# Patient Record
Sex: FEMALE | Race: BLACK OR AFRICAN AMERICAN | HISPANIC OR LATINO | Employment: OTHER | URBAN - METROPOLITAN AREA
[De-identification: names, ages, dates, MRNs, and addresses within clinical notes are randomized per-mention and may not be internally consistent; named-entity substitution may affect disease eponyms.]

---

## 2017-08-28 ENCOUNTER — CONVERSION ENCOUNTER (OUTPATIENT)
Dept: MAMMOGRAPHY | Facility: CLINIC | Age: 72
End: 2017-08-28

## 2017-10-10 ENCOUNTER — CONVERSION ENCOUNTER (OUTPATIENT)
Dept: MAMMOGRAPHY | Facility: CLINIC | Age: 72
End: 2017-10-10

## 2018-05-24 PROBLEM — C50.312 MALIGNANT NEOPLASM OF LOWER-INNER QUADRANT OF LEFT FEMALE BREAST (HCC): Status: ACTIVE | Noted: 2018-05-24

## 2018-05-24 RX ORDER — ATORVASTATIN CALCIUM 20 MG/1
20 TABLET, FILM COATED ORAL
COMMUNITY
End: 2018-07-10 | Stop reason: SDUPTHER

## 2018-05-24 RX ORDER — ACETAMINOPHEN AND CODEINE PHOSPHATE 300; 30 MG/1; MG/1
1 TABLET ORAL EVERY 6 HOURS PRN
Refills: 0 | COMMUNITY
Start: 2018-04-03 | End: 2018-05-29 | Stop reason: ALTCHOICE

## 2018-05-24 RX ORDER — GABAPENTIN 300 MG/1
100 CAPSULE ORAL 3 TIMES DAILY
COMMUNITY
End: 2018-07-10 | Stop reason: SDUPTHER

## 2018-05-24 RX ORDER — ANASTROZOLE 1 MG/1
1 TABLET ORAL DAILY
COMMUNITY
End: 2018-10-26 | Stop reason: SDUPTHER

## 2018-05-24 RX ORDER — NIACIN 100 MG
100 TABLET ORAL
COMMUNITY
End: 2018-07-10 | Stop reason: ALTCHOICE

## 2018-05-24 RX ORDER — DULOXETIN HYDROCHLORIDE 20 MG/1
20 CAPSULE, DELAYED RELEASE ORAL 2 TIMES DAILY
COMMUNITY
End: 2018-07-10 | Stop reason: ALTCHOICE

## 2018-05-24 RX ORDER — B-COMPLEX WITH VITAMIN C
1 TABLET ORAL 2 TIMES DAILY WITH MEALS
COMMUNITY
End: 2018-07-10 | Stop reason: SDUPTHER

## 2018-05-24 RX ORDER — NAPROXEN 500 MG/1
1 TABLET ORAL EVERY 12 HOURS PRN
COMMUNITY
Start: 2013-06-21 | End: 2018-11-27

## 2018-05-24 RX ORDER — DICLOFENAC SODIUM 25 MG/1
50 TABLET, DELAYED RELEASE ORAL 2 TIMES DAILY
COMMUNITY
End: 2018-07-10 | Stop reason: ALTCHOICE

## 2018-05-24 RX ORDER — ONDANSETRON 4 MG/1
4 TABLET, FILM COATED ORAL ONCE
COMMUNITY
End: 2018-11-27

## 2018-05-29 ENCOUNTER — APPOINTMENT (OUTPATIENT)
Dept: RADIATION ONCOLOGY | Facility: CLINIC | Age: 73
End: 2018-05-29
Attending: RADIOLOGY
Payer: MEDICARE

## 2018-05-29 ENCOUNTER — RADIATION ONCOLOGY CONSULT (OUTPATIENT)
Dept: RADIATION ONCOLOGY | Facility: CLINIC | Age: 73
End: 2018-05-29

## 2018-05-29 VITALS
RESPIRATION RATE: 18 BRPM | BODY MASS INDEX: 39.34 KG/M2 | WEIGHT: 194.8 LBS | SYSTOLIC BLOOD PRESSURE: 118 MMHG | TEMPERATURE: 98 F | HEART RATE: 87 BPM | DIASTOLIC BLOOD PRESSURE: 80 MMHG

## 2018-05-29 DIAGNOSIS — Z17.0 MALIGNANT NEOPLASM OF LOWER-INNER QUADRANT OF LEFT BREAST IN FEMALE, ESTROGEN RECEPTOR POSITIVE (HCC): Primary | ICD-10-CM

## 2018-05-29 DIAGNOSIS — C50.312 MALIGNANT NEOPLASM OF LOWER-INNER QUADRANT OF LEFT BREAST IN FEMALE, ESTROGEN RECEPTOR POSITIVE (HCC): Primary | ICD-10-CM

## 2018-05-29 PROCEDURE — 99215 OFFICE O/P EST HI 40 MIN: CPT | Performed by: RADIOLOGY

## 2018-05-29 NOTE — PROGRESS NOTES
Consultation - Radiation Oncology     SJQ:[id number] : 1945  Encounter: [id number]  Patient Information: [address]  Chief Complaint   Patient presents with    Consult     Cancer Staging  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 [facility])  Staging form: Breast, AJCC 8th Edition  - Clinical: No stage assigned - Unsigned  - Pathologic: Stage IA (pT2, pN0, cM0, G1, ER: Positive, CO: Positive, HER2: Negative) - Signed by Sonja Butts MD on 2018           History of Present Illness   Nellie Fernandez is a 74y o  year old female who presents to radiation oncology for left breast cancer radiation treatment  Referred by [facility] Dr Eben Ramsey  Friend from [facility] present with patient during consult           Uzbek speaking only from [facility]  Bilateral mammogram 10/10/17 at [facility], BI-RAD 5, seen by Dr Eben Ramsey, [address] , 18 revealed low grade estrogen receptor positive progesterone receptor negative HER 2 negative invasive lobular carcinoma  Left sided partial mastectomy and sentinel lymph node biopsy on 18 with 2 lymph nodes negative for malignancy and 2 1cm low grade invasive lobular carcinoma with anterior margin focally involved       17 Screening mammogram at [facility]  Focal asymmetry LIQ BI-RAD 0     10/10/17 Left diagnostic mammogram with ultrasound  Left LIQ spiculated asymmetry  US:  At 7:00 6-7 cm FN irregular hypoechoic nodule taller than wide with acoustic shadowing 1 x 1 5 x 1 3 cm  BiRads 5  Results discussed with patient via hospital , f/u with breast care nurse navigator     18 ultrasound pelvis unremarkable uterus, poorly imaged ovaries, no free fluid     18 Consult with Dr Eben Ramsey  Biopsy recommended for a self palpated lump in left breast, 7:00 cm from nipple 1 0x 07x1  0 cm     18 Left breast ultrasound guided core biopsies  Infiltrating lobular carcinoma  Grade 1    ER 98% positive, VA negative, HER2 negative     2/15/18 XR chest 2 views no acute or focal abnormality within chest     18 Dr Italo Coppola, surgical oncology, [facility] f/u  Plan is for eft partial mastectomy with sentinel node biopsy and possible axillary lymph node dissection     3/16/18 MRI brain unremarkable no mets     4/3/18 Left partial mastectomy with 2 sentinel node biopsy  Invasive lobular carcinoma  Grade 1  Size at least 2 1 cm  Multiple foci of LCIS  LCIS present at superior margin  Invasive malignancy is also noted in close proximity to the superior margin of resection as well (although actual involvement of such is not identified)     18 Dr Italo Coppola, [facility] f/u  Plan re-excision of margin unknown date and unplanned at this time  Follow up 18 Seen by Dr Merry Carcamo, [facility] follow up 2018  Anastrozole started  Referral to [address] [place] [address] , Edie  Psychiatry appt scheduled in 2018  Screening  Tobacco  Current tobacco user: no  If yes, brief counseling provided:yes     Hypertension  Hypertension screening performed: yes  Normotensive:  yes  If no, referred to PCP:no     Depression Screening  Screened for depression using PHQ-2: yes     Screened for depression using PHQ-9: yes  Screening positive or negative:  positive  If score >4, was any of the following actions taken? Additional evaluation for depression, suicide risk assesment, referral to PCP or psychiatry, medication started:yes     Advanced Care Planning for Patients >65 years  Advanced Care Planning Discussed:  no  Patient named surrogate decision maker or care plan in chart:no     OB/GYN History:  The patient underwent menarche at 13 years  Menopause Status postmenopausal  Menopause at 37 years  Hormone replacement therapy:no  Years used    6  Para 5  Age at first delivery being 19 years     Nursing: yes  Birth control pills: no   Years used n/a  Gyncological comment: tubes tied approx 43years old     Historical Information      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 ([facility] )    2018 Biopsy     Left breast ultrasound guided core biopsies  Infiltrating lobular carcinoma  Grade 1  ER 98% positive, VA negative, HER2 negative           2018 Initial Diagnosis     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 ([facility] )         4/3/2018 Surgery     Left partial mastectomy with 2 sentinel node biopsy    Size at least 2 1 cm  Multiple foci of LCIS  LCIS present at superior margin  Invasive malignancy is also noted in close proximity to the superior margin of resection    Pathological staging: stage IIA pT2, pN0 sn, pMX, G1         4/3/2018 Genomic Testing     Oncotype DX    Recurrence score 13, 10 year risk of distance recurrence  SANCHEZ alone 8%  ER score 10 6 positive  VA score 5 1 negative  HER2 9 6 negative               Past Medical History:   Diagnosis Date    Anxiety     Breast cancer ([place] [address] )     Depression     Depression     Hypercholesterolemia     Hypertension     Migraine      Past Surgical History:   Procedure Laterality Date    BREAST BIOPSY      BREAST LUMPECTOMY       SECTION      CHOLECYSTECTOMY      SENTINEL LYMPH NODE BIOPSY         History reviewed  No pertinent family history      Social History   History   Alcohol Use No     History   Drug Use No     History   Smoking Status    Passive Smoke Exposure - Never Smoker   Smokeless Tobacco    Never Used         Meds/Allergies     Current Outpatient Prescriptions:     anastrozole (ARIMIDEX) 1 mg tablet, Take 1 mg by mouth daily, Disp: , Rfl:     atorvastatin (LIPITOR) 20 mg tablet, Take 20 mg by mouth daily at bedtime, Disp: , Rfl:     calcium carbonate-vitamin D (OSCAL-D) 500 mg-200 units per tablet, Take 1 tablet by mouth 2 (two) times a day with meals, Disp: , Rfl:     niacin 100 mg tablet, Take 100 mg by mouth daily at bedtime, Disp: , Rfl:     diclofenac (VOLTAREN) 25 MG EC tablet, Take 50 mg by mouth 2 (two) times a day, Disp: , Rfl:     DULoxetine (CYMBALTA) 20 mg capsule, Take 20 mg by mouth 2 (two) times a day, Disp: , Rfl:     gabapentin (NEURONTIN) 300 mg capsule, Take 100 mg by mouth 3 (three) times a day, Disp: , Rfl:     naproxen (NAPROSYN) 500 mg tablet, Take 1 tablet by mouth every 12 (twelve) hours as needed, Disp: , Rfl:     ondansetron (ZOFRAN) 4 mg tablet, Take 4 mg by mouth once, Disp: , Rfl:   Allergies   Allergen Reactions    Chocolate     Pork-Derived Products          Review of Systems  Constitutional: Positive for fatigue  Eyes: Positive for visual disturbance  Cataract surgery scheduled June 18, 2018   Respiratory: Negative  Cardiovascular: Positive for leg swelling  Gastrointestinal: Negative  Endocrine: Negative  Genitourinary: Positive for frequency  Dysuria   Neurological: Positive for headaches  Hematological: Negative  Psychiatric/Behavioral:        Depression      OBJECTIVE:   /80 (BP Location: Right arm, Patient Position: Sitting, Cuff Size: Large)   Pulse 87   Temp 98 °F (36 7 °C) (Temporal)   Resp 18   Wt 88 4 kg (194 lb 12 8 oz)   BMI 39 34 kg/m²   Pain Assessment:  2  Performance Status: ECOG/Zubrod/WHO: 1 - Symptomatic but completely ambulatory    Physical Exam   Constitutional: She appears well-developed  HENT:   Head: Normocephalic  Hair is normal    Mouth/Throat: Oropharynx is clear and moist    Eyes: EOM are normal  No scleral icterus  Neck: Neck supple  No spinous process tenderness present  No edema present  Cardiovascular: Normal rate and regular rhythm  Pulmonary/Chest: Effort normal and breath sounds normal  No respiratory distress  She has no wheezes  She exhibits no tenderness  She has no significant breast edema  She does have limitation for full extension of her left upper extremity    No suspicious lesions palpable in either breast   Her incisions are healing well without any sign of erythema or drainage noted  Abdominal: Soft  Bowel sounds are normal  She exhibits no distension, no ascites and no mass  There is no hepatosplenomegaly  There is no tenderness  There is no rebound  Genitourinary: No breast swelling or tenderness  Musculoskeletal: Normal range of motion  She exhibits no edema or tenderness  Lymphadenopathy:     She has no cervical adenopathy  She has no axillary adenopathy  Neurological: She is alert  She has normal strength  No cranial nerve deficit  Coordination and gait normal    Skin: Skin is intact  Psychiatric: She has a normal mood and affect  Her speech is normal and behavior is normal           RESULTS  Lab Results    Chemistry    No results found for: NA, K, CL, CO2, BUN, CREATININE, GLU No results found for: CALCIUM, ALKPHOS, AST, ALT, BILITOT         No results found for: WBC, HGB, HCT, MCV, PLT      Imaging Studies  No results found  Pathology:  Her outside pathology revealed 2 sentinel lymph nodes negative for metastasis  The left breast contained a 2 1 cm invasive lobular carcinoma grade 3  The anterior margin was focally involved with invasive carcinoma  The superior and inferior margin had invasive carcinoma 1 5 mm  Lateral margin 2 mm a medial margin 2  7 mm  Tumor was ERPR positive her 2-        ASSESSMENT  1   Malignant neoplasm of 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, estrogen receptor positive ([address] )  Radiation Simulation Treatment     Cancer Staging  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 ([address] )  Staging form: Breast, AJCC 8th Edition  - Clinical: No stage assigned - Unsigned  - Pathologic: Stage IA (pT2, pN0, cM0, G1, ER: Positive, ID: Positive, HER2: Negative) - Signed by Leatha Escamilla MD on 5/29/2018        PLAN/DISCUSSION  Orders Placed This Encounter   Procedures   [address] Treatment          David Willis is a 74y o  year old female with T2 N0 (stage IIA) invasive lobular carcinoma grade 1  Her tumor is ERPR positive her 2-  Her anterior margin is positive for invasive carcinoma and close in the superior and inferior aspect at 1 8 mm  Per her surgeon's note there is no plan for re-excision  I will confirm this with the surgeon  Should there be no reexcision the plan will be to proceed with adjuvant radiation therapy to the left whole breast   The plan will be to deliver a dose of 5000 cGy to the breast followed by a boost for an additional 1600 cGy in light of her margin status  The procedure involved was reviewed in detail with the patient  Of notice  was utilized for the entire visit  She understands the goal of treatment is to reduce her risk of recurrence  The possible acute and long-term side effects were reviewed with her  She has been scheduled for CT simulation at which time she will undergo both free breathing as well as breath hold scans for optimal cardiac dosimetry  She has been evaluated by Medical Oncology and recommended hormone therapy  She did undergo Oncotype DX which returned low risk  She is agreeable to the above outlined plan  Omid Schaefer MD  6/22/1115,9:25 PM      Portions of the record may have been created with voice recognition software   Occasional wrong word or "sound a like" substitutions may have occurred due to the inherent limitations of voice recognition software   Read the chart carefully and recognize, using context, where substitutions have occurred

## 2018-05-29 NOTE — PROGRESS NOTES
Deanna Oneal  1945  Ms Sp Bunch is a 74 y o  female    Chief Complaint   Patient presents with    Consult       Cancer Staging  No matching staging information was found for the patient  74year old female presents to radiation oncology for left breast cancer radiation treatment  [facility] Eb [facility]  Friend from [facility] present with patient during consult  Referral to [facility] speaking , Edie  Psychiatry appt scheduled in June 2018  Yi speaking only from [facility]  Bilateral mammogram 10/10/17 at [facility], BI-RAD 5, seen by Dr Eb Rodriguez, [facility] , 1/24/18 revealed low grade estrogen receptor positive progesterone receptor negative HER 2 negative invasive lobular carcinoma  Left sided partial mastectomy and sentinel lymph node biopsy on 4/2/18 with 2 lymph nodes negative for malignancy and 2 1cm low grade invasive lobular carcinoma with anterior margin focally involved  8/28/17 Screening mammogram at [facility]  Focal asymmetry LIQ BI-RAD 0    10/10/17 Left diagnostic mammogram with ultrasound  Left LIQ spiculated asymmetry  US:  At 7:00 6-7 cm FN irregular hypoechoic nodule taller than wide with acoustic shadowing 1 x 1 5 x 1 3 cm  BiRads 5  Results discussed with patient via hospital , f/u with breast care nurse navigator    1/19/18 ultrasound pelvis unremarkable uterus, poorly imaged ovaries, no free fluid    1/24/18 Consult with Dr Eb Rodriguez  Biopsy recommended for a self palpated lump in left breast, 7:00 cm from nipple 1 0x 07x1  0 cm    1/24/18 Left breast ultrasound guided core biopsies  Infiltrating lobular carcinoma  Grade 1  ER 98% positive, WI negative, HER2 negative    2/15/18 XR chest 2 views no acute or focal abnormality within chest    2/22/18 Dr Eb Rodriguez, surgical oncology, [facility]  f/u   Plan is for eft partial mastectomy with sentinel node biopsy and possible axillary lymph node dissection    3/16/18 MRI brain unremarkable no mets    4/3/18 Left partial mastectomy with 2 sentinel node biopsy  Invasive lobular carcinoma  Grade 1  Size at least 2 1 cm  Multiple foci of LCIS  LCIS present at superior margin  Invasive malignancy is also noted in close proximity to the superior margin of resection as well (although actual involvement of such is not identified)    4/16/18 Dr Caleb Darden, [facility] [address]  f/u  Plan re-excision of margin unknown date and unplanned at this time  Follow up July 2018 5/16/18 Seen by Dr Eric Metz, [facility] follow up July 2018  Anastrozole started         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 ([address] )    1/24/2018 Biopsy     Left breast ultrasound guided core biopsies  Infiltrating lobular carcinoma  Grade 1  ER 98% positive, MO negative, HER2 negative           1/24/2018 Initial Diagnosis     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 ([address] )         4/3/2018 Surgery     Left partial mastectomy with 2 sentinel node biopsy    Size at least 2 1 cm  Multiple foci of LCIS  LCIS present at superior margin  Invasive malignancy is also noted in close proximity to the superior margin of resection    Pathological staging: stage IIA pT2, pN0 sn, pMX, G1         4/3/2018 Genomic Testing     Oncotype DX    Recurrence score 13, 10 year risk of distance recurrence  SANCHEZ alone 8%  ER score 10 6 positive  MO score 5 1 negative  HER2 9 6 negative             Clinical Trial: no    Screening  Tobacco  Current tobacco user: no  If yes, brief counseling provided:yes    Hypertension  Hypertension screening performed: yes  Normotensive:  yes  If no, referred to PCP:no    Depression Screening  Screened for depression using PHQ-2: yes    Screened for depression using PHQ-9: yes  Screening positive or negative:  positive  If score >4, was any of the following actions taken?    Additional evaluation for depression, suicide risk assesment, referral to PCP or psychiatry, medication started:yes    Advanced Care Planning for Patients >65 years  Advanced Care Planning Discussed:  no  Patient named surrogate decision maker or care plan in chart:no    OB/GYN History:  The patient underwent menarche at 13 years  Menopause Status postmenopausal  Menopause at 37 years  Hormone replacement therapy:no  Years used    6  Para 5  Age at first delivery being 19 years  Nursing: yes  Birth control pills: no   Years used n/a  Gyncological comment: tubes tied approx 43years old      There are no preventive care reminders to display for this patient  Patient Active Problem List   Diagnosis    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 [facility])     Past Medical History:   Diagnosis Date    Anxiety     [facility] )     Depression     Depression     Hypercholesterolemia     Migraine      Past Surgical History:   Procedure Laterality Date    BREAST BIOPSY      BREAST LUMPECTOMY       SECTION      CHOLECYSTECTOMY      SENTINEL LYMPH NODE BIOPSY       History reviewed  No pertinent family history  Social History     Social History    Marital status: Unknown     Spouse name: N/A    Number of children: N/A    Years of education: N/A     Occupational History    Not on file       Social History Main Topics    Smoking status: Passive Smoke Exposure - Never Smoker    Smokeless tobacco: Never Used    Alcohol use No    Drug use: No    Sexual activity: Not on file     Other Topics Concern    Not on file     Social History Narrative    No narrative on file       Current Outpatient Prescriptions:     anastrozole (ARIMIDEX) 1 mg tablet, Take 1 mg by mouth daily, Disp: , Rfl:     atorvastatin (LIPITOR) 20 mg tablet, Take 20 mg by mouth daily at bedtime, Disp: , Rfl:     calcium carbonate-vitamin D (OSCAL-D) 500 mg-200 units per tablet, Take 1 tablet by mouth 2 (two) times a day with meals, Disp: , Rfl:     niacin 100 mg tablet, Take 100 mg by mouth daily at bedtime, Disp: , Rfl:     diclofenac (VOLTAREN) 25 MG EC tablet, Take 50 mg by mouth 2 (two) times a day, Disp: , Rfl:     DULoxetine (CYMBALTA) 20 mg capsule, Take 20 mg by mouth 2 (two) times a day, Disp: , Rfl:     gabapentin (NEURONTIN) 300 mg capsule, Take 100 mg by mouth 3 (three) times a day, Disp: , Rfl:     naproxen (NAPROSYN) 500 mg tablet, Take 1 tablet by mouth every 12 (twelve) hours as needed, Disp: , Rfl:     ondansetron (ZOFRAN) 4 mg tablet, Take 4 mg by mouth once, Disp: , Rfl:     Allergies   Allergen Reactions    Chocolate     Pork-Derived Products        Review of Systems:  Review of Systems   Constitutional: Positive for fatigue  Eyes: Positive for visual disturbance  Cataract surgery scheduled June 18, 2018   Respiratory: Negative  Cardiovascular: Positive for leg swelling  Gastrointestinal: Negative  Endocrine: Negative  Genitourinary: Positive for frequency  Dysuria   Neurological: Positive for headaches  Hematological: Negative  Psychiatric/Behavioral:        Depression        Vitals:    05/29/18 1346   BP: 118/80   BP Location: Right arm   Patient Position: Sitting   Cuff Size: Large   Pulse: 87   Resp: 18   Temp: 98 °F (36 7 °C)   TempSrc: Temporal   Weight: 88 4 kg (194 lb 12 8 oz)       Pain Score:   8    Imaging:No results found  Teaching: Bel[place]ian speaking patient provided RT packet with breast cancer teaching  Provided [place]ian teaching

## 2018-06-05 ENCOUNTER — APPOINTMENT (OUTPATIENT)
Dept: RADIATION ONCOLOGY | Facility: CLINIC | Age: 73
End: 2018-06-05
Payer: MEDICARE

## 2018-06-07 ENCOUNTER — APPOINTMENT (OUTPATIENT)
Dept: RADIATION ONCOLOGY | Facility: CLINIC | Age: 73
End: 2018-06-07
Attending: RADIOLOGY
Payer: MEDICARE

## 2018-06-07 PROCEDURE — 77290 THER RAD SIMULAJ FIELD CPLX: CPT | Performed by: RADIOLOGY

## 2018-06-14 ENCOUNTER — TELEPHONE (OUTPATIENT)
Dept: GASTROENTEROLOGY | Facility: MEDICAL CENTER | Age: 73
End: 2018-06-14

## 2018-06-20 DIAGNOSIS — Z17.0 MALIGNANT NEOPLASM OF LOWER-INNER QUADRANT OF LEFT BREAST IN FEMALE, ESTROGEN RECEPTOR POSITIVE (HCC): Primary | ICD-10-CM

## 2018-06-20 DIAGNOSIS — C50.312 MALIGNANT NEOPLASM OF LOWER-INNER QUADRANT OF LEFT BREAST IN FEMALE, ESTROGEN RECEPTOR POSITIVE (HCC): Primary | ICD-10-CM

## 2018-06-21 PROCEDURE — 77417 THER RADIOLOGY PORT IMAGE(S): CPT | Performed by: RADIOLOGY

## 2018-06-21 PROCEDURE — 77412 RADIATION TX DELIVERY LVL 3: CPT | Performed by: RADIOLOGY

## 2018-06-22 PROCEDURE — 77331 SPECIAL RADIATION DOSIMETRY: CPT | Performed by: RADIOLOGY

## 2018-06-22 PROCEDURE — 77412 RADIATION TX DELIVERY LVL 3: CPT | Performed by: RADIOLOGY

## 2018-06-25 ENCOUNTER — APPOINTMENT (EMERGENCY)
Dept: CT IMAGING | Facility: HOSPITAL | Age: 73
End: 2018-06-25
Payer: MEDICARE

## 2018-06-25 ENCOUNTER — HOSPITAL ENCOUNTER (EMERGENCY)
Facility: HOSPITAL | Age: 73
Discharge: HOME | End: 2018-06-25
Attending: EMERGENCY MEDICINE
Payer: MEDICARE

## 2018-06-25 VITALS
HEART RATE: 84 BPM | SYSTOLIC BLOOD PRESSURE: 146 MMHG | OXYGEN SATURATION: 100 % | WEIGHT: 191.58 LBS | DIASTOLIC BLOOD PRESSURE: 96 MMHG | BODY MASS INDEX: 38.69 KG/M2 | TEMPERATURE: 96.4 F | RESPIRATION RATE: 20 BRPM

## 2018-06-25 DIAGNOSIS — R11.10 VOMITING: Primary | ICD-10-CM

## 2018-06-25 DIAGNOSIS — R42 DIZZINESS: ICD-10-CM

## 2018-06-25 LAB
ALBUMIN SERPL BCP-MCNC: 4.2 G/DL (ref 3–5.2)
ALP SERPL-CCNC: 92 U/L (ref 43–122)
ALT SERPL W P-5'-P-CCNC: 33 U/L (ref 9–52)
AMORPH PHOS CRY URNS QL MICRO: ABNORMAL /HPF
ANION GAP SERPL CALCULATED.3IONS-SCNC: 8 MMOL/L (ref 5–14)
AST SERPL W P-5'-P-CCNC: 38 U/L (ref 14–36)
ATRIAL RATE: 84 BPM
BACTERIA UR QL AUTO: ABNORMAL /HPF
BASOPHILS # BLD AUTO: 0.17 THOUSAND/UL (ref 0–0.1)
BASOPHILS NFR MAR MANUAL: 2 % (ref 0–1)
BILIRUB SERPL-MCNC: 0.5 MG/DL
BILIRUB UR QL STRIP: NEGATIVE
BUN SERPL-MCNC: 13 MG/DL (ref 5–25)
CALCIUM SERPL-MCNC: 9.5 MG/DL (ref 8.4–10.2)
CHLORIDE SERPL-SCNC: 104 MMOL/L (ref 97–108)
CLARITY UR: ABNORMAL
CO2 SERPL-SCNC: 29 MMOL/L (ref 22–30)
COLOR UR: ABNORMAL
CREAT SERPL-MCNC: 0.56 MG/DL (ref 0.6–1.2)
EOSINOPHIL # BLD AUTO: 0.09 THOUSAND/UL (ref 0–0.4)
EOSINOPHIL NFR BLD MANUAL: 1 % (ref 0–6)
ERYTHROCYTE [DISTWIDTH] IN BLOOD BY AUTOMATED COUNT: 13.8 %
GFR SERPL CREATININE-BSD FRML MDRD: 106 ML/MIN/1.73SQ M
GLUCOSE SERPL-MCNC: 93 MG/DL (ref 70–99)
GLUCOSE UR STRIP-MCNC: NEGATIVE MG/DL
HCT VFR BLD AUTO: 36.9 % (ref 36–46)
HGB BLD-MCNC: 12.5 G/DL (ref 12–16)
HGB UR QL STRIP.AUTO: NEGATIVE
KETONES UR STRIP-MCNC: NEGATIVE MG/DL
LEUKOCYTE ESTERASE UR QL STRIP: 25
LIPASE SERPL-CCNC: 83 U/L (ref 23–300)
LYMPHOCYTES # BLD AUTO: 3.57 THOUSAND/UL (ref 0.5–4)
LYMPHOCYTES # BLD AUTO: 42 % (ref 20–50)
MCH RBC QN AUTO: 30 PG (ref 26–34)
MCHC RBC AUTO-ENTMCNC: 33.8 G/DL (ref 31–36)
MCV RBC AUTO: 89 FL (ref 80–100)
MONOCYTES # BLD AUTO: 1.11 THOUSAND/UL (ref 0.2–0.9)
MONOCYTES NFR BLD AUTO: 13 % (ref 1–10)
NEUTS BAND NFR BLD MANUAL: 2 % (ref 0–8)
NEUTS SEG # BLD: 3.57 THOUSAND/UL (ref 1.8–7.8)
NEUTS SEG NFR BLD AUTO: 40 %
NITRITE UR QL STRIP: NEGATIVE
NON-SQ EPI CELLS URNS QL MICRO: ABNORMAL /HPF
P AXIS: 53 DEGREES
PH UR STRIP.AUTO: 8 [PH] (ref 4.5–8)
PLATELET # BLD AUTO: 265 THOUSANDS/UL (ref 150–450)
PLATELET BLD QL SMEAR: ADEQUATE
PMV BLD AUTO: 8.4 FL (ref 8.9–12.7)
POTASSIUM SERPL-SCNC: 4.2 MMOL/L (ref 3.6–5)
PR INTERVAL: 154 MS
PROT SERPL-MCNC: 7.8 G/DL (ref 5.9–8.4)
PROT UR STRIP-MCNC: NEGATIVE MG/DL
QRS AXIS: -5 DEGREES
QRSD INTERVAL: 96 MS
QT INTERVAL: 400 MS
QTC INTERVAL: 472 MS
RBC # BLD AUTO: 4.16 MILLION/UL (ref 4–5.2)
RBC #/AREA URNS AUTO: ABNORMAL /HPF
RBC MORPH BLD: NORMAL
SODIUM SERPL-SCNC: 141 MMOL/L (ref 137–147)
SP GR UR STRIP.AUTO: 1.01 (ref 1–1.04)
T WAVE AXIS: 40 DEGREES
TOTAL CELLS COUNTED SPEC: 100
TROPONIN I SERPL-MCNC: <0.01 NG/ML (ref 0–0.03)
UROBILINOGEN UA: NEGATIVE MG/DL
VENTRICULAR RATE: 84 BPM
WBC # BLD AUTO: 8.5 THOUSAND/UL (ref 4.5–11)
WBC #/AREA URNS AUTO: ABNORMAL /HPF

## 2018-06-25 PROCEDURE — 93005 ELECTROCARDIOGRAM TRACING: CPT

## 2018-06-25 PROCEDURE — 84484 ASSAY OF TROPONIN QUANT: CPT | Performed by: EMERGENCY MEDICINE

## 2018-06-25 PROCEDURE — 96361 HYDRATE IV INFUSION ADD-ON: CPT

## 2018-06-25 PROCEDURE — 96374 THER/PROPH/DIAG INJ IV PUSH: CPT

## 2018-06-25 PROCEDURE — 80053 COMPREHEN METABOLIC PANEL: CPT | Performed by: EMERGENCY MEDICINE

## 2018-06-25 PROCEDURE — 93010 ELECTROCARDIOGRAM REPORT: CPT | Performed by: INTERNAL MEDICINE

## 2018-06-25 PROCEDURE — 36415 COLL VENOUS BLD VENIPUNCTURE: CPT | Performed by: EMERGENCY MEDICINE

## 2018-06-25 PROCEDURE — 81001 URINALYSIS AUTO W/SCOPE: CPT | Performed by: EMERGENCY MEDICINE

## 2018-06-25 PROCEDURE — 85027 COMPLETE CBC AUTOMATED: CPT | Performed by: EMERGENCY MEDICINE

## 2018-06-25 PROCEDURE — 99284 EMERGENCY DEPT VISIT MOD MDM: CPT

## 2018-06-25 PROCEDURE — 85007 BL SMEAR W/DIFF WBC COUNT: CPT | Performed by: EMERGENCY MEDICINE

## 2018-06-25 PROCEDURE — 83690 ASSAY OF LIPASE: CPT | Performed by: EMERGENCY MEDICINE

## 2018-06-25 PROCEDURE — 74177 CT ABD & PELVIS W/CONTRAST: CPT

## 2018-06-25 RX ORDER — ONDANSETRON 2 MG/ML
4 INJECTION INTRAMUSCULAR; INTRAVENOUS ONCE
Status: COMPLETED | OUTPATIENT
Start: 2018-06-25 | End: 2018-06-25

## 2018-06-25 RX ORDER — ONDANSETRON 2 MG/ML
INJECTION INTRAMUSCULAR; INTRAVENOUS
Status: COMPLETED
Start: 2018-06-25 | End: 2018-06-25

## 2018-06-25 RX ORDER — ONDANSETRON 4 MG/1
4 TABLET, ORALLY DISINTEGRATING ORAL ONCE
Status: CANCELLED | OUTPATIENT
Start: 2018-06-25 | End: 2018-06-25

## 2018-06-25 RX ADMIN — ONDANSETRON 4 MG: 2 INJECTION INTRAMUSCULAR; INTRAVENOUS at 16:54

## 2018-06-25 RX ADMIN — SODIUM CHLORIDE 1000 ML: 9 INJECTION, SOLUTION INTRAVENOUS at 16:49

## 2018-06-25 RX ADMIN — IOHEXOL 50 ML: 240 INJECTION, SOLUTION INTRATHECAL; INTRAVASCULAR; INTRAVENOUS; ORAL at 18:58

## 2018-06-25 RX ADMIN — IOHEXOL 80 ML: 350 INJECTION, SOLUTION INTRAVENOUS at 21:05

## 2018-06-25 RX ADMIN — ONDANSETRON 4 MG: 2 INJECTION, SOLUTION INTRAMUSCULAR; INTRAVENOUS at 16:54

## 2018-06-25 NOTE — ED NOTES
Complaining of feeling weak, dizziness and SOB "she feels like she is suffocating"  Denies CP Eliane Mortimer, RN  06/25/18 1905

## 2018-06-25 NOTE — ED PROVIDER NOTES
History  Chief Complaint   Patient presents with    Dizziness     pt c/o dizziness since last night/ pt also c/o vomiting & neck pain  Denies any injury     Patient presents with several days right-sided abdominal discomfort  Yesterday she began to developed vomiting and now having dizziness as well  She is also complaining of generalized fatigue  She had tried to get in touch with her primary care physician Dr Chuck Kirby but was unsuccessful  She denies chest pain headache diarrhea blood in the vomit or any other symptoms  History provided by:  Caregiver and patient   used: Yes    Dizziness   Quality:  Lightheadedness  Severity:  Moderate  Onset quality:  Gradual  Duration:  1 day  Timing:  Constant  Progression:  Waxing and waning  Chronicity:  New  Relieved by:  Nothing  Worsened by:  Nothing  Ineffective treatments:  None tried  Associated symptoms: nausea, vomiting and weakness    Vomiting:     Quality:  Stomach contents      Prior to Admission Medications   Prescriptions Last Dose Informant Patient Reported? Taking?    DULoxetine (CYMBALTA) 20 mg capsule   Yes No   Sig: Take 20 mg by mouth 2 (two) times a day   anastrozole (ARIMIDEX) 1 mg tablet   Yes No   Sig: Take 1 mg by mouth daily   atorvastatin (LIPITOR) 20 mg tablet   Yes No   Sig: Take 20 mg by mouth daily at bedtime   calcium carbonate-vitamin D (OSCAL-D) 500 mg-200 units per tablet   Yes No   Sig: Take 1 tablet by mouth 2 (two) times a day with meals   diclofenac (VOLTAREN) 25 MG EC tablet   Yes No   Sig: Take 50 mg by mouth 2 (two) times a day   gabapentin (NEURONTIN) 300 mg capsule   Yes No   Sig: Take 100 mg by mouth 3 (three) times a day   naproxen (NAPROSYN) 500 mg tablet   Yes No   Sig: Take 1 tablet by mouth every 12 (twelve) hours as needed   niacin 100 mg tablet   Yes No   Sig: Take 100 mg by mouth daily at bedtime   ondansetron (ZOFRAN) 4 mg tablet   Yes No   Sig: Take 4 mg by mouth once Facility-Administered Medications: None       Past Medical History:   Diagnosis Date    Anxiety     Breast cancer ([address] )     Depression     Depression     Hypercholesterolemia     Hypertension     Migraine        Past Surgical History:   Procedure Laterality Date    BREAST BIOPSY      BREAST LUMPECTOMY       SECTION      SENTINEL LYMPH NODE BIOPSY         History reviewed  No pertinent family history  I have reviewed and agree with the history as documented  Social History   Substance Use Topics    Smoking status: Passive Smoke Exposure - Never Smoker    Smokeless tobacco: Never Used    Alcohol use No        Review of Systems   Constitutional: Positive for appetite change, chills and fatigue  Gastrointestinal: Positive for nausea and vomiting  Neurological: Positive for dizziness and weakness  Physical Exam  Physical Exam   Constitutional: She is oriented to person, place, and time  She appears well-developed and well-nourished  HENT:   Head: Normocephalic  Eyes: Conjunctivae are normal  Pupils are equal, round, and reactive to light  Neck: Neck supple  Cardiovascular: Normal rate, regular rhythm and normal heart sounds  Pulmonary/Chest: Effort normal and breath sounds normal    Abdominal: Soft  Bowel sounds are normal  She exhibits no mass  There is tenderness  There is no rebound  Musculoskeletal: Normal range of motion  Neurological: She is alert and oriented to person, place, and time  Skin: Skin is warm and dry  Capillary refill takes less than 2 seconds  Psychiatric: She has a normal mood and affect  Vitals reviewed        Vital Signs  ED Triage Vitals [18 1534]   Temperature Pulse Respirations Blood Pressure SpO2   (!) 96 4 °F (35 8 °C) 83 22 144/94 96 %      Temp Source Heart Rate Source Patient Position - Orthostatic VS BP Location FiO2 (%)   Temporal Monitor Sitting Left arm --      Pain Score       Worst Possible Pain           Vitals: 06/25/18 1536 06/25/18 1845 06/25/18 1900 06/25/18 2210   BP: 144/94   146/96   Pulse: 82 83 85 84   Patient Position - Orthostatic VS: Sitting   Lying       Visual Acuity      ED Medications  Medications   ondansetron (ZOFRAN) injection 4 mg (4 mg Intravenous Given 6/25/18 1654)   sodium chloride 0 9 % bolus 1,000 mL (0 mL Intravenous Stopped 6/25/18 2201)   iohexol (OMNIPAQUE) 240 MG/ML solution **AcuDose Override Pull** (50 mL Oral Given 6/25/18 1858)   iohexol (OMNIPAQUE) 350 MG/ML injection (SINGLE-DOSE) 80 mL (80 mL Intravenous Given 6/25/18 2105)       Diagnostic Studies  Results Reviewed     Procedure Component Value Units Date/Time    Urine Microscopic [[id number]]  (Abnormal) Collected:  06/25/18 1723    Lab Status:  Final result Specimen:  Urine from Urine, Other Updated:  06/25/18 1746     RBC, UA 0-1 (A) /hpf      WBC, UA 1-2 (A) /hpf      Epithelial Cells Occasional /hpf      Bacteria, UA Occasional /hpf      AMORPH PHOSPATES Moderate /hpf     UA w Reflex to Microscopic [[id number]]  (Abnormal) Collected:  06/25/18 1723    Lab Status:  Final result Specimen:  Urine from Urine, Other Updated:  06/25/18 1745     Color, UA Straw     Clarity, UA Slightly Cloudy (A)     Specific Gravity, UA 1 015     pH, UA 8 0     Leukocytes, UA 25 0 (A)     Nitrite, UA Negative     Protein, UA Negative mg/dl      Glucose, UA Negative mg/dl      Ketones, UA Negative mg/dl      Bilirubin, UA Negative     Blood, UA Negative     UROBILINOGEN UA Negative mg/dL     Troponin I [[id number]]  (Normal) Collected:  06/25/18 1647    Lab Status:  Final result Specimen:  Blood from Arm, Right Updated:  06/25/18 1732     Troponin I <0 01 ng/mL     Lipase [[id number]]  (Normal) Collected:  06/25/18 1647    Lab Status:  Final result Specimen:  Blood from Arm, Right Updated:  06/25/18 1727     Lipase 83 u/L     Comprehensive metabolic panel [[id number]]  (Abnormal) Collected:  06/25/18 1647    Lab Status:  Final result Specimen:  Blood from Arm, Right Updated:  06/25/18 1727     Sodium 141 mmol/L      Potassium 4 2 mmol/L      Chloride 104 mmol/L      CO2 29 mmol/L      Anion Gap 8 mmol/L      BUN 13 mg/dL      Creatinine 0 56 (L) mg/dL      Glucose 93 mg/dL      Calcium 9 5 mg/dL      AST 38 (H) U/L      ALT 33 U/L      Alkaline Phosphatase 92 U/L      Total Protein 7 8 g/dL      Albumin 4 2 g/dL      Total Bilirubin 0 50 mg/dL      eGFR 106 ml/min/1 73sq m     Narrative:         National Kidney Disease Education Program recommendations are as follows:  GFR calculation is accurate only with a steady state creatinine  Chronic Kidney disease less than 60 ml/min/1 73 sq  meters  Kidney failure less than 15 ml/min/1 73 sq  meters  CBC and differential [[id number]]  (Abnormal) Collected:  06/25/18 1647    Lab Status:  Final result Specimen:  Blood from Arm, Right Updated:  06/25/18 1703     WBC 8 50 Thousand/uL      RBC 4 16 Million/uL      Hemoglobin 12 5 g/dL      Hematocrit 36 9 %      MCV 89 fL      MCH 30 0 pg      MCHC 33 8 g/dL      RDW 13 8 %      MPV 8 4 (L) fL      Platelets 724 Thousands/uL                  CT abdomen pelvis with contrast   Final Result by Tyrell Rivera MD (06/25 2121)      Hepatic steatosis  Workstation performed: [id number]                    Procedures  ECG 12 Lead Documentation  Date/Time: 6/25/2018 4:10 PM  Performed by: Leonides Klinefelter  Authorized by: Leonides Klinefelter     ECG reviewed by me, the ED Provider: yes    Patient location:  ED  Interpretation:     Interpretation: normal    Rate:     ECG rate assessment: normal    Rhythm:     Rhythm: sinus rhythm    Ectopy:     Ectopy: none    QRS:     QRS axis:  Left  ST segments:     ST segments:  Normal  T waves:     T waves: flattening               Phone Contacts  ED Phone Contact    ED Course  ED Course as of Jun 26 0003 Mon Jun 25, 2018   1708 Patient re-evaluated  She states that her nausea and vomiting have completely resolved after receiving Zofran    Her dizziness is improving as the IV fluids infuse  [facility]  Number of Diagnoses or Management Options  Dizziness:   Vomiting:   Diagnosis management comments: Patient was reassessed after labs and CT were resulted  Her symptoms remained completely resolved  She was feeling better was happy laughing and smiling with her family  She was advised the importance of close follow-up with her family physician as soon as possible  Also discussed the need to maintain a bland diet for the next 24 hr and then proper methods for advancing her diet  Discussed reasons to return to the emergency department as well  CritCare Time    Disposition  Final diagnoses:   Vomiting   Dizziness     Time reflects when diagnosis was documented in both MDM as applicable and the Disposition within this note     Time User Action Codes Description Comment    6/25/2018  9:55 PM Lennox Ashby Add [R11 10] Vomiting     6/25/2018  9:55 PM Lennox Ashby Add [R42] Dizziness       ED Disposition     ED Disposition Condition Comment    Discharge  Cameron Fall discharge to home/self care      Condition at discharge: Good        Follow-up Information     Follow up With Specialties Details Why Contact Info    Cori Parker MD [facility] Medicine Call  [address]  [address] [facility]  [address]  [address] [place] [address]            Discharge Medication List as of 6/25/2018  9:57 PM      CONTINUE these medications which have NOT CHANGED    Details   anastrozole (ARIMIDEX) 1 mg tablet Take 1 mg by mouth daily, Historical Med      atorvastatin (LIPITOR) 20 mg tablet Take 20 mg by mouth daily at bedtime, Historical Med      calcium carbonate-vitamin D (OSCAL-D) 500 mg-200 units per tablet Take 1 tablet by mouth 2 (two) times a day with meals, Historical Med      diclofenac (VOLTAREN) 25 MG EC tablet Take 50 mg by mouth 2 (two) times a day, Historical Med      DULoxetine (CYMBALTA) 20 mg capsule Take 20 mg by mouth 2 (two) times a day, Historical Med gabapentin (NEURONTIN) 300 mg capsule Take 100 mg by mouth 3 (three) times a day, Historical Med      naproxen (NAPROSYN) 500 mg tablet Take 1 tablet by mouth every 12 (twelve) hours as needed, Starting Fri 6/21/2013, Historical Med      niacin 100 mg tablet Take 100 mg by mouth daily at bedtime, Historical Med      ondansetron (ZOFRAN) 4 mg tablet Take 4 mg by mouth once, Historical Med           No discharge procedures on file      ED Provider  Electronically Signed by           Torin Nur, DO  06/26/18 0005

## 2018-06-26 NOTE — ED NOTES
Patient beginning 2nd half of Omnipaque at this time  Patient family present at bedside       Bradley Whatley RN  06/25/18 2001

## 2018-06-27 ENCOUNTER — RADIATION ONCOLOGY ON TREATMENT (OUTPATIENT)
Dept: RADIATION ONCOLOGY | Facility: CLINIC | Age: 73
End: 2018-06-27

## 2018-07-02 ENCOUNTER — APPOINTMENT (OUTPATIENT)
Dept: RADIATION ONCOLOGY | Facility: CLINIC | Age: 73
End: 2018-07-02
Attending: RADIOLOGY
Payer: MEDICARE

## 2018-07-03 PROCEDURE — 77412 RADIATION TX DELIVERY LVL 3: CPT | Performed by: RADIOLOGY

## 2018-07-05 ENCOUNTER — TELEPHONE (OUTPATIENT)
Dept: HEMATOLOGY ONCOLOGY | Facility: CLINIC | Age: 73
End: 2018-07-05

## 2018-07-05 PROCEDURE — 77412 RADIATION TX DELIVERY LVL 3: CPT | Performed by: RADIOLOGY

## 2018-07-05 NOTE — TELEPHONE ENCOUNTER
Called pt  To f/u on labs, spoke to her  Salvador, he stated he will take her tomorrow to have her labs done, pt  Has an appointment with dr Bowman Fuelfrank on 7/9/18

## 2018-07-06 ENCOUNTER — APPOINTMENT (OUTPATIENT)
Dept: LAB | Facility: HOSPITAL | Age: 73
End: 2018-07-06
Payer: MEDICARE

## 2018-07-06 ENCOUNTER — TELEPHONE (OUTPATIENT)
Dept: HEMATOLOGY ONCOLOGY | Facility: CLINIC | Age: 73
End: 2018-07-06

## 2018-07-06 ENCOUNTER — TRANSCRIBE ORDERS (OUTPATIENT)
Dept: ADMINISTRATIVE | Facility: HOSPITAL | Age: 73
End: 2018-07-06

## 2018-07-06 DIAGNOSIS — C50.919 MALIGNANT NEOPLASM OF FEMALE BREAST, UNSPECIFIED ESTROGEN RECEPTOR STATUS, UNSPECIFIED LATERALITY, UNSPECIFIED SITE OF BREAST (HCC): Primary | ICD-10-CM

## 2018-07-06 DIAGNOSIS — R10.9 ABDOMINAL PAIN, UNSPECIFIED ABDOMINAL LOCATION: ICD-10-CM

## 2018-07-06 DIAGNOSIS — C50.312 MALIGNANT NEOPLASM OF LOWER-INNER QUADRANT OF LEFT FEMALE BREAST, UNSPECIFIED ESTROGEN RECEPTOR STATUS (HCC): ICD-10-CM

## 2018-07-06 DIAGNOSIS — Z17.0 MALIGNANT NEOPLASM OF LOWER-INNER QUADRANT OF LEFT BREAST IN FEMALE, ESTROGEN RECEPTOR POSITIVE (HCC): ICD-10-CM

## 2018-07-06 DIAGNOSIS — C50.312 MALIGNANT NEOPLASM OF LOWER-INNER QUADRANT OF LEFT BREAST IN FEMALE, ESTROGEN RECEPTOR POSITIVE (HCC): ICD-10-CM

## 2018-07-06 LAB
EOSINOPHIL # BLD AUTO: 0.2 THOUSAND/UL (ref 0–0.4)
EOSINOPHIL NFR BLD MANUAL: 3 % (ref 0–6)
ERYTHROCYTE [DISTWIDTH] IN BLOOD BY AUTOMATED COUNT: 14.1 %
HCT VFR BLD AUTO: 35.8 % (ref 36–46)
HGB BLD-MCNC: 11.9 G/DL (ref 12–16)
LYMPHOCYTES # BLD AUTO: 2.73 THOUSAND/UL (ref 0.5–4)
LYMPHOCYTES # BLD AUTO: 42 % (ref 20–50)
MCH RBC QN AUTO: 29.5 PG (ref 26–34)
MCHC RBC AUTO-ENTMCNC: 33.3 G/DL (ref 31–36)
MCV RBC AUTO: 89 FL (ref 80–100)
MONOCYTES # BLD AUTO: 0.72 THOUSAND/UL (ref 0.2–0.9)
MONOCYTES NFR BLD AUTO: 11 % (ref 1–10)
NEUTS BAND NFR BLD MANUAL: 2 % (ref 0–8)
NEUTS SEG # BLD: 2.86 THOUSAND/UL (ref 1.8–7.8)
NEUTS SEG NFR BLD AUTO: 42 %
PLATELET # BLD AUTO: 255 THOUSANDS/UL (ref 150–450)
PLATELET BLD QL SMEAR: ADEQUATE
PMV BLD AUTO: 8.4 FL (ref 8.9–12.7)
RBC # BLD AUTO: 4.04 MILLION/UL (ref 4–5.2)
RBC MORPH BLD: NORMAL
TOTAL CELLS COUNTED SPEC: 100
WBC # BLD AUTO: 6.5 THOUSAND/UL (ref 4.5–11)

## 2018-07-06 PROCEDURE — 77336 RADIATION PHYSICS CONSULT: CPT | Performed by: RADIOLOGY

## 2018-07-06 PROCEDURE — 85027 COMPLETE CBC AUTOMATED: CPT

## 2018-07-06 PROCEDURE — 36415 COLL VENOUS BLD VENIPUNCTURE: CPT

## 2018-07-06 PROCEDURE — 85007 BL SMEAR W/DIFF WBC COUNT: CPT

## 2018-07-06 PROCEDURE — 77412 RADIATION TX DELIVERY LVL 3: CPT | Performed by: RADIOLOGY

## 2018-07-06 NOTE — TELEPHONE ENCOUNTER
Tried to reach pt  At house number and spoke to her , he stated he will give her the message, also spoke to neighbor listed as her emergency contact Soniya Corley explained to her that we will be cancelling Enriqueta's sydni  For Monday 7/9/18 due to the fact that we have been trying to reach her to have her labs done and she needs a CT which is scheduled for Thursday 7/12/18 @ [facility]eria is aware to bring Momo Dickey to have her labs done on Monday and stop by our office to  the drinks for the CT and have everything explained to her  We will reschedule her sydni after all her test are completed

## 2018-07-09 ENCOUNTER — HOSPITAL ENCOUNTER (OUTPATIENT)
Dept: INFUSION CENTER | Facility: HOSPITAL | Age: 73
Discharge: HOME | End: 2018-07-09
Payer: MEDICARE

## 2018-07-09 ENCOUNTER — OFFICE VISIT (OUTPATIENT)
Dept: HEMATOLOGY ONCOLOGY | Facility: CLINIC | Age: 73
End: 2018-07-09
Payer: MEDICARE

## 2018-07-09 ENCOUNTER — APPOINTMENT (OUTPATIENT)
Dept: LAB | Facility: HOSPITAL | Age: 73
End: 2018-07-09
Payer: MEDICARE

## 2018-07-09 VITALS
WEIGHT: 191.4 LBS | DIASTOLIC BLOOD PRESSURE: 76 MMHG | RESPIRATION RATE: 16 BRPM | OXYGEN SATURATION: 98 % | BODY MASS INDEX: 38.66 KG/M2 | SYSTOLIC BLOOD PRESSURE: 108 MMHG | HEART RATE: 99 BPM | TEMPERATURE: 98.7 F

## 2018-07-09 DIAGNOSIS — Z17.0 MALIGNANT NEOPLASM OF LOWER-INNER QUADRANT OF LEFT BREAST IN FEMALE, ESTROGEN RECEPTOR POSITIVE (HCC): Primary | ICD-10-CM

## 2018-07-09 DIAGNOSIS — D64.9 ANEMIA, UNSPECIFIED TYPE: ICD-10-CM

## 2018-07-09 DIAGNOSIS — C50.919 MALIGNANT NEOPLASM OF FEMALE BREAST, UNSPECIFIED ESTROGEN RECEPTOR STATUS, UNSPECIFIED LATERALITY, UNSPECIFIED SITE OF BREAST (HCC): ICD-10-CM

## 2018-07-09 DIAGNOSIS — C50.312 MALIGNANT NEOPLASM OF LOWER-INNER QUADRANT OF LEFT FEMALE BREAST, UNSPECIFIED ESTROGEN RECEPTOR STATUS (HCC): ICD-10-CM

## 2018-07-09 DIAGNOSIS — R10.9 ABDOMINAL PAIN, UNSPECIFIED ABDOMINAL LOCATION: ICD-10-CM

## 2018-07-09 DIAGNOSIS — C50.312 MALIGNANT NEOPLASM OF LOWER-INNER QUADRANT OF LEFT BREAST IN FEMALE, ESTROGEN RECEPTOR POSITIVE (HCC): Primary | ICD-10-CM

## 2018-07-09 LAB
ALBUMIN SERPL BCP-MCNC: 3.9 G/DL (ref 3–5.2)
ALP SERPL-CCNC: 84 U/L (ref 43–122)
ALT SERPL W P-5'-P-CCNC: 30 U/L (ref 9–52)
ANION GAP SERPL CALCULATED.3IONS-SCNC: 8 MMOL/L (ref 5–14)
AST SERPL W P-5'-P-CCNC: 27 U/L (ref 14–36)
BILIRUB SERPL-MCNC: 0.4 MG/DL
BUN SERPL-MCNC: 13 MG/DL (ref 5–25)
CALCIUM SERPL-MCNC: 9.6 MG/DL (ref 8.4–10.2)
CHLORIDE SERPL-SCNC: 102 MMOL/L (ref 97–108)
CO2 SERPL-SCNC: 29 MMOL/L (ref 22–30)
CREAT SERPL-MCNC: 0.66 MG/DL (ref 0.6–1.2)
CRP SERPL QL: <3 MG/L
EOSINOPHIL # BLD AUTO: 0.07 THOUSAND/UL (ref 0–0.4)
EOSINOPHIL NFR BLD MANUAL: 1 % (ref 0–6)
ERYTHROCYTE [DISTWIDTH] IN BLOOD BY AUTOMATED COUNT: 13.8 %
ERYTHROCYTE [SEDIMENTATION RATE] IN BLOOD: 37 MM/HOUR (ref 1–20)
FERRITIN SERPL-MCNC: 44 NG/ML (ref 8–388)
GFR SERPL CREATININE-BSD FRML MDRD: 101 ML/MIN/1.73SQ M
GLUCOSE SERPL-MCNC: 82 MG/DL (ref 70–99)
HCT VFR BLD AUTO: 37.7 % (ref 36–46)
HGB BLD-MCNC: 12.6 G/DL (ref 12–16)
IGA SERPL-MCNC: 329 MG/DL (ref 70–400)
IGG SERPL-MCNC: 1170 MG/DL (ref 700–1600)
IGM SERPL-MCNC: 86 MG/DL (ref 40–230)
IRON SATN MFR SERPL: 24 %
IRON SERPL-MCNC: 72 UG/DL (ref 50–170)
LDH SERPL-CCNC: 600 U/L (ref 313–618)
LYMPHOCYTES # BLD AUTO: 2.6 THOUSAND/UL (ref 0.5–4)
LYMPHOCYTES # BLD AUTO: 40 % (ref 20–50)
MCH RBC QN AUTO: 29.6 PG (ref 26–34)
MCHC RBC AUTO-ENTMCNC: 33.3 G/DL (ref 31–36)
MCV RBC AUTO: 89 FL (ref 80–100)
MONOCYTES # BLD AUTO: 0.52 THOUSAND/UL (ref 0.2–0.9)
MONOCYTES NFR BLD AUTO: 8 % (ref 1–10)
NEUTS SEG # BLD: 3.32 THOUSAND/UL (ref 1.8–7.8)
NEUTS SEG NFR BLD AUTO: 51 %
PLATELET # BLD AUTO: 245 THOUSANDS/UL (ref 150–450)
PLATELET BLD QL SMEAR: ADEQUATE
PMV BLD AUTO: 8.5 FL (ref 8.9–12.7)
POTASSIUM SERPL-SCNC: 4.4 MMOL/L (ref 3.6–5)
PROT SERPL-MCNC: 7.3 G/DL (ref 5.9–8.4)
RBC # BLD AUTO: 4.24 MILLION/UL (ref 4–5.2)
RBC MORPH BLD: PRESENT
SODIUM SERPL-SCNC: 139 MMOL/L (ref 137–147)
TIBC SERPL-MCNC: 302 UG/DL (ref 250–450)
TOTAL CELLS COUNTED SPEC: 100
VIT B12 SERPL-MCNC: 337 PG/ML (ref 100–900)
WBC # BLD AUTO: 6.5 THOUSAND/UL (ref 4.5–11)

## 2018-07-09 PROCEDURE — 83550 IRON BINDING TEST: CPT

## 2018-07-09 PROCEDURE — 77387 GUIDANCE FOR RADJ TX DLVR: CPT | Performed by: RADIOLOGY

## 2018-07-09 PROCEDURE — 83540 ASSAY OF IRON: CPT

## 2018-07-09 PROCEDURE — 82607 VITAMIN B-12: CPT

## 2018-07-09 PROCEDURE — 84165 PROTEIN E-PHORESIS SERUM: CPT

## 2018-07-09 PROCEDURE — 82784 ASSAY IGA/IGD/IGG/IGM EACH: CPT

## 2018-07-09 PROCEDURE — 85027 COMPLETE CBC AUTOMATED: CPT

## 2018-07-09 PROCEDURE — 85652 RBC SED RATE AUTOMATED: CPT

## 2018-07-09 PROCEDURE — 77417 THER RADIOLOGY PORT IMAGE(S): CPT | Performed by: RADIOLOGY

## 2018-07-09 PROCEDURE — 84165 PROTEIN E-PHORESIS SERUM: CPT | Performed by: PATHOLOGY

## 2018-07-09 PROCEDURE — 83615 LACTATE (LD) (LDH) ENZYME: CPT

## 2018-07-09 PROCEDURE — 77412 RADIATION TX DELIVERY LVL 3: CPT | Performed by: RADIOLOGY

## 2018-07-09 PROCEDURE — 80053 COMPREHEN METABOLIC PANEL: CPT

## 2018-07-09 PROCEDURE — 36415 COLL VENOUS BLD VENIPUNCTURE: CPT

## 2018-07-09 PROCEDURE — 85007 BL SMEAR W/DIFF WBC COUNT: CPT

## 2018-07-09 PROCEDURE — 83918 ORGANIC ACIDS TOTAL QUANT: CPT

## 2018-07-09 PROCEDURE — 82728 ASSAY OF FERRITIN: CPT

## 2018-07-09 PROCEDURE — 96401 CHEMO ANTI-NEOPL SQ/IM: CPT

## 2018-07-09 PROCEDURE — 99214 OFFICE O/P EST MOD 30 MIN: CPT | Performed by: INTERNAL MEDICINE

## 2018-07-09 PROCEDURE — 82232 ASSAY OF BETA-2 PROTEIN: CPT

## 2018-07-09 PROCEDURE — 86140 C-REACTIVE PROTEIN: CPT

## 2018-07-09 RX ADMIN — DENOSUMAB 60 MG: 60 INJECTION SUBCUTANEOUS at 16:22

## 2018-07-09 NOTE — PLAN OF CARE
Problem: SAFETY ADULT  Goal: Maintain or return to baseline ADL function  INTERVENTIONS:  -  Assess patient's ability to carry out ADLs; assess patient's baseline for ADL function and identify physical deficits which impact ability to perform ADLs (bathing, care of mouth/teeth, toileting, grooming, dressing, etc )  - Assess/evaluate cause of self-care deficits   - Assess range of motion  - Assess patient's mobility; develop plan if impaired  - Assess patient's need for assistive devices and provide as appropriate  - Encourage maximum independence but intervene and supervise when necessary  ¯ Involve family in performance of ADLs  ¯ Assess for home care needs following discharge   ¯ Request OT consult to assist with ADL evaluation and planning for discharge  ¯ Provide patient education as appropriate   Outcome: Progressing

## 2018-07-09 NOTE — PLAN OF CARE
Problem: SAFETY ADULT  Goal: Maintain or return mobility status to optimal level  INTERVENTIONS:  - Assess patient's baseline mobility status (ambulation, transfers, stairs, etc )    - Identify cognitive and physical deficits and behaviors that affect mobility  - Identify mobility aids required to assist with transfers and/or ambulation (gait belt, sit-to-stand, lift, walker, cane, etc )  - [place] fall precautions as indicated by assessment  - Record patient progress and toleration of activity level on Mobility SBAR; progress patient to next Phase/Stage  - Instruct patient to call for assistance with activity based on assessment  - Request Rehabilitation consult to assist with strengthening/weightbearing, etc   Outcome: Progressing      Problem: Knowledge Deficit  Goal: Patient/family/caregiver demonstrates understanding of disease process, treatment plan, medications, and discharge instructions  Complete learning assessment and assess knowledge base    Interventions:  - Provide teaching at level of understanding  - Provide teaching via preferred learning methods  Outcome: Progressing

## 2018-07-09 NOTE — PLAN OF CARE
Problem: SAFETY ADULT  Goal: Patient will remain free of falls  INTERVENTIONS:  - Assess patient frequently for physical needs  -  Identify cognitive and physical deficits and behaviors that affect risk of falls    -  [place] fall precautions as indicated by assessment   - Educate patient/family on patient safety including physical limitations  - Instruct patient to call for assistance with activity based on assessment  - Modify environment to reduce risk of injury  - Consider OT/PT consult to assist with strengthening/mobility   Outcome: Progressing

## 2018-07-09 NOTE — PROGRESS NOTES
t Hematology/Oncology Outpatient Follow-up  Deanna Oneal 74 y o  female 1945 [id number]    Date:  7/9/2018      Assessment and Plan:  1  Stage IIA left breast cancer, ER positive:  Patient seems to have difficulties tolerating the anastrozole  I did ask her to start anastrozole for about 2 weeks to see if her symptoms will improve  She also is to follow-up with her primary care physician regarding her chronic headaches  2   Mild normocytic anemia:  The patient did not pursue the work up ordered prior to this office visit hopefully she will do it prior to her next visit  HPI:  The patient came in today for a follow-up visit  She was supposed to get her blood done prior to this office visit which she did not do  The patient was in the emergency room on the 25th of June for further evaluation of her abdominal pain  She had a CT scan of the abdomen and pelvis which was negative for any is obvious etiology be site hepatic steatosis  The patient has been taking anastrozole daily since 16th May 2018  Oncology History    Patient had bilateral mammogram October 2017 which showed a breast density with a left-sided asymmetry  An ultrasound was done which revealed 1 5 cm solid mass  Ultrasound-guided core biopsy was done January 24, 2018 the pathology revealed infiltrating lobular carcinoma grade 1  ER strongly positive 90% PA-0% and her 2 Jaylen negative as well  She had her definitive surgery April 3, 2018 as well as 2 sentinel lymph nodes from the left axilla which were both negative for metastatic disease  Her tumor was 2 8 cm and compatible with invasive lobular type adenocarcinoma  The lymphovascular invasion was not present grade 1 with multiple foci of lobular carcinoma in situ  The anterior margin was focally involved with invasive malignancy  Her final pathologic stage was to a PT2PN0 MX G1  Oncotype recurrence score came back 13 putting her in the low risk category      Prolia injections every 6 months to start July of 2018  Patient has a history of osteopenia and will require long-term use of an aromatase inhibitor  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 ([address] )    1/24/2018 Biopsy     Left breast ultrasound guided core biopsies  Infiltrating lobular carcinoma  Grade 1  ER 98% positive, SC negative, HER2 negative           1/24/2018 Initial Diagnosis     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 (HCC)  Stage IA (pT2, pN0, cM0, G1, ER: Positive, SC: Positive, HER2: Negative)           4/3/2018 Surgery     Left partial mastectomy with 2 sentinel node biopsy    Size at least 2 1 cm  Multiple foci of LCIS  LCIS present at superior margin  Invasive malignancy is also noted in close proximity to the superior margin of resection    Pathological staging: stage IIA pT2, pN0 sn, pMX, G1         4/3/2018 Genomic Testing     Oncotype DX    Recurrence score 13, 10 year risk of distance recurrence  SANCHEZ alone 8%  ER score 10 6 positive  SC score 5 1 negative  HER2 9 6 negative          5/16/2018 -  Chemotherapy     1  Anastrozole 1 mg p o  daily            Interval history:    ROS: Review of Systems   Constitutional: Positive for appetite change and fatigue  Negative for activity change, chills, diaphoresis, fever and unexpected weight change  HENT: Positive for trouble swallowing  Negative for congestion, dental problem, ear discharge, ear pain, facial swelling, hearing loss, mouth sores, nosebleeds, postnasal drip, sore throat and tinnitus  Eyes: Negative for discharge, redness, itching and visual disturbance  Respiratory: Negative for cough, chest tightness, shortness of breath and wheezing  Cardiovascular: Negative for chest pain, palpitations and leg swelling  Gastrointestinal: Positive for abdominal pain, constipation, nausea and vomiting  Negative for abdominal distention, anal bleeding, blood in stool and diarrhea  Genitourinary: Negative for difficulty urinating, dysuria, flank pain, frequency, hematuria and urgency  Musculoskeletal: Negative for arthralgias, back pain, gait problem, joint swelling, myalgias and neck pain  Skin: Negative for color change, pallor, rash and wound  Neurological: Positive for dizziness and headaches (Severe headaches in the back of her head which seems to be chronic for the last 2 months)  Negative for syncope, speech difficulty, weakness, light-headedness and numbness  Hematological: Negative for adenopathy  Does not bruise/bleed easily  Psychiatric/Behavioral: Positive for sleep disturbance  Negative for agitation, behavioral problems and confusion         Past Medical History:   Diagnosis Date    Anxiety     Breast cancer ([facility] [address]     Depression     Depression     Hypercholesterolemia     Hypertension     Migraine        Past Surgical History:   Procedure Laterality Date    BREAST BIOPSY      BREAST LUMPECTOMY      CARPAL TUNNEL RELEASE Right     right CTR     SECTION      CHOLECYSTECTOMY      MASTECTOMY Left     sentinae node mapping    SENTINEL LYMPH NODE BIOPSY         Social History     Social History    Marital status: Single     Spouse name: N/A    Number of children: N/A    Years of education: N/A     Social History Main Topics    Smoking status: Passive Smoke Exposure - Never Smoker    Smokeless tobacco: Never Used    Alcohol use No    Drug use: No    Sexual activity: Not Asked     Other Topics Concern    None     Social History Narrative    None       Family History   Problem Relation Age of Onset    Heart disease Mother         cardiovascular disease       Allergies   Allergen Reactions    Chocolate     Chocolate Flavor     Ibuprofen Swelling    Pork-Derived Products          Current Outpatient Prescriptions:     acetaminophen (TYLENOL) 650 mg CR tablet, take 1 tablet   by oral route  every 8 hours as needed for pain, Disp: , Rfl:    anastrozole (ARIMIDEX) 1 mg tablet, Take 1 mg by mouth daily, Disp: , Rfl:     atorvastatin (LIPITOR) 20 mg tablet, Take 20 mg by mouth daily at bedtime, Disp: , Rfl:     atorvastatin (LIPITOR) 40 mg tablet, every 24 hours, Disp: , Rfl:     calcium carbonate-vitamin D (OSCAL-D) 500 mg-200 units per tablet, Take 1 tablet by mouth 2 (two) times a day with meals, Disp: , Rfl:     diclofenac (VOLTAREN) 25 MG EC tablet, Take 50 mg by mouth 2 (two) times a day, Disp: , Rfl:     DULoxetine (CYMBALTA) 20 mg capsule, Take 20 mg by mouth 2 (two) times a day, Disp: , Rfl:     DULoxetine (CYMBALTA) 30 mg delayed release capsule, take 1 capsule by oral route 2 times every day, Disp: , Rfl:     ergocalciferol (VITAMIN D2) 50,000 units, take 1 capsule by oral route  every week, Disp: , Rfl:     fluticasone (FLONASE) 50 mcg/act nasal spray, 2 sprays into each nostril, Disp: , Rfl:     gabapentin (NEURONTIN) 100 mg capsule, Reported on 2/14/2017 , Disp: , Rfl:     gabapentin (NEURONTIN) 300 mg capsule, Take 100 mg by mouth 3 (three) times a day, Disp: , Rfl:     gabapentin (NEURONTIN) 300 mg capsule, Every 12 hours, Disp: , Rfl:     HYDROcodone-acetaminophen (XODOL) 5-300 MG per tablet, Take 1 tablet by mouth every 24 hours, Disp: , Rfl:     Linaclotide (LINZESS) 145 MCG CAPS, every 24 hours, Disp: , Rfl:     mirtazapine (REMERON) 7 5 MG tablet, Take 7 5 mg by mouth, Disp: , Rfl:     naproxen (NAPROSYN) 500 mg tablet, Take 1 tablet by mouth every 12 (twelve) hours as needed, Disp: , Rfl:     niacin (NIASPAN) 500 mg CR tablet, , Disp: , Rfl:     niacin 100 mg tablet, Take 100 mg by mouth daily at bedtime, Disp: , Rfl:     ondansetron (ZOFRAN) 4 mg tablet, Take 4 mg by mouth once, Disp: , Rfl:     topiramate (TOPAMAX) 25 mg tablet, every 24 hours, Disp: , Rfl:     traZODone (DESYREL) 100 mg tablet, Take 100 mg by mouth, Disp: , Rfl:     trolamine salicylate (ASPERCREME) 10 % cream, Apply 1 Units topically 2 (two) times a day, Disp: , Rfl:   No current facility-administered medications for this visit  Facility-Administered Medications Ordered in Other Visits:     denosumab (PROLIA) subcutaneous injection 60 mg, 60 mg, Subcutaneous, Once, Niurka Malcolm MD      Physical Exam:  /76 (BP Location: Right arm, Patient Position: Sitting, Cuff Size: Large)   Pulse 99   Temp 98 7 °F (37 1 °C) (Tympanic)   Resp 16   Wt 86 8 kg (191 lb 6 4 oz)   SpO2 98%   BMI 38 66 kg/m²     Physical Exam   Constitutional: She is oriented to person, place, and time  She appears well-developed and well-nourished  No distress  HENT:   Head: Normocephalic and atraumatic  Nose: Nose normal    Mouth/Throat: Oropharynx is clear and moist    Eyes: Conjunctivae and EOM are normal  Pupils are equal, round, and reactive to light  Right eye exhibits no discharge  Left eye exhibits no discharge  No scleral icterus  Neck: Normal range of motion  Neck supple  No JVD present  No tracheal deviation present  No thyromegaly present  Cardiovascular: Normal rate, regular rhythm and normal heart sounds  Exam reveals no friction rub  No murmur heard  Pulmonary/Chest: Effort normal and breath sounds normal  No stridor  No respiratory distress  She has no wheezes  She has no rales  She exhibits no tenderness  Abdominal: Soft  Bowel sounds are normal  She exhibits no distension and no mass  There is no hepatosplenomegaly, splenomegaly or hepatomegaly  There is tenderness  There is no rebound and no guarding  Tenderness in the epigastric and right upper quadrant area of the abdomen on mild palpation  Obese abdomen  Musculoskeletal: Normal range of motion  She exhibits no edema, tenderness or deformity  Lymphadenopathy:     She has no cervical adenopathy  Neurological: She is alert and oriented to person, place, and time  She has normal reflexes  No cranial nerve deficit  Coordination normal    Skin: Skin is warm and dry  No rash noted   She is not diaphoretic  No erythema  No pallor  Psychiatric: She has a normal mood and affect  Her behavior is normal  Judgment and thought content normal          Labs:  Lab Results   Component Value Date    WBC 6 50 07/06/2018    HGB 11 9 (L) 07/06/2018    HCT 35 8 (L) 07/06/2018    MCV 89 07/06/2018     07/06/2018     Lab Results   Component Value Date     06/25/2018    K 4 2 06/25/2018     06/25/2018    CO2 29 06/25/2018    ANIONGAP 8 06/25/2018    BUN 13 06/25/2018    CREATININE 0 56 (L) 06/25/2018    GLUCOSE 93 06/25/2018    CALCIUM 9 5 06/25/2018    AST 38 (H) 06/25/2018    ALT 33 06/25/2018    ALKPHOS 92 06/25/2018    PROT 7 8 06/25/2018    BILITOT 0 50 06/25/2018    EGFR 106 06/25/2018     No results found for: TSH    Patient voiced understanding and agreement in the above discussion  Aware to contact our office with questions/symptoms in the interim

## 2018-07-09 NOTE — PLAN OF CARE
Problem: SAFETY ADULT  Goal: Maintain or return mobility status to optimal level  INTERVENTIONS:  - Assess patient's baseline mobility status (ambulation, transfers, stairs, etc )    - Identify cognitive and physical deficits and behaviors that affect mobility  - Identify mobility aids required to assist with transfers and/or ambulation (gait belt, sit-to-stand, lift, walker, cane, etc )  - [place] fall precautions as indicated by assessment  - Record patient progress and toleration of activity level on Mobility SBAR; progress patient to next Phase/Stage  - Instruct patient to call for assistance with activity based on assessment  - Request Rehabilitation consult to assist with strengthening/weightbearing, etc   Outcome: Progressing

## 2018-07-10 ENCOUNTER — TRANSCRIBE ORDERS (OUTPATIENT)
Dept: LAB | Facility: CLINIC | Age: 73
End: 2018-07-10

## 2018-07-10 ENCOUNTER — CONSULT (OUTPATIENT)
Dept: FAMILY MEDICINE CLINIC | Facility: CLINIC | Age: 73
End: 2018-07-10
Payer: MEDICARE

## 2018-07-10 ENCOUNTER — APPOINTMENT (OUTPATIENT)
Dept: LAB | Facility: CLINIC | Age: 73
End: 2018-07-10
Attending: RADIOLOGY
Payer: MEDICARE

## 2018-07-10 VITALS
DIASTOLIC BLOOD PRESSURE: 66 MMHG | OXYGEN SATURATION: 97 % | BODY MASS INDEX: 37.69 KG/M2 | HEART RATE: 84 BPM | WEIGHT: 192 LBS | HEIGHT: 60 IN | TEMPERATURE: 98.1 F | RESPIRATION RATE: 16 BRPM | SYSTOLIC BLOOD PRESSURE: 106 MMHG

## 2018-07-10 DIAGNOSIS — C50.121 MALIGNANT NEOPLASM OF CENTRAL PORTION OF RIGHT MALE BREAST, UNSPECIFIED ESTROGEN RECEPTOR STATUS (HCC): Primary | ICD-10-CM

## 2018-07-10 DIAGNOSIS — Z01.810 PREOPERATIVE CARDIOVASCULAR EXAMINATION: ICD-10-CM

## 2018-07-10 DIAGNOSIS — H25.093 AGE-RELATED INCIPIENT CATARACT OF BOTH EYES: Primary | ICD-10-CM

## 2018-07-10 DIAGNOSIS — C50.121 MALIGNANT NEOPLASM OF CENTRAL PORTION OF RIGHT MALE BREAST, UNSPECIFIED ESTROGEN RECEPTOR STATUS (HCC): ICD-10-CM

## 2018-07-10 PROBLEM — M54.42 CHRONIC MIDLINE LOW BACK PAIN WITH BILATERAL SCIATICA: Status: ACTIVE | Noted: 2017-01-25

## 2018-07-10 PROBLEM — M48.061 SPINAL STENOSIS OF LUMBAR REGION WITH RADICULOPATHY: Status: ACTIVE | Noted: 2017-02-02

## 2018-07-10 PROBLEM — M54.41 CHRONIC MIDLINE LOW BACK PAIN WITH BILATERAL SCIATICA: Status: ACTIVE | Noted: 2017-01-25

## 2018-07-10 PROBLEM — M54.16 SPINAL STENOSIS OF LUMBAR REGION WITH RADICULOPATHY: Status: ACTIVE | Noted: 2017-02-02

## 2018-07-10 PROBLEM — R53.81 PHYSICAL DECONDITIONING: Status: ACTIVE | Noted: 2017-02-14

## 2018-07-10 PROBLEM — H53.9 VISION ABNORMALITIES: Status: ACTIVE | Noted: 2017-02-15

## 2018-07-10 PROBLEM — G89.29 CHRONIC MIDLINE LOW BACK PAIN WITH BILATERAL SCIATICA: Status: ACTIVE | Noted: 2017-01-25

## 2018-07-10 PROBLEM — F32.A DEPRESSION: Status: ACTIVE | Noted: 2017-02-14

## 2018-07-10 LAB
B2 MICROGLOB SERPL-MCNC: 1.8 MG/L (ref 0.6–2.4)
ERYTHROCYTE [DISTWIDTH] IN BLOOD BY AUTOMATED COUNT: 14.8 % (ref 11.6–15.1)
GRANULOCYTES NFR BLD AUTO: 54.3 % (ref 47–80)
GRANULOCYTES NFR BLD: 3.7 THOUSAND/ΜL (ref 1.85–7.82)
HCT VFR BLD AUTO: 36.3 % (ref 34.8–46.1)
HGB BLD-MCNC: 11.8 G/DL (ref 11.5–15.4)
LYMPHOCYTES # BLD AUTO: 2.7 THOUSANDS/ΜL (ref 0.6–4.47)
LYMPHOCYTES NFR BLD AUTO: 39 % (ref 14–44)
MCH RBC QN AUTO: 29.1 PG (ref 26.8–34.3)
MCHC RBC AUTO-ENTMCNC: 32.5 G/DL (ref 31.4–37.4)
MCV RBC AUTO: 90 FL (ref 82–98)
MONOCYTES # BLD AUTO: 0.4 THOUSAND/ΜL (ref 0.17–1.22)
MONOCYTES NFR BLD AUTO: 7 % (ref 4–12)
PLATELET # BLD AUTO: 215 THOUSANDS/UL (ref 149–390)
PMV BLD AUTO: 7.6 FL (ref 8.9–12.7)
RBC # BLD AUTO: 4.05 MILLION/UL (ref 3.81–5.12)
WBC # BLD AUTO: 6.8 THOUSAND/UL (ref 4.31–10.16)
WBC NRBC COR # BLD: 6.8 THOUSAND/UL (ref 4.31–10.16)

## 2018-07-10 PROCEDURE — 77412 RADIATION TX DELIVERY LVL 3: CPT | Performed by: RADIOLOGY

## 2018-07-10 PROCEDURE — 36415 COLL VENOUS BLD VENIPUNCTURE: CPT

## 2018-07-10 PROCEDURE — 85025 COMPLETE CBC W/AUTO DIFF WBC: CPT

## 2018-07-10 PROCEDURE — 99214 OFFICE O/P EST MOD 30 MIN: CPT | Performed by: FAMILY MEDICINE

## 2018-07-10 RX ORDER — DULOXETIN HYDROCHLORIDE 30 MG/1
30 CAPSULE, DELAYED RELEASE ORAL 2 TIMES DAILY
COMMUNITY
End: 2019-01-17 | Stop reason: SDUPTHER

## 2018-07-10 NOTE — PROGRESS NOTES
Assessment/Plan:    No problem-specific Assessment & Plan notes found for this encounter  There are no diagnoses linked to this encounter  Subjective:      Patient ID: Kedar Lind is a 74 y o  female  Pt here for preoperative clearance for eye surgery [facility]  The right eye surgery is 7/12/18 and the left eye is 7/26/18  PT is undergoing treatment for breast cancer at current time  The following portions of the patient's history were reviewed and updated as appropriate: allergies, current medications, past family history, past medical history, past social history, past surgical history and problem list     Review of Systems   Constitutional: Positive for fatigue and unexpected weight change  Negative for fever  HENT: Negative for sore throat and trouble swallowing  Eyes: Positive for visual disturbance  Negative for photophobia  Respiratory: Positive for shortness of breath ( Decrease of vision in both eyes)  Negative for cough, chest tightness and wheezing  Cardiovascular: Negative for chest pain, palpitations and leg swelling  Gastrointestinal: Negative for abdominal pain, blood in stool, nausea and vomiting  Genitourinary: Negative for hematuria  Neurological: Positive for headaches  Negative for dizziness, syncope and light-headedness  Hematological: Does not bruise/bleed easily  Objective:      /66 (BP Location: Left arm, Patient Position: Sitting, Cuff Size: Standard)   Pulse 84   Temp 98 1 °F (36 7 °C) (Oral)   Resp 16   Ht 5' (1 524 m)   Wt 87 1 kg (192 lb)   SpO2 97%   Breastfeeding? No   BMI 37 50 kg/m²          Physical Exam   Constitutional: She is oriented to person, place, and time  She appears well-developed and well-nourished  HENT:   Head: Normocephalic  Eyes: Pupils are equal, round, and reactive to light  Neck: Normal range of motion  Cardiovascular: Normal rate, regular rhythm and normal heart sounds  Neurological: She is alert and oriented to person, place, and time  Skin: Skin is warm and dry  Psychiatric: She has a normal mood and affect   Her behavior is normal  Judgment and thought content normal

## 2018-07-11 DIAGNOSIS — G89.29 CHRONIC BILATERAL LOW BACK PAIN WITH SCIATICA, SCIATICA LATERALITY UNSPECIFIED: Primary | ICD-10-CM

## 2018-07-11 DIAGNOSIS — M54.40 CHRONIC BILATERAL LOW BACK PAIN WITH SCIATICA, SCIATICA LATERALITY UNSPECIFIED: Primary | ICD-10-CM

## 2018-07-11 PROCEDURE — 77412 RADIATION TX DELIVERY LVL 3: CPT | Performed by: RADIOLOGY

## 2018-07-11 RX ORDER — SENNOSIDES 8.6 MG
650 CAPSULE ORAL EVERY 8 HOURS PRN
Qty: 30 TABLET | Refills: 0 | Status: SHIPPED | OUTPATIENT
Start: 2018-07-11 | End: 2018-10-17 | Stop reason: ALTCHOICE

## 2018-07-12 ENCOUNTER — APPOINTMENT (OUTPATIENT)
Dept: LAB | Facility: HOSPITAL | Age: 73
End: 2018-07-12
Payer: MEDICARE

## 2018-07-12 DIAGNOSIS — C50.919 MALIGNANT NEOPLASM OF FEMALE BREAST, UNSPECIFIED ESTROGEN RECEPTOR STATUS, UNSPECIFIED LATERALITY, UNSPECIFIED SITE OF BREAST (HCC): ICD-10-CM

## 2018-07-12 DIAGNOSIS — C50.312 MALIGNANT NEOPLASM OF LOWER-INNER QUADRANT OF LEFT FEMALE BREAST, UNSPECIFIED ESTROGEN RECEPTOR STATUS (HCC): ICD-10-CM

## 2018-07-12 DIAGNOSIS — R10.9 ABDOMINAL PAIN, UNSPECIFIED ABDOMINAL LOCATION: ICD-10-CM

## 2018-07-12 LAB
HEMOCCULT STL QL: NEGATIVE
METHYLMALONATE SERPL-SCNC: 155 NMOL/L (ref 0–378)
SL AMB DISCLAIMER: NORMAL

## 2018-07-12 PROCEDURE — 82272 OCCULT BLD FECES 1-3 TESTS: CPT

## 2018-07-12 PROCEDURE — 77412 RADIATION TX DELIVERY LVL 3: CPT | Performed by: RADIOLOGY

## 2018-07-13 LAB
ALBUMIN SERPL ELPH-MCNC: 4.22 G/DL (ref 3.5–5)
ALBUMIN SERPL ELPH-MCNC: 57.8 % (ref 52–65)
ALPHA1 GLOB SERPL ELPH-MCNC: 0.26 G/DL (ref 0.1–0.4)
ALPHA1 GLOB SERPL ELPH-MCNC: 3.6 % (ref 2.5–5)
ALPHA2 GLOB SERPL ELPH-MCNC: 0.72 G/DL (ref 0.4–1.2)
ALPHA2 GLOB SERPL ELPH-MCNC: 9.9 % (ref 7–13)
BETA GLOB ABNORMAL SERPL ELPH-MCNC: 0.42 G/DL (ref 0.4–0.8)
BETA1 GLOB SERPL ELPH-MCNC: 5.8 % (ref 5–13)
BETA2 GLOB SERPL ELPH-MCNC: 6.3 % (ref 2–8)
BETA2+GAMMA GLOB SERPL ELPH-MCNC: 0.46 G/DL (ref 0.2–0.5)
GAMMA GLOB ABNORMAL SERPL ELPH-MCNC: 1.21 G/DL (ref 0.5–1.6)
GAMMA GLOB SERPL ELPH-MCNC: 16.6 % (ref 12–22)
IGG/ALB SER: 1.37 {RATIO} (ref 1.1–1.8)
PROT PATTERN SERPL ELPH-IMP: NORMAL
PROT SERPL-MCNC: 7.3 G/DL (ref 6.4–8.2)

## 2018-07-16 PROCEDURE — 77336 RADIATION PHYSICS CONSULT: CPT | Performed by: RADIOLOGY

## 2018-07-16 PROCEDURE — 77412 RADIATION TX DELIVERY LVL 3: CPT | Performed by: RADIOLOGY

## 2018-07-17 PROCEDURE — 77321 SPECIAL TELETX PORT PLAN: CPT | Performed by: RADIOLOGY

## 2018-07-17 PROCEDURE — 77334 RADIATION TREATMENT AID(S): CPT | Performed by: RADIOLOGY

## 2018-07-17 PROCEDURE — 77412 RADIATION TX DELIVERY LVL 3: CPT | Performed by: RADIOLOGY

## 2018-07-18 PROCEDURE — 77417 THER RADIOLOGY PORT IMAGE(S): CPT | Performed by: RADIOLOGY

## 2018-07-18 PROCEDURE — 77387 GUIDANCE FOR RADJ TX DLVR: CPT | Performed by: RADIOLOGY

## 2018-07-18 PROCEDURE — 77412 RADIATION TX DELIVERY LVL 3: CPT | Performed by: RADIOLOGY

## 2018-07-19 PROCEDURE — 77412 RADIATION TX DELIVERY LVL 3: CPT | Performed by: RADIOLOGY

## 2018-07-23 PROCEDURE — 77412 RADIATION TX DELIVERY LVL 3: CPT | Performed by: RADIOLOGY

## 2018-07-24 PROCEDURE — 77336 RADIATION PHYSICS CONSULT: CPT | Performed by: RADIOLOGY

## 2018-07-24 PROCEDURE — 77412 RADIATION TX DELIVERY LVL 3: CPT | Performed by: RADIOLOGY

## 2018-07-25 PROCEDURE — 77412 RADIATION TX DELIVERY LVL 3: CPT | Performed by: RADIOLOGY

## 2018-07-27 PROCEDURE — 77412 RADIATION TX DELIVERY LVL 3: CPT | Performed by: RADIOLOGY

## 2018-07-27 PROCEDURE — 77417 THER RADIOLOGY PORT IMAGE(S): CPT | Performed by: RADIOLOGY

## 2018-07-30 PROCEDURE — 77412 RADIATION TX DELIVERY LVL 3: CPT | Performed by: RADIOLOGY

## 2018-07-31 PROCEDURE — 77412 RADIATION TX DELIVERY LVL 3: CPT | Performed by: RADIOLOGY

## 2018-08-01 ENCOUNTER — APPOINTMENT (OUTPATIENT)
Dept: RADIATION ONCOLOGY | Facility: CLINIC | Age: 73
End: 2018-08-01
Attending: RADIOLOGY
Payer: MEDICARE

## 2018-08-01 PROCEDURE — 77336 RADIATION PHYSICS CONSULT: CPT | Performed by: RADIOLOGY

## 2018-08-01 PROCEDURE — 77412 RADIATION TX DELIVERY LVL 3: CPT | Performed by: RADIOLOGY

## 2018-08-02 PROCEDURE — 77387 GUIDANCE FOR RADJ TX DLVR: CPT | Performed by: RADIOLOGY

## 2018-08-02 PROCEDURE — 77412 RADIATION TX DELIVERY LVL 3: CPT | Performed by: RADIOLOGY

## 2018-08-02 PROCEDURE — 77417 THER RADIOLOGY PORT IMAGE(S): CPT | Performed by: RADIOLOGY

## 2018-08-03 PROCEDURE — 77412 RADIATION TX DELIVERY LVL 3: CPT | Performed by: RADIOLOGY

## 2018-08-06 PROCEDURE — 77412 RADIATION TX DELIVERY LVL 3: CPT | Performed by: RADIOLOGY

## 2018-08-08 PROCEDURE — 77412 RADIATION TX DELIVERY LVL 3: CPT | Performed by: RADIOLOGY

## 2018-08-09 PROCEDURE — 77336 RADIATION PHYSICS CONSULT: CPT | Performed by: RADIOLOGY

## 2018-08-09 PROCEDURE — 77412 RADIATION TX DELIVERY LVL 3: CPT | Performed by: RADIOLOGY

## 2018-08-09 PROCEDURE — 77417 THER RADIOLOGY PORT IMAGE(S): CPT | Performed by: RADIOLOGY

## 2018-08-10 PROCEDURE — 77412 RADIATION TX DELIVERY LVL 3: CPT | Performed by: RADIOLOGY

## 2018-08-13 PROCEDURE — 77412 RADIATION TX DELIVERY LVL 3: CPT | Performed by: RADIOLOGY

## 2018-08-14 PROCEDURE — 77412 RADIATION TX DELIVERY LVL 3: CPT | Performed by: RADIOLOGY

## 2018-08-15 PROCEDURE — 77280 THER RAD SIMULAJ FIELD SMPL: CPT | Performed by: RADIOLOGY

## 2018-08-15 PROCEDURE — 77412 RADIATION TX DELIVERY LVL 3: CPT | Performed by: RADIOLOGY

## 2018-08-16 PROCEDURE — 77412 RADIATION TX DELIVERY LVL 3: CPT | Performed by: RADIOLOGY

## 2018-08-16 PROCEDURE — 77336 RADIATION PHYSICS CONSULT: CPT | Performed by: RADIOLOGY

## 2018-08-20 PROCEDURE — 77412 RADIATION TX DELIVERY LVL 3: CPT | Performed by: RADIOLOGY

## 2018-08-21 PROCEDURE — 77412 RADIATION TX DELIVERY LVL 3: CPT | Performed by: RADIOLOGY

## 2018-08-22 PROCEDURE — 77412 RADIATION TX DELIVERY LVL 3: CPT | Performed by: RADIOLOGY

## 2018-08-23 PROCEDURE — 77412 RADIATION TX DELIVERY LVL 3: CPT | Performed by: RADIOLOGY

## 2018-08-24 PROCEDURE — 77412 RADIATION TX DELIVERY LVL 3: CPT | Performed by: RADIOLOGY

## 2018-08-24 PROCEDURE — 77336 RADIATION PHYSICS CONSULT: CPT | Performed by: RADIOLOGY

## 2018-08-27 ENCOUNTER — APPOINTMENT (OUTPATIENT)
Dept: RADIATION ONCOLOGY | Facility: CLINIC | Age: 73
End: 2018-08-27
Attending: RADIOLOGY
Payer: MEDICARE

## 2018-08-27 PROCEDURE — 77412 RADIATION TX DELIVERY LVL 3: CPT | Performed by: RADIOLOGY

## 2018-09-25 RX ORDER — TRIPROLIDINE/PSEUDOEPHEDRINE 2.5MG-60MG
TABLET ORAL
Refills: 2 | COMMUNITY
Start: 2018-08-06 | End: 2018-11-30 | Stop reason: HOSPADM

## 2018-09-25 RX ORDER — POLYMYXIN B SULFATE AND TRIMETHOPRIM 1; 10000 MG/ML; [USP'U]/ML
SOLUTION OPHTHALMIC
Refills: 1 | COMMUNITY
Start: 2018-07-12 | End: 2018-10-17 | Stop reason: ALTCHOICE

## 2018-09-28 ENCOUNTER — OFFICE VISIT (OUTPATIENT)
Dept: CARDIOLOGY CLINIC | Facility: CLINIC | Age: 73
End: 2018-09-28
Payer: MEDICARE

## 2018-09-28 VITALS
HEART RATE: 79 BPM | BODY MASS INDEX: 37.3 KG/M2 | OXYGEN SATURATION: 97 % | DIASTOLIC BLOOD PRESSURE: 82 MMHG | SYSTOLIC BLOOD PRESSURE: 120 MMHG | HEIGHT: 60 IN | WEIGHT: 190 LBS

## 2018-09-28 DIAGNOSIS — R00.2 PALPITATIONS: Primary | ICD-10-CM

## 2018-09-28 DIAGNOSIS — R06.02 SHORTNESS OF BREATH: ICD-10-CM

## 2018-09-28 DIAGNOSIS — Z79.899 ENCOUNTER FOR LONG-TERM (CURRENT) USE OF MEDICATIONS: ICD-10-CM

## 2018-09-28 DIAGNOSIS — R00.2 PALPITATION: ICD-10-CM

## 2018-09-28 DIAGNOSIS — Z17.0 MALIGNANT NEOPLASM OF LOWER-INNER QUADRANT OF LEFT BREAST IN FEMALE, ESTROGEN RECEPTOR POSITIVE (HCC): Primary | ICD-10-CM

## 2018-09-28 DIAGNOSIS — C50.312 MALIGNANT NEOPLASM OF LOWER-INNER QUADRANT OF LEFT BREAST IN FEMALE, ESTROGEN RECEPTOR POSITIVE (HCC): Primary | ICD-10-CM

## 2018-09-28 PROCEDURE — 93000 ELECTROCARDIOGRAM COMPLETE: CPT | Performed by: INTERNAL MEDICINE

## 2018-09-28 PROCEDURE — 99215 OFFICE O/P EST HI 40 MIN: CPT | Performed by: INTERNAL MEDICINE

## 2018-09-28 NOTE — PATIENT INSTRUCTIONS
CARDIOLOGY ASSESSMENT & PLAN:  Shortness of breath and palpitations   Patient's symptoms are week and not suggestive of acute cardiopulmonary abnormality  She does have significant risk factors including history of breast cancer  Without evidence of metastatic disease  On clinical examination there is no significant murmur appreciable although heart sounds are distant  She does have risk factors for for arrhythmia  She does appear to be depressed  We will arrange for an echocardiogram and 24 hour Holter monitor  I have explained to the patient that she keep a diary of her symptoms and let us know if she has any sustained symptoms or if she has presyncope or syncope  She does report difficulty in swallowing and some choking sensation and likely needs evaluation with CT scan of the chest    She also reports significant bilateral hand pain  Which is likely related to a history of carpal tunnel syndrome  She is on significant pain medications already  She is scheduled to follow up with her primary physician next week and this can be arrange through his office  I have advised her to report to us if she has any sustained symptoms  Or any syncopal event

## 2018-09-28 NOTE — LETTER
September 28, 2018     Cody Mirza MD  [address]  [address] [place] [address],[address]  [address] [facility]    Patient: Kirk Morgan   YOB: 1945   Date of Visit: 9/28/2018       Dear Dr Erika Mittal: Thank you for referring Kirk Morgan to me for evaluation  Below are my notes for this consultation  If you have questions, please do not hesitate to call me  I look forward to following your patient along with you  Sincerely,        Angelica Porter MD        CC: No Recipients  Angelica Porter MD  9/28/2018 12:41 PM  [facility]   [facility]   [address] [facility]    [address], [address] [place]   [id number]  Phone#  [phone]  Fax#  [phone]  *-*-*-*-*-*-*-*-*-*-*-*-*-*-*-*-*-*-*-*-*-*-*-*-*-*-*-*-*-*-*-*-*-*-*-*-*-*-*-*-*-*-*-*-*-*-*-*-*-*-*-*-*-*    ENCOUNTER DATE: 09/28/18 12:38 PM    PATIENT NAME: Kirk Morgan   1945    [id number]  Age: 74 y o  Sex: female    AUTHOR: Angelica Porter MD  PRIMARYCARE PHYSICIAN: Cody Mirza MD      CURRENT ECG:  Results for orders placed or performed in visit on 09/28/18   POCT ECG    Narrative     Sinus rhythm, leftward axis, HR 79 bpm, normal intervals, no significant ST T wave abnormalities  CARDIOLOGY ASSESSMENT & PLAN:  No problem-specific Assessment & Plan notes found for this encounter  *-*-*-*-*-*-*-*-*-*-*-*-*-*-*-*-*-*-*-*-*-*-*-*-*-*-*-*-*-*-*-*-*-*-*-*-*-*-*-*-*-*-*-*-*-*-*-*-*-*-*-*-*-*-  REASON FOR VISIT:    evaluation of shortness of breath and palpitation  *-*-*-*-*-*-*-*-*-*-*-*-*-*-*-*-*-*-*-*-*-*-*-*-*-*-*-*-*-*-*-*-*-*-*-*-*-*-*-*-*-*-*-*-*-*-*-*-*-*-*-*-*-*-  HISTORY OF PRESENT ILLNESS:  Patient is an elderly 70-year-old originally from [facility] who has been referred for evaluation of above symptoms which have been there for last several months  She primarily speaks [place] and encounter is performed with use of translation provided by [facility]     Her medical history significant for:     1  History of invasive lobular carcinoma involving the left breast status post partial mastectomy and lymph node biopsy in 2018  2  Morbid obesity  3  History of major depression  4  Benign essential hypertension  5  Migraine headaches  6  History of spinal stenosis with radiculopathy  7  History of carpal tunnel syndrome  8  Migraine headaches     she is a poor historian and is unable to describe things in detail  She reports that she feels short of breath all the time and has difficulty in swallowing things  She reports severe back pain and bilateral hand pains relating to her carpal tunnel syndrome  She feels lightheaded and dizzy and vertiginous all the time  Also reports palpitations but is unable to describe   Any specific variation  She  Denies any presyncope /syncope  She has difficulty in ambulating due to her symptoms of pain and her obesity  She is also very depressed as he is  During with her cancer for which she is scheduled to soon begin radiation and chemotherapy  She denies typical anginal like symptoms  Her exercise tolerance is limited    *-*-*-*-*-*-*-*-*-*-*-*-*-*-*-*-*-*-*-*-*-*-*-*-*-*-*-*-*-*-*-*-*-*-*-*-*-*-*-*-*-*-*-*-*-*-*-*-*-*-*-*-*-*  PAST MEDICAL HISTORY:   No problem-specific Assessment & Plan notes found for this encounter        PAST SURGICAL HISTORY:   Past Surgical History:   Procedure Laterality Date    BREAST BIOPSY      BREAST LUMPECTOMY      CARPAL TUNNEL RELEASE Right     right CTR     SECTION      CHOLECYSTECTOMY      MASTECTOMY Left     sentinae node mapping    SENTINEL LYMPH NODE BIOPSY         FAMILY HISTORY:  Family History   Problem Relation Age of Onset    Heart disease Mother         cardiovascular disease       SOCIAL HISTORY:  [unfilled]  History   Smoking Status    Passive Smoke Exposure - Never Smoker   Smokeless Tobacco    Never Used     History   Alcohol Use No     History   Drug Use No [unfilled]    *-*-*-*-*-*-*-*-*-*-*-*-*-*-*-*-*-*-*-*-*-*-*-*-*-*-*-*-*-*-*-*-*-*-*-*-*-*-*-*-*-*-*-*-*-*-*-*-*-*-*-*-*-*  ALLERGIES:  Allergies   Allergen Reactions    Chocolate     Chocolate Flavor     Ibuprofen Swelling    Pork-Derived Products        CURRENT HOSPITAL MEDICATIONS:   Current Outpatient Prescriptions   Medication Sig Dispense Refill    acetaminophen (TYLENOL) 650 mg CR tablet Take 1 tablet (650 mg total) by mouth every 8 (eight) hours as needed for mild pain 30 tablet 0    anastrozole (ARIMIDEX) 1 mg tablet Take 1 mg by mouth daily      atorvastatin (LIPITOR) 40 mg tablet every 24 hours      DULoxetine (CYMBALTA) 30 mg delayed release capsule Take 30 mg by mouth 2 (two) times a day        DUREZOL 0 05 % EMUL INSTILL 1 DROP INTO SURGERY EYE QID  STARTING 3 DAYS PRIOR TO SURGERY DATE SEPARATE SURGERY EYE DROPS BY 5 MINUTES OF EACHOTHER  2    ergocalciferol (VITAMIN D2) 50,000 units take 1 capsule by oral route  every week      gabapentin (NEURONTIN) 300 mg capsule Every 12 hours      Linaclotide (LINZESS) 145 MCG CAPS every 24 hours      naproxen (NAPROSYN) 500 mg tablet Take 1 tablet by mouth every 12 (twelve) hours as needed      ondansetron (ZOFRAN) 4 mg tablet Take 4 mg by mouth once      polymyxin b-trimethoprim (POLYTRIM) ophthalmic solution INSTILL 1 DROP INTO THE SURGICAL EYE QID  START 3 DAYS PRIOR TO SURGERY DATE SEPARATE SURGERY EYE DROPS BY 5 MINUTES OF EACH OTHER  1    topiramate (TOPAMAX) 25 mg tablet every 24 hours       No current facility-administered medications for this visit  CURRENT OUTPATIENT MEDICATIONS:   [unfilled]    *-*-*-*-*-*-*-*-*-*-*-*-*-*-*-*-*-*-*-*-*-*-*-*-*-*-*-*-*-*-*-*-*-*-*-*-*-*-*-*-*-*-*-*-*-*-*-*-*-*-*-*-*-*  REVIEW OF SYMPTOMS:    Positive for:   Fatigue, dizziness, palpitation, shortness of breath, upper extremity pain, shortness of breath with activity  Negative for: All remaining as reviewed below and in HPI      SYSTEM SYMPTOMS REVIEWED:  Generalweight change, fever, night sweats  Respirato  Cardiovascularchest pain, syncope, dyspnea on exertion, edema, decline in exercise tolerance, claudication   Gastrointestinalpersistent vomiting, diarrhea, abdominal distention, blood in stool   Muscular or skeletaljoint pain or swelling   Neurologicheadaches, syncope, abnormal movement  Hematologichistory of easy bruising and bleeding   Endocrinethyroid enlargement, heat or cold intolerance, polyuria   Psychiatricanxiety, depression     *-*-*-*-*-*-*-*-*-*-*-*-*-*-*-*-*-*-*-*-*-*-*-*-*-*-*-*-*-*-*-*-*-*-*-*-*-*-*-*-*-*-*-*-*-*-*-*-*-*-*-*-*-*-  VITAL SIGNS:  Vitals:    09/28/18 1115   BP: 120/82   BP Location: Left arm   Patient Position: Sitting   Cuff Size: Large   Pulse: 79   SpO2: 97%   Weight: 86 2 kg (190 lb)   Height: 5' (1 524 m)       *-*-*-*-*-*-*-*-*-*-*-*-*-*-*-*-*-*-*-*-*-*-*-*-*-*-*-*-*-*-*-*-*-*-*-*-*-*-*-*-*-*-*-*-*-*-*-*-*-*-*-*-*-*-  PHYSICAL EXAM:  General Appearance:      Elderly female , morbidly obese, in no respiratory distress but in significant discomfort due to generalized pain  The report she reports feeling dizzy pulse is noted to be regular and not tachycardic  Head, Eyes, ENT:    No obvious abnormality, moist mucous mebranes  Neck:   Supple, no carotid bruit or JVD   Back:     Symmetric, no curvature  Lungs:     Respirations unlabored  Clear to auscultation bilaterally,    Chest wall:    No tenderness or deformity   Heart:    Regular rate and rhythm, S1 and S2 normal, no murmur, rub  or gallop   Abdomen:     Soft, non-tender, No obvious masses, or organomegaly   Extremities:   Extremities normal, no cyanosis or edema    Skin:   Skin color, texture, turgor normal, no rashes or lesions     *-*-*-*-*-*-*-*-*-*-*-*-*-*-*-*-*-*-*-*-*-*-*-*-*-*-*-*-*-*-*-*-*-*-*-*-*-*-*-*-*-*-*-*-*-*-*-*-*-*-*-*-*-*-  LABORATORY DATA:  I have personally reviewed pertinent labs      Sodium   Date Value Ref Range Status 07/09/2018 139 137 - 147 mmol/L Final   06/25/2018 141 137 - 147 mmol/L Final     Potassium   Date Value Ref Range Status   07/09/2018 4 4 3 6 - 5 0 mmol/L Final   06/25/2018 4 2 3 6 - 5 0 mmol/L Final     Chloride   Date Value Ref Range Status   07/09/2018 102 97 - 108 mmol/L Final   06/25/2018 104 97 - 108 mmol/L Final     CO2   Date Value Ref Range Status   07/09/2018 29 22 - 30 mmol/L Final   06/25/2018 29 22 - 30 mmol/L Final     BUN   Date Value Ref Range Status   07/09/2018 13 5 - 25 mg/dL Final   06/25/2018 13 5 - 25 mg/dL Final     Creatinine   Date Value Ref Range Status   07/09/2018 0 66 0 60 - 1 20 mg/dL Final     Comment:     Standardized to IDMS reference method   06/25/2018 0 56 (L) 0 60 - 1 20 mg/dL Final     Comment:     Standardized to IDMS reference method     eGFR   Date Value Ref Range Status   07/09/2018 101 >60 ml/min/1 73sq m Final   06/25/2018 106 >60 ml/min/1 73sq m Final     Calcium   Date Value Ref Range Status   07/09/2018 9 6 8 4 - 10 2 mg/dL Final   06/25/2018 9 5 8 4 - 10 2 mg/dL Final     AST   Date Value Ref Range Status   07/09/2018 27 14 - 36 U/L Final     Comment:       Specimen collection should occur prior to Sulfasalazine administration due to the potential for falsely depressed results  06/25/2018 38 (H) 14 - 36 U/L Final     Comment:       Specimen collection should occur prior to Sulfasalazine administration due to the potential for falsely depressed results  ALT   Date Value Ref Range Status   07/09/2018 30 9 - 52 U/L Final     Comment:       Specimen collection should occur prior to Sulfasalazine administration due to the potential for falsely depressed results  06/25/2018 33 9 - 52 U/L Final     Comment:       Specimen collection should occur prior to Sulfasalazine administration due to the potential for falsely depressed results        Alkaline Phosphatase   Date Value Ref Range Status   07/09/2018 84 43 - 122 U/L Final   06/25/2018 92 43 - 122 U/L Final WBC   Date Value Ref Range Status   07/10/2018 6 80 4 31 - 10 16 Thousand/uL Final   07/09/2018 6 50 4 50 - 11 00 Thousand/uL Final   07/06/2018 6 50 4 50 - 11 00 Thousand/uL Final     Hemoglobin   Date Value Ref Range Status   07/10/2018 11 8 11 5 - 15 4 g/dL Final   07/09/2018 12 6 12 0 - 16 0 g/dL Final   07/06/2018 11 9 (L) 12 0 - 16 0 g/dL Final     Platelets   Date Value Ref Range Status   07/10/2018 215 149 - 390 Thousands/uL Final   07/09/2018 245 150 - 450 Thousands/uL Final   07/06/2018 255 150 - 450 Thousands/uL Final     No results found for: PT, PTT, INR  No results found for: CKMB, BNP, DIGOXIN  No results found for: TSH, T4, T3  No results found for: CHOL, HDL, CHOLHDLRAT, LDL, TRIG   No results found for: HGBA1C  No results found for: BLOODCX, SPUTUMCULTUR, GRAMSTAIN, URINECX, WOUNDCULT, BODYFLUIDCUL, MRSACULTURE, INFLUAPCR, INFLUBPCR, RSVPCR, LEGIONELLAUR, CDIFFTOXINB    *-*-*-*-*-*-*-*-*-*-*-*-*-*-*-*-*-*-*-*-*-*-*-*-*-*-*-*-*-*-*-*-*-*-*-*-*-*-*-*-*-*-*-*-*-*-*-*-*-*-*-*-*-*-  RADIOLOGY RESULTS:  No results found  *-*-*-*-*-*-*-*-*-*-*-*-*-*-*-*-*-*-*-*-*-*-*-*-*-*-*-*-*-*-*-*-*-*-*-*-*-*-*-*-*-*-*-*-*-*-*-*-*-*-*-*-*-*-  CURRENT ECG:    ECHOCARDIOGRAM AND OTHER CARDIOLOGY RESULTS:  No results found for this or any previous visit  No results found for this or any previous visit  No results found for this or any previous visit  No results found for this or any previous visit    *-*-*-*-*-*-*-*-*-*-*-*-*-*-*-*-*-*-*-*-*-*-*-*-*-*-*-*-*-*-*-*-*-*-*-*-*-*-*-*-*-*-*-*-*-*-*-*-*-*-*-*-*-*-  SIGNATURES:   @LKU@   Shannon Gasca MD     CC:   MD Janell Mckeon MD

## 2018-09-28 NOTE — PROGRESS NOTES
[address]    [address], [address]  [address]  Phone#  [phone]  Fax#  [phone]  *-*-*-*-*-*-*-*-*-*-*-*-*-*-*-*-*-*-*-*-*-*-*-*-*-*-*-*-*-*-*-*-*-*-*-*-*-*-*-*-*-*-*-*-*-*-*-*-*-*-*-*-*-*    ENCOUNTER DATE: 09/28/18 12:41 PM    PATIENT NAME: Tristen Arrington   1945    [id number]  Age: 74 y o  Sex: female    AUTHOR: Angelica Proter MD  PRIMARYCARE PHYSICIAN: Maxwell Mesa MD      CURRENT ECG:  Results for orders placed or performed in visit on 09/28/18   POCT ECG    Narrative     Sinus rhythm, leftward axis, HR 79 bpm, normal intervals, no significant ST T wave abnormalities  CARDIOLOGY ASSESSMENT & PLAN:  Shortness of breath and palpitations   Patient's symptoms are week and not suggestive of acute cardiopulmonary abnormality  She does have significant risk factors including history of breast cancer  Without evidence of metastatic disease  On clinical examination there is no significant murmur appreciable although heart sounds are distant  She does have risk factors for for arrhythmia  She does appear to be depressed  We will arrange for an echocardiogram and 24 hour Holter monitor  I have explained to the patient that she keep a diary of her symptoms and let us know if she has any sustained symptoms or if she has presyncope or syncope  She does report difficulty in swallowing and some choking sensation and likely needs evaluation with CT scan of the chest    She also reports significant bilateral hand pain  Which is likely related to a history of carpal tunnel syndrome  She is on significant pain medications already  She is scheduled to follow up with her primary physician next week and this can be arrange through his office    I have advised her to report to us if she has any sustained symptoms  Or any syncopal event       *-*-*-*-*-*-*-*-*-*-*-*-*-*-*-*-*-*-*-*-*-*-*-*-*-*-*-*-*-*-*-*-*-*-*-*-*-*-*-*-*-*-*-*-*-*-*-*-*-*-*-*-*-*-  REASON FOR VISIT:    evaluation of shortness of breath and palpitation  *-*-*-*-*-*-*-*-*-*-*-*-*-*-*-*-*-*-*-*-*-*-*-*-*-*-*-*-*-*-*-*-*-*-*-*-*-*-*-*-*-*-*-*-*-*-*-*-*-*-*-*-*-*-  HISTORY OF PRESENT ILLNESS:  Patient is an elderly 78-year-old originally from [facility] who has been referred for evaluation of above symptoms which have been there for last several months  She primarily speaks Swedish and encounter is performed with use of translation provided by [facility]  Her medical history significant for:     1  History of invasive lobular carcinoma involving the left breast status post partial mastectomy and lymph node biopsy in April 2018  2  Morbid obesity  3  History of major depression  4  Benign essential hypertension  5  Migraine headaches  6  History of spinal stenosis with radiculopathy  7  History of carpal tunnel syndrome  8  Migraine headaches     she is a poor historian and is unable to describe things in detail  She reports that she feels short of breath all the time and has difficulty in swallowing things  She reports severe back pain and bilateral hand pains relating to her carpal tunnel syndrome  She feels lightheaded and dizzy and vertiginous all the time  Also reports palpitations but is unable to describe   Any specific variation  She  Denies any presyncope /syncope  She has difficulty in ambulating due to her symptoms of pain and her obesity  She is also very depressed as he is  During with her cancer for which she is scheduled to soon begin radiation and chemotherapy  She denies typical anginal like symptoms    Her exercise tolerance is limited    *-*-*-*-*-*-*-*-*-*-*-*-*-*-*-*-*-*-*-*-*-*-*-*-*-*-*-*-*-*-*-*-*-*-*-*-*-*-*-*-*-*-*-*-*-*-*-*-*-*-*-*-*-*  PAST MEDICAL HISTORY:   Shortness of breath and palpitations   Patient's symptoms are week and not suggestive of acute cardiopulmonary abnormality  She does have significant risk factors including history of breast cancer  Without evidence of metastatic disease  On clinical examination there is no significant murmur appreciable although heart sounds are distant  She does have risk factors for for arrhythmia  She does appear to be depressed  We will arrange for an echocardiogram and 24 hour Holter monitor  I have explained to the patient that she keep a diary of her symptoms and let us know if she has any sustained symptoms or if she has presyncope or syncope  She does report difficulty in swallowing and some choking sensation and likely needs evaluation with CT scan of the chest    She also reports significant bilateral hand pain  Which is likely related to a history of carpal tunnel syndrome  She is on significant pain medications already  She is scheduled to follow up with her primary physician next week and this can be arrange through his office  I have advised her to report to us if she has any sustained symptoms  Or any syncopal event        PAST SURGICAL HISTORY:   Past Surgical History:   Procedure Laterality Date    BREAST BIOPSY      BREAST LUMPECTOMY      CARPAL TUNNEL RELEASE Right     right CTR     SECTION      CHOLECYSTECTOMY      MASTECTOMY Left     sentinae node mapping    SENTINEL LYMPH NODE BIOPSY         FAMILY HISTORY:  Family History   Problem Relation Age of Onset    Heart disease Mother         cardiovascular disease       SOCIAL HISTORY:  [unfilled]  History   Smoking Status    Passive Smoke Exposure - Never Smoker   Smokeless Tobacco    Never Used     History   Alcohol Use No     History   Drug Use No     [unfilled]    *-*-*-*-*-*-*-*-*-*-*-*-*-*-*-*-*-*-*-*-*-*-*-*-*-*-*-*-*-*-*-*-*-*-*-*-*-*-*-*-*-*-*-*-*-*-*-*-*-*-*-*-*-*  ALLERGIES:  Allergies   Allergen Reactions    Chocolate     Chocolate Flavor     Ibuprofen Swelling    Pork-Derived Products CURRENT HOSPITAL MEDICATIONS:   Current Outpatient Prescriptions   Medication Sig Dispense Refill    acetaminophen (TYLENOL) 650 mg CR tablet Take 1 tablet (650 mg total) by mouth every 8 (eight) hours as needed for mild pain 30 tablet 0    anastrozole (ARIMIDEX) 1 mg tablet Take 1 mg by mouth daily      atorvastatin (LIPITOR) 40 mg tablet every 24 hours      DULoxetine (CYMBALTA) 30 mg delayed release capsule Take 30 mg by mouth 2 (two) times a day        DUREZOL 0 05 % EMUL INSTILL 1 DROP INTO SURGERY EYE QID  STARTING 3 DAYS PRIOR TO SURGERY DATE SEPARATE SURGERY EYE DROPS BY 5 MINUTES OF EACHOTHER  2    ergocalciferol (VITAMIN D2) 50,000 units take 1 capsule by oral route  every week      gabapentin (NEURONTIN) 300 mg capsule Every 12 hours      Linaclotide (LINZESS) 145 MCG CAPS every 24 hours      naproxen (NAPROSYN) 500 mg tablet Take 1 tablet by mouth every 12 (twelve) hours as needed      ondansetron (ZOFRAN) 4 mg tablet Take 4 mg by mouth once      polymyxin b-trimethoprim (POLYTRIM) ophthalmic solution INSTILL 1 DROP INTO THE SURGICAL EYE QID  START 3 DAYS PRIOR TO SURGERY DATE SEPARATE SURGERY EYE DROPS BY 5 MINUTES OF EACH OTHER  1    topiramate (TOPAMAX) 25 mg tablet every 24 hours       No current facility-administered medications for this visit  [facility]PAT[facility] MEDICATIONS:   [facility]    *-*-*-*-*-*-*-*-*-*-*-*-*-*-*-*-*-*-*-*-*-*-*-*-*-*-*-*-*-*-*-*-*-*-*-*-*-*-*-*-*-*-*-*-*-*-*-*-*-*-*-*-*-*  REVIEW OF SYMPTOMS:    Positive for:   Fatigue, dizziness, palpitation, shortness of breath, upper extremity pain, shortness of breath with activity  Negative for: All remaining as reviewed below and in HPI  SYSTEM SYMPTOMS REVIEWED:  General--weight change, fever, night sweats  Respirato      Cardiovascular--chest pain, syncope, dyspnea on exertion, edema, decline in exercise tolerance, claudication   Gastrointestinal--persistent vomiting, diarrhea, abdominal distention, blood in stool   Muscular or skeletal--joint pain or swelling   Neurologic--headaches, syncope, abnormal movement  Hematologic--history of easy bruising and bleeding   Endocrine--thyroid enlargement, heat or cold intolerance, polyuria   Psychiatric--anxiety, depression     *-*-*-*-*-*-*-*-*-*-*-*-*-*-*-*-*-*-*-*-*-*-*-*-*-*-*-*-*-*-*-*-*-*-*-*-*-*-*-*-*-*-*-*-*-*-*-*-*-*-*-*-*-*-  VITAL SIGNS:  Vitals:    09/28/18 1115   BP: 120/82   BP Location: Left arm   Patient Position: Sitting   Cuff Size: Large   Pulse: 79   SpO2: 97%   Weight: 86 2 kg (190 lb)   Height: 5' (1 524 m)       *-*-*-*-*-*-*-*-*-*-*-*-*-*-*-*-*-*-*-*-*-*-*-*-*-*-*-*-*-*-*-*-*-*-*-*-*-*-*-*-*-*-*-*-*-*-*-*-*-*-*-*-*-*-  PHYSICAL EXAM:  General Appearance:      Elderly female , morbidly obese, in no respiratory distress but in significant discomfort due to generalized pain  The report she reports feeling dizzy pulse is noted to be regular and not tachycardic  Head, Eyes, ENT:    No obvious abnormality, moist mucous mebranes  Neck:   Supple, no carotid bruit or JVD   Back:     Symmetric, no curvature  Lungs:     Respirations unlabored  Clear to auscultation bilaterally,    Chest wall:    No tenderness or deformity   Heart:    Regular rate and rhythm, S1 and S2 normal, no murmur, rub  or gallop   Abdomen:     Soft, non-tender, No obvious masses, or organomegaly   Extremities:   Extremities normal, no cyanosis or edema    Skin:   Skin color, texture, turgor normal, no rashes or lesions     *-*-*-*-*-*-*-*-*-*-*-*-*-*-*-*-*-*-*-*-*-*-*-*-*-*-*-*-*-*-*-*-*-*-*-*-*-*-*-*-*-*-*-*-*-*-*-*-*-*-*-*-*-*-  LABORATORY DATA:  I have personally reviewed pertinent labs      Sodium   Date Value Ref Range Status   07/09/2018 139 137 - 147 mmol/L Final   06/25/2018 141 137 - 147 mmol/L Final     Potassium   Date Value Ref Range Status   07/09/2018 4 4 3 6 - 5 0 mmol/L Final   06/25/2018 4 2 3 6 - 5 0 mmol/L Final     Chloride   Date Value Ref Range Status   07/09/2018 102 97 - 108 mmol/L Final   06/25/2018 104 97 - 108 mmol/L Final     CO2   Date Value Ref Range Status   07/09/2018 29 22 - 30 mmol/L Final   06/25/2018 29 22 - 30 mmol/L Final     BUN   Date Value Ref Range Status   07/09/2018 13 5 - 25 mg/dL Final   06/25/2018 13 5 - 25 mg/dL Final     Creatinine   Date Value Ref Range Status   07/09/2018 0 66 0 60 - 1 20 mg/dL Final     Comment:     Standardized to IDMS reference method   06/25/2018 0 56 (L) 0 60 - 1 20 mg/dL Final     Comment:     Standardized to IDMS reference method     eGFR   Date Value Ref Range Status   07/09/2018 101 >60 ml/min/1 73sq m Final   06/25/2018 106 >60 ml/min/1 73sq m Final     Calcium   Date Value Ref Range Status   07/09/2018 9 6 8 4 - 10 2 mg/dL Final   06/25/2018 9 5 8 4 - 10 2 mg/dL Final     AST   Date Value Ref Range Status   07/09/2018 27 14 - 36 U/L Final     Comment:       Specimen collection should occur prior to Sulfasalazine administration due to the potential for falsely depressed results  06/25/2018 38 (H) 14 - 36 U/L Final     Comment:       Specimen collection should occur prior to Sulfasalazine administration due to the potential for falsely depressed results  ALT   Date Value Ref Range Status   07/09/2018 30 9 - 52 U/L Final     Comment:       Specimen collection should occur prior to Sulfasalazine administration due to the potential for falsely depressed results  06/25/2018 33 9 - 52 U/L Final     Comment:       Specimen collection should occur prior to Sulfasalazine administration due to the potential for falsely depressed results        Alkaline Phosphatase   Date Value Ref Range Status   07/09/2018 84 43 - 122 U/L Final   06/25/2018 92 43 - 122 U/L Final     WBC   Date Value Ref Range Status   07/10/2018 6 80 4 31 - 10 16 Thousand/uL Final   07/09/2018 6 50 4 50 - 11 00 Thousand/uL Final   07/06/2018 6 50 4 50 - 11 00 Thousand/uL Final     Hemoglobin   Date Value Ref Range Status   07/10/2018 11 8 11 5 - 15 4 g/dL Final   07/09/2018 12 6 12 0 - 16 0 g/dL Final   07/06/2018 11 9 (L) 12 0 - 16 0 g/dL Final     Platelets   Date Value Ref Range Status   07/10/2018 215 149 - 390 Thousands/uL Final   07/09/2018 245 150 - 450 Thousands/uL Final   07/06/2018 255 150 - 450 Thousands/uL Final     No results found for: PT, PTT, INR  No results found for: CKMB, BNP, DIGOXIN  No results found for: TSH, T4, T3  No results found for: CHOL, HDL, CHOLHDLRAT, LDL, TRIG   No results found for: HGBA1C  No results found for: BLOODCX, SPUTUMCULTUR, GRAMSTAIN, URINECX, WOUNDCULT, BODYFLUIDCUL, MRSACULTURE, INFLUAPCR, INFLUBPCR, RSVPCR, LEGIONELLAUR, CDIFFTOXINB    *-*-*-*-*-*-*-*-*-*-*-*-*-*-*-*-*-*-*-*-*-*-*-*-*-*-*-*-*-*-*-*-*-*-*-*-*-*-*-*-*-*-*-*-*-*-*-*-*-*-*-*-*-*-  RADIOLOGY RESULTS:  No results found  *-*-*-*-*-*-*-*-*-*-*-*-*-*-*-*-*-*-*-*-*-*-*-*-*-*-*-*-*-*-*-*-*-*-*-*-*-*-*-*-*-*-*-*-*-*-*-*-*-*-*-*-*-*-  CURRENT ECG:    ECHOCARDIOGRAM AND OTHER CARDIOLOGY RESULTS:  No results found for this or any previous visit  No results found for this or any previous visit  No results found for this or any previous visit  No results found for this or any previous visit    *-*-*-*-*-*-*-*-*-*-*-*-*-*-*-*-*-*-*-*-*-*-*-*-*-*-*-*-*-*-*-*-*-*-*-*-*-*-*-*-*-*-*-*-*-*-*-*-*-*-*-*-*-*-  SIGNATURES:   [unfilled]   Noelle Joyce MD     CC:   MD Claude Malone MD

## 2018-09-28 NOTE — ASSESSMENT & PLAN NOTE
Patient's symptoms are week and not suggestive of acute cardiopulmonary abnormality  She does have significant risk factors including history of breast cancer  Without evidence of metastatic disease  On clinical examination there is no significant murmur appreciable although heart sounds are distant  She does have risk factors for for arrhythmia  She does appear to be depressed  We will arrange for an echocardiogram and 24 hour Holter monitor  I have explained to the patient that she keep a diary of her symptoms and let us know if she has any sustained symptoms or if she has presyncope or syncope  She does report difficulty in swallowing and some choking sensation and likely needs evaluation with CT scan of the chest    She also reports significant bilateral hand pain  Which is likely related to a history of carpal tunnel syndrome  She is on significant pain medications already  She is scheduled to follow up with her primary physician next week and this can be arrange through his office  I have advised her to report to us if she has any sustained symptoms  Or any syncopal event

## 2018-10-02 ENCOUNTER — APPOINTMENT (OUTPATIENT)
Dept: LAB | Facility: HOSPITAL | Age: 73
End: 2018-10-02
Payer: MEDICARE

## 2018-10-02 LAB
25(OH)D3 SERPL-MCNC: 30.6 NG/ML (ref 30–100)
ALBUMIN SERPL BCP-MCNC: 3.8 G/DL (ref 3–5.2)
ALP SERPL-CCNC: 81 U/L (ref 43–122)
ALT SERPL W P-5'-P-CCNC: 28 U/L (ref 9–52)
ANION GAP SERPL CALCULATED.3IONS-SCNC: 7 MMOL/L (ref 5–14)
AST SERPL W P-5'-P-CCNC: 32 U/L (ref 14–36)
BASOPHILS # BLD AUTO: 0 THOUSANDS/ΜL (ref 0–0.1)
BASOPHILS NFR BLD AUTO: 1 % (ref 0–1)
BILIRUB SERPL-MCNC: 0.5 MG/DL
BUN SERPL-MCNC: 11 MG/DL (ref 5–25)
CALCIUM SERPL-MCNC: 9.2 MG/DL (ref 8.4–10.2)
CHLORIDE SERPL-SCNC: 105 MMOL/L (ref 97–108)
CO2 SERPL-SCNC: 28 MMOL/L (ref 22–30)
CREAT SERPL-MCNC: 0.64 MG/DL (ref 0.6–1.2)
EOSINOPHIL # BLD AUTO: 0.1 THOUSAND/ΜL (ref 0–0.4)
EOSINOPHIL NFR BLD AUTO: 2 % (ref 0–6)
ERYTHROCYTE [DISTWIDTH] IN BLOOD BY AUTOMATED COUNT: 14.5 %
GFR SERPL CREATININE-BSD FRML MDRD: 102 ML/MIN/1.73SQ M
GLUCOSE SERPL-MCNC: 124 MG/DL (ref 70–99)
HCT VFR BLD AUTO: 36.1 % (ref 36–46)
HGB BLD-MCNC: 11.8 G/DL (ref 12–16)
LYMPHOCYTES # BLD AUTO: 1.8 THOUSANDS/ΜL (ref 0.5–4)
LYMPHOCYTES NFR BLD AUTO: 34 % (ref 20–50)
MCH RBC QN AUTO: 28.7 PG (ref 26–34)
MCHC RBC AUTO-ENTMCNC: 32.6 G/DL (ref 31–36)
MCV RBC AUTO: 88 FL (ref 80–100)
MONOCYTES # BLD AUTO: 0.6 THOUSAND/ΜL (ref 0.2–0.9)
MONOCYTES NFR BLD AUTO: 12 % (ref 1–10)
NEUTROPHILS # BLD AUTO: 2.7 THOUSANDS/ΜL (ref 1.8–7.8)
NEUTS SEG NFR BLD AUTO: 51 % (ref 45–65)
PLATELET # BLD AUTO: 238 THOUSANDS/UL (ref 150–450)
PMV BLD AUTO: 7.7 FL (ref 8.9–12.7)
POTASSIUM SERPL-SCNC: 3.6 MMOL/L (ref 3.6–5)
PROT SERPL-MCNC: 7.2 G/DL (ref 5.9–8.4)
RBC # BLD AUTO: 4.1 MILLION/UL (ref 4–5.2)
SODIUM SERPL-SCNC: 140 MMOL/L (ref 137–147)
WBC # BLD AUTO: 5.3 THOUSAND/UL (ref 4.5–11)

## 2018-10-02 PROCEDURE — 36415 COLL VENOUS BLD VENIPUNCTURE: CPT | Performed by: NURSE PRACTITIONER

## 2018-10-02 PROCEDURE — 85025 COMPLETE CBC W/AUTO DIFF WBC: CPT | Performed by: NURSE PRACTITIONER

## 2018-10-02 PROCEDURE — 82306 VITAMIN D 25 HYDROXY: CPT | Performed by: NURSE PRACTITIONER

## 2018-10-02 PROCEDURE — 80053 COMPREHEN METABOLIC PANEL: CPT | Performed by: NURSE PRACTITIONER

## 2018-10-03 ENCOUNTER — OFFICE VISIT (OUTPATIENT)
Dept: FAMILY MEDICINE CLINIC | Facility: CLINIC | Age: 73
End: 2018-10-03
Payer: MEDICARE

## 2018-10-03 ENCOUNTER — OFFICE VISIT (OUTPATIENT)
Dept: HEMATOLOGY ONCOLOGY | Facility: CLINIC | Age: 73
End: 2018-10-03
Payer: MEDICARE

## 2018-10-03 VITALS
SYSTOLIC BLOOD PRESSURE: 110 MMHG | HEIGHT: 60 IN | HEART RATE: 85 BPM | OXYGEN SATURATION: 98 % | DIASTOLIC BLOOD PRESSURE: 70 MMHG | BODY MASS INDEX: 37.5 KG/M2 | TEMPERATURE: 98.2 F | RESPIRATION RATE: 16 BRPM | WEIGHT: 191 LBS

## 2018-10-03 VITALS
WEIGHT: 192 LBS | DIASTOLIC BLOOD PRESSURE: 82 MMHG | SYSTOLIC BLOOD PRESSURE: 124 MMHG | TEMPERATURE: 99.3 F | OXYGEN SATURATION: 97 % | BODY MASS INDEX: 37.69 KG/M2 | HEIGHT: 60 IN | HEART RATE: 94 BPM | RESPIRATION RATE: 20 BRPM

## 2018-10-03 DIAGNOSIS — G63 NEUROPATHY ASSOCIATED WITH CANCER (HCC): ICD-10-CM

## 2018-10-03 DIAGNOSIS — Z17.0 MALIGNANT NEOPLASM OF LOWER-INNER QUADRANT OF LEFT BREAST IN FEMALE, ESTROGEN RECEPTOR POSITIVE (HCC): Primary | ICD-10-CM

## 2018-10-03 DIAGNOSIS — C50.312 MALIGNANT NEOPLASM OF LOWER-INNER QUADRANT OF LEFT BREAST IN FEMALE, ESTROGEN RECEPTOR POSITIVE (HCC): Primary | ICD-10-CM

## 2018-10-03 DIAGNOSIS — C80.1 NEUROPATHY ASSOCIATED WITH CANCER (HCC): ICD-10-CM

## 2018-10-03 DIAGNOSIS — Z23 NEEDS FLU SHOT: Primary | ICD-10-CM

## 2018-10-03 PROCEDURE — 99213 OFFICE O/P EST LOW 20 MIN: CPT | Performed by: FAMILY MEDICINE

## 2018-10-03 PROCEDURE — 96372 THER/PROPH/DIAG INJ SC/IM: CPT | Performed by: FAMILY MEDICINE

## 2018-10-03 PROCEDURE — G0008 ADMIN INFLUENZA VIRUS VAC: HCPCS | Performed by: FAMILY MEDICINE

## 2018-10-03 PROCEDURE — 99213 OFFICE O/P EST LOW 20 MIN: CPT | Performed by: INTERNAL MEDICINE

## 2018-10-03 PROCEDURE — 90662 IIV NO PRSV INCREASED AG IM: CPT | Performed by: FAMILY MEDICINE

## 2018-10-03 RX ORDER — OXYCODONE HYDROCHLORIDE 5 MG/1
5 TABLET ORAL 2 TIMES DAILY PRN
Qty: 30 TABLET | Refills: 0 | Status: SHIPPED | OUTPATIENT
Start: 2018-10-03 | End: 2018-10-17 | Stop reason: ALTCHOICE

## 2018-10-03 NOTE — PROGRESS NOTES
Hematology/Oncology Outpatient Follow-up  De Number 74 y o  female 1945 [id number]    Date:  10/3/2018        Assessment and Plan:    1  Malignant neoplasm of 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, estrogen receptor positive ([address] )  The patient is having hard time with the neuropathy and arthralgia  It is not entirely clear if she is still taking the anastrozole or not  I did advise her to start the anastrozole to see if her arthralgia will get better  We will get in touch with her within the next 2 weeks to see if her symptoms have improved  Otherwise she was encouraged to follow up with her primary care physician as soon as possible to address the rest of her symptoms of unknown etiologies  The patient refused going to the emergency room for further evaluation today instead we got in touch with her PCP office to be seen as soon as possible  She also was encouraged to follow up with the Radiation Oncology team for symptom management of the left breast skin burning changes  - CBC and differential; Future  - Comprehensive metabolic panel; Future  - Vitamin D 25 hydroxy; Future        HPI:  The patient came in today for a follow-up visit  She continues to have the same symptoms including severe headaches, dizziness, tiredness, and severe burning sensation in her fingertips  She also complained about skin burning at the area where she is currently receiving adjuvant radiation to the left breast   It is not entirely clear if the patient is still taking the anastrozole or not  Even with the help of the  I was not able to figure out if she was taking the endocrine therapy  Her blood work showed normal CBC and CMP with hemoglobin of 11 8  She had extensive workup in the past for her anemia which was nonconclusive without any hint of monoclonal gammopathy  She did have elevated sed rate with low normal vitamin B12 level    Her vitamin-D level was 30  Oncology History    Patient had bilateral mammogram October 2017 which showed a breast density with a left-sided asymmetry  An ultrasound was done which revealed 1 5 cm solid mass  Ultrasound-guided core biopsy was done January 24, 2018 the pathology revealed infiltrating lobular carcinoma grade 1  ER strongly positive 90% AK-0% and her 2 Jaylen negative as well  She had her definitive surgery April 3, 2018 as well as 2 sentinel lymph nodes from the left axilla which were both negative for metastatic disease  Her tumor was 2 8 cm and compatible with invasive lobular type adenocarcinoma  The lymphovascular invasion was not present grade 1 with multiple foci of lobular carcinoma in situ  The anterior margin was focally involved with invasive malignancy  Her final pathologic stage was to a PT2PN0 MX G1  Oncotype recurrence score came back 13 putting her in the low risk category  Prolia injections every 6 months to start July of 2018  Patient has a history of osteopenia and will require long-term use of an aromatase inhibitor  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 ([facility] [address] )    1/24/2018 Biopsy     Left breast ultrasound guided core biopsies  Infiltrating lobular carcinoma  Grade 1  ER 98% positive, AK negative, HER2 negative           1/24/2018 Initial Diagnosis     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 (HCC)  Stage IA (pT2, pN0, cM0, G1, ER: Positive, AK: Positive, HER2: Negative)           4/3/2018 Surgery     Left partial mastectomy with 2 sentinel node biopsy    Size at least 2 1 cm  Multiple foci of LCIS  LCIS present at superior margin   Invasive malignancy is also noted in close proximity to the superior margin of resection    Pathological staging: stage IIA pT2, pN0 sn, pMX, G1         4/3/2018 Genomic Testing     Oncotype DX    Recurrence score 13, 10 year risk of distance recurrence  SANCHEZ alone 8%  ER score 10 6 positive  AK score 5 1 negative  HER2 9 6 negative 2018 -  Chemotherapy     1  Anastrozole 1 mg p o  daily            Interval history:    ROS: Review of Systems   Constitutional: Positive for appetite change and fatigue  Negative for activity change, chills, diaphoresis, fever and unexpected weight change  HENT: Positive for trouble swallowing  Negative for congestion, dental problem, ear discharge, ear pain, facial swelling, hearing loss, mouth sores, nosebleeds, postnasal drip, sore throat and tinnitus  Eyes: Negative for discharge, redness, itching and visual disturbance  Respiratory: Positive for shortness of breath  Negative for cough, chest tightness and wheezing  Cardiovascular: Negative for chest pain, palpitations and leg swelling  Gastrointestinal: Positive for diarrhea and nausea  Negative for abdominal distention, abdominal pain, anal bleeding, blood in stool, constipation and vomiting  Genitourinary: Negative for difficulty urinating, dysuria, flank pain, frequency, hematuria and urgency  Musculoskeletal: Negative for arthralgias, back pain, gait problem, joint swelling, myalgias and neck pain  Skin: Negative for color change, pallor, rash and wound  Neurological: Positive for dizziness, numbness (Severe mainly in both hands in fingertips) and headaches  Negative for syncope, speech difficulty, weakness and light-headedness  Hematological: Negative for adenopathy  Does not bruise/bleed easily  Psychiatric/Behavioral: Positive for sleep disturbance  Negative for agitation, behavioral problems and confusion         Past Medical History:   Diagnosis Date    Anxiety     Breast cancer ([facility] )     Depression     Depression     Diabetes mellitus ([facility] )     Hypercholesterolemia     Hypertension     Migraine        Past Surgical History:   Procedure Laterality Date    BREAST BIOPSY      BREAST LUMPECTOMY      CARPAL TUNNEL RELEASE Right     right CTR     SECTION      CHOLECYSTECTOMY      MASTECTOMY Left sentinae node mapping    SENTINEL LYMPH NODE BIOPSY         Social History     Social History    Marital status: /Civil Union     Spouse name: N/A    Number of children: N/A    Years of education: N/A     Social History Main Topics    Smoking status: Passive Smoke Exposure - Never Smoker    Smokeless tobacco: Never Used    Alcohol use No    Drug use: No    Sexual activity: Yes     Partners: Male     Other Topics Concern    Not on file     Social History Narrative    No narrative on file       Family History   Problem Relation Age of Onset    Heart disease Mother         cardiovascular disease       Allergies   Allergen Reactions    Chocolate     Chocolate Flavor     Ibuprofen Swelling    Pork-Derived Products          Current Outpatient Prescriptions:     acetaminophen (TYLENOL) 650 mg CR tablet, Take 1 tablet (650 mg total) by mouth every 8 (eight) hours as needed for mild pain, Disp: 30 tablet, Rfl: 0    anastrozole (ARIMIDEX) 1 mg tablet, Take 1 mg by mouth daily, Disp: , Rfl:     atorvastatin (LIPITOR) 40 mg tablet, every 24 hours, Disp: , Rfl:     DULoxetine (CYMBALTA) 30 mg delayed release capsule, Take 30 mg by mouth 2 (two) times a day  , Disp: , Rfl:     DUREZOL 0 05 % EMUL, INSTILL 1 DROP INTO SURGERY EYE QID  STARTING 3 DAYS PRIOR TO SURGERY DATE SEPARATE SURGERY EYE DROPS BY 5 MINUTES OF EACHOTHER, Disp: , Rfl: 2    ergocalciferol (VITAMIN D2) 50,000 units, take 1 capsule by oral route  every week, Disp: , Rfl:     gabapentin (NEURONTIN) 300 mg capsule, Every 12 hours, Disp: , Rfl:     Linaclotide (LINZESS) 145 MCG CAPS, every 24 hours, Disp: , Rfl:     naproxen (NAPROSYN) 500 mg tablet, Take 1 tablet by mouth every 12 (twelve) hours as needed, Disp: , Rfl:     ondansetron (ZOFRAN) 4 mg tablet, Take 4 mg by mouth once, Disp: , Rfl:     polymyxin b-trimethoprim (POLYTRIM) ophthalmic solution, INSTILL 1 DROP INTO THE SURGICAL EYE QID   START 3 DAYS PRIOR TO SURGERY DATE SEPARATE SURGERY EYE DROPS BY 5 MINUTES OF EACH OTHER, Disp: , Rfl: 1    topiramate (TOPAMAX) 25 mg tablet, every 24 hours, Disp: , Rfl:       Physical Exam:  /82 (BP Location: Right arm, Patient Position: Sitting, Cuff Size: Adult)   Pulse 94   Temp 99 3 °F (37 4 °C) (Tympanic)   Resp 20   Ht 5' (1 524 m)   Wt 87 1 kg (192 lb)   SpO2 97%   BMI 37 50 kg/m²     Physical Exam   Constitutional: She is oriented to person, place, and time  She appears well-developed and well-nourished  She appears distressed  HENT:   Head: Normocephalic and atraumatic  Nose: Nose normal    Mouth/Throat: Oropharynx is clear and moist    Eyes: Pupils are equal, round, and reactive to light  Conjunctivae and EOM are normal  Right eye exhibits no discharge  Left eye exhibits no discharge  No scleral icterus  Neck: Normal range of motion  Neck supple  No JVD present  No tracheal deviation present  No thyromegaly present  Cardiovascular: Normal rate, regular rhythm and normal heart sounds  Exam reveals no friction rub  No murmur heard  Pulmonary/Chest: Effort normal and breath sounds normal  No stridor  No respiratory distress  She has no wheezes  She has no rales  She exhibits no tenderness  Abdominal: Soft  Bowel sounds are normal  She exhibits no distension and no mass  There is no hepatosplenomegaly, splenomegaly or hepatomegaly  There is tenderness (On mild palpation in the right upper and left upper quadrant area)  There is no rebound and no guarding  Musculoskeletal: Normal range of motion  She exhibits no edema, tenderness or deformity  Lymphadenopathy:     She has no cervical adenopathy  Neurological: She is alert and oriented to person, place, and time  She has normal reflexes  No cranial nerve deficit  Coordination normal    Skin: Skin is warm and dry  Rash noted  She is not diaphoretic  There is erythema  No pallor     Left breast skin burning and changes due to the radiation treatment Psychiatric: She has a normal mood and affect  Her behavior is normal  Judgment and thought content normal          Labs:  Lab Results   Component Value Date    WBC 5 30 10/02/2018    HGB 11 8 (L) 10/02/2018    HCT 36 1 10/02/2018    MCV 88 10/02/2018     10/02/2018     Lab Results   Component Value Date     10/02/2018    K 3 6 10/02/2018     10/02/2018    CO2 28 10/02/2018    BUN 11 10/02/2018    CREATININE 0 64 10/02/2018    CALCIUM 9 2 10/02/2018    AST 32 10/02/2018    ALT 28 10/02/2018    ALKPHOS 81 10/02/2018    EGFR 102 10/02/2018     No results found for: TSH    Patient voiced understanding and agreement in the above discussion  Aware to contact our office with questions/symptoms in the interim

## 2018-10-03 NOTE — PATIENT INSTRUCTIONS
Oxicodona , liberación rápida (Por la boca)   Se [place] para tratar el dolor de moderado a severo  Rosey medicamento es un narcótico para el dolor  Hola(s) : Oxaydo, Oxy IR, Roxicodone   Existen muchas otras marcas de Donita  Rosey medicamento no debe ser usado cuando:   Rosey medicamento no es adecuado para todas las personas  No lo use si ha tenido rita reacción alérgica a la oxicodona, la codeína, la hidrocodona, la dihidrocodeína o la morfina, o si usted sufre de un bloqueo estomacal o intestinal   Forma de usar rosey medicamento:   Moraima Líquido, Tableta  · Normal ita medicamentos noe se le haya indicado  Es probable que sea necesario cambiar trujillo dosis varias veces hasta encontrar la que funciona mejor para usted  · Rita sobredosis puede ser Alveria Bumps  Siga las instrucciones con cuidado para no jacoby [facility] cantidad del medicamento de rita tyrese vez  · Solución oral: Mida el líquido oral con lena Caal para uso oral o taza especialmente marcadas para medir medicamentos  · Tableta de Oxaydo®: Trague la tableta entera con rita cantidad suficiente de agua  No la parta, triture, mastique, ni la disuelva  No humedezca la tableta antes de ponérsela en la boca  · Dueñas debe venir con [place] Presser Guía del medicamento  Solicite rita copia con trujillo farmacéutico en rakesh de no tener la guía  · Si olvida rita dosis: Si olvida rita dosis de trujillo medicamento, tómelo lo más pronto posible  Si es francisco la hora para trujillo próxima dosis, espere hasta entonces para jacoby trujillo dosis regular  No use medicamento adicional para reponer la dosis olvidada  · Guarde el medicamento en un recipiente cerrado a temperatura ambiente y alejado del calor, la humedad y la sudhir directa  Guarde rosey medicamento en un lugar seguro para prevenir que otros lo tomen  Consulte con trujillo farmacéutico sobre la mejor forma de botar el medicamento que no haya usado    Medicamentos y [employer] que debe evitar:   Consulte con trujillo médico o farmacéutico antes de usar [place], [employer] [place] que compra sin receta médica, las vitaminas y los productos herbales  · No use rosey medicamento si usted ha usado un inhibidor de la monoaminooxidasa (IMAO) en los últimos 14 días  · Algunos medicamentos pueden afectar la eficacia de oxicodona  Informe a trujillo médico si está usando cualquiera de los siguientes:  ¨ Amiodarona, Cegdel, eritromicina, ketoconazol, fenitoína, quinidina, rifampicina, ritonavir  ¨ Diurético  ¨ Medicamentos para tratar la depresión o la ansiedad  ¨ Medicamento para tratar las migrañas  ¨ Medicamentos con fenotiazina  · Informe a trujillo médico si usted [place] cualquier cosa que le provoca sueño  Bret Bile son medicamentos para alergia o medicamentos narcóticos para el dolor y el alcohol  Informe a trujillo médico si usted [employer] buprenorfina, butorfanol, nalbufina, pentazocina o un relajante muscular  · No consuma alcohol mientras esté   Precauciones kiko el uso de rosey medicamento:   · Informe a trujillo médico si usted está embarazada o amamantando, o si tiene enfermedad renal, enfermedad hepática, enfermedad cardíaca, presión arterial baja, enfermedad pulmonar o problemas respiratorios (noe asma, EPOC), escoliosis, agrandamiento de la próstata o dificultad para orinar, hipotiroidismo, enfermedad de Nelson, problemas en la vesícula biliar o el páncreas, o problemas digestivos  Infórmele a trujillo médico si usted tiene antecedentes de lesiones en la boogie, tumores cerebrales, problemas mentales, convulsiones o adicción a las drogas o el alcohol  · Rosey medicamento puede causar los siguientes problemas:  ¨ Mayor riesgo de sobredosis, que puede conducir a la muerte  ¨ La depresión respiratoria (problema respiratorio grave que puede ser mortal)  ¨ Síndrome de la serotonina, cuando se [place] con otros medicamentos  · [employer] puede causar que usted sienta mareos, farida o Vicky   No maneje un vehículo ni fatou ninguna tarea que pueda ser peligrosa hasta que usted sepa cómo lo afecta rosey medicamento  Siéntese o acuéstese si se siente mareado  Póngase de pie con cuidado  · Rosey medicamento puede convertirse en un hábito  No use más de la dosis prescrita  Llame a trujillo médico si usted siente que el medicamento no le está funcionando  · No suspenda el uso de rosey medicamento súbitamente  Trujillo médico necesitará disminuir trujillo dosis poco a poco antes de suspender el medicamento por completo  · Rosey medicamento puede causar el estreñimiente, especialmente si usted lo [place] kiko [employer]  Pregúntele a trujillo médico si usted también debería usar un laxante para prevenir y tratar el estreñimiento  Flowella muchos líquidos para evitar el estreñimiento  · Puede que rosey medicamento cause infertilidad  Hable con trujillo médico antes de usar rosey medicamento si usted planea tener hijos  · Guarde todos los medicamentos fuera del alcance de los niños  Nunca comparta ita medicamentos con [employer]    Efectos secundarios que pueden presentarse kiko el uso de rosey medicamento:   Consulte inmediatamente con el médico si nota cualquiera de estos efectos secundarios:  · Reacción alérgica: Comezón o ronchas, hinchazón del diamond o las tolu, hinchazón u hormigueo en la boca o garganta, opresión en el pecho, dificultad para respirar  · Ansiedad, inquietud, ritmo cardíaco acelerado, fiebre, transpiración, espasmos musculares, estremecimientos, náusea, vómito, diarrea, jaswant o escuchar cosas inexistentes  · Labios, uñas o piel azulado, dificultad para respirar  · Mareo y debilidad extremos, respiración superficial, ritmo cardíaco lento, sudor, piel fría o pegajosa, convulsiones  · Desvanecimientos, mareos, desmayos  · Estreñimiento severo, dolor estomacal  Consulte con el médico si nota los siguientes efectos secundarios menos graves:   · Estreñimiento leve  · Somnolencia, cansancio  Consulte con el médico si nota otros efectos secundarios que fernando son causados por irene medicamento  Llame a dial médico para consultarle Lizbeth Mancia puede notificar ita efectos secundarios al FDA al [phone]  © 2017 [address] Information is for End User's use only and may not be sold, redistributed or otherwise used for commercial purposes  Esta información es sólo para uso en educación  Dial intención no es darle un consejo médico sobre enfermedades o tratamientos  Colsulte con dial Rupinder Smith farmacéutico antes de seguir cualquier régimen médico para saber si es seguro y efectivo para usted

## 2018-10-03 NOTE — PROGRESS NOTES
Assessment/Plan:  1  Needs flu shot  Given to her today  - influenza vaccine, 1654-1642, high-dose, PF 0 5 mL, for patients 65 yr+ (FLUZONE HIGH-DOSE)    2  Neuropathy associated with cancer (HCC)  Breast cancer and arms neuropathy  Unsure neuropathy neuropathy is related to her current cancer condition, patient had been trying multiple NSAIDs  She does not have history of alcohol abuse or any illegal drug use  The level pain is severe and patient feels frustrated due to uncontrolled pain  The pain can be ceased 10/10 at home and at this time is 8/10 in the office  After serious consideration of other options I am going to give her a trial of colon  She was found having carpal tunnel in the past and she needs to follow up with neurosurgery  Situation that was put on hold after diagnosis of breast cancer   - oxyCODONE (ROXICODONE) 5 mg immediate release tablet; Take 1 tablet (5 mg total) by mouth 2 (two) times a day as needed for moderate pain Max Daily Amount: 10 mg  Dispense: 30 tablet; Refill: 0    No problem-specific Assessment & Plan notes found for this encounter  Diagnoses and all orders for this visit:    Needs flu shot  -     influenza vaccine, 8420-1556, high-dose, PF 0 5 mL, for patients 65 yr+ (FLUZONE HIGH-DOSE)          Subjective:      Patient ID: Anne Biggs is a 74 y o  female  PT came as a walk in crying due to hand pain  Dizziness   This is a recurrent problem  The current episode started more than 1 year ago  The problem occurs intermittently  The problem has been unchanged  Associated symptoms include arthralgias, headaches, joint swelling and myalgias (Mainly over both arms and hands)  Pertinent negatives include no abdominal pain, chest pain, coughing, fatigue, fever, nausea, sore throat or vomiting  The symptoms are aggravated by bending, standing, walking and twisting  Earache    There is pain in both ears  This is a new problem   The current episode started 1 to 4 weeks ago  The problem occurs constantly  The problem has been unchanged  There has been no fever  The pain is at a severity of 4/10  The pain is moderate  Associated symptoms include headaches  Pertinent negatives include no abdominal pain, coughing, sore throat or vomiting  She has tried nothing for the symptoms  Hand Pain    There was no injury mechanism  The pain is present in the left hand and right hand  The quality of the pain is described as aching and burning  The pain is at a severity of 5/10  The pain is moderate  The pain has been worsening since the incident  Associated symptoms include tingling  Pertinent negatives include no chest pain  The symptoms are aggravated by movement  The following portions of the patient's history were reviewed and updated as appropriate: allergies, current medications, past family history, past medical history, past social history, past surgical history and problem list     Review of Systems   Constitutional: Positive for unexpected weight change  Negative for fatigue and fever  HENT: Positive for ear pain  Negative for sore throat and trouble swallowing  Eyes: Negative for photophobia  Respiratory: Negative for cough, chest tightness, shortness of breath and wheezing  Cardiovascular: Negative for chest pain, palpitations and leg swelling  Gastrointestinal: Negative for abdominal pain, blood in stool, nausea and vomiting  Genitourinary: Negative for hematuria  Musculoskeletal: Positive for arthralgias, joint swelling and myalgias (Mainly over both arms and hands)  Bilateral hand pain   Neurological: Positive for dizziness, tingling and headaches  Negative for syncope and light-headedness  Hematological: Does not bruise/bleed easily           Objective:      /70 (BP Location: Left arm, Patient Position: Sitting, Cuff Size: Standard)   Pulse 85   Temp 98 2 °F (36 8 °C) (Oral)   Resp 16   Ht 5' (1 524 m)   Wt 86 6 kg (191 lb)   SpO2 98%   Breastfeeding? No   BMI 37 30 kg/m²          Physical Exam   Constitutional: She is oriented to person, place, and time  She appears well-developed and well-nourished  HENT:   Head: Normocephalic  Eyes: Pupils are equal, round, and reactive to light  EOM are normal    Neck: Neck supple  Cardiovascular: Normal rate, regular rhythm and normal heart sounds  Pulmonary/Chest: Effort normal and breath sounds normal    Abdominal: Soft  Bowel sounds are normal    Musculoskeletal: Normal range of motion  She exhibits tenderness (Severe tenderness to the passive motion of both elbows and wrists  )  Neurological: She is alert and oriented to person, place, and time  She has normal reflexes  Skin: Skin is warm and dry  Psychiatric: She has a normal mood and affect   Her behavior is normal  Judgment and thought content normal

## 2018-10-03 NOTE — LETTER
October 3, 2018     Patient: Yady Higginbotham   YOB: 1945   Date of Visit: 10/3/2018       Consult Home Care  Questioning patient compliance with cancer treatment  Skilled Nursing Requested           Sincerely,          Lizeth Aviles MD        CC: No Recipients

## 2018-10-08 ENCOUNTER — HOSPITAL ENCOUNTER (OUTPATIENT)
Dept: NON INVASIVE DIAGNOSTICS | Facility: HOSPITAL | Age: 73
Discharge: HOME | End: 2018-10-08
Attending: INTERNAL MEDICINE
Payer: MEDICARE

## 2018-10-08 DIAGNOSIS — R06.02 SHORTNESS OF BREATH: ICD-10-CM

## 2018-10-08 DIAGNOSIS — R00.2 PALPITATIONS: ICD-10-CM

## 2018-10-08 PROCEDURE — 93225 XTRNL ECG REC<48 HRS REC: CPT

## 2018-10-09 ENCOUNTER — HOSPITAL ENCOUNTER (OUTPATIENT)
Dept: NON INVASIVE DIAGNOSTICS | Facility: HOSPITAL | Age: 73
Discharge: HOME | End: 2018-10-09
Attending: INTERNAL MEDICINE
Payer: MEDICARE

## 2018-10-09 DIAGNOSIS — R06.02 SHORTNESS OF BREATH: ICD-10-CM

## 2018-10-09 DIAGNOSIS — R00.2 PALPITATION: ICD-10-CM

## 2018-10-09 PROCEDURE — 93306 TTE W/DOPPLER COMPLETE: CPT

## 2018-10-10 PROCEDURE — 93306 TTE W/DOPPLER COMPLETE: CPT | Performed by: INTERNAL MEDICINE

## 2018-10-15 PROCEDURE — 93227 XTRNL ECG REC<48 HR R&I: CPT | Performed by: INTERNAL MEDICINE

## 2018-10-16 ENCOUNTER — CLINICAL SUPPORT (OUTPATIENT)
Dept: RADIATION ONCOLOGY | Facility: CLINIC | Age: 73
End: 2018-10-16
Payer: MEDICARE

## 2018-10-16 ENCOUNTER — RADIATION ONCOLOGY FOLLOW-UP (OUTPATIENT)
Dept: RADIATION ONCOLOGY | Facility: CLINIC | Age: 73
End: 2018-10-16
Attending: RADIOLOGY
Payer: MEDICARE

## 2018-10-16 VITALS
SYSTOLIC BLOOD PRESSURE: 126 MMHG | WEIGHT: 190.5 LBS | HEIGHT: 60 IN | HEART RATE: 95 BPM | BODY MASS INDEX: 37.4 KG/M2 | DIASTOLIC BLOOD PRESSURE: 80 MMHG | RESPIRATION RATE: 18 BRPM

## 2018-10-16 DIAGNOSIS — Z17.0 MALIGNANT NEOPLASM OF LOWER-INNER QUADRANT OF LEFT BREAST IN FEMALE, ESTROGEN RECEPTOR POSITIVE (HCC): Primary | ICD-10-CM

## 2018-10-16 DIAGNOSIS — C50.312 MALIGNANT NEOPLASM OF LOWER-INNER QUADRANT OF LEFT BREAST IN FEMALE, ESTROGEN RECEPTOR POSITIVE (HCC): Primary | ICD-10-CM

## 2018-10-16 PROCEDURE — 99214 OFFICE O/P EST MOD 30 MIN: CPT | Performed by: RADIOLOGY

## 2018-10-16 NOTE — PROGRESS NOTES
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 [facility])  Staging form: Breast, AJCC 8th Edition  - Pathologic stage from 4/3/2018: Stage IB (pT2, pN0(sn), cM0, G1, ER: Positive, RI: Negative, HER2: Negative) - Signed by AMERICO Slater on 7/6/2018  - Clinical: No stage assigned - Unsigned            Interval History:    Anne Biggs returns today for her first radiation therapy follow up after completing a course of adjuvant radiation therapy for stage IA left breast carcinoma on 8/27/18 9/19/18 seen Dr Jasper Warren   Due for right-sided mammogram in October        10/3/18 seen by Dr Sri Davenport  Unclear if she is taking anastrozole  Complaining of neuropathy and arthralgia  Complaining of skin burning at radiation therapy  Prolia every 6 months        Future:  2/4/19 Dr Tang Oz she is not taking anastrozole     She currently is denying any breast complaints        Screening  Tobacco  Current tobacco user: no  If yes, brief counseling provided: NA     Hypertension  Hypertension screening performed: yes  Normotensive:  yes  If no, referred to PCP: n/a     Depression Screening  Screened for depression using PHQ-2: yes     Screened for depression using PHQ-9:  no  Screening positive or negative:  negative  If score >4, was any of the following actions taken?    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 ([facility] )  Stage IA (pT2, pN0, cM0, G1, ER: Positive, UT: Positive, HER2: Negative)           2018 Biopsy     Left breast ultrasound guided core biopsies  Infiltrating lobular carcinoma  Grade 1  ER 98% positive, UT negative, HER2 negative           4/3/2018 Surgery     Left partial mastectomy with 2 sentinel node biopsy    Size at least 2 1 cm  Multiple foci of LCIS  LCIS present at superior margin  Invasive malignancy is also noted in close proximity to the superior margin of resection    Pathological staging: stage IIA pT2, pN0 sn, pMX, G1    - Dr Sally York         4/3/2018 Genomic Testing     Oncotype DX    Recurrence score 13, 10 year risk of distance recurrence  SANCHEZ alone 8%  ER score 10 6 positive  UT score 5 1 negative  HER2 9 6 negative          2018 -  Chemotherapy     1    Anastrozole 1 mg p o  daily         2018 - 2018 Radiation     Plan ID Energy Fractions Dose per Fraction (cGy) Dose Correction (cGy) Total Dose Delivered (cGy) Elapsed Days   FB L Breas # 10X/6X 28 / 28 180 0 5,040 54   L Brst Boost 12E  200 0 1,600 12      Treatment dates:  2018 - 2018     - Dr Jose Arauz            Past Medical History:   Diagnosis Date    Anxiety     Breast cancer ([place] [address] )     Depression     Depression     Diabetes mellitus ([place] [address] )     Hypercholesterolemia     Hypertension     Migraine      Past Surgical History:   Procedure Laterality Date    BREAST BIOPSY      BREAST LUMPECTOMY      CARPAL TUNNEL RELEASE Right     right CTR     SECTION      CHOLECYSTECTOMY      MASTECTOMY Left     sentinae node mapping    SENTINEL LYMPH NODE BIOPSY         Social History   History   Alcohol Use No     History   Drug Use No     History   Smoking Status    Passive Smoke Exposure - Never Smoker   Smokeless Tobacco    Never Used         Meds/Allergies     Current Outpatient Prescriptions:     acetaminophen (TYLENOL) 650 mg CR tablet, Take 1 tablet (650 mg total) by mouth every 8 (eight) hours as needed for mild pain, Disp: 30 tablet, Rfl: 0    anastrozole (ARIMIDEX) 1 mg tablet, Take 1 mg by mouth daily, Disp: , Rfl:     atorvastatin (LIPITOR) 40 mg tablet, every 24 hours, Disp: , Rfl:     DULoxetine (CYMBALTA) 30 mg delayed release capsule, Take 30 mg by mouth 2 (two) times a day  , Disp: , Rfl:     DUREZOL 0 05 % EMUL, INSTILL 1 DROP INTO SURGERY EYE QID  STARTING 3 DAYS PRIOR TO SURGERY DATE SEPARATE SURGERY EYE DROPS BY 5 MINUTES OF EACHOTHER, Disp: , Rfl: 2    ergocalciferol (VITAMIN D2) 50,000 units, take 1 capsule by oral route  every week, Disp: , Rfl:     gabapentin (NEURONTIN) 300 mg capsule, Every 12 hours, Disp: , Rfl:     Linaclotide (LINZESS) 145 MCG CAPS, every 24 hours, Disp: , Rfl:     naproxen (NAPROSYN) 500 mg tablet, Take 1 tablet by mouth every 12 (twelve) hours as needed, Disp: , Rfl:     ondansetron (ZOFRAN) 4 mg tablet, Take 4 mg by mouth once, Disp: , Rfl:     oxyCODONE (ROXICODONE) 5 mg immediate release tablet, Take 1 tablet (5 mg total) by mouth 2 (two) times a day as needed for moderate pain Max Daily Amount: 10 mg, Disp: 30 tablet, Rfl: 0    polymyxin b-trimethoprim (POLYTRIM) ophthalmic solution, INSTILL 1 DROP INTO THE SURGICAL EYE QID  START 3 DAYS PRIOR TO SURGERY DATE SEPARATE SURGERY EYE DROPS BY 5 MINUTES OF EACH OTHER, Disp: , Rfl: 1    topiramate (TOPAMAX) 25 mg tablet, every 24 hours, Disp: , Rfl:   Allergies   Allergen Reactions    Chocolate     Chocolate Flavor     Ibuprofen Swelling    Pork-Derived Products          Review of Systems    Constitutional: Positive for fatigue (stable)  HENT: Negative  Eyes: Negative  Respiratory: Negative  Cardiovascular: Negative  Gastrointestinal: Negative  Endocrine: Negative  Genitourinary: Negative  Musculoskeletal: Positive for arthralgias (unchanged)  Skin: Positive for color change (dark discoloration near left nipple  )  Allergic/Immunologic: Negative  Neurological: Negative  Hematological: Negative  Psychiatric/Behavioral: Negative          OBJECTIVE:   /80 (BP Location: Right arm, Patient Position: Sitting, Cuff Size: Standard)   Pulse 95   Resp 18   Ht 5' (1 524 m)   Wt 86 4 kg (190 lb 8 oz)   BMI 37 20 kg/m²   Pain Assessment:  0  ECOG/Zubrod/WHO: 1 - Symptomatic but completely ambulatory    Physical Exam   There is no supraclavicular axillary adenopathy palpable  Her breast has mild hyperpigmentation however no desquamation  No suspicious lesions palpable in either breast   Cardiac regular rhythm  Lungs clear          RESULTS    Lab Results:   Recent Results (from the past 672 hour(s))   CBC and differential    Collection Time: 10/02/18  3:28 PM   Result Value Ref Range    WBC 5 30 4 50 - 11 00 Thousand/uL    RBC 4 10 4 00 - 5 20 Million/uL    Hemoglobin 11 8 (L) 12 0 - 16 0 g/dL    Hematocrit 36 1 36 0 - 46 0 %    MCV 88 80 - 100 fL    MCH 28 7 26 0 - 34 0 pg    MCHC 32 6 31 0 - 36 0 g/dL    RDW 14 5 <15 3 %    MPV 7 7 (L) 8 9 - 12 7 fL    Platelets 127 934 - 273 Thousands/uL    Neutrophils Relative 51 45 - 65 %    Lymphocytes Relative 34 20 - 50 %    Monocytes Relative 12 (H) 1 - 10 %    Eosinophils Relative 2 0 - 6 %    Basophils Relative 1 0 - 1 %    Neutrophils Absolute 2 70 1 80 - 7 80 Thousands/µL    Lymphocytes Absolute 1 80 0 50 - 4 00 Thousands/µL    Monocytes Absolute 0 60 0 20 - 0 90 Thousand/µL    Eosinophils Absolute 0 10 0 00 - 0 40 Thousand/µL    Basophils Absolute 0 00 0 00 - 0 10 Thousands/µL   Comprehensive metabolic panel    Collection Time: 10/02/18  3:28 PM   Result Value Ref Range    Sodium 140 137 - 147 mmol/L    Potassium 3 6 3 6 - 5 0 mmol/L    Chloride 105 97 - 108 mmol/L    CO2 28 22 - 30 mmol/L    ANION GAP 7 5 - 14 mmol/L    BUN 11 5 - 25 mg/dL    Creatinine 0 64 0 60 - 1 20 mg/dL    Glucose 124 (H) 70 - 99 mg/dL    Calcium 9 2 8 4 - 10 2 mg/dL    AST 32 14 - 36 U/L    ALT 28 9 - 52 U/L    Alkaline Phosphatase 81 43 - 122 U/L    Total Protein 7 2 5 9 - 8 4 g/dL    Albumin 3 8 3 0 - 5 2 g/dL    Total Bilirubin 0 50 <1 30 mg/dL    eGFR 102 >60 ml/min/1 73sq m   Vitamin D 25 hydroxy    Collection Time: 10/02/18  3:28 PM   Result Value Ref Range    Vit D, 25-Hydroxy 30 6 30 0 - 100 0 ng/mL       Imaging Studies:No results found  Assessment/Plan:  No orders of the defined types were placed in this encounter  Matthias Henry is a 74y o  year old female who is 1 month post radiation therapy for left breast carcinoma  Her acute radiation reaction has resolved  She states that she is performing breast massage  She will return for follow-up visit in 6 months  Maral Vazquez MD  10/16/2018,4:10 PM    Portions of the record may have been created with voice recognition software   Occasional wrong word or "sound a like" substitutions may have occurred due to the inherent limitations of voice recognition software   Read the chart carefully and recognize, using context, where substitutions have occurred

## 2018-10-16 NOTE — PROGRESS NOTES
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 [facility])  Staging form: Breast, AJCC 8th Edition  - Pathologic stage from 4/3/2018: Stage IB (pT2, pN0(sn), cM0, G1, ER: Positive, CO: Negative, HER2: Negative) - Signed by AMERICO Herrera on 7/6/2018  - Clinical: No stage assigned - Unsigned      Oncology History    Ching Hernandez returns today for her first radiation therapy follow up after completing a course of adjuvant radiation therapy for stage IA left breast carcinoma on 8/27/18 9/19/18 seen Dr Stephen Jenkins   Due for right-sided mammogram in October      10/3/18 seen by Dr Jorja Hamman  Unclear if she is taking anastrozole  Complaining of neuropathy and arthralgia  Complaining of skin burning at radiation therapy  Prolia every 6 months      Future:  2/4/19 Dr Ronak Robles done yet    States she is not taking anastrozole    Some breast discomfort        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 ([facility] [address] )    1/24/2018 Initial Diagnosis     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 ([facility])  Stage IA (pT2, pN0, cM0, G1, ER: Positive, CO: Positive, HER2: Negative)           1/24/2018 Biopsy     Left breast ultrasound guided core biopsies  Infiltrating lobular carcinoma  Grade 1  ER 98% positive, CO negative, HER2 negative           4/3/2018 Surgery     Left partial mastectomy with 2 sentinel node biopsy    Size at least 2 1 cm  Multiple foci of LCIS  LCIS present at superior margin   Invasive malignancy is also noted in close proximity to the superior margin of resection    Pathological staging: stage IIA pT2, pN0 sn, pMX, G1    - Dr Tevin Street         4/3/2018 Genomic Testing     Oncotype DX    Recurrence score 13, 10 year risk of distance recurrence  SANCHEZ alone 8%  ER score 10 6 positive  VT score 5 1 negative  HER2 9 6 negative          5/16/2018 -  Chemotherapy     1  Anastrozole 1 mg p o  daily         6/21/2018 - 8/27/2018 Radiation     Plan ID Energy Fractions Dose per Fraction (cGy) Dose Correction (cGy) Total Dose Delivered (cGy) Elapsed Days   FB L [place] # 10X/6X 28 / 28 180 0 5,040 54   L Brst Boost 12E 8 / 8 200 0 1,600 12      Treatment dates:  6/21/2018 - 8/27/2018     - Dr Ramya Roy            Clinical Trial: no    Interval History  Nelda Holbrook returns today for her first radiation therapy follow up after completing a course of adjuvant radiation therapy for stage IA left breast carcinoma on 8/27/18 9/19/18 seen Dr Kim Huerta   Due for right-sided mammogram in October      10/3/18 seen by Dr Ericka Kelsey  Unclear if she is taking anastrozole  Complaining of neuropathy and arthralgia  Complaining of skin burning at radiation therapy  Prolia every 6 months      Future:  2/4/19 Dr Marquita Alvarado done yet    States she is not taking anastrozole    Some breast discomfort      Screening  Tobacco  Current tobacco user: no  If yes, brief counseling provided: NA    Hypertension  Hypertension screening performed: yes  Normotensive:  yes  If no, referred to PCP: n/a    Depression Screening  Screened for depression using PHQ-2: yes    Screened for depression using PHQ-9:  no  Screening positive or negative:  negative  If score >4, was any of the following actions taken?    Additional evaluation for depression, suicide risk assesment, referral to PCP or psychiatry, medication started:  no    Advanced Care Planning for Patients >65 years  Advanced Care Planning Discussed:  no  Patient named surrogate decision maker or care plan in chart: no      Health Maintenance   Topic Date Due    CRC Screening: Colonoscopy  1945    DTaP,Tdap,and Td Vaccines (1 - Tdap) 12/24/1964    Fall Risk  12/24/2008    Urinary Incontinence Screening  12/24/2008    Pneumococcal PPSV23/PCV13 65+ Years / High and Highest Risk (2 of 2 - PPSV23) 2017    INFLUENZA VACCINE  Completed       Patient Active Problem List   Diagnosis    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 ([facility] [address] )    Anemia, unspecified    Benign paroxysmal positional vertigo    Carpal tunnel syndrome    Chronic midline low back pain with bilateral sciatica    Chronic pain disorder    Depression    Perforation of tympanic membrane    Physical deconditioning    Poor dentition    Prediabetes    Spinal stenosis of lumbar region with radiculopathy    Vision abnormalities    Age-related incipient cataract of both eyes    Preoperative cardiovascular examination    Shortness of breath and palpitations     Past Medical History:   Diagnosis Date    Anxiety     [facility] )     Depression     Depression     Diabetes mellitus ([facility] )     Hypercholesterolemia     Hypertension     Migraine      Past Surgical History:   Procedure Laterality Date    BREAST BIOPSY      BREAST LUMPECTOMY      CARPAL TUNNEL RELEASE Right     right CTR     SECTION      CHOLECYSTECTOMY      MASTECTOMY Left     sentinae node mapping    SENTINEL LYMPH NODE BIOPSY       Family History   Problem Relation Age of Onset    Heart disease Mother         cardiovascular disease     Social History     Social History    Marital status: /Civil Union     Spouse name: N/A    Number of children: N/A    Years of education: N/A     Occupational History    Not on file       Social History Main Topics    Smoking status: Passive Smoke Exposure - Never Smoker    Smokeless tobacco: Never Used    Alcohol use No    Drug use: No    Sexual activity: Yes     Partners: Male     Other Topics Concern    Not on file     Social History Narrative    No narrative on file       Current Outpatient Prescriptions:     acetaminophen (TYLENOL) 650 mg CR tablet, Take 1 tablet (650 mg total) by mouth every 8 (eight) hours as needed for mild pain, Disp: 30 tablet, Rfl: 0    anastrozole (ARIMIDEX) 1 mg tablet, Take 1 mg by mouth daily, Disp: , Rfl:     atorvastatin (LIPITOR) 40 mg tablet, every 24 hours, Disp: , Rfl:     DULoxetine (CYMBALTA) 30 mg delayed release capsule, Take 30 mg by mouth 2 (two) times a day  , Disp: , Rfl:     DUREZOL 0 05 % EMUL, INSTILL 1 DROP INTO SURGERY EYE QID  STARTING 3 DAYS PRIOR TO SURGERY DATE SEPARATE SURGERY EYE DROPS BY 5 MINUTES OF EACHOTHER, Disp: , Rfl: 2    ergocalciferol (VITAMIN D2) 50,000 units, take 1 capsule by oral route  every week, Disp: , Rfl:     gabapentin (NEURONTIN) 300 mg capsule, Every 12 hours, Disp: , Rfl:     Linaclotide (LINZESS) 145 MCG CAPS, every 24 hours, Disp: , Rfl:     naproxen (NAPROSYN) 500 mg tablet, Take 1 tablet by mouth every 12 (twelve) hours as needed, Disp: , Rfl:     ondansetron (ZOFRAN) 4 mg tablet, Take 4 mg by mouth once, Disp: , Rfl:     oxyCODONE (ROXICODONE) 5 mg immediate release tablet, Take 1 tablet (5 mg total) by mouth 2 (two) times a day as needed for moderate pain Max Daily Amount: 10 mg, Disp: 30 tablet, Rfl: 0    polymyxin b-trimethoprim (POLYTRIM) ophthalmic solution, INSTILL 1 DROP INTO THE SURGICAL EYE QID  START 3 DAYS PRIOR TO SURGERY DATE SEPARATE SURGERY EYE DROPS BY 5 MINUTES OF EACH OTHER, Disp: , Rfl: 1    topiramate (TOPAMAX) 25 mg tablet, every 24 hours, Disp: , Rfl:   Allergies   Allergen Reactions    Chocolate     Chocolate Flavor     Ibuprofen Swelling    Pork-Derived Products        Review of Systems:  Review of Systems   Constitutional: Positive for fatigue (stable)  HENT: Negative  Eyes: Negative  Respiratory: Negative  Cardiovascular: Negative  Gastrointestinal: Negative  Endocrine: Negative  Genitourinary: Negative  Musculoskeletal: Positive for arthralgias (unchanged)  Skin: Positive for color change (dark discoloration near left nipple  )  Allergic/Immunologic: Negative  Neurological: Negative  Hematological: Negative  Psychiatric/Behavioral: Negative  Vitals:    10/16/18 1027   BP: 126/80   BP Location: Right arm   Patient Position: Sitting   Cuff Size: Standard   Pulse: 95   Resp: 18   Weight: 86 4 kg (190 lb 8 oz)   Height: 5' (1 524 m)   Oxygen 97%    Pain Score: 0-No pain    Imaging:No results found      Teaching

## 2018-10-17 ENCOUNTER — OFFICE VISIT (OUTPATIENT)
Dept: FAMILY MEDICINE CLINIC | Facility: CLINIC | Age: 73
End: 2018-10-17
Payer: MEDICARE

## 2018-10-17 VITALS
BODY MASS INDEX: 37.3 KG/M2 | DIASTOLIC BLOOD PRESSURE: 80 MMHG | SYSTOLIC BLOOD PRESSURE: 110 MMHG | TEMPERATURE: 98 F | OXYGEN SATURATION: 98 % | HEART RATE: 81 BPM | HEIGHT: 60 IN | WEIGHT: 190 LBS | RESPIRATION RATE: 16 BRPM

## 2018-10-17 DIAGNOSIS — G56.03 BILATERAL CARPAL TUNNEL SYNDROME: Primary | ICD-10-CM

## 2018-10-17 PROCEDURE — 99214 OFFICE O/P EST MOD 30 MIN: CPT | Performed by: FAMILY MEDICINE

## 2018-10-17 RX ORDER — NIACIN 500 MG/1
500 TABLET, EXTENDED RELEASE ORAL
COMMUNITY
End: 2018-11-27

## 2018-10-17 RX ORDER — SENNOSIDES 8.6 MG
650 CAPSULE ORAL EVERY 8 HOURS PRN
Qty: 90 TABLET | Refills: 3 | Status: SHIPPED | OUTPATIENT
Start: 2018-10-17 | End: 2019-02-22

## 2018-10-17 NOTE — PROGRESS NOTES
Assessment/Plan:  1  Bilateral carpal tunnel syndrome  Patient did not have surgery planned by Dr Ministerio Vides in the past due to concurrent diagnosis of breast cancer  She continues with severe pain, She was requested Oxycodone for pain, but she needs evaluation for surgery  - acetaminophen (TYLENOL) 650 mg CR tablet; Take 1 tablet (650 mg total) by mouth every 8 (eight) hours as needed for mild pain  Dispense: 90 tablet; Refill: 3  - Ambulatory referral to Plastic Surgery; Future    No problem-specific Assessment & Plan notes found for this encounter  There are no diagnoses linked to this encounter  Subjective:      Patient ID: Edith Beltre is a 74 y o  female  Pt here with complaints of increased bilateral hand pain  Pt states she is unable to sleep at night due to pain  Hand Pain    There was no injury mechanism  The pain is present in the left hand and right hand  The quality of the pain is described as aching  The pain is at a severity of 8/10  The pain is severe  The pain has been constant since the incident  The symptoms are aggravated by movement  Treatments tried: narcotics  The treatment provided no relief  The following portions of the patient's history were reviewed and updated as appropriate: allergies, current medications, past family history, past medical history, past social history, past surgical history and problem list     Review of Systems   Constitutional: Positive for fatigue  Respiratory: Negative for chest tightness and shortness of breath  Gastrointestinal: Negative for abdominal pain  Musculoskeletal: Positive for arthralgias, back pain, joint swelling and myalgias  Bilateral hand pain   Psychiatric/Behavioral: Positive for sleep disturbance           Objective:      /80 (BP Location: Left arm, Patient Position: Sitting, Cuff Size: Standard)   Pulse 81   Temp 98 °F (36 7 °C) (Oral)   Resp 16   Ht 5' (1 524 m)   Wt 86 2 kg (190 lb) SpO2 98%   Breastfeeding? No   BMI 37 11 kg/m²          Physical Exam   Constitutional: She is oriented to person, place, and time  She appears well-developed and well-nourished  Cardiovascular: Normal rate, regular rhythm and normal heart sounds  Pulmonary/Chest: Effort normal and breath sounds normal    Abdominal: Soft  Bowel sounds are normal    Musculoskeletal: She exhibits tenderness (grasping weakness of hands  )  Neurological: She is alert and oriented to person, place, and time  She has normal reflexes  Skin: Skin is warm and dry  Psychiatric: She has a normal mood and affect   Her behavior is normal  Judgment and thought content normal

## 2018-10-24 ENCOUNTER — TELEPHONE (OUTPATIENT)
Dept: HEMATOLOGY ONCOLOGY | Facility: CLINIC | Age: 73
End: 2018-10-24

## 2018-10-24 NOTE — TELEPHONE ENCOUNTER
Non-urgent request:    Can you please call the patient and ask her if she is still taking her "cancer pill" anastrozole  She only speaks [place]  Dr Sylwia Adhikari asked her on 10/3/18 to hold the medication to see if her bone/muscle aches and dizziness would stop off the medication  If she is off the medication have her symptoms improved???     Thanks I really appreciate your assistance with this matter

## 2018-10-24 NOTE — TELEPHONE ENCOUNTER
Spoke with patient's spouse Jarett Milligan which stated that she is still taking the anastrozole  She is no longer experiencing any symptoms  I also specifically asked him about the bone/muscle aches & dizziness & he stated that he wasn't experiencing any of that either  I asked him to call us if the pt does begin to experience any symptoms    Jarett Milligan voiced understanding

## 2018-10-26 DIAGNOSIS — Z17.0 MALIGNANT NEOPLASM OF BREAST IN FEMALE, ESTROGEN RECEPTOR POSITIVE, UNSPECIFIED LATERALITY, UNSPECIFIED SITE OF BREAST (HCC): Primary | ICD-10-CM

## 2018-10-26 DIAGNOSIS — C50.919 MALIGNANT NEOPLASM OF BREAST IN FEMALE, ESTROGEN RECEPTOR POSITIVE, UNSPECIFIED LATERALITY, UNSPECIFIED SITE OF BREAST (HCC): Primary | ICD-10-CM

## 2018-10-26 RX ORDER — ANASTROZOLE 1 MG/1
TABLET ORAL
Qty: 30 TABLET | Refills: 0 | Status: SHIPPED | OUTPATIENT
Start: 2018-10-26 | End: 2018-11-19 | Stop reason: SDUPTHER

## 2018-11-17 ENCOUNTER — APPOINTMENT (EMERGENCY)
Dept: CT IMAGING | Facility: HOSPITAL | Age: 73
End: 2018-11-17
Payer: MEDICARE

## 2018-11-17 ENCOUNTER — HOSPITAL ENCOUNTER (EMERGENCY)
Facility: HOSPITAL | Age: 73
Discharge: HOME | End: 2018-11-18
Attending: EMERGENCY MEDICINE | Admitting: EMERGENCY MEDICINE
Payer: MEDICARE

## 2018-11-17 VITALS
SYSTOLIC BLOOD PRESSURE: 110 MMHG | RESPIRATION RATE: 20 BRPM | WEIGHT: 187.39 LBS | TEMPERATURE: 100.1 F | OXYGEN SATURATION: 98 % | HEART RATE: 83 BPM | DIASTOLIC BLOOD PRESSURE: 72 MMHG | BODY MASS INDEX: 36.6 KG/M2

## 2018-11-17 DIAGNOSIS — N12 PYELONEPHRITIS: ICD-10-CM

## 2018-11-17 DIAGNOSIS — N39.0 UTI (URINARY TRACT INFECTION): Primary | ICD-10-CM

## 2018-11-17 DIAGNOSIS — R10.9 FLANK PAIN: ICD-10-CM

## 2018-11-17 LAB
ANION GAP SERPL CALCULATED.3IONS-SCNC: 9 MMOL/L (ref 5–14)
BACTERIA UR QL AUTO: ABNORMAL /HPF
BASOPHILS # BLD AUTO: 0 THOUSANDS/ΜL (ref 0–0.1)
BASOPHILS NFR BLD AUTO: 0 % (ref 0–1)
BILIRUB UR QL STRIP: NEGATIVE
BUN SERPL-MCNC: 14 MG/DL (ref 5–25)
CALCIUM SERPL-MCNC: 9.7 MG/DL (ref 8.4–10.2)
CHLORIDE SERPL-SCNC: 104 MMOL/L (ref 97–108)
CLARITY UR: CLEAR
CO2 SERPL-SCNC: 27 MMOL/L (ref 22–30)
COLOR UR: ABNORMAL
CREAT SERPL-MCNC: 0.74 MG/DL (ref 0.6–1.2)
EOSINOPHIL # BLD AUTO: 0.1 THOUSAND/ΜL (ref 0–0.4)
EOSINOPHIL NFR BLD AUTO: 1 % (ref 0–6)
ERYTHROCYTE [DISTWIDTH] IN BLOOD BY AUTOMATED COUNT: 14.7 %
GFR SERPL CREATININE-BSD FRML MDRD: 92 ML/MIN/1.73SQ M
GLUCOSE SERPL-MCNC: 106 MG/DL (ref 70–99)
GLUCOSE UR STRIP-MCNC: NEGATIVE MG/DL
HCT VFR BLD AUTO: 38.3 % (ref 36–46)
HGB BLD-MCNC: 12.7 G/DL (ref 12–16)
HGB UR QL STRIP.AUTO: NEGATIVE
KETONES UR STRIP-MCNC: NEGATIVE MG/DL
LEUKOCYTE ESTERASE UR QL STRIP: 500
LYMPHOCYTES # BLD AUTO: 2.2 THOUSANDS/ΜL (ref 0.5–4)
LYMPHOCYTES NFR BLD AUTO: 23 % (ref 20–50)
MCH RBC QN AUTO: 29.2 PG (ref 26–34)
MCHC RBC AUTO-ENTMCNC: 33.2 G/DL (ref 31–36)
MCV RBC AUTO: 88 FL (ref 80–100)
MONOCYTES # BLD AUTO: 0.9 THOUSAND/ΜL (ref 0.2–0.9)
MONOCYTES NFR BLD AUTO: 10 % (ref 1–10)
NEUTROPHILS # BLD AUTO: 6.4 THOUSANDS/ΜL (ref 1.8–7.8)
NEUTS SEG NFR BLD AUTO: 66 % (ref 45–65)
NITRITE UR QL STRIP: NEGATIVE
NON-SQ EPI CELLS URNS QL MICRO: ABNORMAL /HPF
PH UR STRIP.AUTO: 6 [PH] (ref 4.5–8)
PLATELET # BLD AUTO: 230 THOUSANDS/UL (ref 150–450)
PMV BLD AUTO: 7.9 FL (ref 8.9–12.7)
POTASSIUM SERPL-SCNC: 4.1 MMOL/L (ref 3.6–5)
PROT UR STRIP-MCNC: NEGATIVE MG/DL
RBC # BLD AUTO: 4.36 MILLION/UL (ref 4–5.2)
RBC #/AREA URNS AUTO: ABNORMAL /HPF
SODIUM SERPL-SCNC: 140 MMOL/L (ref 137–147)
SP GR UR STRIP.AUTO: 1.01 (ref 1–1.04)
UROBILINOGEN UA: NEGATIVE MG/DL
WBC # BLD AUTO: 9.6 THOUSAND/UL (ref 4.5–11)
WBC #/AREA URNS AUTO: ABNORMAL /HPF

## 2018-11-17 PROCEDURE — 80048 BASIC METABOLIC PNL TOTAL CA: CPT | Performed by: EMERGENCY MEDICINE

## 2018-11-17 PROCEDURE — 85025 COMPLETE CBC W/AUTO DIFF WBC: CPT | Performed by: EMERGENCY MEDICINE

## 2018-11-17 PROCEDURE — 81001 URINALYSIS AUTO W/SCOPE: CPT | Performed by: EMERGENCY MEDICINE

## 2018-11-17 PROCEDURE — 96374 THER/PROPH/DIAG INJ IV PUSH: CPT

## 2018-11-17 PROCEDURE — 99284 EMERGENCY DEPT VISIT MOD MDM: CPT

## 2018-11-17 PROCEDURE — 74176 CT ABD & PELVIS W/O CONTRAST: CPT

## 2018-11-17 PROCEDURE — 96361 HYDRATE IV INFUSION ADD-ON: CPT

## 2018-11-17 PROCEDURE — 36415 COLL VENOUS BLD VENIPUNCTURE: CPT | Performed by: EMERGENCY MEDICINE

## 2018-11-17 RX ORDER — DIAZEPAM 5 MG/1
10 TABLET ORAL ONCE
Status: COMPLETED | OUTPATIENT
Start: 2018-11-17 | End: 2018-11-17

## 2018-11-17 RX ORDER — ACETAMINOPHEN 325 MG/1
975 TABLET ORAL ONCE
Status: COMPLETED | OUTPATIENT
Start: 2018-11-17 | End: 2018-11-17

## 2018-11-17 RX ADMIN — ACETAMINOPHEN 975 MG: 325 TABLET ORAL at 20:43

## 2018-11-17 RX ADMIN — SODIUM CHLORIDE 500 ML: 9 INJECTION, SOLUTION INTRAVENOUS at 20:46

## 2018-11-17 RX ADMIN — DIAZEPAM 10 MG: 5 TABLET ORAL at 20:43

## 2018-11-17 RX ADMIN — MORPHINE SULFATE 2 MG: 2 INJECTION, SOLUTION INTRAMUSCULAR; INTRAVENOUS at 22:30

## 2018-11-18 RX ORDER — ONDANSETRON 4 MG/1
4 TABLET, ORALLY DISINTEGRATING ORAL EVERY 6 HOURS PRN
Qty: 20 TABLET | Refills: 0 | Status: SHIPPED | OUTPATIENT
Start: 2018-11-17 | End: 2019-03-22 | Stop reason: ALTCHOICE

## 2018-11-18 RX ORDER — LEVOFLOXACIN 500 MG/1
500 TABLET, FILM COATED ORAL ONCE
Status: COMPLETED | OUTPATIENT
Start: 2018-11-18 | End: 2018-11-18

## 2018-11-18 RX ORDER — OXYCODONE HYDROCHLORIDE AND ACETAMINOPHEN 5; 325 MG/1; MG/1
1 TABLET ORAL EVERY 4 HOURS PRN
Qty: 5 TABLET | Refills: 0 | Status: ON HOLD | OUTPATIENT
Start: 2018-11-17 | End: 2018-11-30 | Stop reason: ALTCHOICE

## 2018-11-18 RX ORDER — CIPROFLOXACIN 500 MG/1
500 TABLET, FILM COATED ORAL 2 TIMES DAILY
Qty: 20 TABLET | Refills: 0 | Status: SHIPPED | OUTPATIENT
Start: 2018-11-17 | End: 2018-11-27

## 2018-11-18 RX ADMIN — LEVOFLOXACIN 500 MG: 500 TABLET, FILM COATED ORAL at 00:08

## 2018-11-18 NOTE — DISCHARGE INSTRUCTIONS
Infección del riñón   LO QUE NECESITA SABER:   Isiah infección de Blu Isaac, o pielonefritis es isiah infección bacteriana  La infección usualmente comienza en trujillo vejiga o uretra y se pasa a trujillo [place]  Andrei o ambos riñones podrían estar infectados  INSTRUCCIONES SOBRE [facility]:   Regrese a la lexi de emergencias si:   · Usted tiene fiebre o escalofríos  · Usted no puede dejar de vomitar  · Usted tiene dolor intenso en el abdomen, en la parte inferior de la espalda o en los costados  Pregúntele a trujillo Debby Guise vitaminas y minerales son adecuados para usted  · Usted continúa teniendo fiebre después de jacoby los antibióticos por 3 días  · Usted siente dolor al orinar, incluso después del tratamiento  · Mariam signos y síntomas regresan  · Usted tiene preguntas o inquietudes acerca de trujillo condición o cuidado  Medicamentos:  Es posible que usted necesite alguno de los siguientes:  · Antibióticos  tratan trujillo infección bacteriana  · Acetaminofeno:  phil el dolor y baja la fiebre  Está disponible sin receta médica  Pregunte la cantidad y la frecuencia con que debe tomarlos  [address]  Amanda las etiquetas de todos los demás medicamentos que esté usando para saber si también contienen acetaminofén, o pregunte a trujillo médico o farmacéutico  El acetaminofén puede causar daño en el hígado cuando no se milton de forma correcta  No use más de 4 gramos (4000 miligramos) en total de acetaminofeno en un día  · AINEs (Analgésicos antiinflamatorios no esteroides) noe el ibuprofeno, ayudan a disminuir la inflamación, el dolor y la [place]  Irene medicamento esta disponible con o sin isiah receta médica  Los AINEs pueden causar sangrado estomacal o problemas renales en ciertas personas  Si usted esta tomando un anticoágulante,  siempre  pregunte si los AINEs son seguros para usted  Siempre amanda la etiqueta de irene medicamento y [place] instrucciones   No administre irene medicamento a niños menores de 6 meses de camille sin antes obtener la autorización de trujillo médico      · Un medicamento con receta para el dolor  podrían ser Megan Lame  Pregunte cómo jacoby estos medicamentos de isiah forma rachel  · [place] ita medicamentos noe se le haya indicado  Consulte con trujillo médico si usted fernando que trujillo medicamento no le está ayudando o si presenta efectos secundarios  Infórmele si es alérgico a algún medicamento  Mantenga isiah lista actualizada de los [place], las vitaminas y los productos herbales que milton  Incluya los siguientes datos de los medicamentos: cantidad, frecuencia y motivo de administración  Traiga con usted la lista o los envases de la píldoras a ita citas de seguimiento  Lleve la lista de los medicamentos con usted en rakesh de isiah emergencia  Crumpler líquidos según ita indicaciones:  Es posible que usted necesite jacoby líquidos adicionales para ayudar a limpiar ita riñones y trujillo sistema urinario  El agua es el mejor líquido para jacoby  Pregunte a trujillo médico sobre la cantidad de líquido que necesita jacoby todos los días y cuáles le recomienda  Orine ott pronto noe sienta la necesidad de hacerlo:  [place] ayudará a eliminar las bacterias de trujillo sistema urinario  No espere ni aguante trujillo orina por [employer]  Limpie el área de los genitales a diario con agua y [place]   Límpiese de adelante hacia atrás después de orinar o de tener isiah evacuación intestinal  Use ropa interior de algodón  Las telas noe el nylon y el poliéster pueden [employer]  [place] puede aumentar trujillo riesgo de isiah infección  Orine dentro de 15 minutos después de kory tenido [employer]  Acuda a ita consultas de control con trujillo médico según le indicaron  Anote ita preguntas para que se acuerde de hacerlas kiko ita visitas  © 2017 [address] Information is for End User's use only and may not be sold, redistributed or otherwise used for commercial purposes   All illustrations and images included in [address] are the copyrighted property of A D A M , Inc  or Brian Chris  Esta información es sólo para uso en educación  Trujillo intención no es darle un consejo médico sobre enfermedades o tratamientos  Colsulte con trujillo Yana Drew farmacéutico antes de seguir cualquier régimen médico para saber si es seguro y efectivo para usted  Dolor de costado   LO QUE NECESITA SABER:   El dolor de costado es un dolor que se siente en el área debajo de trujillo caja torácica y por encima de los huesos de la cadera, francisco siempre se sitúa en la parte inferior de la espalda  El dolor podría ser suave o tan severo que usted no puede sentirse cómodo  El dolor podría permanecer en un área o propagarse a [place]  Mihir Caro y mejorarse en momentos  El dolor de costado francisco siempre es isiah señal de problemas con trujillo tracto urinario, noe de cálculos en el riñón o infección  INSTRUCCIONES SOBRE [facility]:   Regrese a la lexi de emergencias si:   · Usted tiene fiebre  · Trujillo corazón está revoloteando o saltando  · Usted orina con tara  · Trujillo dolor se propaga a la parte inferior de trujillo abdomen y del área genital      · Usted tiene dolor intenso en la parte inferior de trujillo espalda junto a trujillo columna vertebral      · Usted está mucho más cansado de lo normal y no tiene deseos de comer  · Usted tiene dolor de [place] y distensión muscular  Pregúntele a trujillo [place] vitaminas y minerales son adecuados para usted  · Usted tiene [employer] y está vomitando  · Usted tiene que orinar con más frecuencia y con [place]  · Trujillo dolor empeora o no mejora y usted no puede sentirse cómodo  · Usted pasa un cálculo cuando orina  · Usted tiene preguntas o inquietudes acerca de trujillo condición o cuidado  Medicamentos:  A usted le pueden  prescribir los siguientes medicamentos:  · Los analgésicos  podrían ayudar a disminuir o aliviar el dolor   No espere a que el dolor sea muy intenso para jacoby el medicamento  · Antibióticos  podrían ayudar a tratar isiah infección del tracto urinario provocada por isiah bacteria  · [place] ita medicamentos noe se le haya indicado  Consulte con dial médico si usted fernando que dial medicamento no le está ayudando o si presenta efectos secundarios  Infórmele si es alérgico a algún medicamento  Mantenga isiah lista actualizada de los [place], las vitaminas y los productos herbales que milton  Incluya los siguientes datos de los medicamentos: cantidad, frecuencia y motivo de administración  Traiga con usted la lista o los envases de la píldoras a ita citas de seguimiento  Lleve la lista de los medicamentos con usted en rakesh de isiah emergencia  Programe isiah ramesh con dial médico en 1 o 2 días o noe se le indique:  Anote ita preguntas para que se acuerde de hacerlas kiko ita visitas  © 2017 [address] [facility] Information is for End User's use only and may not be sold, redistributed or otherwise used for commercial purposes  All illustrations and images included in CareNotes® are the copyrighted property of A D A M , Inc  or Brian Perez  Esta información es sólo para uso en educación  Dial intención no es darle un consejo médico sobre enfermedades o tratamientos  Colsulte con dial Olivia Nisha farmacéutico antes de seguir cualquier régimen médico para saber si es seguro y efectivo para usted  Infección del tracto urinario en mujeres   CUIDADO AMBULATORIO:   Isiah infección del tracto urinario (ITU)  es causada por isiah bacteria que entra al tracto urinario  La mayoría de las bacterias que entran al tracto urinario salen al Melony Bri  Si la bacteria permanece en el tracto urinario, usted podría contraer Carmel Hilario  Dial tracto urinario incluye ita riñones, uréteres, vejiga y [place]  La orina es producida en los riñones y fluye del uréter a la vejiga  La orina sale de la vejiga a través de la uretra   Isiah infección del tracto urinario es más común in  inferior de trujillo tracto urinario que incluye trujillo vejiga y uretra  Los síntomas más comunes incluyen los siguientes:   · Orina con mayor frecuencia o se despierta porque necesita orinar  · Dolor o ardor al [facility] Cancer    · Dolor o presión en la parte inferior del abdomen     · Orina con mal olor    · [employer] en la Deanna Kin    · Goteo de orina  Busque atención médica de inmediato si:   · Usted está orinando muy poco o nada en absoluto  · Usted tiene fiebre twan con temblor y escalofríos  · Usted tiene dolor en el costado o en la espalda que JONATHAN  Pregúntele a trujillo Leila Camacho vitaminas y minerales son adecuados para usted  · Usted tiene fiebre  · Usted no siente mejoría después de 2 días de jacoby los antibióticos  · Usted esta vomitando  · Usted tiene preguntas o inquietudes acerca de trujillo condición o cuidado  El tratamiento para isiah infección del tracto urinario  podría incluir medicamentos para tratar isiah infección bacteriana  Usted también podría necesitar medicamentos para disminuir el dolor y el ardor o para disminuir la necesidad de orinar con frecuencia  Evite isiah ITU:   · Vacíe la vejiga con frecuencia  Orine y vacíe la vejiga tan pronto noe usted sienta la necesidad  No retenga la orina por largos períodos de [place]  · Límpiese de adelante hacia atrás después de orinar o de tener isiah evacuación intestinal   [place] ayudará a evitar que los gérmenes entren en el tracto urinario a través de la uretra  · [address] se le haya indicado  Pregunte cuánto líquido debe jacoby cada día y cuáles líquidos son los más adecuados para usted  Es probable que usted necesite jacoby más líquidos de lo habitual para ayudar a deshacerse de la bacteria  No tome alcohol, cafeína ni jugos cítricos  Estos pueden irritar trujillo vejiga y aumentar ita síntomas  Trujillo médico puede recomendarle el jugo de arándano para prevenir isiah infección [place]  · Orine después de [employer]    [place] puede ayudar a eliminar las bacterias que pasan kiko el sexo  · No tome duchas vaginales ni use desodorantes femeninos  Estos pueden cambiar el equilibrio químico de la vagina  · Cambie las compresas o los tampones a menudo  [place] ayudará a evitar que los gérmenes entren en el tracto urinario  · Realice ejercicios para el piso pélvico con frecuencia  Los músculos pélvicos ayudan a empezar y parar de orinar  Los músculos pélvicos [place] ayudan a vaciar la vejiga más fácilmente  Apriete estos músculos firmemente kiko 5 segundos noe si estuviera tratando de retener el flujo de [place]  Luego relaje por 5 segundos  Aumente gradualmente a 10 segundos  Eyal 3 series de 15 repeticiones al día o noe se le indique  Acuda a ita consultas de control con dial médico según le indicaron  Anote ita preguntas para que se acuerde de hacerlas kiko ita visitas  © 2017 [address]  Information is for End User's use only and may not be sold, redistributed or otherwise used for commercial purposes  All illustrations and images included in CareNotes® are the copyrighted property of A D A M , Inc  or Brian Perez  Esta información es sólo para uso en educación  Dial intención no es darle un consejo médico sobre enfermedades o tratamientos  Colsulte con dial Gaby Mall farmacéutico antes de seguir cualquier régimen médico para saber si es seguro y efectivo para usted

## 2018-11-18 NOTE — ED PROVIDER NOTES
History  Chief Complaint   Patient presents with    Flank Pain     Pt had onset of left sided flank pain at 4pm today  EMS reports that pt is normally ambulatory, but today is unable to walk d/t pain  Pt tried icy hot with no relief  75-year-old female presents with complaint of left-sided flank pain that began around four o'clock today  She states that she has pain palpation and with movement  She has a history of similar pain in the past in the family believes that it could have been musculoskeletal point time  She has had some dysuria and low-grade temperature on arrival to the ED  She denies any other abdominal pain, she denies nausea, vomiting, diarrhea, chest pain, shortness of breath, or other acute issues  Abdominal Pain   Pain location:  L flank  Pain quality: sharp    Pain quality: not aching    Pain radiates to:  Does not radiate  Pain severity:  Moderate  Timing:  Constant  Progression:  Waxing and waning  Relieved by:  Nothing  Worsened by:  Nothing  Ineffective treatments:  None tried  Associated symptoms: anorexia    Associated symptoms: no chest pain, no chills, no constipation, no cough, no diarrhea, no dysuria, no fatigue, no fever, no hematuria, no melena, no nausea, no shortness of breath, no sore throat and no vomiting        Cannot display prior to admission medications because the patient has not been admitted in this contact         Past Medical History:   Diagnosis Date    Anxiety     [facility]     Depression     Depression     [facility]     Hypercholesterolemia     Hypertension     Migraine        Past Surgical History:   Procedure Laterality Date    BREAST BIOPSY      BREAST LUMPECTOMY      CARPAL TUNNEL RELEASE Right     right CTR     SECTION      CHOLECYSTECTOMY      MASTECTOMY Left     sentinae node mapping    SENTINEL LYMPH NODE BIOPSY         Family History   Problem Relation Age of Onset    Heart disease Mother cardiovascular disease     I have reviewed and agree with the history as documented  Social History   Substance Use Topics    Smoking status: Passive Smoke Exposure - Never Smoker    Smokeless tobacco: Never Used    Alcohol use No        Review of Systems   Constitutional: Negative for appetite change, chills, fatigue and fever  HENT: Negative for postnasal drip, sinus pain, sore throat and trouble swallowing  Eyes: Negative for redness and itching  Respiratory: Negative for cough, chest tightness, shortness of breath and wheezing  Cardiovascular: Negative for chest pain and leg swelling  Gastrointestinal: Positive for abdominal pain and anorexia  Negative for constipation, diarrhea, melena, nausea and vomiting  Endocrine: Negative  Genitourinary: Negative for difficulty urinating, dysuria and hematuria  Musculoskeletal: Negative for back pain and myalgias  Skin: Negative for rash  Allergic/Immunologic: Negative  Neurological: Negative for dizziness, numbness and headaches  Hematological: Negative  Psychiatric/Behavioral: Negative  Physical Exam  Physical Exam   Constitutional: She is oriented to person, place, and time  She appears well-developed and well-nourished  HENT:   Head: Normocephalic and atraumatic  Nose: Nose normal    Mouth/Throat: Oropharynx is clear and moist    Eyes: Pupils are equal, round, and reactive to light  Conjunctivae and EOM are normal    Neck: Normal range of motion  Neck supple  Cardiovascular: Normal rate, regular rhythm and normal heart sounds  Pulmonary/Chest: Effort normal and breath sounds normal  No respiratory distress  She has no wheezes  Abdominal: Soft  Bowel sounds are normal  There is tenderness  There is no guarding  Musculoskeletal: She exhibits no edema, tenderness or deformity  Neurological: She is alert and oriented to person, place, and time  Skin: Skin is warm and dry   Capillary refill takes less than 2 seconds  No rash noted  Psychiatric: She has a normal mood and affect  Her behavior is normal    Nursing note and vitals reviewed  Vital Signs  ED Triage Vitals [11/17/18 2015]   Temperature Pulse Respirations Blood Pressure SpO2   100 1 °F (37 8 °C) 95 (!) 24 127/61 95 %      Temp Source Heart Rate Source Patient Position - Orthostatic VS BP Location FiO2 (%)   Oral Monitor Lying Right arm --      Pain Score       --           Vitals:    11/17/18 2015   BP: 127/61   Pulse: 95   Patient Position - Orthostatic VS: Lying       Visual Acuity      ED Medications  Medications - No data to display    Diagnostic Studies  Results Reviewed     None                 No orders to display              Procedures  Procedures       Phone Contacts  ED Phone Contact    ED Course  ED Course as of Nov 17 2355   Sat Nov 17, 2018   2300 Patient's pain is markedly improved  She is jovial and resting comfortably  CT results pending  MDM  Number of Diagnoses or Management Options  Diagnosis management comments: 77-year-old female presents with left-sided flank pain  Initially on arrival appeared to be either from renal colic or musculoskeletal in etiology  Laboratory studies are unremarkable  Patient had a borderline low-grade temperature on arrival to the ED  Muscle relaxants did improve her discomfort but a small dose caused complete resolution of her discomfort  Urine is positive for large amount of leukocytes  On further evaluation patient is admitting to having some dysuria  Given her low-grade temperature and discomfort, will plan to treat her for UTI and evolving pyelonephritis  She appears comfortable and is able to tolerate oral medications at this point in time  Will try discharge home with oral medications if she fails this, she will return for possible admission to the hospital   She will follow up with her family physician         Amount and/or Complexity of Data Reviewed  Clinical lab tests: ordered and reviewed  Tests in the radiology section of CPT®: ordered and reviewed  Tests in the medicine section of CPT®: ordered and reviewed  Independent visualization of images, tracings, or specimens: yes      CritCare Time    Disposition  Final diagnoses:   None     ED Disposition     None      Follow-up Information    None         Patient's Medications   Discharge Prescriptions    No medications on file     No discharge procedures on file      ED Provider  Electronically Signed by           Chaparrita Castellanos DO  11/18/18 0028

## 2018-11-19 DIAGNOSIS — Z17.0 MALIGNANT NEOPLASM OF BREAST IN FEMALE, ESTROGEN RECEPTOR POSITIVE, UNSPECIFIED LATERALITY, UNSPECIFIED SITE OF BREAST (HCC): ICD-10-CM

## 2018-11-19 DIAGNOSIS — C50.919 MALIGNANT NEOPLASM OF BREAST IN FEMALE, ESTROGEN RECEPTOR POSITIVE, UNSPECIFIED LATERALITY, UNSPECIFIED SITE OF BREAST (HCC): ICD-10-CM

## 2018-11-19 RX ORDER — ANASTROZOLE 1 MG/1
TABLET ORAL
Qty: 30 TABLET | Refills: 5 | Status: SHIPPED | OUTPATIENT
Start: 2018-11-19 | End: 2019-02-27 | Stop reason: SINTOL

## 2018-11-27 ENCOUNTER — APPOINTMENT (OUTPATIENT)
Dept: PREADMISSION TESTING | Facility: HOSPITAL | Age: 73
End: 2018-11-27
Payer: MEDICARE

## 2018-11-27 DIAGNOSIS — Z01.818 PREOP TESTING: Primary | ICD-10-CM

## 2018-11-27 LAB
ANION GAP SERPL CALCULATED.3IONS-SCNC: 8 MMOL/L (ref 5–14)
APTT PPP: 26 SECONDS (ref 23–34)
BUN SERPL-MCNC: 10 MG/DL (ref 5–25)
CALCIUM SERPL-MCNC: 9.5 MG/DL (ref 8.4–10.2)
CHLORIDE SERPL-SCNC: 108 MMOL/L (ref 97–108)
CO2 SERPL-SCNC: 25 MMOL/L (ref 22–30)
CREAT SERPL-MCNC: 0.64 MG/DL (ref 0.6–1.2)
EOSINOPHIL # BLD AUTO: 0.16 THOUSAND/UL (ref 0–0.4)
EOSINOPHIL NFR BLD MANUAL: 3 % (ref 0–6)
ERYTHROCYTE [DISTWIDTH] IN BLOOD BY AUTOMATED COUNT: 14.9 %
GFR SERPL CREATININE-BSD FRML MDRD: 102 ML/MIN/1.73SQ M
GLUCOSE SERPL-MCNC: 117 MG/DL (ref 70–99)
HCT VFR BLD AUTO: 37 % (ref 36–46)
HGB BLD-MCNC: 12 G/DL (ref 12–16)
INR PPP: 0.99 (ref 0.89–1.1)
LYMPHOCYTES # BLD AUTO: 1.25 THOUSAND/UL (ref 0.5–4)
LYMPHOCYTES # BLD AUTO: 24 % (ref 20–50)
MCH RBC QN AUTO: 28.7 PG (ref 26–34)
MCHC RBC AUTO-ENTMCNC: 32.4 G/DL (ref 31–36)
MCV RBC AUTO: 89 FL (ref 80–100)
MONOCYTES # BLD AUTO: 0.26 THOUSAND/UL (ref 0.2–0.9)
MONOCYTES NFR BLD AUTO: 5 % (ref 1–10)
NEUTS SEG # BLD: 3.54 THOUSAND/UL (ref 1.8–7.8)
NEUTS SEG NFR BLD AUTO: 68 %
PLATELET # BLD AUTO: 241 THOUSANDS/UL (ref 150–450)
PLATELET BLD QL SMEAR: ADEQUATE
PMV BLD AUTO: 7.9 FL (ref 8.9–12.7)
POTASSIUM SERPL-SCNC: 3.9 MMOL/L (ref 3.6–5)
PROTHROMBIN TIME: 10.5 SECONDS (ref 9.5–11.6)
RBC # BLD AUTO: 4.19 MILLION/UL (ref 4–5.2)
RBC MORPH BLD: NORMAL
SODIUM SERPL-SCNC: 141 MMOL/L (ref 137–147)
TOTAL CELLS COUNTED SPEC: 100
WBC # BLD AUTO: 5.2 THOUSAND/UL (ref 4.5–11)

## 2018-11-27 PROCEDURE — 93005 ELECTROCARDIOGRAM TRACING: CPT

## 2018-11-27 PROCEDURE — 85007 BL SMEAR W/DIFF WBC COUNT: CPT

## 2018-11-27 PROCEDURE — 85610 PROTHROMBIN TIME: CPT

## 2018-11-27 PROCEDURE — 85027 COMPLETE CBC AUTOMATED: CPT

## 2018-11-27 PROCEDURE — 80048 BASIC METABOLIC PNL TOTAL CA: CPT

## 2018-11-27 PROCEDURE — 85730 THROMBOPLASTIN TIME PARTIAL: CPT

## 2018-11-27 NOTE — PRE-PROCEDURE INSTRUCTIONS
Pre-Surgery Instructions:   Medication Instructions    DULoxetine (CYMBALTA) 30 mg delayed release capsule Instructed patient per Anesthesia Guidelines   gabapentin (NEURONTIN) 300 mg capsule Instructed patient per Anesthesia Guidelines  Pre-op Showering Instructions for Surgery Patients    Before your operation, you play an important role in decreasing your risk for infection by washing with special antiseptic soap  This is an effective way to reduce bacteria on the skin which may help to prevent infections at the surgical site  Please read the following directions in advance  1  In the week before your operation, purchase a 4 ounce bottle of antiseptic soap containing chlorhexidine gluconate (CHG)  4%  Some brand names include: Aplicare®, Endure, and Hibiclens®  The cost is usually less than $5 00   For your convenience, the [address] [place] [address] carries the soap   It may also be available at your doctors office or pre-admission testing center, and at most retail pharmacies   If you are allergic or sensitive to soaps containing CHG, please let your doctor know so another antiseptic can be suggested   CHG antiseptic soap is for external use only  2  The day before your operation, follow these instructions carefully to get ready   Please clean linens (sheets) on your bed; you should sleep on clean sheets after your evening shower   Get clean towels and washcloth ready - you need enough for 2 showers   Set aside clean underwear, pajamas, and clothing to wear after the showers     Reminders:   DO NOT use any other soap or body rinse on your skin during or after the antiseptic showers   DO NOT use lotion, powder, deodorant, or perfume/aftershave of any kind on your skin after your antiseptic shower   DO NOT shave any body parts in the 24 hours/day before your operation   DO NOT get the antiseptic soap in your eyes, ears, nose, mouth, or vaginal area    3   You will need to shower the night before AND the morning of your surgery  Shower 1:   The first evening before the operation, take the first shower   First, shampoo your hair with regular shampoo and rinse it completely before you use the antiseptic soap  After washing and rinsing your hair, rinse your body   Next, use a clean washcloth to apply the antiseptic soap and wash your body from the neck down to your toes using ½ bottle of the antiseptic soap   You will use the other ½ bottle for the second shower   Clean the area where your incision will be; lather this area well for about 2 minutes   If you are having head or neck surgery, wash areas with the antiseptic soap   Rinse yourself completely with running water   Use a clean towel to dry off   Wear clean underwear and clothing/pajamas  Shower 2   The morning of your operation, take the second shower following the same steps as Shower 1 using the second ½ of the bottle of antiseptic soap   Use clean cloths and towels to wash and dry yourself   Wear clean underwear and clothing

## 2018-11-28 LAB
ATRIAL RATE: 82 BPM
P AXIS: 44 DEGREES
PR INTERVAL: 140 MS
QRS AXIS: -13 DEGREES
QRSD INTERVAL: 94 MS
QT INTERVAL: 388 MS
QTC INTERVAL: 453 MS
T WAVE AXIS: 23 DEGREES
VENTRICULAR RATE: 82 BPM

## 2018-11-28 PROCEDURE — 93010 ELECTROCARDIOGRAM REPORT: CPT | Performed by: INTERNAL MEDICINE

## 2018-11-29 ENCOUNTER — ANESTHESIA EVENT (OUTPATIENT)
Dept: PERIOP | Facility: HOSPITAL | Age: 73
End: 2018-11-29
Payer: MEDICARE

## 2018-11-29 NOTE — ANESTHESIA PREPROCEDURE EVALUATION
Review of Systems/Medical History  Patient summary reviewed  Chart reviewed  No history of anesthetic complications     Cardiovascular  Exercise tolerance (METS): <4,  Hyperlipidemia, Hypertension controlled,   Comment: ECHO  WNL,  Pulmonary  Not a smoker , No asthma , Shortness of breath, Sleep apnea ,        GI/Hepatic  Negative GI/hepatic ROS   GERD well controlled,        Negative  ROS        Endo/Other  Diabetes Diet controlled,   Obesity  morbid obesity   GYN  Not currently pregnant , Prior pregnancy/OB history : 7+ Parity: 7+,   Breast cancer (left mastectomy)        Hematology  Anemia (no anemia by CBC) ,     Musculoskeletal  Back pain , lumbar pain and spinal stenosis,   Arthritis     Neurology    Headaches,    Psychology   Anxiety, Depression , being treated for depression,              Physical Exam    Airway    Mallampati score: II  TM Distance: >3 FB  Neck ROM: limited     Dental       Cardiovascular  Cardiovascular exam normal    Pulmonary  Pulmonary exam normal     Other Findings        Anesthesia Plan  ASA Score- 3     Anesthesia Type- IV sedation with anesthesia with ASA Monitors  Additional Monitors:   Airway Plan:     Comment: Likely ESTER   Martiniquais only  Ms Mendoza Letters excellent   Plan Factors-Patient not instructed to abstain from smoking on day of procedure  Patient did not smoke on day of surgery  Induction- intravenous  Postoperative Plan- Plan for postoperative opioid use  Informed Consent- Anesthetic plan and risks discussed with patient  I personally reviewed this patient with the CRNA  Discussed and agreed on the Anesthesia Plan with the CRNA  Karrie Nissen

## 2018-11-29 NOTE — H&P
H&P Exam - Kwadwo Schuster 74 y o  female MRN: [id number]    Unit/Bed#:  Encounter: [id number]    Assessment:  Left carpal tunnel syndrome left 3rd trigger finger    Plan:  Endoscopic release of left volar carpal ligament  Release of left 3rd trigger finger    History of Present Illness   This is a 47-year-old female with a history of bilateral carpal tunnel syndrome  Patient has a history of her right carpal tunnel release with good relief  Patient now presents with night pain paresthesias weakness in her left hand no relief with splints are anti-inflammatories  Patient has a positive EMG and nerve conduction study for left carpal tunnel syndrome  Patient also has left 3rd trigger finger with a flexor contracture at the PIP joint of the left 3rd finger and a nodule over the A1 pulley with triggering of the digit and tenderness over the A1 pulley    Review of Systems   All other systems reviewed and are negative        Historical Information   Past Medical History:   Diagnosis Date    Anxiety     Arthritis     Breast cancer ([address] )     Chronic pain disorder     Depression     Depression     Hypercholesterolemia     Hypertension     Irritable bowel syndrome     Migraine      Past Surgical History:   Procedure Laterality Date    BREAST BIOPSY      BREAST LUMPECTOMY      CARPAL TUNNEL RELEASE Right     right CTR     SECTION      CHOLECYSTECTOMY      MASTECTOMY Left     sentinae node mapping    SENTINEL LYMPH NODE BIOPSY       Social History   History   Alcohol Use No     History   Drug Use No     History   Smoking Status    Passive Smoke Exposure - Never Smoker   Smokeless Tobacco    Never Used     Family History: non-contributory    Meds/Allergies   all medications and allergies reviewed  Allergies   Allergen Reactions    Chocolate     Chocolate Flavor     Ibuprofen Swelling    Pork-Derived Products        Objective   First Vitals:   Height: 5' (152 4 cm) (18 1010)  Weight - Scale: 84 8 kg (187 lb) (11/27/18 1010)    Current Vitals:   Height: 5' (152 4 cm) (11/27/18 1010)  Weight - Scale: 84 8 kg (187 lb) (11/27/18 1010)    No intake or output data in the 24 hours ending 11/29/18 1846    Invasive Devices          No matching active lines, drains, or airways          Physical Exam examination head ears eyes nose and throat is within normal limits heart sounds S1-S2 are heard no murmurs or gallops lungs clear abdomen is soft and the extremities there is a positive Tinel's test positive Phalen's test at the left distal volar wrist and palm there is left palmar thenar wasting in areas a flexion contracture of the left 3rd digit at the PIP joint there is pain over the A1 pulley and a nodule felt over the A1 pulley bilateral lower extremities are within normal limits    Lab Results:   Imaging:   EKG, Pathology, and Other Studies:     Code Status: No Order  Advance Directive and Living Will:      Power of :    POLST:      Counseling / Coordination of Care:   None

## 2018-11-30 ENCOUNTER — ANESTHESIA (OUTPATIENT)
Dept: PERIOP | Facility: HOSPITAL | Age: 73
End: 2018-11-30
Payer: MEDICARE

## 2018-11-30 ENCOUNTER — HOSPITAL ENCOUNTER (OUTPATIENT)
Facility: HOSPITAL | Age: 73
Setting detail: OUTPATIENT SURGERY
Discharge: HOME | End: 2018-11-30
Attending: PLASTIC SURGERY | Admitting: PLASTIC SURGERY
Payer: MEDICARE

## 2018-11-30 VITALS
OXYGEN SATURATION: 98 % | HEIGHT: 60 IN | RESPIRATION RATE: 18 BRPM | SYSTOLIC BLOOD PRESSURE: 99 MMHG | HEART RATE: 70 BPM | BODY MASS INDEX: 36.71 KG/M2 | DIASTOLIC BLOOD PRESSURE: 56 MMHG | TEMPERATURE: 97.2 F | WEIGHT: 187 LBS

## 2018-11-30 DIAGNOSIS — Z01.818 PREOP TESTING: ICD-10-CM

## 2018-11-30 DIAGNOSIS — G56.02 CARPAL TUNNEL SYNDROME OF LEFT WRIST: Primary | ICD-10-CM

## 2018-11-30 PROBLEM — M65.332 TRIGGER MIDDLE FINGER OF LEFT HAND: Status: ACTIVE | Noted: 2018-11-30

## 2018-11-30 RX ORDER — DEXAMETHASONE SODIUM PHOSPHATE 4 MG/ML
4 INJECTION, SOLUTION INTRA-ARTICULAR; INTRALESIONAL; INTRAMUSCULAR; INTRAVENOUS; SOFT TISSUE ONCE AS NEEDED
Status: DISCONTINUED | OUTPATIENT
Start: 2018-11-30 | End: 2018-11-30 | Stop reason: HOSPADM

## 2018-11-30 RX ORDER — FENTANYL CITRATE 50 UG/ML
INJECTION, SOLUTION INTRAMUSCULAR; INTRAVENOUS AS NEEDED
Status: DISCONTINUED | OUTPATIENT
Start: 2018-11-30 | End: 2018-11-30 | Stop reason: SURG

## 2018-11-30 RX ORDER — BUPIVACAINE HYDROCHLORIDE 5 MG/ML
INJECTION, SOLUTION PERINEURAL AS NEEDED
Status: DISCONTINUED | OUTPATIENT
Start: 2018-11-30 | End: 2018-11-30 | Stop reason: HOSPADM

## 2018-11-30 RX ORDER — PROPOFOL 10 MG/ML
INJECTION, EMULSION INTRAVENOUS AS NEEDED
Status: DISCONTINUED | OUTPATIENT
Start: 2018-11-30 | End: 2018-11-30 | Stop reason: SURG

## 2018-11-30 RX ORDER — TOPIRAMATE 25 MG/1
25 TABLET ORAL 2 TIMES DAILY
COMMUNITY
End: 2019-01-17 | Stop reason: ALTCHOICE

## 2018-11-30 RX ORDER — FENTANYL CITRATE/PF 50 MCG/ML
12.5 SYRINGE (ML) INJECTION
Status: DISCONTINUED | OUTPATIENT
Start: 2018-11-30 | End: 2018-11-30 | Stop reason: HOSPADM

## 2018-11-30 RX ORDER — ONDANSETRON 2 MG/ML
INJECTION INTRAMUSCULAR; INTRAVENOUS AS NEEDED
Status: DISCONTINUED | OUTPATIENT
Start: 2018-11-30 | End: 2018-11-30 | Stop reason: SURG

## 2018-11-30 RX ORDER — MAGNESIUM HYDROXIDE 1200 MG/15ML
LIQUID ORAL AS NEEDED
Status: DISCONTINUED | OUTPATIENT
Start: 2018-11-30 | End: 2018-11-30 | Stop reason: HOSPADM

## 2018-11-30 RX ORDER — OXYCODONE HYDROCHLORIDE AND ACETAMINOPHEN 5; 325 MG/1; MG/1
1 TABLET ORAL EVERY 4 HOURS PRN
Qty: 10 TABLET | Refills: 0 | Status: SHIPPED | OUTPATIENT
Start: 2018-11-30 | End: 2019-01-17 | Stop reason: ALTCHOICE

## 2018-11-30 RX ORDER — FENTANYL CITRATE/PF 50 MCG/ML
25 SYRINGE (ML) INJECTION
Status: DISCONTINUED | OUTPATIENT
Start: 2018-11-30 | End: 2018-11-30 | Stop reason: HOSPADM

## 2018-11-30 RX ORDER — MIDAZOLAM HYDROCHLORIDE 1 MG/ML
INJECTION INTRAMUSCULAR; INTRAVENOUS AS NEEDED
Status: DISCONTINUED | OUTPATIENT
Start: 2018-11-30 | End: 2018-11-30 | Stop reason: SURG

## 2018-11-30 RX ORDER — DIPHENHYDRAMINE HYDROCHLORIDE 50 MG/ML
12.5 INJECTION INTRAMUSCULAR; INTRAVENOUS ONCE
Status: DISCONTINUED | OUTPATIENT
Start: 2018-11-30 | End: 2018-11-30 | Stop reason: HOSPADM

## 2018-11-30 RX ORDER — SODIUM CHLORIDE, SODIUM LACTATE, POTASSIUM CHLORIDE, CALCIUM CHLORIDE 600; 310; 30; 20 MG/100ML; MG/100ML; MG/100ML; MG/100ML
75 INJECTION, SOLUTION INTRAVENOUS CONTINUOUS
Status: DISCONTINUED | OUTPATIENT
Start: 2018-11-30 | End: 2018-11-30 | Stop reason: HOSPADM

## 2018-11-30 RX ORDER — CEFAZOLIN SODIUM 1 G/50ML
1000 SOLUTION INTRAVENOUS ONCE
Status: COMPLETED | OUTPATIENT
Start: 2018-11-30 | End: 2018-11-30

## 2018-11-30 RX ORDER — PROPOFOL 10 MG/ML
INJECTION, EMULSION INTRAVENOUS CONTINUOUS PRN
Status: DISCONTINUED | OUTPATIENT
Start: 2018-11-30 | End: 2018-11-30 | Stop reason: SURG

## 2018-11-30 RX ADMIN — SODIUM CHLORIDE, POTASSIUM CHLORIDE, SODIUM LACTATE AND CALCIUM CHLORIDE 75 ML/HR: 600; 310; 30; 20 INJECTION, SOLUTION INTRAVENOUS at 08:25

## 2018-11-30 RX ADMIN — MIDAZOLAM HYDROCHLORIDE 2 MG: 1 INJECTION, SOLUTION INTRAMUSCULAR; INTRAVENOUS at 08:40

## 2018-11-30 RX ADMIN — CEFAZOLIN SODIUM 2000 MG: 1 SOLUTION INTRAVENOUS at 08:40

## 2018-11-30 RX ADMIN — PROPOFOL 75 MCG/KG/MIN: 10 INJECTION, EMULSION INTRAVENOUS at 08:45

## 2018-11-30 RX ADMIN — FENTANYL CITRATE 50 MCG: 50 INJECTION INTRAMUSCULAR; INTRAVENOUS at 08:42

## 2018-11-30 RX ADMIN — PROPOFOL 50 MG: 10 INJECTION, EMULSION INTRAVENOUS at 08:50

## 2018-11-30 RX ADMIN — FENTANYL CITRATE 50 MCG: 50 INJECTION INTRAMUSCULAR; INTRAVENOUS at 08:45

## 2018-11-30 RX ADMIN — ONDANSETRON HYDROCHLORIDE 4 MG: 2 INJECTION, SOLUTION INTRAMUSCULAR; INTRAVENOUS at 08:50

## 2018-11-30 NOTE — NURSING NOTE
Returned from PACU at 1020  Alert and oriented  Denies pain  Dressing dry and intact to left hand/wrist  No drainage noted  Fingers warm to touch  Positive sensation and movement of fingers  Respirations easy and non labored  IV fluids continue  Call bell in reach

## 2018-11-30 NOTE — ADDENDUM NOTE
Addendum  created 11/30/18 1011 by Kelton Cotter, DO    Order list changed, Order sets accessed, Sign clinical note

## 2018-11-30 NOTE — DISCHARGE INSTRUCTIONS
Cirugía del christina sauceda   LO QUE NECESITA SABER:   [place] del túnel sohail, o descompresión, se [place] para quitar la presión del nervio mediano de trujillo alessandro  El nervio mediano controla los músculos y la sensación en la [place]  La cirugía podría realizarse a través de isiah abertura en trujillo case  A esto se le llama cirugía abierta  O en trujillo lugar, el cirujano podría colocar un endoscopio e instrumentos en 1 o 2 incisiones de trujillo alessandro o de trujillo case  A esto se le conoce noe cirugía endoscópica  INSTRUCCIONES SOBRE EL [facility]:   Busque atención médica de inmediato si:   · Se desprenden los puntos de sutura  · La tara empapa el vendaje  · Usted no puede sentir o  ita tolu o dedos  · Usted siente un bulto o inflamación en trujillo alessandro  Comuníquese con trujillo médico o especialista de la mano si:   · Usted tiene fiebre o escalofríos  · Usted se siente débil o dolorido  · Usted tiene dolor, aun después de jacoby [place]  · Usted tiene inflamación, rigidez o entumecimiento en los dedos de las tolu  · Los dedos de ita tolu se quedan rígidos en la misma posición  · Usted tiene preguntas o inquietudes acerca de trujillo condición o cuidado  Medicamentos:  Es posible que usted necesite alguno de los siguientes:  · Antibióticos  sirven para prevenir o combatir la infección bacteriana  · Un medicamento con receta para el dolor  podrían ser [place]  Pregunte cómo jacoby estos medicamentos de isiah forma rachel  · [place] ita medicamentos noe se le haya indicado  Consulte con trujillo médico si usted fernando que trujillo medicamento no le está ayudando o si presenta efectos secundarios  Infórmele si es alérgico a cualquier medicamento  Mantenga isiah lista actualizada de los [place], las vitaminas y los productos herbales que [place]  Incluya los siguientes datos de los medicamentos: cantidad, frecuencia y motivo de administración   Traiga con usted la lista o los envases de la píldoras a ita citas de seguimiento  Lleve la lista de los medicamentos con usted en [place] de isiah emergencia  Acuda a terapia física u ocupacional, si se lo indican:  Un fisioterapeuta puede enseñarle ejercicios para ayudar a mejorar el movimiento y la fuerza  La fisioterapia también puede ayudar a disminuir el dolor o pérdida de función  Un terapeuta ocupacional puede ayudarlo a encontrar maneras de hacer trabajo y [place] actividades para reducir la tensión en dial alessandro  Aplique hielo en dial alessandro:  El hielo ayuda a disminuir la inflamación y el dolor  El hielo también puede contribuir a evitar el daño de los tejidos  Use un paquete de hielo o ponga hielo molido dentro de [employer]  Cubra el paquete o bolsa con hielo con isiah toalla  Colóquela en dial alessandro por 15 a 20 minutos cada hora, o noe se le indique  Limite ita actividades según le indicaron:  No jale, levante ni mueva objetos pesados hasta que dial médico le indique que puede [place]  Pregunte cuándo puede regresar al [place]  Tómese tiempo para descansar dial mano  Si usted trabaja con isiah computadora, descanse ita tolu con frecuencia  Es posible que usted necesite elevar dial brazo varias veces al día  [place] ayuda a disminuir el dolor y la inflamación  Programe isaih ramesh con dial médico o especialista de la mano noe se le indique:  Es posible que usted necesite regresar para que le quiten los puntos de [place]  Pregunte por cuánto tiempo usted necesita usar la férula  Anote ita preguntas para que se acuerde de hacerlas kiko ita visitas  © 2017 [address] Information is for End User's use only and may not be sold, redistributed or otherwise used for commercial purposes  All illustrations and images included in CareNotes® are the copyrighted property of A D A M , Inc  or Brian Perez  Esta información es sólo para uso en educación  Dial intención no es darle un consejo médico sobre enfermedades o tratamientos   Colsulte con [employer], enfermera o farmacéutico antes de seguir cualquier régimen médico para saber si es seguro y efectivo para usted

## 2018-11-30 NOTE — NURSING NOTE
No distress  Tolerated toast and drink  Denies pain  Dressing remains dry and intact  IV removed  Discharge instructions given  Waiting for her friend to take her home

## 2018-11-30 NOTE — OP NOTE
OPERATIVE REPORT  PATIENT NAME: Mayte Aiken    :  1945  MRN: [id number]  Pt Location:  OR ROOM 03    SURGERY DATE: 2018    Surgeon(s) and Role:     * Danielle Madrigal MD - Primary    Preop Diagnosis:  Carpal tunnel syndrome of left wrist [G56 02]  Trigger middle finger of left hand [M65 332]    Post-Op Diagnosis Codes:     * Carpal tunnel syndrome of left wrist [G56 02]     * Trigger middle finger of left hand [M65 332]    Procedure(s) (LRB):  ENDOSCOPIOC RELEASE VOLAR CARPAL LIGAMENT (Left)  RELEASE 3RD TRIGGER FINGER   (Left)    Specimen(s):  * No specimens in log *    Estimated Blood Loss:   Minimal    Drains:       Anesthesia Type:   IV Sedation with Anesthesia    Operative Indications:  Carpal tunnel syndrome of left wrist [G56 02]  Trigger middle finger of left hand [M65 332]      Operative Findings: Thick left volar carpal ligament thick A1 pulley left 3rd finger    Complications:   None    Procedure and Technique:  Patient was draped and prepped in the normal sterile fashion and injected with local anesthesia  Incision was made in the distal wrist crease and the palmaris longus was retracted radially   A synovial elevator was used to scrape the synovium from the volar carpal ligament and 2 progressive sounds were placed  The scope was introduced under direct vision of the scope the volar carpal ligament was divided the scope was removed the wound was irrigated and closed with 5 0 nylon suture  A v made over the A1 pulley of the left 3rd finger carried down to the tendon sheath the A1 pulley was incised with a 15 blade and released both distally and proximally with  tenotomy scissors     The wound was irrigated and closed with 5 0 nylon sutures     ition:  PACU     SIGNATURE: Danielle Madrigal MD  DATE: 2018  TIME: 9:21 AM

## 2019-01-07 ENCOUNTER — HOSPITAL ENCOUNTER (OUTPATIENT)
Dept: INFUSION CENTER | Facility: HOSPITAL | Age: 74
Discharge: HOME | End: 2019-01-07
Payer: MEDICARE

## 2019-01-07 PROCEDURE — 96401 CHEMO ANTI-NEOPL SQ/IM: CPT

## 2019-01-07 RX ADMIN — DENOSUMAB 60 MG: 60 INJECTION SUBCUTANEOUS at 09:14

## 2019-01-07 NOTE — PROGRESS NOTES
Pt here for prolia injection, recent calcium 9 5 from 11-27-18  Tolerated prolia well to right upper arm SQ without complications  AVS given with Dr Jose Rubin next appt

## 2019-01-17 ENCOUNTER — OFFICE VISIT (OUTPATIENT)
Dept: FAMILY MEDICINE CLINIC | Facility: CLINIC | Age: 74
End: 2019-01-17
Payer: MEDICARE

## 2019-01-17 VITALS
HEART RATE: 78 BPM | HEIGHT: 60 IN | SYSTOLIC BLOOD PRESSURE: 110 MMHG | OXYGEN SATURATION: 99 % | DIASTOLIC BLOOD PRESSURE: 70 MMHG | WEIGHT: 185 LBS | RESPIRATION RATE: 16 BRPM | BODY MASS INDEX: 36.32 KG/M2 | TEMPERATURE: 98 F

## 2019-01-17 DIAGNOSIS — G56.03 BILATERAL CARPAL TUNNEL SYNDROME: Primary | ICD-10-CM

## 2019-01-17 DIAGNOSIS — E78.2 MIXED HYPERLIPIDEMIA: ICD-10-CM

## 2019-01-17 DIAGNOSIS — F32.9 REACTIVE DEPRESSION: ICD-10-CM

## 2019-01-17 DIAGNOSIS — C50.312 MALIGNANT NEOPLASM OF LOWER-INNER QUADRANT OF LEFT BREAST IN FEMALE, ESTROGEN RECEPTOR POSITIVE (HCC): ICD-10-CM

## 2019-01-17 DIAGNOSIS — Z17.0 MALIGNANT NEOPLASM OF LOWER-INNER QUADRANT OF LEFT BREAST IN FEMALE, ESTROGEN RECEPTOR POSITIVE (HCC): ICD-10-CM

## 2019-01-17 DIAGNOSIS — E53.8 VITAMIN B12 DEFICIENCY: ICD-10-CM

## 2019-01-17 DIAGNOSIS — R73.9 HYPERGLYCEMIA: ICD-10-CM

## 2019-01-17 PROCEDURE — 99214 OFFICE O/P EST MOD 30 MIN: CPT | Performed by: FAMILY MEDICINE

## 2019-01-17 RX ORDER — CYANOCOBALAMIN 1000 UG/ML
INJECTION INTRAMUSCULAR; SUBCUTANEOUS
Qty: 4 ML | Refills: 0 | Status: SHIPPED | OUTPATIENT
Start: 2019-01-17 | End: 2019-03-22 | Stop reason: ALTCHOICE

## 2019-01-17 RX ORDER — ATORVASTATIN CALCIUM 40 MG/1
40 TABLET, FILM COATED ORAL
Qty: 30 TABLET | Refills: 5 | Status: SHIPPED | OUTPATIENT
Start: 2019-01-17 | End: 2019-08-13 | Stop reason: SDUPTHER

## 2019-01-17 RX ORDER — DULOXETIN HYDROCHLORIDE 30 MG/1
30 CAPSULE, DELAYED RELEASE ORAL DAILY
Qty: 30 CAPSULE | Refills: 5 | Status: SHIPPED | OUTPATIENT
Start: 2019-01-17 | End: 2019-08-13 | Stop reason: SDUPTHER

## 2019-01-17 RX ORDER — GABAPENTIN 300 MG/1
300 CAPSULE ORAL 3 TIMES DAILY
Qty: 90 CAPSULE | Refills: 3 | Status: SHIPPED | OUTPATIENT
Start: 2019-01-17 | End: 2019-06-13 | Stop reason: SDUPTHER

## 2019-01-17 NOTE — PROGRESS NOTES
Assessment/Plan:  1  Bilateral carpal tunnel syndrome  To Pt and Ot, The choice of NSAID's in this patient depends of her current pain syndrome  I explained to patient that  response of NSAIDs varies between patient's and also is different in regarding to the area of the body affected in the progression of the damage  The drug interactions and comorbidities affecting by these medications were explained to patient also; the caution in toxicity in the GI tract was also discussed  Prevent the long-term use of these medications may be wise  The use ice and physical therapy is necessary in order to get the best results  NSAID's might elevate BP or block effect of certain antihypertensive agents  Use with caution  Always use ice and therapy to decrease or avoid the need for a Pharmacological agent  - Ambulatory referral to Physical Therapy; Future    2  Malignant neoplasm of 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, estrogen receptor positive ([facility] [address] )  Condition is stable with current treatment, to  continue the same      3  Vitamin B12 deficiency  To load her weekly for 4 weeks  - cyanocobalamin 1,000 mcg/mL; Apply in office weekly for 4 weeks  Dispense: 4 mL; Refill: 0    4  Hyperglycemia  Patient has elevation of blood sugar without diagnosis of diabetes  Are going to check hemoglobin A1c today     - HEMOGLOBIN A1C W/ EAG ESTIMATION; Future    5  Mixed hyperlipidemia  HE HAS HYPERLIPIDEMIA, their recommendations today is more exercise and decrease the amount of saturated fat in his diet  Will repeat that this labs in 3-6 month  - Lipid panel; Future    No problem-specific Assessment & Plan notes found for this encounter  There are no diagnoses linked to this encounter  Subjective:      Patient ID: Tika Labs is a 74 y o  female  Bilateral pain in both wrist due to carpal tunnel syndrome  Hand Pain    Pertinent negatives include no chest pain     Dizziness   Associated symptoms include arthralgias (of wrists)  Pertinent negatives include no abdominal pain, chest pain, coughing, fatigue, fever, headaches, nausea, sore throat or vomiting  The following portions of the patient's history were reviewed and updated as appropriate: allergies, current medications, past family history, past medical history, past social history, past surgical history and problem list     Review of Systems   Constitutional: Negative for fatigue, fever and unexpected weight change  HENT: Negative for sore throat and trouble swallowing  Eyes: Negative for photophobia  Respiratory: Negative for cough, chest tightness, shortness of breath and wheezing  Cardiovascular: Negative for chest pain, palpitations and leg swelling  Gastrointestinal: Negative for abdominal pain, blood in stool, nausea and vomiting  Genitourinary: Negative for hematuria  Musculoskeletal: Positive for arthralgias (of wrists) and back pain  Neurological: Negative for dizziness, syncope, light-headedness and headaches  Hematological: Does not bruise/bleed easily  Objective:      /70 (BP Location: Left arm, Patient Position: Sitting, Cuff Size: Standard)   Pulse 78   Temp 98 °F (36 7 °C) (Oral)   Resp 16   Ht 5' (1 524 m)   Wt 83 9 kg (185 lb)   SpO2 99%   Breastfeeding? No   BMI 36 13 kg/m²          Physical Exam   HENT:   Head: Normocephalic  Eyes: Pupils are equal, round, and reactive to light  EOM are normal    Neck: Neck supple  Cardiovascular: Normal rate and regular rhythm  Pulmonary/Chest: Effort normal    Abdominal: Soft  Musculoskeletal:        Right wrist: She exhibits decreased range of motion, tenderness and crepitus  Left wrist: She exhibits decreased range of motion and tenderness  Neurological: She is alert  Skin: Skin is warm and dry  Psychiatric: She has a normal mood and affect   Her behavior is normal

## 2019-01-17 NOTE — LETTER
January 17, 2019     Patient: Bruno Patiño   YOB: 1945   Date of Visit: 1/17/2019       Consult home care  Skilled Nursing Needed  Breast cancer  Carpal tunnel syndrome           Sincerely,          Humberto Pillai MD        CC: No Recipients

## 2019-01-17 NOTE — PATIENT INSTRUCTIONS
Síndrome del túnel carpiano   CUIDADO AMBULATORIO:   El síndrome del túnel carpiano  es isiah afección que causa que se acumule presión en el túnel carpiano  El túnel carpiano es isiah área pequeña entre los [place] y tejidos de trujillo [place]  Isiah inflamación en esta área pone presión en el nervio mediano  El nervio mediano controla los músculos y la sensación en la [place]  Los signos y síntomas comunes son:   · Dolor sordo, rufino y punzante en la mano    · Sensación de entumecimiento, hormigueo o ardor en el dedo pulgar, índice y medio    · Dolor en el brazo que se extiende hasta el hombro    · Debilidad en trujillo mano    · Inflamación en trujillo mano    · No poder controlar come [employer], o jose alberto caer objetos  Busque atención médica de inmediato si:   · De repente usted pierde sensación en trujillo mano o dedos y no los puede   · Trujillo mano de repente cambia de color  Pregúntele a trujillo Andreas Parody vitaminas y minerales son adecuados para usted  · Mariam síntomas empeoran  · Trujillo mano y mariam dedos se debilitan tanto que no puede agarrar, apretar o [employer]  · Usted tiene preguntas o inquietudes acerca de trujillo condición o cuidado  El tratamiento  podría no ser necesario  Si mariam síntomas persisten o son graves, usted podría necesitar cualquier de los siguientes:  · AINEs (Analgésicos antiinflamatorios no esteroides)  podría ser recomendado para reducir la inflamación y el dolor  Los AINEs se pueden obtener sin receta médica  Pregunte a trujillo médico cual medicamento es adecuado para usted y cuánto debería jacoby  Tómelos noe se le indique  Cuando no se suzie de la [employer], los medicamentos antiinflamatorios no esteroides pueden causar sangrado estomacal o problemas renales  Si usted esta tomando un medicamento anticoagulante, siempre pregunte a trujillo médico si un ANDREW es seguro para usted  · Las inyecciones de esteroides  podrían ayudar a reducir el dolor y la inflamación   Los esteroides se inyectan dentro del christina sauceda  · La neuroestimulación eléctrica transcutánea  [place] impulsos eléctricos leves para ayudar a reducir trujillo dolor de alessandro  · [place] descompresión del christina sauceda se [place] para eliminar la presión sobre el nervio mediano en trujillo [place]  [place] de trujillo síntomas:   · Aplique hielo a trujillo alessandro  El hielo ayuda a reducir la inflamación y el dolor en trujillo alessandro  El hielo también puede contribuir a evitar el daño de los tejidos  Use un paquete de hielo o ponga hielo molido dentro de [employer]  Cubra la compresa de hielo con isiah toalla  Colóquela en trujillo alessandro por 15 a 20 minutos cada hora, o noe se le indique  · Descanse ita tolu  Permita que ita tolu descansen por un tiempo cuando hace movimientos repetitivos noe la mecanografía  Suspenda lo que está haciendo cuando usted sienta dolor en trujillo alessandro y suavemente realice un masaje en trujillo alessandro y [place]  · Obtenga terapia física y ocupacional si se lo chairez indicado  Un terapeuta físico le demostrará maneras de realizar ejercicios y fortalecer trujillo alessandro  Un terapeuta ocupacional le demostrará maneras seguras de usar trujillo alessandro mientras realiza ita [place]  · Use isiah férula para la alessandro noe se le haya indicado  Isiah férula mantiene trujillo alessandro recta o en isiah posición ligeramente flexionada  Isiah férula para la alessandro reduce la presión en el nervio mediano y de esta manera descansa la [place]  Es probable que usted necesite usar la férula por un dante de 8 semanas  Es posible que deba usarlo a la noche  Acuda a ita consultas de control con trujillo médico según le indicaron  Anote ita preguntas para que se acuerde de hacerlas kiko ita visitas  © 2017 [address] Information is for End User's use only and may not be sold, redistributed or otherwise used for commercial purposes   All illustrations and images included in CareNotes® are the copyrighted property of A D A M , Inc  or [employer]  Esta información es sólo para uso en educación  Trujillo intención no es darle un consejo médico sobre enfermedades o tratamientos  Colsulte con trujillo Tatiana Walsh farmacéutico antes de seguir cualquier régimen médico para saber si es seguro y efectivo para usted

## 2019-02-04 ENCOUNTER — APPOINTMENT (OUTPATIENT)
Dept: LAB | Facility: HOSPITAL | Age: 74
End: 2019-02-04
Payer: MEDICARE

## 2019-02-04 ENCOUNTER — TELEPHONE (OUTPATIENT)
Dept: HEMATOLOGY ONCOLOGY | Facility: CLINIC | Age: 74
End: 2019-02-04

## 2019-02-04 DIAGNOSIS — E78.2 MIXED HYPERLIPIDEMIA: ICD-10-CM

## 2019-02-04 DIAGNOSIS — R73.9 HYPERGLYCEMIA: ICD-10-CM

## 2019-02-04 LAB
CHOLEST SERPL-MCNC: 243 MG/DL
EST. AVERAGE GLUCOSE BLD GHB EST-MCNC: 128 MG/DL
HBA1C MFR BLD: 6.1 % (ref 4.2–6.3)
HDLC SERPL-MCNC: 47 MG/DL (ref 40–59)
LDLC SERPL CALC-MCNC: 131 MG/DL
NONHDLC SERPL-MCNC: 196 MG/DL
TRIGL SERPL-MCNC: 327 MG/DL

## 2019-02-04 PROCEDURE — 83036 HEMOGLOBIN GLYCOSYLATED A1C: CPT

## 2019-02-04 PROCEDURE — 36415 COLL VENOUS BLD VENIPUNCTURE: CPT

## 2019-02-04 PROCEDURE — 80061 LIPID PANEL: CPT

## 2019-02-04 NOTE — TELEPHONE ENCOUNTER
Called patient regarding not having labs done, and missed apt today 02/04 w/Dr Lenard Mccarty  Left message for patient to call the office to reschedule

## 2019-02-05 ENCOUNTER — OFFICE VISIT (OUTPATIENT)
Dept: FAMILY MEDICINE CLINIC | Facility: CLINIC | Age: 74
End: 2019-02-05
Payer: MEDICARE

## 2019-02-05 VITALS
HEART RATE: 92 BPM | DIASTOLIC BLOOD PRESSURE: 80 MMHG | TEMPERATURE: 97.4 F | SYSTOLIC BLOOD PRESSURE: 124 MMHG | BODY MASS INDEX: 43.09 KG/M2 | HEIGHT: 55 IN | RESPIRATION RATE: 20 BRPM | WEIGHT: 186.2 LBS | OXYGEN SATURATION: 98 %
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
## 2019-02-05 DIAGNOSIS — R07.89 COSTOCHONDRAL PAIN: ICD-10-CM

## 2019-02-05 DIAGNOSIS — G56.02 CARPAL TUNNEL SYNDROME OF LEFT WRIST: ICD-10-CM

## 2019-02-05 PROCEDURE — 99214 OFFICE O/P EST MOD 30 MIN: CPT | Performed by: NURSE PRACTITIONER

## 2019-02-05 RX ORDER — CELECOXIB 200 MG/1
200 CAPSULE ORAL DAILY
Qty: 30 CAPSULE | Refills: 0 | Status: SHIPPED | OUTPATIENT
Start: 2019-02-05 | End: 2019-02-25 | Stop reason: SDUPTHER

## 2019-02-05 NOTE — ASSESSMENT & PLAN NOTE
S/p fall with severe pain on left side of breast post fall and left scapula  I am ordering xray of left side or ribs and scapula to rule out fracture  I am prescribing patient celebrex 200mg daily for pain management  Will follow up with patient with results  Also will send letter to [facility] to assess home environment

## 2019-02-05 NOTE — ASSESSMENT & PLAN NOTE
Likely due to trauma from fall  Ordering chest and rib xray and scapula xray  To rule out fracture or dislocation  Recommendation is use of celebrex  Ice packs to affected site as well and rest is recommended

## 2019-02-05 NOTE — PROGRESS NOTES
Assessment/Plan:    Carpal tunnel syndrome of left wrist  Patient to continue with Physical therapy recommendations  Use of anti-inflammatory for pain management  Pt underwent surgery last year in 11/2018 and is continuous pain of bilateral wrist  I am prescribing patient Celebrex 200mg daily to help ease her pain  To follow up in 2 weeks with Dr Bradley Shabbir  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 [facility])  Under care of Heme/Onc Dr Pedro Pablo Stroud  Patient received SQ injection of Xegva on 1/7/19  Currently on anastrozole 1mg tablet daily  Pt was previously on oxycodone but was weaned off to duloxetine  Pt to continue under care of heme/onc with their treatment plan  Fall  S/p fall with severe pain on left side of breast post fall and left scapula  I am ordering xray of left side or ribs and scapula to rule out fracture  I am prescribing patient celebrex 200mg daily for pain management  Will follow up with patient with results  Also will [place] [facility] [place]  Costochondral pain  Likely due to trauma from fall  Ordering chest and rib xray and scapula xray  To rule out fracture or dislocation  Recommendation is use of celebrex  Ice packs to affected site as well and rest is recommended  Diagnoses and all orders for this visit:    Fall, initial encounter  -     XR shoulder 2+ vw left; Future  -     XR ribs left w pa chest min 3 views; Future  -     celecoxib (CeleBREX) 200 mg capsule; Take 1 capsule (200 mg total) by mouth daily    Carpal tunnel syndrome of left wrist    Costochondral pain          Subjective:      Patient ID: Tika Cohn is a 74 y o  female  74year old female patient presents s/p fall 4 days ago  States it was walking in her home and slipped and fell and hit her left side of breast with edge of steps  Denies any bleeding or breakage in skin  Did not go to hospital  Pain scale a 10/10 without relief of duloxetine  Hurts to move her left side and pressing on it  Fall   The accident occurred 3 to 5 days ago  The fall occurred while walking (slipped and fell)  She fell from an unknown height  Impact surface: edge of steps  There was no blood loss  Point of impact: Left breast pain  Pain location: left breast pain  The pain is at a severity of 10/10  The pain is severe  The symptoms are aggravated by movement, rotation and use of injured limb (radiates to the back side)  Pertinent negatives include no fever, headaches, numbness, tingling, visual change or vomiting  Associated symptoms comments: dizziness  Treatments tried: duloxetine  The treatment provided no relief  Hand Pain    The incident occurred more than 1 week ago  The pain is present in the right wrist and left wrist  The quality of the pain is described as aching  The pain does not radiate  The pain is at a severity of 10/10  The pain is severe  The pain has been constant since the incident  Pertinent negatives include no chest pain, numbness or tingling  The symptoms are aggravated by movement, palpation and lifting  Treatments tried: duloxetine  The treatment provided no relief  The following portions of the patient's history were reviewed and updated as appropriate: allergies, current medications, past family history, past medical history, past social history, past surgical history and problem list     Review of Systems   Constitutional: Negative  Negative for fatigue and fever  HENT: Negative  Negative for congestion  Eyes: Negative  Respiratory: Negative  Negative for cough and shortness of breath  Cardiovascular: Negative  Negative for chest pain and palpitations  Gastrointestinal: Negative  Negative for vomiting  Endocrine: Negative  Genitourinary: Negative  Musculoskeletal: Positive for arthralgias, joint swelling and myalgias  (Left sided rib pain and scapula pain)   Skin: Negative  Neurological: Negative  Negative for tingling, numbness and headaches  Hematological: Negative  Psychiatric/Behavioral: Negative  Objective:      /80   Pulse 92   Temp (!) 97 4 °F (36 3 °C) (Temporal)   Resp 20   Ht 4' 7" (1 397 m)   Wt 84 5 kg (186 lb 3 2 oz)   SpO2 98%   BMI 43 28 kg/m²          Physical Exam   Constitutional: She is oriented to person, place, and time  She appears well-developed and well-nourished  No distress  HENT:   Head: Normocephalic and atraumatic  Eyes: Pupils are equal, round, and reactive to light  Conjunctivae and EOM are normal    Neck: Normal range of motion  Neck supple  Cardiovascular: Normal rate, regular rhythm, normal heart sounds and intact distal pulses  Pulmonary/Chest: Effort normal and breath sounds normal  No respiratory distress  She has no wheezes  Abdominal: Soft  Bowel sounds are normal  She exhibits no distension  Musculoskeletal: She exhibits tenderness  Cervical back: She exhibits tenderness  She exhibits no swelling and no edema  Back:    Neurological: She is alert and oriented to person, place, and time  She has normal reflexes  No cranial nerve deficit  Coordination normal    Skin: Skin is warm and dry  She is not diaphoretic  Psychiatric: She has a normal mood and affect  Thought content normal    Nursing note and vitals reviewed

## 2019-02-05 NOTE — ASSESSMENT & PLAN NOTE
Patient to continue with Physical therapy recommendations  Use of anti-inflammatory for pain management  Pt underwent surgery last year in 11/2018 and is continuous pain of bilateral wrist  I am prescribing patient Celebrex 200mg daily to help ease her pain  To follow up in 2 weeks with Dr Elise Booker

## 2019-02-05 NOTE — LETTER
February 5, 2019     Patient: Roland Connor   YOB: 1945   Date of Visit: 2/5/2019     To [facility],    I am currently taking care of my patient Roland Connor whom had a recent fall 4 days ago  I am concerned that she may need her home environment assessed, medications at home, as well as advanced age  I am requesting home skilled nursing to go to her home and assess the environment  If you can facilitate this for my patient  If you have any questions or concerns do not hesitate to reach out to me at [phone]           Sincerely,          Sherrilyn Meigs, CRNP        CC: No Recipients

## 2019-02-08 ENCOUNTER — HOSPITAL ENCOUNTER (OUTPATIENT)
Dept: RADIOLOGY | Facility: HOSPITAL | Age: 74
Discharge: HOME | End: 2019-02-08
Payer: MEDICARE
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
## 2019-02-08 PROCEDURE — 71101 X-RAY EXAM UNILAT RIBS/CHEST: CPT

## 2019-02-08 PROCEDURE — 73030 X-RAY EXAM OF SHOULDER: CPT

## 2019-02-22 ENCOUNTER — OFFICE VISIT (OUTPATIENT)
Dept: FAMILY MEDICINE CLINIC | Facility: CLINIC | Age: 74
End: 2019-02-22
Payer: MEDICARE

## 2019-02-22 ENCOUNTER — TELEPHONE (OUTPATIENT)
Dept: FAMILY MEDICINE CLINIC | Facility: CLINIC | Age: 74
End: 2019-02-22

## 2019-02-22 VITALS
HEIGHT: 60 IN | TEMPERATURE: 97.8 F | SYSTOLIC BLOOD PRESSURE: 110 MMHG | HEART RATE: 92 BPM | BODY MASS INDEX: 36.52 KG/M2 | DIASTOLIC BLOOD PRESSURE: 80 MMHG | WEIGHT: 186 LBS | RESPIRATION RATE: 16 BRPM | OXYGEN SATURATION: 98 %

## 2019-02-22 DIAGNOSIS — Z00.01 ENCOUNTER FOR GENERAL ADULT MEDICAL EXAMINATION WITH ABNORMAL FINDINGS: ICD-10-CM

## 2019-02-22 DIAGNOSIS — G56.02 CARPAL TUNNEL SYNDROME OF LEFT WRIST: Primary | ICD-10-CM

## 2019-02-22 PROCEDURE — 99214 OFFICE O/P EST MOD 30 MIN: CPT | Performed by: FAMILY MEDICINE

## 2019-02-22 PROCEDURE — G0439 PPPS, SUBSEQ VISIT: HCPCS | Performed by: FAMILY MEDICINE

## 2019-02-22 RX ORDER — SENNOSIDES 8.6 MG
650 CAPSULE ORAL EVERY 8 HOURS PRN
Qty: 90 TABLET | Refills: 5 | Status: SHIPPED | OUTPATIENT
Start: 2019-02-22 | End: 2020-02-10 | Stop reason: ALTCHOICE

## 2019-02-22 NOTE — TELEPHONE ENCOUNTER
[facility] called rewuesting a bubble pack on the pt  They requested a refill on her Vit B12 and Niacin   Please advise

## 2019-02-22 NOTE — PROGRESS NOTES
Assessment and Plan:       Encounter for general adult medical examination with abnormal findings  During this visit we have a goal to personalize prevention  I discussed the patient new illness and  and decrease the risk of current problems  Health risk assessment was discussed with patient today and the ways to stay healthier  We reviewed also the current medications in his treatment regimen  Discussed about how the chronic conditions are impacting now and later  Recommended a healthy diet and exercising frequently will help to control better ronnie's current chronic conditions  We also discussed about  Vaccinations, and the need to compliance with them  Patient declined at this time advanced directives  Patient will discuss with family regarding advanced directives  Problem List Items Addressed This Visit     None        Health Maintenance Due   Topic Date Due    Medicare Annual Wellness Visit (AWV)  1945    CRC Screening: Colonoscopy  1945    BMI: Followup Plan  12/24/1961    DTaP,Tdap,and Td Vaccines (1 - Tdap) 12/24/1964    Fall Risk  12/24/2008    Urinary Incontinence Screening  12/24/2008    Pneumococcal PPSV23/PCV13 65+ Years / High and Highest Risk (2 of 2 - PPSV23) 04/11/2017         HPI:  Priscila Brown is a 74 y o  female here for her Subsequent Wellness Visit      Patient Active Problem List   Diagnosis    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 ([address] )    Anemia, unspecified    Benign paroxysmal positional vertigo    Carpal tunnel syndrome    Chronic midline low back pain with bilateral sciatica    Chronic pain disorder    Depression    Perforation of tympanic membrane    Physical deconditioning    Poor dentition    Prediabetes    Spinal stenosis of lumbar region with radiculopathy    Vision abnormalities    Age-related incipient cataract of both eyes    Preoperative cardiovascular examination    Shortness of breath and palpitations    Carpal tunnel syndrome of left wrist    Trigger middle finger of left hand    Fall    Costochondral pain     Past Medical History:   Diagnosis Date    Anxiety     Arthritis     Breast cancer ([address] )     left    Chronic pain disorder     Depression     Depression     Hypercholesterolemia     Hypertension     Irritable bowel syndrome     Migraine      Past Surgical History:   Procedure Laterality Date    BREAST BIOPSY      BREAST LUMPECTOMY      CARPAL TUNNEL RELEASE Right     right CTR    CARPAL TUNNEL RELEASE Left 2018     SECTION      CHOLECYSTECTOMY      MASTECTOMY Left     sentinae node mapping    ME INCISE FINGER TENDON SHEATH Left 2018    Procedure: RELEASE 3RD TRIGGER FINGER  ;  Surgeon: Angeline Dillard MD;  Location: [facility] MAIN OR;  Service: Plastics    ME WRIST Angela Endow LIG Left 2018    Procedure: ENDOSCOPIOC RELEASE VOLAR CARPAL LIGAMENT;  Surgeon: Angeline Dillard MD;  Location: [facility] MAIN OR;  Service: Plastics    SENTINEL LYMPH NODE BIOPSY      TRIGGER FINGER RELEASE Left 2018     Family History   Problem Relation Age of Onset    Heart disease Mother         cardiovascular disease     Social History     Tobacco Use   Smoking Status Passive Smoke Exposure - Never Smoker   Smokeless Tobacco Never Used     Social History     Substance and Sexual Activity   Alcohol Use No      Social History     Substance and Sexual Activity   Drug Use No       Current Outpatient Medications   Medication Sig Dispense Refill    Ergocalciferol (VITAMIN D2 PO) 1 capsule      acetaminophen (TYLENOL) 650 mg CR tablet Take 1 tablet (650 mg total) by mouth every 8 (eight) hours as needed for mild pain 90 tablet 3    anastrozole (ARIMIDEX) 1 mg tablet TAKE ONE TABLET BY MOUTH EVERY DAY 30 tablet 5    atorvastatin (LIPITOR) 40 mg tablet Take 1 tablet (40 mg total) by mouth daily at bedtime 30 tablet 5    celecoxib (CeleBREX) 200 mg capsule Take 1 capsule (200 mg total) by mouth daily 30 capsule 0    cyanocobalamin 1,000 mcg/mL Apply in office weekly for 4 weeks 4 mL 0    DULoxetine (CYMBALTA) 30 mg delayed release capsule Take 1 capsule (30 mg total) by mouth daily 30 capsule 5    gabapentin (NEURONTIN) 300 mg capsule Take 1 capsule (300 mg total) by mouth 3 (three) times a day 90 capsule 3    ondansetron (ZOFRAN-ODT) 4 mg disintegrating tablet Take 1 tablet (4 mg total) by mouth every 6 (six) hours as needed for nausea or vomiting 20 tablet 0     No current facility-administered medications for this visit  Allergies   Allergen Reactions    Chocolate     Chocolate Flavor     Ibuprofen Swelling    Pork-Derived Products      Immunization History   Administered Date(s) Administered    INFLUENZA 01/20/2014, 01/07/2016, 01/07/2016, 09/19/2016, 09/19/2016, 02/14/2017, 10/02/2017    Influenza Split High Dose Preservative Free IM 02/14/2017, 10/02/2017    Influenza, high dose seasonal 0 5 mL 10/03/2018    Pneumococcal Conjugate 13-Valent 02/14/2017       Patient Care Team:  Yasmine Avendano MD as PCP - General (Family Medicine)  Cookie De La O MD (Radiation Oncology)  Art Bates MD (Hematology and Oncology)    Medicare Screening Tests and Risk Assessments:  Peterson Mcfadden is here for her Initial Wellness visit  Health Risk Assessment:  Patient rates overall health as fair  Patient feels that their physical health rating is Same  Eyesight was rated as Slightly worse  Hearing was rated as Same  Patient feels that their emotional and mental health rating is Same  Pain experienced by patient in the last 7 days has been Some  Patient's pain rating has been 8/10  Patient states that she has experienced no weight loss or gain in last 6 months  Emotional/Mental Health:  Patient has been feeling nervous/anxious  PHQ-9 Depression Screening:    Frequency of the following problems over the past two weeks:      1   Little interest or pleasure in doing things: 0 - not at all      2  Feeling down, depressed, or hopeless: 0 - not at all  PHQ-2 Score: 0          Broken Bones/Falls: Fall Risk Assessment:    In the past year, patient has experienced: History of falling in past year     Number of falls: 2 or more  Patient does not feel she is unsteady standing  Patient is not taking medication that can cause feelings of lightheadedness or tiredness  Patient often has no need to rush to the toilet  Bladder/Bowel:  Patient has not leaked urine accidently in the last six months  Patient reports no loss of bowel control  Immunizations:  Patient has had a flu vaccination within the last year  Patient has received a pneumonia shot  Patient has not received a shingles shot  Patient has not received tetanus/diphtheria shot  Home Safety:  Patient has trouble with stairs inside or outside of their home  Patient currently reports that there are no safety hazards present in home, working smoke alarms, working carbon monoxide detectors  Preventative Screenings:   No breast cancer screening performed, no colon cancer screen completed, cholesterol screen completed, glaucoma eye exam completed,     Nutrition:  Current diet: Regular with servings of the following:    Medications:  Patient is not currently taking any over-the-counter supplements  Patient is able to manage medications  Lifestyle Choices:  Patient reports no tobacco use  Patient has not smoked or used tobacco in the past   Patient reports no alcohol use  Patient does not drive a vehicle  Patient wears seat belt          Activities of Daily Living:  Can get out of bed by his or her self, able to dress self, unable to make own meals, unable to do own shopping, unable to bathe self, unable to do laundry/housekeeping, unable to manage own money and other related tasks    Previous Hospitalizations:  Hospitalization or ED visit in past 12 months  Number of hospitalizations within the last year: 3-4        Advanced Directives:  Patient has decided on a power of   Patient has spoken to designated power of   Patient has not completed advanced directive  Preventative Screening/Counseling:      Cardiovascular:      General: Screening Current      Counseling: Healthy Diet, Healthy Weight and Improve Cholesterol          Diabetes:      General: Screening Current      Counseling: Healthy Diet and Healthy Weight          Colorectal Cancer:      General: Risks and Benefits Discussed      Counseling: high fiber diet          Breast Cancer:      General: Screening Current          Cervical Cancer:      General: Screening Not Indicated          Osteoporosis:      General: Screening Current      Counseling: Calcium and Vitamin D Intake          AAA:      General: Screening Not Indicated          Glaucoma:      General: Screening Current          HIV:      General: Screening Not Indicated          Hepatitis C:      General: Screening Current        Advanced Directives:   Patient has no living will for healthcare, does not have durable POA for healthcare, patient does not have an advanced directive  Information on ACP and/or AD not provided  No 5 wishes given  No end of life assessment reviewed with patient  Provider does not agree with end of life deisions  Immunizations:  Patient reviewed and up to date      Other Preventative Counseling (Non-Medicare):   Fall Prevention, Nutrition Counseling and Weight reduction discussed

## 2019-02-22 NOTE — LETTER
March 4, 2019     Alexandria Camara  [address]    Patient: Alexandria Camara   YOB: 1945   Date of Visit: 2/22/2019       To [address] [place]    Please bubble packs           Sincerely,        Debbi Colon MD      CC: No Recipients
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
## 2019-02-27 ENCOUNTER — OFFICE VISIT (OUTPATIENT)
Dept: HEMATOLOGY ONCOLOGY | Facility: CLINIC | Age: 74
End: 2019-02-27
Payer: MEDICARE

## 2019-02-27 VITALS
DIASTOLIC BLOOD PRESSURE: 68 MMHG | HEART RATE: 84 BPM | TEMPERATURE: 98.7 F | SYSTOLIC BLOOD PRESSURE: 110 MMHG | WEIGHT: 191.4 LBS | RESPIRATION RATE: 16 BRPM | BODY MASS INDEX: 37.58 KG/M2 | HEIGHT: 60 IN | OXYGEN SATURATION: 96 %

## 2019-02-27 DIAGNOSIS — Z17.0 MALIGNANT NEOPLASM OF LOWER-INNER QUADRANT OF LEFT BREAST IN FEMALE, ESTROGEN RECEPTOR POSITIVE (HCC): Primary | ICD-10-CM

## 2019-02-27 DIAGNOSIS — C50.312 MALIGNANT NEOPLASM OF LOWER-INNER QUADRANT OF LEFT BREAST IN FEMALE, ESTROGEN RECEPTOR POSITIVE (HCC): Primary | ICD-10-CM

## 2019-02-27 PROCEDURE — 99214 OFFICE O/P EST MOD 30 MIN: CPT | Performed by: INTERNAL MEDICINE

## 2019-02-27 RX ORDER — LETROZOLE 2.5 MG/1
2.5 TABLET, FILM COATED ORAL DAILY
Qty: 30 TABLET | Refills: 5 | Status: SHIPPED | OUTPATIENT
Start: 2019-02-27 | End: 2019-07-12 | Stop reason: ALTCHOICE

## 2019-02-27 NOTE — PROGRESS NOTES
Hematology/Oncology Outpatient Follow-up  Rica Steward 74 y o  female 1945 [id number]    Date:  2/27/2019        Assessment and Plan:  1  Malignant neoplasm of 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, estrogen receptor positive ([facility] [address] )  The patient was told that we will change her endocrine therapy from anastrozole to letrozole hoping that this will improve her arthralgias  Regarding her as significant left lateral chest pain, I think would be appropriate to get a CT scan of the chest in could also a screening mammogram since she seems to be overdue for mammograms  We will also order her blood work is vitamin-D level in see her again about 3 months from for close monitoring   - CT chest w contrast; Future  - CBC and differential; Future  - Comprehensive metabolic panel; Future  - Mammo screening bilateral w cad; Future  - Vitamin D 25 hydroxy  - letrozole (FEMARA) 2 5 mg tablet; Take 1 tablet (2 5 mg total) by mouth daily  Dispense: 30 tablet; Refill: 5        HPI:  Patient came in today for a follow-up visit  She continues to complain about significant arthralgias and multiple other symptoms  She apparently fell down recently id leg ended on the lateral aspect of her chest wall  She did have x-ray which was negative for any hint of rib fractures  She continues to have severe pain in that area and seems to be very concerned about it  The patient was asked to stop the anastrozole after her last visit to see if this will help her arthralgia  She stated that this improved her alpha yet but not entirely  She did not do her blood work as it was ordered prior to this office visit       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 ([facility] [address] )    1/24/2018 Initial Diagnosis     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 (HCC)  Stage IA (pT2, pN0, cM0, G1, ER: Positive, CA: Positive, HER2: Negative)           1/24/2018 Biopsy     Left breast ultrasound guided core biopsies  Infiltrating lobular carcinoma  Grade 1  ER 98% positive, WY negative, HER2 negative           4/3/2018 Surgery     Left partial mastectomy with 2 sentinel node biopsy    Size at least 2 1 cm  Multiple foci of LCIS  LCIS present at superior margin  Invasive malignancy is also noted in close proximity to the superior margin of resection    Pathological staging: stage IIA pT2, pN0 sn, pMX, G1    - Dr Gonsales Sutton         4/3/2018 Genomic Testing     Oncotype DX    Recurrence score 13, 10 year risk of distance recurrence  SANCHEZ alone 8%  ER score 10 6 positive  WY score 5 1 negative  HER2 9 6 negative          5/16/2018 -  Chemotherapy     1  Anastrozole 1 mg p o  daily         6/21/2018 - 8/27/2018 Radiation     Plan ID Energy Fractions Dose per Fraction (cGy) Dose Correction (cGy) Total Dose Delivered (cGy) Elapsed Days   FB L Breas # 10X/6X 28 / 28 180 0 5,040 54   L Brst Boost 12E 8 / 8 200 0 1,600 12      Treatment dates:  6/21/2018 - 8/27/2018     - Dr Marietta Venegas            Interval history:    ROS: Review of Systems   Constitutional: Positive for appetite change and fatigue  Negative for activity change, chills, diaphoresis, fever and unexpected weight change  HENT: Positive for hearing loss, mouth sores and trouble swallowing  Negative for congestion, dental problem, ear discharge, ear pain, facial swelling, nosebleeds, postnasal drip, sore throat and tinnitus  Eyes: Negative for discharge, redness, itching and visual disturbance  Respiratory: Positive for cough and shortness of breath  Negative for chest tightness and wheezing  Cardiovascular: Negative for chest pain, palpitations and leg swelling  Gastrointestinal: Positive for constipation  Negative for abdominal distention, abdominal pain, anal bleeding, blood in stool, diarrhea, nausea and vomiting  Genitourinary: Positive for dysuria  Negative for difficulty urinating, flank pain, frequency, hematuria and urgency     Musculoskeletal: Negative for arthralgias, back pain, gait problem, joint swelling, myalgias and neck pain  Skin: Negative for color change, pallor, rash and wound  Neurological: Positive for dizziness, numbness and headaches  Negative for syncope, speech difficulty, weakness and light-headedness  Hematological: Negative for adenopathy  Does not bruise/bleed easily  Psychiatric/Behavioral: Positive for sleep disturbance  Negative for agitation, behavioral problems and confusion         Past Medical History:   Diagnosis Date    Anxiety     Arthritis     Breast cancer ([address] )     left    Chronic pain disorder     Depression     Depression     Hypercholesterolemia     Hypertension     Irritable bowel syndrome     Migraine        Past Surgical History:   Procedure Laterality Date    BREAST BIOPSY      BREAST LUMPECTOMY      CARPAL TUNNEL RELEASE Right     right CTR    CARPAL TUNNEL RELEASE Left 2018     SECTION      CHOLECYSTECTOMY      MASTECTOMY Left     sentinae node mapping    CT INCISE FINGER TENDON SHEATH Left 2018    Procedure: RELEASE 3RD TRIGGER FINGER  ;  Surgeon: Margaret Méndez MD;  Location: [address] [place] [address];  Service: Plastics    CT WRIST Lou Doles LIG Left 2018    Procedure: ENDOSCOPIOC RELEASE VOLAR CARPAL LIGAMENT;  Surgeon: Margaret Méndez MD;  Location: [address] [place] [address];  Service: Plastics    SENTINEL LYMPH NODE BIOPSY      TRIGGER FINGER RELEASE Left 2018       Social History     Socioeconomic History    Marital status: /Civil Union     Spouse name: None    Number of children: None    Years of education: None    Highest education level: None   Occupational History    None   Social Needs    Financial resource strain: None    Food insecurity:     Worry: None     Inability: None    Transportation needs:     Medical: None     Non-medical: None   Tobacco Use    Smoking status: Passive Smoke Exposure - Never Smoker    Smokeless tobacco: Never Used Substance and Sexual Activity    Alcohol use: No    Drug use: No    Sexual activity: Yes     Partners: Male   Lifestyle    Physical activity:     Days per week: None     Minutes per session: None    Stress: None   Relationships    Social connections:     Talks on phone: None     Gets together: None     Attends Anabaptist service: None     Active member of club or organization: None     Attends meetings of clubs or organizations: None     Relationship status: None    Intimate partner violence:     Fear of current or ex partner: None     Emotionally abused: None     Physically abused: None     Forced sexual activity: None   Other Topics Concern    None   Social History Narrative    None       Family History   Problem Relation Age of Onset    Heart disease Mother         cardiovascular disease       Allergies   Allergen Reactions    Chocolate     Chocolate Flavor     Ibuprofen Swelling    Pork-Derived Products          Current Outpatient Medications:     acetaminophen (TYLENOL) 650 mg CR tablet, Take 1 tablet (650 mg total) by mouth every 8 (eight) hours as needed for mild pain, Disp: 90 tablet, Rfl: 5    atorvastatin (LIPITOR) 40 mg tablet, Take 1 tablet (40 mg total) by mouth daily at bedtime, Disp: 30 tablet, Rfl: 5    celecoxib (CeleBREX) 200 mg capsule, Take 1 capsule (200 mg total) by mouth daily, Disp: 30 capsule, Rfl: 0    DULoxetine (CYMBALTA) 30 mg delayed release capsule, Take 1 capsule (30 mg total) by mouth daily, Disp: 30 capsule, Rfl: 5    Ergocalciferol (VITAMIN D2 PO), 1 capsule, Disp: , Rfl:     gabapentin (NEURONTIN) 300 mg capsule, Take 1 capsule (300 mg total) by mouth 3 (three) times a day, Disp: 90 capsule, Rfl: 3    ondansetron (ZOFRAN-ODT) 4 mg disintegrating tablet, Take 1 tablet (4 mg total) by mouth every 6 (six) hours as needed for nausea or vomiting, Disp: 20 tablet, Rfl: 0    cyanocobalamin 1,000 mcg/mL, Apply in office weekly for 4 weeks, Disp: 4 mL, Rfl: 0   letrozole (FEMARA) 2 5 mg tablet, Take 1 tablet (2 5 mg total) by mouth daily, Disp: 30 tablet, Rfl: 5      Physical Exam:  /68 (BP Location: Right arm, Cuff Size: Adult)   Pulse 84   Temp 98 7 °F (37 1 °C) (Tympanic)   Resp 16   Ht 5' (1 524 m)   Wt 86 8 kg (191 lb 6 4 oz)   SpO2 96%   BMI 37 38 kg/m²     Physical Exam   Constitutional: She is oriented to person, place, and time  She appears well-developed and well-nourished  No distress  HENT:   Head: Normocephalic and atraumatic  Nose: Nose normal    Mouth/Throat: Oropharynx is clear and moist    Eyes: Pupils are equal, round, and reactive to light  Conjunctivae and EOM are normal  Right eye exhibits no discharge  Left eye exhibits no discharge  No scleral icterus  Neck: Normal range of motion  Neck supple  No JVD present  No tracheal deviation present  No thyromegaly present  Cardiovascular: Normal rate, regular rhythm and normal heart sounds  Exam reveals no friction rub  No murmur heard  Tenderness on the left lateral aspect of her chest, the left breast seems normal    Pulmonary/Chest: Effort normal and breath sounds normal  No stridor  No respiratory distress  She has no wheezes  She has no rales  She exhibits no tenderness  Abdominal: Soft  Bowel sounds are normal  She exhibits no distension and no mass  There is no hepatosplenomegaly, splenomegaly or hepatomegaly  There is no tenderness  There is no rebound and no guarding  Musculoskeletal: Normal range of motion  She exhibits no edema, tenderness or deformity  Lymphadenopathy:     She has no cervical adenopathy  Neurological: She is alert and oriented to person, place, and time  She has normal reflexes  No cranial nerve deficit  Coordination normal    Skin: Skin is warm and dry  No rash noted  She is not diaphoretic  No erythema  No pallor  Psychiatric: She has a normal mood and affect   Her behavior is normal  Judgment and thought content normal          Labs:  Lab Results   Component Value Date    WBC 5 20 11/27/2018    HGB 12 0 11/27/2018    HCT 37 0 11/27/2018    MCV 89 11/27/2018     11/27/2018     Lab Results   Component Value Date    K 3 9 11/27/2018     11/27/2018    CO2 25 11/27/2018    BUN 10 11/27/2018    CREATININE 0 64 11/27/2018    CALCIUM 9 5 11/27/2018    AST 32 10/02/2018    ALT 28 10/02/2018    ALKPHOS 81 10/02/2018    EGFR 102 11/27/2018     No results found for: TSH    Patient voiced understanding and agreement in the above discussion  Aware to contact our office with questions/symptoms in the interim

## 2019-02-28 NOTE — ASSESSMENT & PLAN NOTE
Is a chronic problem, surgery did not improve as expected pain in this patient  Perhaps biofeedback or another modality of cognitive behavior modification to pain is necessary  Patient has problems transportation  She has continued having follow-up for breast cancer  We will discuss more details psychotherapy  She will continue on acetaminophen as needed for pain  Explained to patient that narcotics can cause more harm than benefit

## 2019-02-28 NOTE — PROGRESS NOTES
Assessment/Plan:    Carpal tunnel syndrome  Is a chronic problem, surgery did not improve as expected pain in this patient  Perhaps biofeedback or another modality of cognitive behavior modification to pain is necessary  Patient has problems transportation  She has continued having follow-up for breast cancer  We will discuss more details psychotherapy  She will continue on acetaminophen as needed for pain  Explained to patient that narcotics can cause more harm than benefit  Diagnoses and all orders for this visit:    Carpal tunnel syndrome of left wrist  -     acetaminophen (TYLENOL) 650 mg CR tablet; Take 1 tablet (650 mg total) by mouth every 8 (eight) hours as needed for mild pain    Encounter for general adult medical examination with abnormal findings    Other orders  -     Ergocalciferol (VITAMIN D2 PO); 1 capsule          Subjective:      Patient ID: Bruno Patiño is a 74 y o  female  Bruno Patiño 74 y o  complaining of   Chronic pain  It is located in hands  Pain  started about 12 months  ago  The problem occurs continuous  The wrists wrist and hands pain has been gradually worsening since onset  The pain is present in the wrist and hands bilaterally  The quality of the pain is described as aching  The pain radiates both arms  The pain is at a severity of 5/10  The pain is moderate  The pain is worse during the night  The symptoms are aggravated by lying down  Associated symptoms include numbness, paresthesias and tingling  Pertinent negatives include no abdominal pain, chest pain, dysuria, fever or headaches  she has  tried NSAIDs ibuprofen and Naprosyn for the symptoms  The treatment provided mild relief  She wants narcotic medications  She Already has surgery both wrists           The following portions of the patient's history were reviewed and updated as appropriate: allergies, current medications, past family history, past medical history, past social history, past surgical history and problem list     Review of Systems   Constitutional: Positive for fatigue and unexpected weight change  Negative for fever  HENT: Negative for sore throat and trouble swallowing  Eyes: Negative for photophobia  Respiratory: Negative for cough, chest tightness, shortness of breath and wheezing  Cardiovascular: Negative for chest pain, palpitations and leg swelling  Gastrointestinal: Positive for constipation  Negative for abdominal pain, blood in stool, nausea and vomiting  Endocrine: Positive for cold intolerance  Negative for polydipsia, polyphagia and polyuria  Genitourinary: Negative for difficulty urinating and hematuria  Musculoskeletal: Positive for arthralgias (Of both hands and wrists), myalgias, neck pain and neck stiffness  Skin: Positive for pallor  Neurological: Positive for weakness and headaches  Negative for dizziness, syncope and light-headedness  Hematological: Does not bruise/bleed easily  Psychiatric/Behavioral: Positive for decreased concentration and sleep disturbance  The patient is nervous/anxious  Objective:      /80 (BP Location: Left arm, Patient Position: Sitting, Cuff Size: Standard)   Pulse 92   Temp 97 8 °F (36 6 °C) (Temporal)   Resp 16   Ht 5' (1 524 m)   Wt 84 4 kg (186 lb)   SpO2 98%   Breastfeeding? No   BMI 36 33 kg/m²          Physical Exam   HENT:   Head: Normocephalic  Eyes: Pupils are equal, round, and reactive to light  EOM are normal    Neck: Neck supple  Cardiovascular: Normal rate and regular rhythm  Pulmonary/Chest: Effort normal    Abdominal: Soft  There is tenderness  Musculoskeletal: Normal range of motion  She exhibits tenderness  Neurological: She is alert  Skin: Skin is warm and dry  Psychiatric: She has a normal mood and affect   Her behavior is normal

## 2019-03-04 ENCOUNTER — TRANSCRIBE ORDERS (OUTPATIENT)
Dept: ADMINISTRATIVE | Facility: HOSPITAL | Age: 74
End: 2019-03-04

## 2019-03-04 ENCOUNTER — APPOINTMENT (OUTPATIENT)
Dept: LAB | Facility: HOSPITAL | Age: 74
End: 2019-03-04
Attending: INTERNAL MEDICINE
Payer: MEDICARE

## 2019-03-04 DIAGNOSIS — Z17.0 MALIGNANT NEOPLASM OF LOWER-INNER QUADRANT OF LEFT BREAST IN FEMALE, ESTROGEN RECEPTOR POSITIVE (HCC): ICD-10-CM

## 2019-03-04 DIAGNOSIS — C50.312 MALIGNANT NEOPLASM OF LOWER-INNER QUADRANT OF LEFT BREAST IN FEMALE, ESTROGEN RECEPTOR POSITIVE (HCC): ICD-10-CM

## 2019-03-04 LAB
25(OH)D3 SERPL-MCNC: 23.4 NG/ML (ref 30–100)
ALBUMIN SERPL BCP-MCNC: 4.2 G/DL (ref 3–5.2)
ALP SERPL-CCNC: 101 U/L (ref 43–122)
ALT SERPL W P-5'-P-CCNC: 25 U/L (ref 9–52)
ANION GAP SERPL CALCULATED.3IONS-SCNC: 7 MMOL/L (ref 5–14)
AST SERPL W P-5'-P-CCNC: 30 U/L (ref 14–36)
BILIRUB SERPL-MCNC: 0.4 MG/DL
BUN SERPL-MCNC: 14 MG/DL (ref 5–25)
CALCIUM SERPL-MCNC: 9.5 MG/DL (ref 8.4–10.2)
CHLORIDE SERPL-SCNC: 102 MMOL/L (ref 97–108)
CO2 SERPL-SCNC: 28 MMOL/L (ref 22–30)
CREAT SERPL-MCNC: 0.63 MG/DL (ref 0.6–1.2)
EOSINOPHIL # BLD AUTO: 0.06 THOUSAND/UL (ref 0–0.4)
EOSINOPHIL NFR BLD MANUAL: 1 % (ref 0–6)
ERYTHROCYTE [DISTWIDTH] IN BLOOD BY AUTOMATED COUNT: 14.4 %
GFR SERPL CREATININE-BSD FRML MDRD: 101 ML/MIN/1.73SQ M
GLUCOSE SERPL-MCNC: 88 MG/DL (ref 70–99)
HCT VFR BLD AUTO: 34.5 % (ref 36–46)
HGB BLD-MCNC: 11.5 G/DL (ref 12–16)
LYMPHOCYTES # BLD AUTO: 2.11 THOUSAND/UL (ref 0.5–4)
LYMPHOCYTES # BLD AUTO: 34 % (ref 25–45)
MCH RBC QN AUTO: 29.6 PG (ref 26–34)
MCHC RBC AUTO-ENTMCNC: 33.5 G/DL (ref 31–36)
MCV RBC AUTO: 89 FL (ref 80–100)
MONOCYTES # BLD AUTO: 0.87 THOUSAND/UL (ref 0.2–0.9)
MONOCYTES NFR BLD AUTO: 14 % (ref 1–10)
NEUTS SEG # BLD: 3.04 THOUSAND/UL (ref 1.8–7.8)
NEUTS SEG NFR BLD AUTO: 49 %
PLATELET # BLD AUTO: 224 THOUSANDS/UL (ref 150–450)
PLATELET BLD QL SMEAR: ADEQUATE
PMV BLD AUTO: 8.1 FL (ref 8.9–12.7)
POTASSIUM SERPL-SCNC: 4 MMOL/L (ref 3.6–5)
PROT SERPL-MCNC: 7.3 G/DL (ref 5.9–8.4)
RBC # BLD AUTO: 3.89 MILLION/UL (ref 4–5.2)
RBC MORPH BLD: NORMAL
SODIUM SERPL-SCNC: 137 MMOL/L (ref 137–147)
TOTAL CELLS COUNTED SPEC: 100
VARIANT LYMPHS # BLD AUTO: 2 % (ref 0–0)
WBC # BLD AUTO: 6.2 THOUSAND/UL (ref 4.5–11)

## 2019-03-04 PROCEDURE — 85027 COMPLETE CBC AUTOMATED: CPT

## 2019-03-04 PROCEDURE — 82306 VITAMIN D 25 HYDROXY: CPT | Performed by: INTERNAL MEDICINE

## 2019-03-04 PROCEDURE — 36415 COLL VENOUS BLD VENIPUNCTURE: CPT | Performed by: INTERNAL MEDICINE

## 2019-03-04 PROCEDURE — 80053 COMPREHEN METABOLIC PANEL: CPT

## 2019-03-04 PROCEDURE — 85007 BL SMEAR W/DIFF WBC COUNT: CPT

## 2019-03-04 RX ORDER — CELECOXIB 200 MG/1
200 CAPSULE ORAL DAILY
Qty: 30 CAPSULE | Refills: 5 | Status: SHIPPED | OUTPATIENT
Start: 2019-03-04 | End: 2019-08-13 | Stop reason: SDUPTHER

## 2019-03-05 DIAGNOSIS — Z17.0 MALIGNANT NEOPLASM OF LOWER-INNER QUADRANT OF LEFT BREAST IN FEMALE, ESTROGEN RECEPTOR POSITIVE (HCC): Primary | ICD-10-CM

## 2019-03-05 DIAGNOSIS — E55.9 VITAMIN D DEFICIENCY: Primary | ICD-10-CM

## 2019-03-05 DIAGNOSIS — C50.312 MALIGNANT NEOPLASM OF LOWER-INNER QUADRANT OF LEFT BREAST IN FEMALE, ESTROGEN RECEPTOR POSITIVE (HCC): Primary | ICD-10-CM

## 2019-03-05 RX ORDER — ERGOCALCIFEROL 1.25 MG/1
50000 CAPSULE ORAL WEEKLY
Qty: 8 CAPSULE | Refills: 0 | Status: SHIPPED | OUTPATIENT
Start: 2019-03-05 | End: 2019-04-24 | Stop reason: SDUPTHER

## 2019-03-06 ENCOUNTER — HOSPITAL ENCOUNTER (OUTPATIENT)
Dept: CT IMAGING | Facility: HOSPITAL | Age: 74
Discharge: HOME | End: 2019-03-06
Attending: INTERNAL MEDICINE
Payer: MEDICARE

## 2019-03-06 ENCOUNTER — APPOINTMENT (OUTPATIENT)
Dept: MAMMOGRAPHY | Facility: CLINIC | Age: 74
End: 2019-03-06
Payer: MEDICARE

## 2019-03-06 DIAGNOSIS — C50.312 MALIGNANT NEOPLASM OF LOWER-INNER QUADRANT OF LEFT BREAST IN FEMALE, ESTROGEN RECEPTOR POSITIVE (HCC): ICD-10-CM

## 2019-03-06 DIAGNOSIS — Z17.0 MALIGNANT NEOPLASM OF LOWER-INNER QUADRANT OF LEFT BREAST IN FEMALE, ESTROGEN RECEPTOR POSITIVE (HCC): ICD-10-CM

## 2019-03-06 PROCEDURE — 71260 CT THORAX DX C+: CPT

## 2019-03-06 RX ADMIN — IOHEXOL 85 ML: 350 INJECTION, SOLUTION INTRAVENOUS at 10:17

## 2019-03-22 ENCOUNTER — OFFICE VISIT (OUTPATIENT)
Dept: FAMILY MEDICINE CLINIC | Facility: CLINIC | Age: 74
End: 2019-03-22
Payer: MEDICARE

## 2019-03-22 VITALS
OXYGEN SATURATION: 97 % | SYSTOLIC BLOOD PRESSURE: 118 MMHG | BODY MASS INDEX: 36.91 KG/M2 | TEMPERATURE: 98.2 F | HEIGHT: 60 IN | HEART RATE: 96 BPM | DIASTOLIC BLOOD PRESSURE: 70 MMHG | WEIGHT: 188 LBS | RESPIRATION RATE: 16 BRPM

## 2019-03-22 DIAGNOSIS — I73.9 PERIPHERAL VASCULAR DISEASE (HCC): ICD-10-CM

## 2019-03-22 DIAGNOSIS — E11.65 TYPE 2 DIABETES MELLITUS WITH HYPERGLYCEMIA, WITHOUT LONG-TERM CURRENT USE OF INSULIN (HCC): ICD-10-CM

## 2019-03-22 DIAGNOSIS — G63 NEUROPATHY ASSOCIATED WITH CANCER (HCC): ICD-10-CM

## 2019-03-22 DIAGNOSIS — G90.513 COMPLEX REGIONAL PAIN SYNDROME TYPE 1 AFFECTING BOTH HANDS: Primary | ICD-10-CM

## 2019-03-22 DIAGNOSIS — C80.1 NEUROPATHY ASSOCIATED WITH CANCER (HCC): ICD-10-CM

## 2019-03-22 PROCEDURE — 99214 OFFICE O/P EST MOD 30 MIN: CPT | Performed by: FAMILY MEDICINE

## 2019-03-22 RX ORDER — OXYCODONE HYDROCHLORIDE AND ACETAMINOPHEN 5; 325 MG/1; MG/1
TABLET ORAL
Qty: 15 TABLET | Refills: 0 | Status: SHIPPED | OUTPATIENT
Start: 2019-03-22 | End: 2019-05-15 | Stop reason: SDUPTHER

## 2019-03-22 NOTE — PATIENT INSTRUCTIONS
Oxicodona/Acetaminofén (Por la boca)   Se [place] para tratar el dolor de moderado a moderadamente intenso  Rosey medicamento es un narcótico para el dolor  Hola(s) : Endocet, Percocet, Primlev, Xartemis XR   Existen muchas otras marcas de [employer]  Rosey medicamento no debe ser usado cuando:   Rosey medicamento no es adecuado para todas las personas  No lo use si usted alguna vez tuvo rita reacción alérgica al acetaminofeno o la oxicodona, o si tiene problemas respiratorios graves o [address] usar rosey medicamento:   Shante Deanna, [place] de liberación prolongada  · Trujillo médico le indicara cuanto medicamento necesita usar  No use más medicamento de lo indicado  · Rita sobredosis puede ser Adenike Ahn  Siga las instrucciones con cuidado para no jacoby [facility] cantidad del medicamento de rita tyrese vez  · Solución oral: Mida el líquido oral con Katie Reef, jeringa para uso oral o taza especialmente marcadas para medir medicamentos  · Trague la tableta de liberación prolongada entera  No triture, rompa o mastique  No chupe o moje la tableta antes de colocarla en trujillo boca  No administre rosey medicamento a través de un tubo alimenticio  · Kmsocial debe venir con Laney Flurry Guía del medicamento  Solicite rita copia con trujillo farmacéutico en rakesh de no tener la guía  · Si olvida rita dosis:  Si se le olvida jacoby rita dosis de [employer], omita la dosis [place] y vuelva al horario regular de dosificación  No duplique la dosis  · Guarde el medicamento en un recipiente cerrado a temperatura ambiente y alejado del calor, la humedad y la sudhir directa  Consulte con trujillo farmacéutico sobre la mejor forma de botar el medicamento que no haya usado  Medicamentos y [employer] que debe evitar:   Consulte con trujillo médico o farmacéutico antes de usar cualquier medicamento, incluyendo los que compra sin receta médica, las vitaminas y los productos herbales    · No utilice Xartemis XR si usted [place] un inhibidor de la monoaminooxidasa (IMAO) o si lo ha [employer] últimos 14 días  · Algunos medicamentos pueden afectar la función de Donita  Informe a trujillo médico si está usando cualquiera de los siguientes:   ¨ [place], eritromicina, ketoconazol, lamotrigina, mirtazapina, naltrexona, fenitoína, propranolol, rifampina, ritonavir, tramadol, trazodona o zidovudina  ¨ Las pastillas anticonceptivas  ¨ Diurético  ¨ Medicamento para tratar la depresión  ¨ Medicamentos con fenotiazina  ¨ Medicamento de triptán para tratar la migraña  · No consuma alcohol mientras esté   El acetaminofeno puede dañar trujillo hígado y el alcohol puede aumentar irene riesgo  Si usted consume 3 bebidas alcohólicas o más cada día, no tome acetaminofén sin consultar a trujillo médico   · Informe a trujillo médico si usted [place] cualquier cosa que le provoca sueño  Jeanette Needles son medicamentos para alergia o medicamentos narcóticos para el dolor y el alcohol  Informe a trujillo médico si usted está usando buprenorfina, butorfanol, nalbufina, pentazocina, isiah benzodiazepina o un relajante muscular  Precauciones kiko el uso de irene medicamento:   · Informe a trujillo médico si usted está embarazada o amamantando, o si tiene enfermedad renal, enfermedad hepática, enfermedad cardíaca, presión arterial baja, problemas pulmonares o respiratorios (noe asma, EPOC), problemas de tiroides, enfermedad de [place], problemas en el páncreas o la vesícula, problemas prostáticos, problemas para orinar o trastornos estomacales, o antecedentes de lesión en la boogie o daño cerebral, convulsiones o adicción a las drogas o el alcohol  Infórmele al médico si usted es alérgico a la codeína    · Irene medicamento puede causar los siguientes problemas:  ¨ Mayor riesgo de sobredosis, que puede conducir a la muerte  ¨ La depresión respiratoria (problema respiratorio grave que puede ser mortal)  ¨ Problemas hepáticos  ¨ Reacciones graves en la [facility] de la serotonina (cuando se [place] con otros medicamentos)  · Rosey medicamento podría causarle a usted mareos o somnolencia  No maneje ni fatou otra tarea que pueda ser peligrosa hasta que sepa cómo le afecta rosey medicamento  Siéntese o acuéstese si se siente mareado  Póngase de pie con cuidado  · Rosey medicamento contiene acetaminofén  Amanda las etiquetas de todos los demás medicamentos que esté usando para saber si también contienen acetaminofén, o pregunte a trujillo médico o farmacéutico  No use más de 4 gramos (4000 miligramos) en total de acetaminofeno en un día  · Rosey medicamento puede convertirse en un hábito  No use más de la dosis prescrita  Llame a trujillo médico si usted siente que el medicamento no le está funcionando  · No suspenda el uso de rosey medicamento súbitamente  Trujillo médico necesitará disminuir trujillo dosis poco a poco antes de suspender el medicamento por completo  · Puede que rosey medicamento cause infertilidad  Hable con trujillo médico antes de usar rosey medicamento si usted planea tener hijos  · Rosey medicamento puede causar el estreñimiente, especialmente si usted lo [place] kiko [employer]  Pregúntele a trujillo médico si usted también debería usar un laxante para prevenir y tratar el estreñimiento  · Guarde todos los medicamentos fuera del alcance de los niños  Nunca comparta ita medicamentos con [employer]  Efectos secundarios que pueden presentarse kiko el uso de rosey medicamento:   Consulte inmediatamente con el médico si nota cualquiera de estos efectos secundarios:  · Reacción alérgica: Comezón o ronchas, hinchazón del diamond o las tolu, hinchazón u hormigueo en la boca o garganta, opresión en el pecho, dificultad para respirar  · Ansiedad, inquietud, ritmo cardíaco rápido, fiebre, espasmos musculares, contracciones involuntarias, diarrea, jaswant o escuchar cosas que no existen  · Ampollas, despellejamiento, sarpullido foy en la piel    · Piel, labios o uñas azuladas  · [place] oscura o heces pálidas, pérdida de apetito, dolor de estómago, piel u ojos amarillos  · Debilidad excesiva, respiración superficial, ritmo cardíaco irregular, convulsiones, transpiración, o piel húmeda o fría  · Confusión grave, desvanecimientos, mareos o desmayos  · Estreñimiento grave, náuseas o vómitos  · Dificultad para respirar o respiración lenta  Consulte con el médico si nota los siguientes efectos secundarios menos graves:   · Dolor de boogie  · Estreñimiento leve, náuseas o vómitos  · Sueño o somnolencia leves  Consulte con el médico si nota otros efectos secundarios que fernando son causados por irene medicamento  Llame a dial médico para consultarle Lizbeth Mancia puede notificar ita efectos secundarios al FDA al [phone]  © 2017 [address] Information is for End User's use only and may not be sold, redistributed or otherwise used for commercial purposes  Esta información es sólo para uso en educación  Dial intención no es darle un consejo médico sobre enfermedades o tratamientos  Colsulte con dial Jesus Keas farmacéutico antes de seguir cualquier régimen médico para saber si es seguro y efectivo para usted

## 2019-03-22 NOTE — PROGRESS NOTES
Assessment/Plan:  1  Complex regional pain syndrome type 1 affecting both hands  Poor controlled pain after surgeries and the use of high dose of NSAID's  I reviewed [address] and i spoke with patient regarding use of  Oxycodone, current issues with diversion and abuse of drugs  Patient understood the facts of missuse of prescribed medication may cause death of herself or other in diversion is the case  We will monitor medications with a ramdonized call to bring pills to be counted and make sure there is no diversion  Urine screnn also might be requested  - oxyCODONE-acetaminophen (PERCOCET) 5-325 mg per tablet; Take 1 table at bed time as needed for severe pain  Dispense: 15 tablet; Refill: 0    2  Neuropathy associated with cancer ([address] )  Condition is stable with current treatment, to  continue the same      3  Type 2 diabetes mellitus with hyperglycemia, without long-term current use of insulin ([facility])  I explained to patient the goals of blood sugar levels during fasting  and after meals  Education given verbally and with written materials  Change of life style modification again stressed  The vascular complications secondary to diabetes poor control were explained with details  The renal function protection and the prevention of major vascular disease with the use of ACEI's and/or ARB  and the use of statins respectively and exercise/diet was also discussed  4  BMI 33 0-33 9,adult  We discussed about the initial approach to the obese patient who desires to lose weight is diet and exercise, as recommended by the NIH Expert Panel in 1998   A combination of a reduced-calorie diet and exercise is more efficacious than either alone  Additional weight loss may be possible with some medication regimens  The initial goal of weight loss therapy (diet and exercise) is a 10% reduction in body weight over a 6-month period   After the initial 6-month period, we will reassess to determine the efficacy of the therapy, whether the patient needs to lose more weight, or whether a weight-maintenance program may be established  5  Peripheral vascular disease ([address] )  Condition is stable with current treatment, to  continue the same    '  No problem-specific Assessment & Plan notes found for this encounter  Diagnoses and all orders for this visit:    BMI 33 0-33 9,adult          Subjective:      Patient ID: Tika Cohn is a 74 y o  female  After multiple treatments and surgeries of both hands still persistent high level of pain  She also has weakness of both hands and wrist   She had be using ibuprofen at 800 mg every 6 hours without control of pain  She does not have history of drug abuse or alcohol use  She does not have as psych history  The following portions of the patient's history were reviewed and updated as appropriate: allergies, current medications, past family history, past medical history, past social history, past surgical history and problem list     Review of Systems   Constitutional: Positive for fatigue and unexpected weight change  Negative for fever  HENT: Negative for sore throat and trouble swallowing  Eyes: Negative for photophobia  Respiratory: Negative for cough, chest tightness, shortness of breath and wheezing  Cardiovascular: Negative for chest pain, palpitations and leg swelling  Gastrointestinal: Positive for constipation  Negative for abdominal pain, blood in stool, nausea and vomiting  Endocrine: Positive for cold intolerance  Negative for polydipsia, polyphagia and polyuria  Genitourinary: Negative for difficulty urinating and hematuria  Musculoskeletal: Positive for arthralgias, joint swelling (Both hands), myalgias, neck pain and neck stiffness  Skin: Positive for pallor  Neurological: Positive for weakness and headaches  Negative for dizziness, syncope and light-headedness  Hematological: Does not bruise/bleed easily  Psychiatric/Behavioral: Positive for decreased concentration and sleep disturbance  The patient is nervous/anxious  Objective:      /70 (BP Location: Left arm, Patient Position: Sitting, Cuff Size: Standard)   Pulse 96   Temp 98 2 °F (36 8 °C) (Oral)   Resp 16   Ht 5' (1 524 m)   Wt 85 3 kg (188 lb)   SpO2 97%   Breastfeeding? No   BMI 36 72 kg/m²          Physical Exam   HENT:   Head: Normocephalic  Eyes: Pupils are equal, round, and reactive to light  EOM are normal    Neck: Neck supple  Cardiovascular: Normal rate and regular rhythm  Pulmonary/Chest: Effort normal    Abdominal: Soft  There is tenderness  Musculoskeletal: Normal range of motion  She exhibits tenderness  Neurological: She is alert  A sensory deficit (And weakness of both hands) is present  Skin: Skin is warm and dry  Psychiatric: She has a normal mood and affect  Her behavior is normal    Nursing note and vitals reviewed  BMI Counseling: Body mass index is 36 72 kg/m²  Discussed the patient's BMI with her  The BMI is above average  BMI counseling and education was provided to the patient  Exercise recommendations include exercising 3-5 times per week

## 2019-03-26 ENCOUNTER — HOSPITAL ENCOUNTER (OUTPATIENT)
Dept: MAMMOGRAPHY | Facility: CLINIC | Age: 74
Discharge: HOME | End: 2019-03-26
Payer: MEDICARE

## 2019-03-26 VITALS — BODY MASS INDEX: 35.5 KG/M2 | WEIGHT: 188 LBS | HEIGHT: 61 IN

## 2019-03-26 DIAGNOSIS — Z17.0 MALIGNANT NEOPLASM OF LOWER-INNER QUADRANT OF LEFT BREAST IN FEMALE, ESTROGEN RECEPTOR POSITIVE (HCC): ICD-10-CM

## 2019-03-26 DIAGNOSIS — C50.312 MALIGNANT NEOPLASM OF LOWER-INNER QUADRANT OF LEFT BREAST IN FEMALE, ESTROGEN RECEPTOR POSITIVE (HCC): ICD-10-CM

## 2019-03-26 PROCEDURE — 77066 DX MAMMO INCL CAD BI: CPT

## 2019-04-19 DIAGNOSIS — E55.9 VITAMIN D DEFICIENCY: ICD-10-CM

## 2019-04-19 RX ORDER — ERGOCALCIFEROL 1.25 MG/1
50000 CAPSULE ORAL WEEKLY
Qty: 8 CAPSULE | Refills: 0 | OUTPATIENT
Start: 2019-04-19 | End: 2019-06-08

## 2019-04-22 DIAGNOSIS — E55.9 VITAMIN D DEFICIENCY: ICD-10-CM

## 2019-04-22 RX ORDER — ERGOCALCIFEROL 1.25 MG/1
50000 CAPSULE ORAL WEEKLY
Qty: 8 CAPSULE | Refills: 0 | OUTPATIENT
Start: 2019-04-22 | End: 2019-06-11

## 2019-04-24 DIAGNOSIS — E55.9 VITAMIN D DEFICIENCY: ICD-10-CM

## 2019-04-24 RX ORDER — ERGOCALCIFEROL 1.25 MG/1
50000 CAPSULE ORAL WEEKLY
Qty: 8 CAPSULE | Refills: 5 | Status: SHIPPED | OUTPATIENT
Start: 2019-04-24 | End: 2019-06-20 | Stop reason: ALTCHOICE

## 2019-04-29 ENCOUNTER — RADIATION ONCOLOGY FOLLOW-UP (OUTPATIENT)
Dept: RADIATION ONCOLOGY | Facility: CLINIC | Age: 74
End: 2019-04-29
Attending: RADIOLOGY
Payer: MEDICARE

## 2019-04-29 VITALS
DIASTOLIC BLOOD PRESSURE: 72 MMHG | TEMPERATURE: 97.8 F | WEIGHT: 192.4 LBS | RESPIRATION RATE: 18 BRPM | HEART RATE: 83 BPM | SYSTOLIC BLOOD PRESSURE: 110 MMHG | BODY MASS INDEX: 36.32 KG/M2 | HEIGHT: 61 IN

## 2019-04-29 DIAGNOSIS — C50.312 MALIGNANT NEOPLASM OF LOWER-INNER QUADRANT OF LEFT BREAST IN FEMALE, ESTROGEN RECEPTOR POSITIVE (HCC): Primary | ICD-10-CM

## 2019-04-29 DIAGNOSIS — Z17.0 MALIGNANT NEOPLASM OF LOWER-INNER QUADRANT OF LEFT BREAST IN FEMALE, ESTROGEN RECEPTOR POSITIVE (HCC): Primary | ICD-10-CM

## 2019-04-29 PROCEDURE — 99214 OFFICE O/P EST MOD 30 MIN: CPT | Performed by: RADIOLOGY

## 2019-05-15 ENCOUNTER — OFFICE VISIT (OUTPATIENT)
Dept: FAMILY MEDICINE CLINIC | Facility: CLINIC | Age: 74
End: 2019-05-15
Payer: MEDICARE

## 2019-05-15 VITALS
HEIGHT: 60 IN | HEART RATE: 92 BPM | TEMPERATURE: 98 F | SYSTOLIC BLOOD PRESSURE: 110 MMHG | OXYGEN SATURATION: 96 % | WEIGHT: 189 LBS | BODY MASS INDEX: 37.11 KG/M2 | DIASTOLIC BLOOD PRESSURE: 70 MMHG | RESPIRATION RATE: 16 BRPM

## 2019-05-15 DIAGNOSIS — G90.513 COMPLEX REGIONAL PAIN SYNDROME TYPE 1 AFFECTING BOTH HANDS: ICD-10-CM

## 2019-05-15 PROCEDURE — 99214 OFFICE O/P EST MOD 30 MIN: CPT | Performed by: FAMILY MEDICINE

## 2019-05-15 RX ORDER — OXYCODONE HYDROCHLORIDE AND ACETAMINOPHEN 5; 325 MG/1; MG/1
TABLET ORAL
Qty: 30 TABLET | Refills: 0 | Status: SHIPPED | OUTPATIENT
Start: 2019-05-15 | End: 2019-06-17 | Stop reason: SDUPTHER

## 2019-05-29 ENCOUNTER — TELEPHONE (OUTPATIENT)
Dept: HEMATOLOGY ONCOLOGY | Facility: CLINIC | Age: 74
End: 2019-05-29

## 2019-06-02 ENCOUNTER — DOCUMENTATION (OUTPATIENT)
Dept: HEMATOLOGY ONCOLOGY | Facility: CLINIC | Age: 74
End: 2019-06-02

## 2019-06-02 DIAGNOSIS — E55.9 VITAMIN D DEFICIENCY: ICD-10-CM

## 2019-06-13 DIAGNOSIS — G56.03 BILATERAL CARPAL TUNNEL SYNDROME: ICD-10-CM

## 2019-06-14 RX ORDER — GABAPENTIN 300 MG/1
300 CAPSULE ORAL 3 TIMES DAILY
Qty: 90 CAPSULE | Refills: 3 | Status: SHIPPED | OUTPATIENT
Start: 2019-06-14 | End: 2019-10-08 | Stop reason: SDUPTHER

## 2019-06-17 ENCOUNTER — OFFICE VISIT (OUTPATIENT)
Dept: FAMILY MEDICINE CLINIC | Facility: CLINIC | Age: 74
End: 2019-06-17
Payer: MEDICARE

## 2019-06-17 VITALS
SYSTOLIC BLOOD PRESSURE: 110 MMHG | WEIGHT: 189 LBS | HEART RATE: 92 BPM | BODY MASS INDEX: 37.11 KG/M2 | RESPIRATION RATE: 16 BRPM | DIASTOLIC BLOOD PRESSURE: 80 MMHG | HEIGHT: 60 IN | OXYGEN SATURATION: 97 % | TEMPERATURE: 98.2 F

## 2019-06-17 DIAGNOSIS — M25.532 LEFT WRIST PAIN: ICD-10-CM

## 2019-06-17 DIAGNOSIS — G90.513 COMPLEX REGIONAL PAIN SYNDROME TYPE 1 AFFECTING BOTH HANDS: ICD-10-CM

## 2019-06-17 DIAGNOSIS — D64.9 ANEMIA, UNSPECIFIED TYPE: Primary | ICD-10-CM

## 2019-06-17 PROBLEM — E11.65 TYPE 2 DIABETES MELLITUS WITH HYPERGLYCEMIA, WITHOUT LONG-TERM CURRENT USE OF INSULIN (HCC): Status: RESOLVED | Noted: 2019-03-22 | Resolved: 2019-06-17

## 2019-06-17 PROCEDURE — 99214 OFFICE O/P EST MOD 30 MIN: CPT | Performed by: FAMILY MEDICINE

## 2019-06-17 RX ORDER — OXYCODONE HYDROCHLORIDE AND ACETAMINOPHEN 5; 325 MG/1; MG/1
TABLET ORAL
Qty: 90 TABLET | Refills: 0 | Status: SHIPPED | OUTPATIENT
Start: 2019-06-17 | End: 2019-07-22 | Stop reason: SDUPTHER

## 2019-06-18 PROBLEM — G90.513: Status: ACTIVE | Noted: 2019-06-18

## 2019-06-18 PROBLEM — M25.532 LEFT WRIST PAIN: Status: ACTIVE | Noted: 2019-06-18

## 2019-06-18 PROCEDURE — 20605 DRAIN/INJ JOINT/BURSA W/O US: CPT | Performed by: FAMILY MEDICINE

## 2019-06-18 RX ORDER — TRIAMCINOLONE ACETONIDE 40 MG/ML
80 INJECTION, SUSPENSION INTRA-ARTICULAR; INTRAMUSCULAR
Status: COMPLETED | OUTPATIENT
Start: 2019-06-18 | End: 2019-06-18

## 2019-06-18 RX ADMIN — TRIAMCINOLONE ACETONIDE 80 MG: 40 INJECTION, SUSPENSION INTRA-ARTICULAR; INTRAMUSCULAR at 07:41

## 2019-06-19 ENCOUNTER — DOCUMENTATION (OUTPATIENT)
Dept: HEMATOLOGY ONCOLOGY | Facility: CLINIC | Age: 74
End: 2019-06-19

## 2019-06-19 ENCOUNTER — TELEPHONE (OUTPATIENT)
Dept: HEMATOLOGY ONCOLOGY | Facility: CLINIC | Age: 74
End: 2019-06-19

## 2019-06-19 DIAGNOSIS — C50.312 MALIGNANT NEOPLASM OF LOWER-INNER QUADRANT OF LEFT BREAST IN FEMALE, ESTROGEN RECEPTOR POSITIVE (HCC): Primary | ICD-10-CM

## 2019-06-19 DIAGNOSIS — Z17.0 MALIGNANT NEOPLASM OF LOWER-INNER QUADRANT OF LEFT BREAST IN FEMALE, ESTROGEN RECEPTOR POSITIVE (HCC): Primary | ICD-10-CM

## 2019-06-20 ENCOUNTER — HOSPITAL ENCOUNTER (OUTPATIENT)
Dept: INFUSION CENTER | Facility: HOSPITAL | Age: 74
Discharge: HOME | End: 2019-06-20
Payer: MEDICARE

## 2019-06-20 ENCOUNTER — OFFICE VISIT (OUTPATIENT)
Dept: HEMATOLOGY ONCOLOGY | Facility: CLINIC | Age: 74
End: 2019-06-20
Payer: MEDICARE

## 2019-06-20 VITALS
DIASTOLIC BLOOD PRESSURE: 82 MMHG | BODY MASS INDEX: 35.04 KG/M2 | WEIGHT: 185.6 LBS | RESPIRATION RATE: 20 BRPM | TEMPERATURE: 98.5 F | HEART RATE: 84 BPM | HEIGHT: 61 IN | SYSTOLIC BLOOD PRESSURE: 115 MMHG | OXYGEN SATURATION: 95 %

## 2019-06-20 DIAGNOSIS — C50.312 MALIGNANT NEOPLASM OF LOWER-INNER QUADRANT OF LEFT BREAST IN FEMALE, ESTROGEN RECEPTOR POSITIVE (HCC): ICD-10-CM

## 2019-06-20 DIAGNOSIS — R91.8 ABNORMAL CT SCAN OF LUNG: ICD-10-CM

## 2019-06-20 DIAGNOSIS — Z79.811 USE OF AROMATASE INHIBITORS: ICD-10-CM

## 2019-06-20 DIAGNOSIS — R26.2 AMBULATORY DYSFUNCTION: ICD-10-CM

## 2019-06-20 DIAGNOSIS — C50.312 MALIGNANT NEOPLASM OF LOWER-INNER QUADRANT OF LEFT BREAST IN FEMALE, ESTROGEN RECEPTOR POSITIVE (HCC): Primary | ICD-10-CM

## 2019-06-20 DIAGNOSIS — Z17.0 MALIGNANT NEOPLASM OF LOWER-INNER QUADRANT OF LEFT BREAST IN FEMALE, ESTROGEN RECEPTOR POSITIVE (HCC): Primary | ICD-10-CM

## 2019-06-20 DIAGNOSIS — Z79.811 USE OF AROMATASE INHIBITORS: Primary | ICD-10-CM

## 2019-06-20 DIAGNOSIS — M85.89 OSTEOPENIA OF MULTIPLE SITES: ICD-10-CM

## 2019-06-20 DIAGNOSIS — Z17.0 MALIGNANT NEOPLASM OF LOWER-INNER QUADRANT OF LEFT BREAST IN FEMALE, ESTROGEN RECEPTOR POSITIVE (HCC): ICD-10-CM

## 2019-06-20 PROCEDURE — 99215 OFFICE O/P EST HI 40 MIN: CPT | Performed by: NURSE PRACTITIONER

## 2019-06-20 PROCEDURE — 96401 CHEMO ANTI-NEOPL SQ/IM: CPT

## 2019-06-20 RX ORDER — PHENOL 1.4 %
600 AEROSOL, SPRAY (ML) MUCOUS MEMBRANE 2 TIMES DAILY WITH MEALS
Qty: 90 TABLET | Refills: 3 | Status: SHIPPED | OUTPATIENT
Start: 2019-06-20 | End: 2019-10-08 | Stop reason: SDUPTHER

## 2019-06-20 RX ADMIN — DENOSUMAB 60 MG: 60 INJECTION SUBCUTANEOUS at 12:17

## 2019-06-20 NOTE — PROGRESS NOTES
Pt admitted to infusion department today for prolia injection  Recent labs (march) reviewed  Approval to use these labs from [facility]  Discharged ambulatory to meet star

## 2019-06-22 DIAGNOSIS — Z17.0 MALIGNANT NEOPLASM OF LOWER-INNER QUADRANT OF LEFT BREAST IN FEMALE, ESTROGEN RECEPTOR POSITIVE (HCC): ICD-10-CM

## 2019-06-22 DIAGNOSIS — C50.312 MALIGNANT NEOPLASM OF LOWER-INNER QUADRANT OF LEFT BREAST IN FEMALE, ESTROGEN RECEPTOR POSITIVE (HCC): ICD-10-CM

## 2019-06-24 RX ORDER — LETROZOLE 2.5 MG/1
2.5 TABLET, FILM COATED ORAL DAILY
Qty: 30 TABLET | Refills: 5 | OUTPATIENT
Start: 2019-06-24

## 2019-07-09 ENCOUNTER — TELEPHONE (OUTPATIENT)
Dept: HEMATOLOGY ONCOLOGY | Facility: CLINIC | Age: 74
End: 2019-07-09

## 2019-07-09 NOTE — TELEPHONE ENCOUNTER
Attempt to call patient to follow-up on symptoms after holding letrozole for about 2 weeks with the assistance of  Reginald SMITH  Patient was not accepting phone calls on her home and cell phone unable to leave a message  Also tried to call patient's  Leanne Del Valle and had to leave a message to please call us  Awaiting call back  If we do not hear back from patient has been we will attempt to call her at a later time

## 2019-07-12 ENCOUNTER — TELEPHONE (OUTPATIENT)
Dept: HEMATOLOGY ONCOLOGY | Facility: CLINIC | Age: 74
End: 2019-07-12

## 2019-07-12 DIAGNOSIS — Z17.0 MALIGNANT NEOPLASM OF LOWER-INNER QUADRANT OF LEFT BREAST IN FEMALE, ESTROGEN RECEPTOR POSITIVE (HCC): Primary | ICD-10-CM

## 2019-07-12 DIAGNOSIS — C50.312 MALIGNANT NEOPLASM OF LOWER-INNER QUADRANT OF LEFT BREAST IN FEMALE, ESTROGEN RECEPTOR POSITIVE (HCC): Primary | ICD-10-CM

## 2019-07-12 RX ORDER — EXEMESTANE 25 MG/1
25 TABLET ORAL DAILY
Qty: 30 TABLET | Refills: 3 | Status: SHIPPED | OUTPATIENT
Start: 2019-07-12 | End: 2019-09-20 | Stop reason: SDUPTHER

## 2019-07-12 NOTE — TELEPHONE ENCOUNTER
Patient called back and spoke to our [address]  She told her that she had significant improvement in her symptoms since she stopped her letrozole and is interested in trying a new medication  We will start patient on her 3rd aromatase inhibitor with Aromasin to see how she will tolerate it  Script will be sent to her local pharmacy we will instruct patient to start the new medication and notify us should she start to notice return of her prior symptoms  She does have a follow-up scheduled in September

## 2019-07-12 NOTE — TELEPHONE ENCOUNTER
I spoke to Salvador, patient's  and I asked him to have Caleb Ramirez calling us for medication follow up  I provided him with our phone number

## 2019-07-12 NOTE — TELEPHONE ENCOUNTER
I called Sam Lu and spoke to her regarding Nurse Practitioner, Donald Nieves instructions  Patient verbally agreed have understood and also she is advised to call us any time with questions

## 2019-07-22 ENCOUNTER — OFFICE VISIT (OUTPATIENT)
Dept: FAMILY MEDICINE CLINIC | Facility: CLINIC | Age: 74
End: 2019-07-22
Payer: MEDICARE

## 2019-07-22 VITALS
DIASTOLIC BLOOD PRESSURE: 70 MMHG | OXYGEN SATURATION: 96 % | RESPIRATION RATE: 16 BRPM | WEIGHT: 185 LBS | BODY MASS INDEX: 36.32 KG/M2 | SYSTOLIC BLOOD PRESSURE: 110 MMHG | HEART RATE: 92 BPM | HEIGHT: 60 IN | TEMPERATURE: 98 F

## 2019-07-22 DIAGNOSIS — R79.89 ABNORMAL TSH: Primary | ICD-10-CM

## 2019-07-22 DIAGNOSIS — G90.513 COMPLEX REGIONAL PAIN SYNDROME TYPE 1 AFFECTING BOTH HANDS: ICD-10-CM

## 2019-07-22 DIAGNOSIS — E78.2 MIXED HYPERLIPIDEMIA: ICD-10-CM

## 2019-07-22 PROCEDURE — 99213 OFFICE O/P EST LOW 20 MIN: CPT | Performed by: FAMILY MEDICINE

## 2019-07-22 RX ORDER — OXYCODONE HYDROCHLORIDE AND ACETAMINOPHEN 5; 325 MG/1; MG/1
TABLET ORAL
Qty: 30 TABLET | Refills: 0 | Status: SHIPPED | OUTPATIENT
Start: 2019-07-22 | End: 2019-09-30 | Stop reason: SDUPTHER

## 2019-07-22 NOTE — PATIENT INSTRUCTIONS
Mareos   LO QUE NECESITA SABER:   El mareo es la sensación de falta de equilibrio o inestabilidad  Las causas comunes del mareo son un desequilibrio del líquido del oído interno o la falta de oxígeno en dial tara  Los [employer] ser [employer] (durar 3 días o menos) o crónicos (durar más de 3 días)  Usted puede llegar a tener episodios de [employer] que nevarez de segundos a unas horas  INSTRUCCIONES SOBRE [facility]:   Regrese a la lexi de emergencias si:   · Usted tiene dolor de [place] y rigidez en el sonal  · Usted tiene escalofríos con temblores y [place]  · Usted vomita isiah y [place] vez sin [place]  · Dial vómito o deposiciones están lao o negras  · Usted tiene dolor en el pecho, espalda o abdomen  · Usted tiene adormecimiento, sobre todo en la [place], brazos o piernas  · Usted tiene dificultad para  los brazos o las piernas  · Usted está confundido  Pregúntele a dial Shonna Paddy vitaminas y minerales son adecuados para usted  · Usted tiene fiebre  · Ita síntomas no mejoran con el tratamiento  · Usted tiene preguntas o inquietudes acerca de dial condición o cuidado  [place] de dial síntomas:   · No maneje   u opere maquinaria pesada cuando esté mareado  · Levántese lentamente  cuando esté sentado o acostado  · [place] suficiente líquidos  Los líquidos ayudan a evitar la deshidratación  Pregunte cuánto líquido debe jacoby cada día y cuáles líquidos son los más adecuados para usted  Acuda a ita consultas de control con dial médico según le indicaron  Anote ita preguntas para que se acuerde de hacerlas kiko ita visitas  © 2017 [address] Information is for End User's use only and may not be sold, redistributed or otherwise used for commercial purposes  All illustrations and images included in CareNotes® are the copyrighted property of A D A M , Inc  or Brian Perez  Esta información es sólo para uso en educación   Dial intención no es darle un consejo [employer] o tratamientos  Colsulte con trujillo Moriah Jarett farmacéutico antes de seguir cualquier régimen médico para saber si es seguro y efectivo para usted

## 2019-07-24 NOTE — ASSESSMENT & PLAN NOTE
Since patient is reports i improve in her wrist pain and improve also insomnia after controlling pain this means in proving her quality of life, we will continue same treatment

## 2019-07-30 ENCOUNTER — APPOINTMENT (OUTPATIENT)
Dept: LAB | Facility: HOSPITAL | Age: 74
End: 2019-07-30
Payer: MEDICARE

## 2019-07-30 ENCOUNTER — OFFICE VISIT (OUTPATIENT)
Dept: FAMILY MEDICINE CLINIC | Facility: CLINIC | Age: 74
End: 2019-07-30
Payer: MEDICARE

## 2019-07-30 ENCOUNTER — TRANSCRIBE ORDERS (OUTPATIENT)
Dept: ADMINISTRATIVE | Facility: HOSPITAL | Age: 74
End: 2019-07-30

## 2019-07-30 VITALS
HEART RATE: 91 BPM | BODY MASS INDEX: 36.28 KG/M2 | SYSTOLIC BLOOD PRESSURE: 130 MMHG | OXYGEN SATURATION: 96 % | HEIGHT: 60 IN | WEIGHT: 184.8 LBS | DIASTOLIC BLOOD PRESSURE: 80 MMHG | TEMPERATURE: 97 F

## 2019-07-30 DIAGNOSIS — N39.0 URINARY TRACT INFECTION WITHOUT HEMATURIA, SITE UNSPECIFIED: Primary | ICD-10-CM

## 2019-07-30 DIAGNOSIS — Z79.811 USE OF AROMATASE INHIBITORS: ICD-10-CM

## 2019-07-30 DIAGNOSIS — E78.2 MIXED HYPERLIPIDEMIA: ICD-10-CM

## 2019-07-30 DIAGNOSIS — D64.9 ANEMIA, UNSPECIFIED TYPE: ICD-10-CM

## 2019-07-30 DIAGNOSIS — R10.2 VAGINAL PAIN: ICD-10-CM

## 2019-07-30 DIAGNOSIS — M85.89 OSTEOPENIA OF MULTIPLE SITES: ICD-10-CM

## 2019-07-30 DIAGNOSIS — Z17.0 MALIGNANT NEOPLASM OF LOWER-INNER QUADRANT OF LEFT BREAST IN FEMALE, ESTROGEN RECEPTOR POSITIVE (HCC): ICD-10-CM

## 2019-07-30 DIAGNOSIS — C50.312 MALIGNANT NEOPLASM OF LOWER-INNER QUADRANT OF LEFT BREAST IN FEMALE, ESTROGEN RECEPTOR POSITIVE (HCC): ICD-10-CM

## 2019-07-30 DIAGNOSIS — R79.89 ABNORMAL TSH: ICD-10-CM

## 2019-07-30 LAB
ALBUMIN SERPL BCP-MCNC: 4 G/DL (ref 3–5.2)
ALP SERPL-CCNC: 82 U/L (ref 43–122)
ALT SERPL W P-5'-P-CCNC: 25 U/L (ref 9–52)
ANION GAP SERPL CALCULATED.3IONS-SCNC: 6 MMOL/L (ref 5–14)
AST SERPL W P-5'-P-CCNC: 33 U/L (ref 14–36)
BACTERIA UR QL AUTO: ABNORMAL /HPF
BASOPHILS # BLD AUTO: 0 THOUSANDS/ΜL (ref 0–0.1)
BASOPHILS NFR BLD AUTO: 0 % (ref 0–1)
BILIRUB SERPL-MCNC: 0.5 MG/DL
BILIRUB UR QL STRIP: NEGATIVE
BUN SERPL-MCNC: 12 MG/DL (ref 5–25)
CALCIUM SERPL-MCNC: 9.4 MG/DL (ref 8.4–10.2)
CHLORIDE SERPL-SCNC: 106 MMOL/L (ref 97–108)
CHOLEST SERPL-MCNC: 265 MG/DL
CLARITY UR: CLEAR
CO2 SERPL-SCNC: 29 MMOL/L (ref 22–30)
COLOR UR: YELLOW
CREAT SERPL-MCNC: 0.61 MG/DL (ref 0.6–1.2)
EOSINOPHIL # BLD AUTO: 0.1 THOUSAND/ΜL (ref 0–0.4)
EOSINOPHIL NFR BLD AUTO: 2 % (ref 0–6)
ERYTHROCYTE [DISTWIDTH] IN BLOOD BY AUTOMATED COUNT: 15 %
FERRITIN SERPL-MCNC: 48 NG/ML (ref 8–388)
GFR SERPL CREATININE-BSD FRML MDRD: 103 ML/MIN/1.73SQ M
GLUCOSE P FAST SERPL-MCNC: 88 MG/DL (ref 70–99)
GLUCOSE UR STRIP-MCNC: NEGATIVE MG/DL
HCT VFR BLD AUTO: 37.1 % (ref 36–46)
HDLC SERPL-MCNC: 39 MG/DL (ref 40–59)
HGB BLD-MCNC: 12.3 G/DL (ref 12–16)
HGB UR QL STRIP.AUTO: NEGATIVE
HYALINE CASTS #/AREA URNS LPF: ABNORMAL /LPF
IRON SATN MFR SERPL: 27 %
IRON SERPL-MCNC: 81 UG/DL (ref 50–170)
KETONES UR STRIP-MCNC: NEGATIVE MG/DL
LDLC SERPL CALC-MCNC: 171 MG/DL
LEUKOCYTE ESTERASE UR QL STRIP: ABNORMAL
LYMPHOCYTES # BLD AUTO: 2.2 THOUSANDS/ΜL (ref 0.5–4)
LYMPHOCYTES NFR BLD AUTO: 38 % (ref 25–45)
MCH RBC QN AUTO: 28.9 PG (ref 26–34)
MCHC RBC AUTO-ENTMCNC: 33.1 G/DL (ref 31–36)
MCV RBC AUTO: 87 FL (ref 80–100)
MONOCYTES # BLD AUTO: 0.7 THOUSAND/ΜL (ref 0.2–0.9)
MONOCYTES NFR BLD AUTO: 11 % (ref 1–10)
NEUTROPHILS # BLD AUTO: 2.8 THOUSANDS/ΜL (ref 1.8–7.8)
NEUTS SEG NFR BLD AUTO: 48 % (ref 45–65)
NITRITE UR QL STRIP: NEGATIVE
NON-SQ EPI CELLS URNS QL MICRO: ABNORMAL /HPF
NONHDLC SERPL-MCNC: 226 MG/DL
PH UR STRIP.AUTO: 7.5 [PH]
PLATELET # BLD AUTO: 230 THOUSANDS/UL (ref 150–450)
PMV BLD AUTO: 8.3 FL (ref 8.9–12.7)
POTASSIUM SERPL-SCNC: 4.6 MMOL/L (ref 3.6–5)
PROT SERPL-MCNC: 7.5 G/DL (ref 5.9–8.4)
PROT UR STRIP-MCNC: NEGATIVE MG/DL
RBC # BLD AUTO: 4.25 MILLION/UL (ref 4–5.2)
RBC #/AREA URNS AUTO: ABNORMAL /HPF
SL AMB  POCT GLUCOSE, UA: ABNORMAL
SL AMB LEUKOCYTE ESTERASE,UA: ABNORMAL
SL AMB POCT BILIRUBIN,UA: ABNORMAL
SL AMB POCT BLOOD,UA: ABNORMAL
SL AMB POCT CLARITY,UA: CLEAR
SL AMB POCT COLOR,UA: ABNORMAL
SL AMB POCT KETONES,UA: ABNORMAL
SL AMB POCT NITRITE,UA: ABNORMAL
SL AMB POCT PH,UA: 7.5
SL AMB POCT SPECIFIC GRAVITY,UA: 1.01
SL AMB POCT URINE PROTEIN: ABNORMAL
SL AMB POCT UROBILINOGEN: 0.2
SODIUM SERPL-SCNC: 141 MMOL/L (ref 137–147)
SP GR UR STRIP.AUTO: 1.01 (ref 1–1.03)
TIBC SERPL-MCNC: 296 UG/DL (ref 250–450)
TRIGL SERPL-MCNC: 276 MG/DL
TSH SERPL DL<=0.05 MIU/L-ACNC: 1.71 UIU/ML (ref 0.47–4.68)
UROBILINOGEN UR QL STRIP.AUTO: 0.2 E.U./DL
WBC # BLD AUTO: 5.8 THOUSAND/UL (ref 4.5–11)
WBC #/AREA URNS AUTO: ABNORMAL /HPF

## 2019-07-30 PROCEDURE — 81001 URINALYSIS AUTO W/SCOPE: CPT | Performed by: NURSE PRACTITIONER

## 2019-07-30 PROCEDURE — 81002 URINALYSIS NONAUTO W/O SCOPE: CPT | Performed by: NURSE PRACTITIONER

## 2019-07-30 PROCEDURE — 80061 LIPID PANEL: CPT

## 2019-07-30 PROCEDURE — 83540 ASSAY OF IRON: CPT

## 2019-07-30 PROCEDURE — 36415 COLL VENOUS BLD VENIPUNCTURE: CPT | Performed by: INTERNAL MEDICINE

## 2019-07-30 PROCEDURE — 85025 COMPLETE CBC W/AUTO DIFF WBC: CPT

## 2019-07-30 PROCEDURE — 84443 ASSAY THYROID STIM HORMONE: CPT

## 2019-07-30 PROCEDURE — 80053 COMPREHEN METABOLIC PANEL: CPT

## 2019-07-30 PROCEDURE — 83550 IRON BINDING TEST: CPT

## 2019-07-30 PROCEDURE — 82728 ASSAY OF FERRITIN: CPT

## 2019-07-30 PROCEDURE — 99213 OFFICE O/P EST LOW 20 MIN: CPT | Performed by: NURSE PRACTITIONER

## 2019-07-30 PROCEDURE — 87086 URINE CULTURE/COLONY COUNT: CPT | Performed by: NURSE PRACTITIONER

## 2019-07-30 RX ORDER — NITROFURANTOIN 25; 75 MG/1; MG/1
100 CAPSULE ORAL 2 TIMES DAILY
Qty: 10 CAPSULE | Refills: 0 | Status: SHIPPED | OUTPATIENT
Start: 2019-07-30 | End: 2019-08-04

## 2019-07-30 NOTE — ASSESSMENT & PLAN NOTE
POCT urine with leukocytes noted  I am starting patient on antibiotics  I advised to increase fluid intake  Also recommended use of cranberry pills to cleanse out kidneys  Void post intercourse as well was advised to patient  I am recommending to follow up if s/s do not improve

## 2019-07-30 NOTE — PROGRESS NOTES
Assessment/Plan:    Urinary tract infection without hematuria  POCT urine with leukocytes noted  I am starting patient on antibiotics  I advised to increase fluid intake  Also recommended use of cranberry pills to cleanse out kidneys  Void post intercourse as well was advised to patient  I am recommending to follow up if s/s do not improve  Diagnoses and all orders for this visit:    Urinary tract infection without hematuria, site unspecified  -     nitrofurantoin (MACROBID) 100 mg capsule; Take 1 capsule (100 mg total) by mouth 2 (two) times a day for 5 days    Vaginal pain  -     POCT urine dip          Subjective:      Patient ID: Nkechi Garcia is a 74 y o  female  Abdominal Pain   This is a new problem  The current episode started in the past 7 days  The onset quality is gradual  The problem occurs intermittently  The problem has been waxing and waning  The pain is located in the suprapubic region  The pain is at a severity of 4/10  The pain is mild  The quality of the pain is colicky, aching and a sensation of fullness  The abdominal pain does not radiate  Associated symptoms include frequency  Pertinent negatives include no arthralgias, hematuria, myalgias or vomiting  The pain is aggravated by movement  She has tried nothing for the symptoms  The treatment provided no relief  Prior diagnostic workup includes barium enema and CT scan  Her past medical history is significant for abdominal surgery and gallstones  Urinary Frequency    This is a new problem  The current episode started in the past 7 days  The problem occurs every urination  The problem has been gradually worsening  The quality of the pain is described as burning, aching and stabbing  The pain is at a severity of 8/10  The pain is severe  There has been no fever  She is not sexually active  There is no history of pyelonephritis  Associated symptoms include frequency and urgency   Pertinent negatives include no discharge, hematuria, hesitancy, possible pregnancy or vomiting  She has tried increased fluids (potatoe water) for the symptoms  The treatment provided mild relief  There is no history of catheterization, kidney stones, recurrent UTIs, a single kidney or urinary stasis  The following portions of the patient's history were reviewed and updated as appropriate: allergies, current medications, past family history, past medical history, past social history, past surgical history and problem list     Review of Systems   Constitutional: Negative  Negative for appetite change, fatigue and unexpected weight change  HENT: Negative  Eyes: Negative  Respiratory: Negative  Negative for cough, chest tightness and wheezing  Cardiovascular: Negative  Negative for chest pain and palpitations  Gastrointestinal: Positive for abdominal pain (suprapubic)  Negative for vomiting  Endocrine: Negative  Genitourinary: Positive for frequency and urgency  Negative for hematuria and hesitancy  Musculoskeletal: Negative  Negative for arthralgias, back pain and myalgias  Skin: Negative  Allergic/Immunologic: Negative  Neurological: Negative  Hematological: Negative  Psychiatric/Behavioral: Negative  Objective:      /80 (BP Location: Right arm, Patient Position: Sitting, Cuff Size: Adult)   Pulse 91   Temp (!) 97 °F (36 1 °C) (Temporal)   Ht 5' (1 524 m)   Wt 83 8 kg (184 lb 12 8 oz)   SpO2 96%   BMI 36 09 kg/m²          Physical Exam   Constitutional: She is oriented to person, place, and time  She appears well-developed and well-nourished  Non-toxic appearance  She does not appear ill  No distress  HENT:   Head: Normocephalic and atraumatic  Mouth/Throat: Oropharynx is clear and moist    Eyes: Pupils are equal, round, and reactive to light  Conjunctivae and EOM are normal    Neck: Normal range of motion  Neck supple     Cardiovascular: Normal rate, regular rhythm, normal heart sounds and intact distal pulses  Pulmonary/Chest: Effort normal and breath sounds normal  No respiratory distress  She has no wheezes  She has no rales  Abdominal: Soft  Normal appearance and bowel sounds are normal  There is tenderness in the suprapubic area  There is no rigidity, no rebound, no guarding, no CVA tenderness, no tenderness at McBurney's point and negative Huerta's sign  Musculoskeletal: Normal range of motion  Lymphadenopathy:     She has no cervical adenopathy  Neurological: She is alert and oriented to person, place, and time  She has normal reflexes  Skin: Skin is warm and dry  Capillary refill takes less than 2 seconds  She is not diaphoretic  Psychiatric: She has a normal mood and affect  Her behavior is normal  Judgment and thought content normal    Nursing note and vitals reviewed

## 2019-07-31 LAB — BACTERIA UR CULT: NORMAL

## 2019-08-13 DIAGNOSIS — F32.9 REACTIVE DEPRESSION: ICD-10-CM

## 2019-08-13 DIAGNOSIS — C50.312 MALIGNANT NEOPLASM OF LOWER-INNER QUADRANT OF LEFT BREAST IN FEMALE, ESTROGEN RECEPTOR POSITIVE (HCC): ICD-10-CM

## 2019-08-13 DIAGNOSIS — Z17.0 MALIGNANT NEOPLASM OF LOWER-INNER QUADRANT OF LEFT BREAST IN FEMALE, ESTROGEN RECEPTOR POSITIVE (HCC): ICD-10-CM

## 2019-08-13 DIAGNOSIS — E78.2 MIXED HYPERLIPIDEMIA: ICD-10-CM
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
## 2019-08-13 RX ORDER — LETROZOLE 2.5 MG/1
TABLET, FILM COATED ORAL
Qty: 30 TABLET | Refills: 5 | Status: SHIPPED | OUTPATIENT
Start: 2019-08-13 | End: 2019-09-20 | Stop reason: SINTOL

## 2019-08-14 ENCOUNTER — TELEPHONE (OUTPATIENT)
Dept: HEMATOLOGY ONCOLOGY | Facility: CLINIC | Age: 74
End: 2019-08-14

## 2019-08-14 RX ORDER — ATORVASTATIN CALCIUM 40 MG/1
40 TABLET, FILM COATED ORAL
Qty: 30 TABLET | Refills: 5 | Status: SHIPPED | OUTPATIENT
Start: 2019-08-14 | End: 2019-12-09 | Stop reason: SDUPTHER

## 2019-08-14 RX ORDER — CELECOXIB 200 MG/1
CAPSULE ORAL
Qty: 30 CAPSULE | Refills: 5 | Status: SHIPPED | OUTPATIENT
Start: 2019-08-14 | End: 2020-01-03 | Stop reason: SDUPTHER

## 2019-08-14 RX ORDER — DULOXETIN HYDROCHLORIDE 30 MG/1
30 CAPSULE, DELAYED RELEASE ORAL DAILY
Qty: 30 CAPSULE | Refills: 5 | Status: SHIPPED | OUTPATIENT
Start: 2019-08-14 | End: 2019-11-11 | Stop reason: ALTCHOICE

## 2019-08-14 NOTE — TELEPHONE ENCOUNTER
Received a phone call from [facility] pharmacist at [facility], he wanted to clarify if patient was to be on letrozole or Aromasin  Per office notes patient is now on Aromasin as letrozole had too many side effects  He will remove the Letrozole Rx from her file

## 2019-09-13 ENCOUNTER — HOSPITAL ENCOUNTER (OUTPATIENT)
Dept: CT IMAGING | Facility: HOSPITAL | Age: 74
Discharge: HOME | End: 2019-09-13
Payer: MEDICARE

## 2019-09-13 DIAGNOSIS — R91.8 ABNORMAL CT SCAN OF LUNG: ICD-10-CM

## 2019-09-13 DIAGNOSIS — C50.312 MALIGNANT NEOPLASM OF LOWER-INNER QUADRANT OF LEFT BREAST IN FEMALE, ESTROGEN RECEPTOR POSITIVE (HCC): ICD-10-CM

## 2019-09-13 DIAGNOSIS — Z17.0 MALIGNANT NEOPLASM OF LOWER-INNER QUADRANT OF LEFT BREAST IN FEMALE, ESTROGEN RECEPTOR POSITIVE (HCC): ICD-10-CM

## 2019-09-13 PROCEDURE — 71260 CT THORAX DX C+: CPT

## 2019-09-13 RX ADMIN — IOHEXOL 85 ML: 350 INJECTION, SOLUTION INTRAVENOUS at 10:28

## 2019-09-16 ENCOUNTER — TELEPHONE (OUTPATIENT)
Dept: HEMATOLOGY ONCOLOGY | Facility: CLINIC | Age: 74
End: 2019-09-16

## 2019-09-16 NOTE — TELEPHONE ENCOUNTER
Nicole from radiology called and  Taj Smith that there are significant   Findings on the ct scan which is   In epic

## 2019-09-20 ENCOUNTER — OFFICE VISIT (OUTPATIENT)
Dept: HEMATOLOGY ONCOLOGY | Facility: CLINIC | Age: 74
End: 2019-09-20
Payer: MEDICARE

## 2019-09-20 VITALS
HEART RATE: 76 BPM | OXYGEN SATURATION: 98 % | DIASTOLIC BLOOD PRESSURE: 70 MMHG | WEIGHT: 182 LBS | RESPIRATION RATE: 16 BRPM | HEIGHT: 61 IN | BODY MASS INDEX: 34.36 KG/M2 | TEMPERATURE: 98.7 F | SYSTOLIC BLOOD PRESSURE: 120 MMHG

## 2019-09-20 DIAGNOSIS — Z17.0 MALIGNANT NEOPLASM OF LOWER-INNER QUADRANT OF LEFT BREAST IN FEMALE, ESTROGEN RECEPTOR POSITIVE (HCC): ICD-10-CM

## 2019-09-20 DIAGNOSIS — C50.312 MALIGNANT NEOPLASM OF LOWER-INNER QUADRANT OF LEFT BREAST IN FEMALE, ESTROGEN RECEPTOR POSITIVE (HCC): Primary | ICD-10-CM

## 2019-09-20 DIAGNOSIS — Z17.0 MALIGNANT NEOPLASM OF LOWER-INNER QUADRANT OF LEFT BREAST IN FEMALE, ESTROGEN RECEPTOR POSITIVE (HCC): Primary | ICD-10-CM

## 2019-09-20 DIAGNOSIS — C50.312 MALIGNANT NEOPLASM OF LOWER-INNER QUADRANT OF LEFT BREAST IN FEMALE, ESTROGEN RECEPTOR POSITIVE (HCC): ICD-10-CM

## 2019-09-20 PROCEDURE — 99214 OFFICE O/P EST MOD 30 MIN: CPT | Performed by: INTERNAL MEDICINE

## 2019-09-20 RX ORDER — EXEMESTANE 25 MG/1
25 TABLET ORAL DAILY
Qty: 30 TABLET | Refills: 3 | Status: SHIPPED | OUTPATIENT
Start: 2019-09-20 | End: 2019-12-23 | Stop reason: SDUPTHER

## 2019-09-20 NOTE — PROGRESS NOTES
Hematology/Oncology Outpatient Follow-up  Tristen Arrington 74 y o  female 1945 [id number]    Date:  9/20/2019        Assessment and Plan:  1  Malignant neoplasm of 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, estrogen receptor positive ([facility] [address] )  The patient was asked to get in touch with us phone to figure out if she is taking the letrozole or Aromasin or both by mistake as endocrine therapy  She agreed that she is going to ask 1 of her family member to call with the information  We will otherwise continue to monitor her closely  The patient has multiple symptoms which need to be addressed by her primary care team including her chronic headaches, dizziness and fatigue  We will check her blood work on vitamin-D prior to her next visit and try to obtain a bone density scan  She will also be continue on the Prolia injection on a every 6 months basis  - CBC and differential; Future  - Comprehensive metabolic panel; Future  - Vitamin D 25 hydroxy; Future        HPI:  The patient came today for a follow-up visit  She did complain about significant arthralgias with the letrozole  She was then switched to Aromasin around July 2019  The patient is very poor historian and even with the help of the  I was not able to find out if she stop taking the letrozole or not or if she is currently on Aromasin or both pills together  She continues to have multiple symptoms including headaches, dizziness  and abdominal tenderness  She just had a CT scan of the chest with contrast on the 13th of September which showed:  IMPRESSION:  1  Decrease prominence of the subpleural groundglass opacity in the anterior left upper lobe likely posttreatment change  Consider 12 month follow-upCT to ensure continued stability  2   Hepatic steatosis  3   Stable fluid collection in the medial left breast compatible with postoperative seroma  She was supposed to get a bone density scan which was not done    Her most recent blood work from the 30th of July showed normal CBC and CMP with normal iron level  Her TSH was also normal   Oncology History    Patient had bilateral mammogram October 2017 which showed a breast density with a left-sided asymmetry  An ultrasound was done which revealed 1 5 cm solid mass  Ultrasound-guided core biopsy was done January 24, 2018 the pathology revealed infiltrating lobular carcinoma grade 1  ER strongly positive 90% LA-0% and her 2 Jaylen negative as well  She had her definitive surgery April 3, 2018 as well as 2 sentinel lymph nodes from the left axilla which were both negative for metastatic disease  Her tumor was 2 8 cm and compatible with invasive lobular type adenocarcinoma  The lymphovascular invasion was not present grade 1 with multiple foci of lobular carcinoma in situ  The anterior margin was focally involved with invasive malignancy  Her final pathologic stage was to a PT2PN0 MX G1  Oncotype recurrence score came back 13 putting her in the low risk category      Prolia injections every 6 months to start July of 2018  Patient has a history of osteopenia and will require long-term use of an aromatase inhibitor  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 ([facility] [address] )    1/24/2018 Initial Diagnosis     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 (HCC)  Stage IA (pT2, pN0, cM0, G1, ER: Positive, LA: Positive, HER2: Negative)        1/24/2018 Biopsy     Left breast ultrasound guided core biopsies  Infiltrating lobular carcinoma  Grade 1  ER 98% positive, LA negative, HER2 negative        4/3/2018 Surgery     Left partial mastectomy with 2 sentinel node biopsy    Size at least 2 1 cm  Multiple foci of LCIS  LCIS present at superior margin   Invasive malignancy is also noted in close proximity to the superior margin of resection    Pathological staging: stage IIA pT2, pN0 sn, pMX, G1    - Dr Madie Lopez      4/3/2018 Genomic Testing Oncotype DX    Recurrence score 13, 10 year risk of distance recurrence  SANCHEZ alone 8%  ER score 10 6 positive  VA score 5 1 negative  HER2 9 6 negative       6/21/2018 - 8/27/2018 Radiation     Plan ID Energy Fractions Dose per Fraction (cGy) Dose Correction (cGy) Total Dose Delivered (cGy) Elapsed Days   FB L [place] # 10X/6X 28 / 28 180 0 5,040 54   L Brst Boost 12E 8 / 8 200 0 1,600 12      Treatment dates:  6/21/2018 - 8/27/2018     - Dr Lamar Thayer     1  Anastrozole 5/16/18- 2/27/19 (d/c due to significant arthralgias)  2  Letrozole started 2/27/19         Interval history:    ROS: Review of Systems   Constitutional: Positive for appetite change and fatigue  Negative for activity change, chills, diaphoresis, fever and unexpected weight change  HENT: Positive for hearing loss and trouble swallowing  Negative for congestion, dental problem, ear discharge, ear pain, facial swelling, mouth sores, nosebleeds, postnasal drip, sore throat and tinnitus  Eyes: Negative for discharge, redness, itching and visual disturbance  Respiratory: Positive for shortness of breath  Negative for cough, chest tightness and wheezing  Cardiovascular: Negative for chest pain, palpitations and leg swelling  Gastrointestinal: Positive for constipation  Negative for abdominal distention, abdominal pain, anal bleeding, blood in stool, diarrhea, nausea and vomiting  Genitourinary: Positive for dysuria  Negative for difficulty urinating, flank pain, frequency, hematuria and urgency  Musculoskeletal: Negative for arthralgias, back pain, gait problem, joint swelling, myalgias and neck pain  Skin: Negative for color change, pallor, rash and wound  Neurological: Positive for dizziness, numbness and headaches  Negative for syncope, speech difficulty, weakness and light-headedness  Hematological: Negative for adenopathy  Does not bruise/bleed easily  Psychiatric/Behavioral: Positive for sleep disturbance  Negative for agitation, behavioral problems and confusion         Past Medical History:   Diagnosis Date    Anxiety     Arthritis     Breast cancer ([facility] ) 2018    left    Chronic pain disorder     Depression     Depression     Diabetes mellitus ([facility] )     Hypercholesterolemia     Hypertension     Irritable bowel syndrome     Migraine     Type 2 diabetes mellitus with hyperglycemia, without long-term current use of insulin ([facility] ) 3/22/2019       Past Surgical History:   Procedure Laterality Date    BREAST BIOPSY Left 2011    benign    BREAST BIOPSY Left 2011    benign    BREAST LUMPECTOMY Left 2018    malignant    CARPAL TUNNEL RELEASE Right     right CTR    CARPAL TUNNEL RELEASE Left 2018     SECTION      CHOLECYSTECTOMY      MASTECTOMY Left     sentinae node mapping    IL INCISE FINGER TENDON SHEATH Left 2018    Procedure: RELEASE 3RD TRIGGER FINGER  ;  Surgeon: Ezequiel Campos MD;  Location: [facility] MAIN OR;  Service: Plastics    IL WRIST Julane Money LIG Left 2018    Procedure: [address];  Surgeon: Ezequiel Campos MD;  Location: [facility] MAIN OR;  Service: Plastics    SENTINEL LYMPH NODE BIOPSY      TRIGGER FINGER RELEASE Left 2018       Social History     Socioeconomic History    Marital status: /Civil Union     Spouse name: None    Number of children: None    Years of education: None    Highest education level: None   Occupational History    None   Social Needs    Financial resource strain: None    Food insecurity:     Worry: None     Inability: None    Transportation needs:     Medical: None     Non-medical: None   Tobacco Use    Smoking status: Passive Smoke Exposure - Never Smoker    Smokeless tobacco: Never Used   Substance and Sexual Activity    Alcohol use: No    Drug use: No    Sexual activity: Yes     Partners: Male   Lifestyle    Physical activity:     Days per week: None     Minutes per session: None    Stress: None   Relationships    Social connections:     Talks on phone: None     Gets together: None     Attends [employer] service: None     Active member of club or organization: None     Attends meetings of clubs or organizations: None     Relationship status: None    Intimate partner violence:     Fear of current or ex partner: None     Emotionally abused: None     Physically abused: None     Forced sexual activity: None   Other Topics Concern    None   Social History Narrative    None       Family History   Problem Relation Age of Onset    Heart disease Mother         cardiovascular disease    Heart disease Father        Allergies   Allergen Reactions    Chocolate     Chocolate Flavor     Ibuprofen Swelling    Pork-Derived Products          Current Outpatient Medications:     atorvastatin (LIPITOR) 40 mg tablet, Take 1 tablet (40 mg total) by mouth daily at bedtime, Disp: 30 tablet, Rfl: 5    calcium carbonate (OS-NILDA) 600 MG tablet, Take 1 tablet (600 mg total) by mouth 2 (two) times a day with meals, Disp: 90 tablet, Rfl: 3    celecoxib (CeleBREX) 200 mg capsule, TAKE ONE CAPSULE BY MOUTH DAILYTOMAR CAROLEE CAPSULA POR VIA ORAL DIARIAMENTE, Disp: 30 capsule, Rfl: 5    DULoxetine (CYMBALTA) 30 mg delayed release capsule, Take 1 capsule (30 mg total) by mouth daily, Disp: 30 capsule, Rfl: 5    exemestane (AROMASIN) 25 MG tablet, Take 1 tablet (25 mg total) by mouth daily, Disp: 30 tablet, Rfl: 3    gabapentin (NEURONTIN) 300 mg capsule, Take 1 capsule (300 mg total) by mouth 3 (three) times a day, Disp: 90 capsule, Rfl: 3    letrozole (FEMARA) 2 5 mg tablet, TAKE ONE TABLET BY MOUTH DAILYTOMAR 1 TABLETA POR VIA ORAL DIARIAMENTE, Disp: 30 tablet, Rfl: 5    oxyCODONE-acetaminophen (PERCOCET) 5-325 mg per tablet, Take 1 table at bed time as needed for severe pain, Disp: 30 tablet, Rfl: 0    acetaminophen (TYLENOL) 650 mg CR tablet, Take 1 tablet (650 mg total) by mouth every 8 (eight) hours as needed for mild pain (Patient not taking: Reported on 6/20/2019), Disp: 90 tablet, Rfl: 5      Physical Exam:  /70 (BP Location: Right arm, Cuff Size: Large)   Pulse 76   Temp 98 7 °F (37 1 °C) (Tympanic)   Resp 16   Ht 5' 1" (1 549 m)   Wt 82 6 kg (182 lb)   SpO2 98%   BMI 34 39 kg/m²     Physical Exam   Constitutional: She is oriented to person, place, and time  She appears well-developed and well-nourished  No distress  HENT:   Head: Normocephalic and atraumatic  Nose: Nose normal    Mouth/Throat: Oropharynx is clear and moist    Eyes: Pupils are equal, round, and reactive to light  Conjunctivae and EOM are normal  Right eye exhibits no discharge  Left eye exhibits no discharge  No scleral icterus  Neck: Normal range of motion  Neck supple  No JVD present  No tracheal deviation present  No thyromegaly present  Cardiovascular: Normal rate, regular rhythm and normal heart sounds  Exam reveals no friction rub  No murmur heard  Pulmonary/Chest: Effort normal and breath sounds normal  No stridor  No respiratory distress  She has no wheezes  She has no rales  She exhibits no tenderness  Abdominal: Soft  Bowel sounds are normal  She exhibits no distension and no mass  There is no hepatosplenomegaly, splenomegaly or hepatomegaly  There is tenderness (Right upper quadrant area on deep palpation)  There is no rebound and no guarding  Obese abdomen   Musculoskeletal: Normal range of motion  She exhibits no edema, tenderness or deformity  Lymphadenopathy:     She has no cervical adenopathy  Neurological: She is alert and oriented to person, place, and time  She has normal reflexes  No cranial nerve deficit  Coordination normal    Skin: Skin is warm and dry  No rash noted  She is not diaphoretic  No erythema  No pallor  Psychiatric: She has a normal mood and affect   Her behavior is normal  Judgment and thought content normal          Labs:  Lab Results   Component Value Date WBC 5 80 07/30/2019    HGB 12 3 07/30/2019    HCT 37 1 07/30/2019    MCV 87 07/30/2019     07/30/2019     Lab Results   Component Value Date    K 4 6 07/30/2019     07/30/2019    CO2 29 07/30/2019    BUN 12 07/30/2019    CREATININE 0 61 07/30/2019    GLUF 88 07/30/2019    CALCIUM 9 4 07/30/2019    AST 33 07/30/2019    ALT 25 07/30/2019    ALKPHOS 82 07/30/2019    EGFR 103 07/30/2019     No results found for: TSH    Patient voiced understanding and agreement in the above discussion  Aware to contact our office with questions/symptoms in the interim

## 2019-09-30 ENCOUNTER — OFFICE VISIT (OUTPATIENT)
Dept: FAMILY MEDICINE CLINIC | Facility: CLINIC | Age: 74
End: 2019-09-30
Payer: MEDICARE

## 2019-09-30 VITALS
TEMPERATURE: 98 F | HEART RATE: 92 BPM | BODY MASS INDEX: 35.73 KG/M2 | WEIGHT: 182 LBS | DIASTOLIC BLOOD PRESSURE: 70 MMHG | SYSTOLIC BLOOD PRESSURE: 110 MMHG | HEIGHT: 60 IN | RESPIRATION RATE: 16 BRPM | OXYGEN SATURATION: 97 %

## 2019-09-30 DIAGNOSIS — G90.513 COMPLEX REGIONAL PAIN SYNDROME TYPE 1 AFFECTING BOTH HANDS: Primary | ICD-10-CM

## 2019-09-30 DIAGNOSIS — Z23 NEEDS FLU SHOT: ICD-10-CM

## 2019-09-30 PROCEDURE — G0008 ADMIN INFLUENZA VIRUS VAC: HCPCS | Performed by: FAMILY MEDICINE

## 2019-09-30 PROCEDURE — 90662 IIV NO PRSV INCREASED AG IM: CPT | Performed by: FAMILY MEDICINE

## 2019-09-30 PROCEDURE — 99214 OFFICE O/P EST MOD 30 MIN: CPT | Performed by: FAMILY MEDICINE

## 2019-09-30 RX ORDER — OXYCODONE HYDROCHLORIDE AND ACETAMINOPHEN 5; 325 MG/1; MG/1
TABLET ORAL
Qty: 30 TABLET | Refills: 0 | Status: SHIPPED | OUTPATIENT
Start: 2019-09-30 | End: 2020-02-10 | Stop reason: SDUPTHER

## 2019-09-30 NOTE — PROGRESS NOTES
Assessment/Plan:  1  Complex regional pain syndrome type 1 affecting both hands  Condition is stable with current treatment, to  continue the same  I reviewed [address] and i spoke with patient regarding use of such drug, current issues with diversion and abuse of drugs  Patient understood the facts of missuse of prescribed medication may cause death of herself or other in diversion is the case  We will monitor medications with a ramdonized call to bring pills to be counted and make sure there is no diversion  Urine screnn also might be requested  - oxyCODONE-acetaminophen (PERCOCET) 5-325 mg per tablet; Take 1 table at bed time as needed for severe pain  Dispense: 30 tablet; Refill: 0    2  Needs flu shot  Flu immunization was given to patient and educated regarding the benefits and the side effects     - influenza vaccine, 2140-2267, high-dose, PF 0 5 mL (FLUZONE HIGH-DOSE)    No problem-specific Assessment & Plan notes found for this encounter  Diagnoses and all orders for this visit:    Needs flu shot  -     influenza vaccine, 8085-5069, high-dose, PF 0 5 mL (FLUZONE HIGH-DOSE)          Subjective:      Patient ID: Haley Givens is a 74 y o  female  Haley Cleaves 74 y o  complaining of   Chronic pain in Lower back; started about 3 year(s) ago  The pain is  intermittent, occurring about every day and lasting 6 hours per episode and has been gradually worsening since onset  The quality of the pain is described as heaviness  The pain does radiate to legs  The pain is at a severity of 5/10  The symptoms are aggravated by movement  Associated symptoms include numbness, paresthesias and tingling  she has  tried different modalities of therapy, she is stable in current treatment             The following portions of the patient's history were reviewed and updated as appropriate: allergies, current medications, past family history, past medical history, past social history, past surgical history and problem list     Review of Systems   Constitutional: Positive for fatigue  Respiratory: Negative for shortness of breath  Cardiovascular: Negative for chest pain, palpitations and leg swelling  Gastrointestinal: Positive for constipation  Endocrine: Negative  Genitourinary: Negative  Musculoskeletal: Positive for arthralgias, back pain, myalgias and neck stiffness  Skin: Negative  Neurological: Positive for dizziness, weakness and numbness  Hematological: Negative  Psychiatric/Behavioral: Positive for sleep disturbance  Negative for suicidal ideas  The patient is nervous/anxious  Objective:      /70 (BP Location: Left arm, Patient Position: Sitting, Cuff Size: Standard)   Pulse 92   Temp 98 °F (36 7 °C) (Oral)   Resp 16   Ht 5' (1 524 m)   Wt 82 6 kg (182 lb)   SpO2 97%   Breastfeeding? No   BMI 35 54 kg/m²          Physical Exam   HENT:   Nose: Nose normal    Mouth/Throat: Oropharynx is clear and moist  No oropharyngeal exudate  Eyes: Pupils are equal, round, and reactive to light  EOM are normal  Right eye exhibits no discharge  Left eye exhibits no discharge  No scleral icterus  Cardiovascular: Normal rate, regular rhythm, normal heart sounds and intact distal pulses  Exam reveals no friction rub  No murmur heard  Pulmonary/Chest: Effort normal  No respiratory distress  She has no wheezes  She has no rales  She exhibits no tenderness  Musculoskeletal: Normal range of motion  She exhibits tenderness (low back pain and walking difficulties  )  Neurological: She is alert  Skin: Skin is warm and dry  No rash noted  No erythema  Psychiatric: She has a normal mood and affect  Her behavior is normal    Nursing note and vitals reviewed

## 2019-10-08 DIAGNOSIS — Z79.811 USE OF AROMATASE INHIBITORS: ICD-10-CM

## 2019-10-08 DIAGNOSIS — G56.03 BILATERAL CARPAL TUNNEL SYNDROME: ICD-10-CM

## 2019-10-08 RX ORDER — IBUPROFEN 200 MG
CAPSULE ORAL
Qty: 90 TABLET | Refills: 3 | Status: SHIPPED | OUTPATIENT
Start: 2019-10-08 | End: 2020-03-19

## 2019-10-09 RX ORDER — GABAPENTIN 300 MG/1
300 CAPSULE ORAL 3 TIMES DAILY
Qty: 90 CAPSULE | Refills: 3 | Status: SHIPPED | OUTPATIENT
Start: 2019-10-09 | End: 2019-11-25 | Stop reason: SDUPTHER

## 2019-10-11 DIAGNOSIS — E78.2 MIXED HYPERLIPIDEMIA: ICD-10-CM

## 2019-10-14 RX ORDER — ATORVASTATIN CALCIUM 40 MG/1
TABLET, FILM COATED ORAL
Qty: 30 TABLET | Refills: 5 | OUTPATIENT
Start: 2019-10-14

## 2019-10-22 DIAGNOSIS — E78.2 MIXED HYPERLIPIDEMIA: ICD-10-CM

## 2019-10-22 RX ORDER — ATORVASTATIN CALCIUM 40 MG/1
TABLET, FILM COATED ORAL
Qty: 30 TABLET | Refills: 5 | OUTPATIENT
Start: 2019-10-22

## 2019-11-05 ENCOUNTER — RADIATION ONCOLOGY FOLLOW-UP (OUTPATIENT)
Dept: RADIATION ONCOLOGY | Facility: CLINIC | Age: 74
End: 2019-11-05
Attending: RADIOLOGY
Payer: MEDICARE

## 2019-11-05 VITALS
TEMPERATURE: 97.5 F | SYSTOLIC BLOOD PRESSURE: 124 MMHG | HEART RATE: 84 BPM | HEIGHT: 60 IN | RESPIRATION RATE: 18 BRPM | WEIGHT: 185.19 LBS | BODY MASS INDEX: 36.36 KG/M2 | DIASTOLIC BLOOD PRESSURE: 82 MMHG

## 2019-11-05 DIAGNOSIS — Z17.0 MALIGNANT NEOPLASM OF LOWER-INNER QUADRANT OF LEFT BREAST IN FEMALE, ESTROGEN RECEPTOR POSITIVE (HCC): Primary | ICD-10-CM

## 2019-11-05 DIAGNOSIS — C50.312 MALIGNANT NEOPLASM OF LOWER-INNER QUADRANT OF LEFT BREAST IN FEMALE, ESTROGEN RECEPTOR POSITIVE (HCC): Primary | ICD-10-CM

## 2019-11-05 PROCEDURE — 99211 OFF/OP EST MAY X REQ PHY/QHP: CPT | Performed by: RADIOLOGY

## 2019-11-05 NOTE — PROGRESS NOTES
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  Presents today for a follow up after completing Radiation Treatments on 8/27/18  She was last seen on 4/29/19 6/20/19- Eleni DRISCOLL- Hem Onc  - Pt instructed to hold letrozole for two weeks, due to significant Arthralgias to her wrists and hands  F/u in 3 months    9/13/19- CT SCAN CHEST W/ CONTRAST  IMPRESSION:  1  Decrease prominence of the subpleural groundglass opacity in the anterior left upper lobe likely posttreatment change  Consider 12 month follow-upCT to ensure continued stability  2   Hepatic steatosis  3   Stable fluid collection in the medial left breast compatible with postoperative seroma  9/20/19- Dr Gretta Epley  "The patient was asked to get in touch with us phone to figure out if she is taking the letrozole or Aromasin or both by mistake as endocrine therapy  She agreed that she is going to ask 1 of her family member to call with the information  "  -Continue Prolia injections every 6 months      No recent Breast Surgeon  follow up schedules scheduled   12/20/19- Dr Gretta Epley  12/20/19-Prolia       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 ([facility] [address] )    1/24/2018 Initial Diagnosis     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 ([facility] [address] )  Stage IA (pT2, pN0, cM0, G1, ER: Positive, VT: Positive, HER2: Negative)        1/24/2018 Biopsy     Left breast ultrasound guided core biopsies  Infiltrating lobular carcinoma  Grade 1  ER 98% positive, VT negative, HER2 negative        4/3/2018 Surgery     Left partial mastectomy with 2 sentinel node biopsy    Size at least 2 1 cm  Multiple foci of LCIS  LCIS present at superior margin   Invasive malignancy is also noted in close proximity to the superior margin of resection    Pathological staging: stage IIA pT2, pN0 sn, pMX, G1    - Dr Mateusz Shaikh      4/3/2018 Genomic Testing     Oncotype DX    Recurrence score 13, 10 year risk of distance recurrence  SANCHEZ alone 8%  ER score 10 6 positive  AZ score 5 1 negative  HER2 9 6 negative       6/21/2018 - 8/27/2018 Radiation     Plan ID Energy Fractions Dose per Fraction (cGy) Dose Correction (cGy) Total Dose Delivered (cGy) Elapsed Days   FB L Breas # 10X/6X 28 / 28 180 0 5,040 54   L Brst Boost 12E 8 / 8 200 0 1,600 12      Treatment dates:  6/21/2018 - 8/27/2018     - Dr Ja Mcdonald     1  Anastrozole 5/16/18- 2/27/19 (d/c due to significant arthralgias)  2   Letrozole started 2/27/19         Clinical Trial: no      Imaging    Health Maintenance   Topic Date Due    DXA SCAN  1945    HEPATITIS B VACCINES (1 of 3 - Risk 3-dose series) 12/24/1962    Falls: Plan of Care  12/24/2008    CRC Screening: Colonoscopy  11/30/2019 (Originally 1945)    DTaP,Tdap,and Td Vaccines (1 - Tdap) 09/30/2020 (Originally 12/24/1964)    Urinary Incontinence Screening  02/22/2020    Medicare Annual Wellness Visit (AWV)  02/22/2020    BMI: Followup Plan  03/27/2020    Fall Risk  09/30/2020    BMI: Adult  09/30/2020    INFLUENZA VACCINE  Completed    Pneumococcal Vaccine: 65+ Years  Completed    Pneumococcal Vaccine: Pediatrics (0 to 5 Years) and At-Risk Patients (6 to 59 Years)  Aged Out       Patient Active Problem List   Diagnosis    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 ([address] )    Anemia, unspecified    Benign paroxysmal positional vertigo    Carpal tunnel syndrome    Chronic midline low back pain with bilateral sciatica    Chronic pain disorder    Depression    Perforation of tympanic membrane    Physical deconditioning    Poor dentition    Prediabetes    Spinal stenosis of lumbar region with radiculopathy    Vision abnormalities    Age-related incipient cataract of both eyes    Preoperative cardiovascular examination    Shortness of breath and palpitations    Carpal tunnel syndrome of left wrist    Trigger middle finger of left hand    Fall    Costochondral pain    Neuropathy associated with cancer (HCC)    Peripheral vascular disease ([facility] [address] )    Complex regional pain syndrome type 1 affecting both hands    Left wrist pain    Use of aromatase inhibitors    Osteopenia of multiple sites    Malignant neoplasm of 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, estrogen receptor positive (HCC)    Urinary tract infection without hematuria     Past Medical History:   Diagnosis Date    Anxiety     Arthritis     Breast cancer ([facility] [address] ) 2018    left    Chronic pain disorder     Depression     Depression     Diabetes mellitus (HCC)     Hepatic steatosis     Hypercholesterolemia     Hypertension     Irritable bowel syndrome     Migraine     Type 2 diabetes mellitus with hyperglycemia, without long-term current use of insulin ([facility] [address] ) 3/22/2019     Past Surgical History:   Procedure Laterality Date    BREAST BIOPSY Left 2011    benign    BREAST BIOPSY Left 2011    benign    BREAST LUMPECTOMY Left 2018    malignant    CARPAL TUNNEL RELEASE Right     right CTR    CARPAL TUNNEL RELEASE Left 2018     SECTION      CHOLECYSTECTOMY      MASTECTOMY Left     sentinae node mapping    IA INCISE FINGER TENDON SHEATH Left 2018    Procedure: RELEASE 3RD TRIGGER FINGER  ;  Surgeon: Debby Corona MD;  Location: [address] [place] [address] OR;  Service: Plastics    IA WRIST Jaimee Hitesh LIG Left 2018    Procedure: ENDOSCOPIOC RELEASE VOLAR CARPAL LIGAMENT;  Surgeon: Debby Corona MD;  [facility] [address] [place], [place] [address];  Service: Plastics    SENTINEL LYMPH NODE BIOPSY      TRIGGER FINGER RELEASE Left 2018     Family History   Problem Relation Age of Onset    Heart disease Mother         cardiovascular disease    Heart disease Father      Social History     Socioeconomic History    Marital status: /Civil Union     Spouse name: Not on file    Number of children: Not on file    Years of education: Not on file    Highest education level: Not on file   Occupational History    Not on file   Social Needs    Financial resource strain: Not on file    Food insecurity:     Worry: Not on file     Inability: Not on file    Transportation needs:     Medical: Not on file     Non-medical: Not on file   Tobacco Use    Smoking status: Passive Smoke Exposure - Never Smoker    Smokeless tobacco: Never Used   Substance and Sexual Activity    Alcohol use: No    Drug use: No    Sexual activity: Yes     Partners: Male   Lifestyle    Physical activity:     Days per week: Not on file     Minutes per session: Not on file    Stress: Not on file   Relationships    Social connections:     Talks on phone: Not on file     Gets together: Not on file     Attends [employer] service: Not on file     Active member of club or organization: Not on file     Attends meetings of clubs or organizations: Not on file     Relationship status: Not on file    Intimate partner violence:     Fear of current or ex partner: Not on file     Emotionally abused: Not on file     Physically abused: Not on file     Forced sexual activity: Not on file   Other Topics Concern    Not on file   Social History Narrative    Not on file       Current Outpatient Medications:     atorvastatin (LIPITOR) 40 mg tablet, Take 1 tablet (40 mg total) by mouth daily at bedtime, Disp: 30 tablet, Rfl: 5    CALCIUM 600 600 MG tablet, TAKE 1 TABLET (600 MG TOTAL) BY MOUTH 2 (TWO) TIMES A DAY WITH A MEAL, Disp: 90 tablet, Rfl: 3    celecoxib (CeleBREX) 200 mg capsule, TAKE ONE CAPSULE BY MOUTH DAILYTOMAR CAROLEE CAPSULA POR VIA ORAL DIARIAMENTE, Disp: 30 capsule, Rfl: 5    DULoxetine (CYMBALTA) 30 mg delayed release capsule, Take 1 capsule (30 mg total) by mouth daily, Disp: 30 capsule, Rfl: 5    exemestane (AROMASIN) 25 MG tablet, TAKE 1 TABLET (25 MG TOTAL) BY MOUTH DAILY, Disp: 30 tablet, Rfl: 3    gabapentin (NEURONTIN) 300 mg capsule, TAKE 1 CAPSULE (300 MG TOTAL) BY MOUTH 3 (THREE) TIMES A DAY, Disp: 90 capsule, Rfl: 3    oxyCODONE-acetaminophen (PERCOCET) 5-325 mg per tablet, Take 1 table at bed time as needed for severe pain, Disp: 30 tablet, Rfl: 0    acetaminophen (TYLENOL) 650 mg CR tablet, Take 1 tablet (650 mg total) by mouth every 8 (eight) hours as needed for mild pain (Patient not taking: Reported on 6/20/2019), Disp: 90 tablet, Rfl: 5  Allergies   Allergen Reactions    Chocolate     Chocolate Flavor     Ibuprofen Swelling    Pork-Derived Products        Review of Systems:  Review of Systems   Constitutional: Positive for fatigue  HENT: Negative  Eyes: Negative  Respiratory: Negative  Cardiovascular: Negative  Gastrointestinal: Positive for constipation  Endocrine: Negative  Genitourinary: Positive for dysuria (occasional)  Musculoskeletal: Positive for arthralgias and joint swelling  Skin: Negative  Allergic/Immunologic: Negative  Neurological: Positive for dizziness and headaches  Hematological: Negative  Psychiatric/Behavioral: Negative  Vitals:    11/05/19 0859   BP: 124/82   BP Location: Right arm   Patient Position: Sitting   Pulse: 84   Resp: 18   Temp: 97 5 °F (36 4 °C)   TempSrc: Temporal   Weight: 84 kg (185 lb 3 oz)   Height: 5' (1 524 m)        Pain assessment:5           Imaging:No results found      Teaching

## 2019-11-05 NOTE — PROGRESS NOTES
Follow-up - Radiation Oncology   Wilma Bassett 1945 74 y o  female [id number]      History of Present Illness   Cancer Staging  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 [facility])  Staging form: Breast, AJCC 8th Edition  - Pathologic stage from 4/3/2018: Stage IB (pT2, pN0(sn), cM0, G1, ER: Positive, VA: Negative, HER2: Negative) - Signed by AMERICO Castellanos on 7/6/2018  Neoadjuvant therapy: No  Laterality: Left  Tumor size (mm): 21  Method of lymph node assessment: [place] lymph node biopsy  Multigene prognostic tests performed: Oncotype DX DCIS  Histologic grading system: 3 grade system  Histopathologic type: Lobular carcinoma, NOS  Diagnostic confirmation: Positive histology  Specimen type: Biopsy / Limited Resection  - Clinical: No stage assigned - Unsigned            Interval History:    Wilma Bassett 74 y o female with a history 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  Presents today for a follow up after completing Radiation Treatments on 8/27/18  She was last seen on 4/29/19 6/20/19- Nguyen DRISCOLL- Hem Onc  - Pt instructed to hold letrozole for two weeks, due to significant Arthralgias to her wrists and hands  F/u in 3 months     9/13/19- CT SCAN CHEST W/ CONTRAST  IMPRESSION:  1   Decrease prominence of the subpleural groundglass opacity in the anterior left upper lobe likely posttreatment change   Consider 12 month follow-upCT to ensure continued stability  2   Hepatic steatosis  3   Stable fluid collection in the medial left breast compatible with postoperative seroma      9/20/19- Dr Kemar Miranda  "The patient was asked to get in touch with us phone to figure out if she is taking the letrozole or Aromasin or both by mistake as endocrine therapy   She agreed that she is going to ask 1 of her family member to call with the information  "  -Continue Prolia injections every 6 months        No recent Breast Surgeon  follow up schedules scheduled   12/20/19- Dr Gretta Epley  12/20/19-Prolia         Historical Information      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 ([place] [address] )    1/24/2018 Initial Diagnosis     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 ([place] [address] )  Stage IA (pT2, pN0, cM0, G1, ER: Positive, OR: Positive, HER2: Negative)        1/24/2018 Biopsy     Left breast ultrasound guided core biopsies  Infiltrating lobular carcinoma  Grade 1  ER 98% positive, OR negative, HER2 negative        4/3/2018 Surgery     Left partial mastectomy with 2 sentinel node biopsy    Size at least 2 1 cm  Multiple foci of LCIS  LCIS present at superior margin  Invasive malignancy is also noted in close proximity to the superior margin of resection    Pathological staging: stage IIA pT2, pN0 sn, pMX, G1    - Dr Liya Martinez      4/3/2018 Genomic Testing     Oncotype DX    Recurrence score 13, 10 year risk of distance recurrence  SANCHEZ alone 8%  ER score 10 6 positive  OR score 5 1 negative  HER2 9 6 negative       6/21/2018 - 8/27/2018 Radiation     Plan ID Energy Fractions Dose per Fraction (cGy) Dose Correction (cGy) Total Dose Delivered (cGy) Elapsed Days   FB L Breas # 10X/6X 28 / 28 180 0 5,040 54   L Brst Boost 12E 8 / 8 200 0 1,600 12      Treatment dates:  6/21/2018 - 8/27/2018     - Dr Kandice Chawla     1  Anastrozole 5/16/18- 2/27/19 (d/c due to significant arthralgias)  2   Letrozole started 2/27/19         Past Medical History:   Diagnosis Date    Anxiety     Arthritis     Breast cancer ([place] [address] ) 01/24/2018    left    Chronic pain disorder     Depression     Depression     Diabetes mellitus ([place] [address] )     Hepatic steatosis     Hypercholesterolemia     Hypertension     Irritable bowel syndrome     Migraine     Type 2 diabetes mellitus with hyperglycemia, without long-term [facility] insulin [facility]  ) 3/22/2019     Past Surgical History:   Procedure Laterality Date    BREAST BIOPSY Left 04/01/2011 benign    BREAST BIOPSY Left 2011    benign    BREAST LUMPECTOMY Left 2018    malignant    CARPAL TUNNEL RELEASE Right     right CTR    CARPAL TUNNEL RELEASE Left 2018     SECTION      CHOLECYSTECTOMY      MASTECTOMY Left     sentinae node mapping    AZ INCISE FINGER TENDON SHEATH Left 2018    Procedure: RELEASE 3RD TRIGGER FINGER  ;  Surgeon: Debby Corona MD;  Location: [facility] MAIN OR;  Service: Plastics    AZ WRIST Jaimee Proctor LIG Left 2018    Procedure: ENDOSCOPIOC RELEASE VOLAR CARPAL LIGAMENT;  Surgeon: Debby Corona MD;  Location: [facility] MAIN OR;  Service: Plastics    SENTINEL LYMPH NODE BIOPSY      TRIGGER FINGER RELEASE Left 2018       Social History   Social History     Substance and Sexual Activity   Alcohol Use No     Social History     Substance and Sexual Activity   Drug Use No     Social History     Tobacco Use   Smoking Status Passive Smoke Exposure - Never Smoker   Smokeless Tobacco Never Used         Meds/Allergies     Current Outpatient Medications:     atorvastatin (LIPITOR) 40 mg tablet, Take 1 tablet (40 mg total) by mouth daily at bedtime, Disp: 30 tablet, Rfl: 5    CALCIUM 600 600 MG tablet, TAKE 1 TABLET (600 MG TOTAL) BY MOUTH 2 (TWO) TIMES A DAY WITH A MEAL, Disp: 90 tablet, Rfl: 3    celecoxib (CeleBREX) 200 mg capsule, TAKE ONE CAPSULE BY MOUTH DAILYTOMAR CAROLEE CAPSULA POR VIA ORAL DIARIAMENTE, Disp: 30 capsule, Rfl: 5    DULoxetine (CYMBALTA) 30 mg delayed release capsule, Take 1 capsule (30 mg total) by mouth daily, Disp: 30 capsule, Rfl: 5    exemestane (AROMASIN) 25 MG tablet, TAKE 1 TABLET (25 MG TOTAL) BY MOUTH DAILY, Disp: 30 tablet, Rfl: 3    gabapentin (NEURONTIN) 300 mg capsule, TAKE 1 CAPSULE (300 MG TOTAL) BY MOUTH 3 (THREE) TIMES A DAY, Disp: 90 capsule, Rfl: 3    oxyCODONE-acetaminophen (PERCOCET) 5-325 mg per tablet, Take 1 table at bed time as needed for severe pain, Disp: 30 tablet, Rfl: 0    acetaminophen (TYLENOL) 650 mg CR tablet, Take 1 tablet (650 mg total) by mouth every 8 (eight) hours as needed for mild pain (Patient not taking: Reported on 6/20/2019), Disp: 90 tablet, Rfl: 5  Allergies   Allergen Reactions    Chocolate     Chocolate Flavor     Ibuprofen Swelling    Pork-Derived Products          Review of Systems  Constitutional: Positive for fatigue  HENT: Negative  Eyes: Negative  Respiratory: Negative  Cardiovascular: Negative  Gastrointestinal: Positive for constipation  Endocrine: Negative  Genitourinary: Positive for dysuria (occasional)  Musculoskeletal: Positive for arthralgias and joint swelling  Skin: Negative  Allergic/Immunologic: Negative  Neurological: Positive for dizziness and headaches  Hematological: Negative  Psychiatric/Behavioral: Negative  OBJECTIVE:   /82 (BP Location: Right arm, Patient Position: Sitting)   Pulse 84   Temp 97 5 °F (36 4 °C) (Temporal)   Resp 18   Ht 5' (1 524 m)   Wt 84 kg (185 lb 3 oz)   BMI 36 17 kg/m²   Pain Assessment:  0  ECOG/Zubrod/WHO: 1 - Symptomatic but completely ambulatory    Physical Exam   Constitutional: She appears well-developed  HENT:   Head: Normocephalic  Hair is normal    Mouth/Throat: Oropharynx is clear and moist    Eyes: EOM are normal  No scleral icterus  Neck: Neck supple  No spinous process tenderness present  No edema present  Cardiovascular: Normal rate and regular rhythm  Pulmonary/Chest: Effort normal and breath sounds normal  No respiratory distress  She has no wheezes  She exhibits no tenderness  No breast tenderness  There is no supraclavicular axillary adenopathy palpable the skin in the radiated field is in good condition  No suspicious lesions palpable in either breast    Abdominal: Soft  Bowel sounds are normal  She exhibits no distension, no ascites and no mass  There is no hepatosplenomegaly  There is no tenderness  There is no rebound  Genitourinary: No breast tenderness  Musculoskeletal: Normal range of motion  She exhibits no edema or tenderness  She has diffuse musculoskeletal soreness   Lymphadenopathy:     She has no cervical adenopathy  She has no axillary adenopathy  Neurological: She is alert  She has normal strength  No cranial nerve deficit  Coordination and gait normal    Skin: Skin is intact  Psychiatric: She has a normal mood and affect  Her speech is normal and behavior is normal    Vitals reviewed  RESULTS    Lab Results: No results found for this or any previous visit (from the past 672 hour(s))  Imaging Studies:No results found  Assessment/Plan:  No orders of the defined types were placed in this encounter  Felipe Lion is a 74y o  year old female is 14 months status post adjuvant radiation therapy for left breast carcinoma  She has no clinical evidence of recurrence  She remains on letrozole  She will be due for mammogram in March  She will return for follow-up visit in 1 year and will be seeing Dr Estiven Almanzar in the interim  Natalee Heck MD  11/5/2019,11:29 AM    Portions of the record may have been created with voice recognition software   Occasional wrong word or "sound a like" substitutions may have occurred due to the inherent limitations of voice recognition software   Read the chart carefully and recognize, using context, where substitutions have occurred

## 2019-11-11 ENCOUNTER — OFFICE VISIT (OUTPATIENT)
Dept: FAMILY MEDICINE CLINIC | Facility: CLINIC | Age: 74
End: 2019-11-11
Payer: MEDICARE

## 2019-11-11 ENCOUNTER — APPOINTMENT (OUTPATIENT)
Dept: LAB | Facility: HOSPITAL | Age: 74
End: 2019-11-11
Attending: INTERNAL MEDICINE
Payer: MEDICARE

## 2019-11-11 VITALS
WEIGHT: 183 LBS | DIASTOLIC BLOOD PRESSURE: 70 MMHG | TEMPERATURE: 98 F | BODY MASS INDEX: 35.93 KG/M2 | SYSTOLIC BLOOD PRESSURE: 110 MMHG | HEIGHT: 60 IN | OXYGEN SATURATION: 98 % | RESPIRATION RATE: 16 BRPM | HEART RATE: 102 BPM

## 2019-11-11 DIAGNOSIS — Z79.891 LONG TERM CURRENT USE OF OPIATE ANALGESIC: ICD-10-CM

## 2019-11-11 DIAGNOSIS — M54.40 CHRONIC BILATERAL LOW BACK PAIN WITH SCIATICA, SCIATICA LATERALITY UNSPECIFIED: ICD-10-CM

## 2019-11-11 DIAGNOSIS — G56.03 BILATERAL CARPAL TUNNEL SYNDROME: Primary | ICD-10-CM

## 2019-11-11 DIAGNOSIS — N39.0 URINARY TRACT INFECTION WITHOUT HEMATURIA, SITE UNSPECIFIED: ICD-10-CM

## 2019-11-11 DIAGNOSIS — C50.312 MALIGNANT NEOPLASM OF LOWER-INNER QUADRANT OF LEFT BREAST IN FEMALE, ESTROGEN RECEPTOR POSITIVE (HCC): ICD-10-CM

## 2019-11-11 DIAGNOSIS — G89.29 CHRONIC BILATERAL LOW BACK PAIN WITH SCIATICA, SCIATICA LATERALITY UNSPECIFIED: ICD-10-CM

## 2019-11-11 DIAGNOSIS — Z17.0 MALIGNANT NEOPLASM OF LOWER-INNER QUADRANT OF LEFT BREAST IN FEMALE, ESTROGEN RECEPTOR POSITIVE (HCC): ICD-10-CM

## 2019-11-11 LAB
25(OH)D3 SERPL-MCNC: 85 NG/ML (ref 30–100)
ALBUMIN SERPL BCP-MCNC: 4.3 G/DL (ref 3–5.2)
ALP SERPL-CCNC: 84 U/L (ref 43–122)
ALT SERPL W P-5'-P-CCNC: 32 U/L (ref 9–52)
ANION GAP SERPL CALCULATED.3IONS-SCNC: 5 MMOL/L (ref 5–14)
AST SERPL W P-5'-P-CCNC: 37 U/L (ref 14–36)
BILIRUB SERPL-MCNC: 0.7 MG/DL
BUN SERPL-MCNC: 13 MG/DL (ref 5–25)
CALCIUM SERPL-MCNC: 9.5 MG/DL (ref 8.4–10.2)
CHLORIDE SERPL-SCNC: 103 MMOL/L (ref 97–108)
CO2 SERPL-SCNC: 32 MMOL/L (ref 22–30)
CREAT SERPL-MCNC: 0.65 MG/DL (ref 0.6–1.2)
EOSINOPHIL # BLD AUTO: 0.06 THOUSAND/UL (ref 0–0.4)
EOSINOPHIL NFR BLD MANUAL: 1 % (ref 0–6)
ERYTHROCYTE [DISTWIDTH] IN BLOOD BY AUTOMATED COUNT: 14.4 %
GFR SERPL CREATININE-BSD FRML MDRD: 100 ML/MIN/1.73SQ M
GLUCOSE P FAST SERPL-MCNC: 95 MG/DL (ref 70–99)
HCT VFR BLD AUTO: 38.3 % (ref 36–46)
HGB BLD-MCNC: 12.9 G/DL (ref 12–16)
LYMPHOCYTES # BLD AUTO: 2.17 THOUSAND/UL (ref 0.5–4)
LYMPHOCYTES # BLD AUTO: 35 % (ref 25–45)
MCH RBC QN AUTO: 29.4 PG (ref 26–34)
MCHC RBC AUTO-ENTMCNC: 33.6 G/DL (ref 31–36)
MCV RBC AUTO: 87 FL (ref 80–100)
MONOCYTES # BLD AUTO: 0.99 THOUSAND/UL (ref 0.2–0.9)
MONOCYTES NFR BLD AUTO: 16 % (ref 1–10)
NEUTS SEG # BLD: 2.98 THOUSAND/UL (ref 1.8–7.8)
NEUTS SEG NFR BLD AUTO: 48 %
PLATELET # BLD AUTO: 254 THOUSANDS/UL (ref 150–450)
PLATELET BLD QL SMEAR: ADEQUATE
PMV BLD AUTO: 8.4 FL (ref 8.9–12.7)
POTASSIUM SERPL-SCNC: 4.3 MMOL/L (ref 3.6–5)
PROT SERPL-MCNC: 7.8 G/DL (ref 5.9–8.4)
RBC # BLD AUTO: 4.39 MILLION/UL (ref 4–5.2)
RBC MORPH BLD: NORMAL
SODIUM SERPL-SCNC: 140 MMOL/L (ref 137–147)
TOTAL CELLS COUNTED SPEC: 100
WBC # BLD AUTO: 6.2 THOUSAND/UL (ref 4.5–11)

## 2019-11-11 PROCEDURE — 80053 COMPREHEN METABOLIC PANEL: CPT

## 2019-11-11 PROCEDURE — 80307 DRUG TEST PRSMV CHEM ANLYZR: CPT | Performed by: FAMILY MEDICINE

## 2019-11-11 PROCEDURE — 85027 COMPLETE CBC AUTOMATED: CPT

## 2019-11-11 PROCEDURE — 85007 BL SMEAR W/DIFF WBC COUNT: CPT

## 2019-11-11 PROCEDURE — 36415 COLL VENOUS BLD VENIPUNCTURE: CPT

## 2019-11-11 PROCEDURE — 99214 OFFICE O/P EST MOD 30 MIN: CPT | Performed by: FAMILY MEDICINE

## 2019-11-11 PROCEDURE — 82306 VITAMIN D 25 HYDROXY: CPT

## 2019-11-11 RX ORDER — DULOXETIN HYDROCHLORIDE 60 MG/1
CAPSULE, DELAYED RELEASE ORAL
Refills: 0 | COMMUNITY
Start: 2019-10-30 | End: 2020-06-15 | Stop reason: SDUPTHER

## 2019-11-11 RX ORDER — AMOXICILLIN 500 MG/1
500 CAPSULE ORAL EVERY 8 HOURS SCHEDULED
Qty: 30 CAPSULE | Refills: 0 | Status: SHIPPED | OUTPATIENT
Start: 2019-11-11 | End: 2019-11-21

## 2019-11-11 RX ORDER — NALOXONE HYDROCHLORIDE 4 MG/.1ML
0.1 SPRAY NASAL ONCE AS NEEDED
Qty: 0.1 ML | Refills: 11 | Status: SHIPPED | OUTPATIENT
Start: 2019-11-11 | End: 2020-04-16 | Stop reason: ALTCHOICE

## 2019-11-11 RX ORDER — OXYCODONE HYDROCHLORIDE AND ACETAMINOPHEN 5; 325 MG/1; MG/1
1 TABLET ORAL
Qty: 30 TABLET | Refills: 0 | Status: SHIPPED | OUTPATIENT
Start: 2019-11-11 | End: 2019-12-11

## 2019-11-11 RX ORDER — MEMANTINE HYDROCHLORIDE 5 MG/1
TABLET ORAL
COMMUNITY
Start: 2019-11-11 | End: 2020-06-15 | Stop reason: ALTCHOICE

## 2019-11-11 NOTE — ASSESSMENT & PLAN NOTE
Empirically treatment with amoxicillin, we are going to assess response in the next few days  Patient will call this office to inform results

## 2019-11-11 NOTE — ASSESSMENT & PLAN NOTE
Multiple therapies failed to provide patient relief for the pain  The main problem she cannot sleep due to the pain  The vein level pain is severe and patient will use Percocet at bedtime as needed  She was advise about the use of Narcan (naloxone) if accidental overdose  I reviewed the [place] drug monitoring program and she is consistent with the prescriptions

## 2019-11-11 NOTE — ASSESSMENT & PLAN NOTE
We discussed about the carefulness of taking this medication  The use of Narcan (naloxone) in case that or does happens    Patient did not have any problems in the past inches compliance with the therapy as per the numbers of pills that she still has from September 30, 2019

## 2019-11-11 NOTE — PROGRESS NOTES
Assessment/Plan:    Carpal tunnel syndrome  Multiple therapies failed to provide patient relief for the pain  The main problem she cannot sleep due to the pain  The vein level pain is severe and patient will use Percocet at bedtime as needed  She was advise about the use of Narcan (naloxone) if accidental overdose  I reviewed the [place] drug monitoring program and she is consistent with the prescriptions  Long term current use of opiate analgesic  We discussed about the carefulness of taking this medication  The use of Narcan (naloxone) in case that or does happens  Patient did not have any problems in the past inches compliance with the therapy as per the numbers of pills that she still has from September 30, 2019    Urinary tract infection without hematuria  Empirically treatment with amoxicillin, we are going to assess response in the next few days  Patient will call this office to inform results  Diagnoses and all orders for this visit:    Bilateral carpal tunnel syndrome  -     oxyCODONE-acetaminophen (PERCOCET) 5-325 mg per tablet; Take 1 tablet by mouth daily at bedtime as needed for moderate pain (for severe pain)Max Daily Amount: 1 tablet    Urinary tract infection without hematuria, site unspecified  -     amoxicillin (AMOXIL) 500 mg capsule; Take 1 capsule (500 mg total) by mouth every 8 (eight) hours for 10 days    Long term current use of opiate analgesic  -     Naloxone HCl (NARCAN) 4 MG/0 1ML LIQD; 0 1 mL (4 mg total) into each nostril once as needed (for drug overdose) for up to 12 doses  -     Toxicology screen, urine    Other orders  -     memantine (NAMENDA) 5 mg tablet          Subjective:      Patient ID: Elaine Melo is a 74 y o  female  Patient is here to follow-up carpal tunnel syndrome  She has surgery in both hands still having pain at bedtime rated 10/10  Patient states she cannot sleep with a severe pain  She takes Percocet only as needed    The last time that we prescribed in I colic form of analgesic  was on September 30, 2019  The following portions of the patient's history were reviewed and updated as appropriate: allergies, current medications, past family history, past medical history, past social history, past surgical history and problem list     Review of Systems   Constitutional: Negative for diaphoresis, fatigue, fever and unexpected weight change  Respiratory: Negative for cough, chest tightness, shortness of breath and wheezing  Cardiovascular: Negative for chest pain, palpitations and leg swelling  Gastrointestinal: Negative for abdominal pain, blood in stool, nausea and vomiting  Genitourinary:        She is complaining of three days of burning sensation with urination  She denies fever or chills  Musculoskeletal: Positive for arthralgias and gait problem  Neurological: Negative for dizziness, syncope, light-headedness and headaches  Hematological: Does not bruise/bleed easily  Psychiatric/Behavioral: Negative for behavioral problems, self-injury and sleep disturbance  The patient is not nervous/anxious  Objective:      /70 (BP Location: Left arm, Patient Position: Sitting, Cuff Size: Standard)   Pulse 102   Temp 98 °F (36 7 °C) (Oral)   Resp 16   Ht 5' (1 524 m)   Wt 83 kg (183 lb)   SpO2 98%   Breastfeeding? No   BMI 35 74 kg/m²          Physical Exam   Constitutional:   Body mass index is 35 74 kg/m²  HENT:   Nose: Nose normal    Mouth/Throat: Oropharynx is clear and moist  No oropharyngeal exudate  Eyes: Pupils are equal, round, and reactive to light  EOM are normal  Right eye exhibits no discharge  Left eye exhibits no discharge  No scleral icterus  Cardiovascular: Normal rate, regular rhythm, normal heart sounds and intact distal pulses  Exam reveals no friction rub  No murmur heard  Pulmonary/Chest: Effort normal  No respiratory distress  She has no wheezes  She has no rales   She exhibits no tenderness  Musculoskeletal: She exhibits tenderness (She is having pain both hands and difficult to grasp )  Neurological: She is alert  Skin: Skin is warm and dry  No rash noted  No erythema  Psychiatric: She has a normal mood and affect  Her behavior is normal    Nursing note and vitals reviewed

## 2019-11-12 LAB
AMPHETAMINES UR QL SCN: NEGATIVE NG/ML
BARBITURATES UR QL SCN: NEGATIVE NG/ML
BENZODIAZ UR QL: NEGATIVE NG/ML
BZE UR QL: NEGATIVE NG/ML
CANNABINOIDS UR QL SCN: NEGATIVE NG/ML
METHADONE UR QL SCN: NEGATIVE NG/ML
OPIATES UR QL: NEGATIVE NG/ML
PCP UR QL: NEGATIVE NG/ML
PROPOXYPH UR QL SCN: NEGATIVE NG/ML

## 2019-11-22 DIAGNOSIS — G56.03 BILATERAL CARPAL TUNNEL SYNDROME: ICD-10-CM

## 2019-11-25 RX ORDER — GABAPENTIN 300 MG/1
300 CAPSULE ORAL 3 TIMES DAILY
Qty: 90 CAPSULE | Refills: 0 | Status: SHIPPED | OUTPATIENT
Start: 2019-11-25 | End: 2020-03-09

## 2019-12-09 DIAGNOSIS — E78.2 MIXED HYPERLIPIDEMIA: ICD-10-CM

## 2019-12-09 RX ORDER — ATORVASTATIN CALCIUM 40 MG/1
TABLET, FILM COATED ORAL
Qty: 30 TABLET | Refills: 0 | Status: SHIPPED | OUTPATIENT
Start: 2019-12-09 | End: 2020-02-03

## 2019-12-19 ENCOUNTER — DOCUMENTATION (OUTPATIENT)
Dept: HEMATOLOGY ONCOLOGY | Facility: CLINIC | Age: 74
End: 2019-12-19

## 2019-12-20 ENCOUNTER — OFFICE VISIT (OUTPATIENT)
Dept: HEMATOLOGY ONCOLOGY | Facility: CLINIC | Age: 74
End: 2019-12-20
Payer: MEDICARE

## 2019-12-20 ENCOUNTER — HOSPITAL ENCOUNTER (OUTPATIENT)
Dept: INFUSION CENTER | Facility: HOSPITAL | Age: 74
Discharge: HOME | End: 2019-12-20
Attending: INTERNAL MEDICINE
Payer: MEDICARE

## 2019-12-20 VITALS
WEIGHT: 176.2 LBS | DIASTOLIC BLOOD PRESSURE: 74 MMHG | OXYGEN SATURATION: 97 % | SYSTOLIC BLOOD PRESSURE: 110 MMHG | HEART RATE: 96 BPM | TEMPERATURE: 97.5 F | HEIGHT: 60 IN | RESPIRATION RATE: 16 BRPM | BODY MASS INDEX: 34.59 KG/M2

## 2019-12-20 DIAGNOSIS — Z17.0 MALIGNANT NEOPLASM OF LOWER-INNER QUADRANT OF LEFT BREAST IN FEMALE, ESTROGEN RECEPTOR POSITIVE (HCC): ICD-10-CM

## 2019-12-20 DIAGNOSIS — Z79.811 USE OF AROMATASE INHIBITORS: ICD-10-CM

## 2019-12-20 DIAGNOSIS — C50.312 MALIGNANT NEOPLASM OF LOWER-INNER QUADRANT OF LEFT BREAST IN FEMALE, ESTROGEN RECEPTOR POSITIVE (HCC): ICD-10-CM

## 2019-12-20 DIAGNOSIS — Z17.0 MALIGNANT NEOPLASM OF LOWER-INNER QUADRANT OF LEFT BREAST IN FEMALE, ESTROGEN RECEPTOR POSITIVE (HCC): Primary | ICD-10-CM

## 2019-12-20 DIAGNOSIS — C50.312 MALIGNANT NEOPLASM OF LOWER-INNER QUADRANT OF LEFT BREAST IN FEMALE, ESTROGEN RECEPTOR POSITIVE (HCC): Primary | ICD-10-CM

## 2019-12-20 DIAGNOSIS — Z79.811 USE OF AROMATASE INHIBITORS: Primary | ICD-10-CM

## 2019-12-20 DIAGNOSIS — R91.8 ABNORMAL CT SCAN OF LUNG: ICD-10-CM

## 2019-12-20 DIAGNOSIS — M85.89 OSTEOPENIA OF MULTIPLE SITES: ICD-10-CM

## 2019-12-20 PROCEDURE — 99214 OFFICE O/P EST MOD 30 MIN: CPT | Performed by: NURSE PRACTITIONER

## 2019-12-20 PROCEDURE — 96401 CHEMO ANTI-NEOPL SQ/IM: CPT

## 2019-12-20 RX ORDER — GABAPENTIN 600 MG/1
TABLET ORAL
COMMUNITY
Start: 2019-11-11 | End: 2020-04-16 | Stop reason: ALTCHOICE

## 2019-12-20 RX ADMIN — DENOSUMAB 60 MG: 60 INJECTION SUBCUTANEOUS at 10:57

## 2019-12-20 NOTE — PROGRESS NOTES
Hematology/Oncology Outpatient Follow-up  Lana Vallejo 74 y o  female 1945 [id number]    Date:  12/20/2019      Assessment and Plan:  1  Malignant neoplasm of 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, estrogen receptor positive ([address] )  Patient continues to have multiple complaints and symptoms including dizziness, poor appetite and significant arthralgias and myalgias  She believes that she may be fighting a cold or viral infection  I am not convinced that her symptoms are due to her aromatase inhibitor  She has exaggerated pain response to light touch on multiple tender points raising suspicion for the diagnosis of fibromyalgia  The patient states that she has an appointment with her PCP later today and was asked to discuss her possible viral infection/symptoms with him/her  She will be continued on the Aromasin on a daily basis for now and follow up in about 3 months for close monitoring  She was advised to continue her calcium supplement as she is taking and try to consume a healthy well-balanced diet along with walking/exercising on a regular basis  - CBC and differential; Future  - Comprehensive metabolic panel; Future    2  Use of aromatase inhibitors  Patient never went for her DEXA scan that was ordered/due for August 2019 for some reason  We will reschedule the test today  She will be continued on her Prolia injections every 6 months and is due to after the visit  3  Abnormal CT scan of lung  Most recent CT scan from September 13th 2019 showed decreased prominence of the subpleural ground-glass opacity in the anterior left upper lobe likely reflecting post treatment changes  Recommended 12 month follow-up to ensure stability  HPI:  Patient presents today for follow-up visit she is Ivorian-speaking interpretation done via [employer] system #[id number]    The patient reports that for the past week she has been feeling increased dizziness, poor appetite and believes that she may have a cold that she has congestion and cough  She admits that she is not drinking as much water as she should  The patient continues to multiple chronic complaints including pain in her wrists and states she is currently taking oxycodone for the pain which is helping  She has lost about 7 lb over the past month  She continues to take her Aromasin on a daily basis; patient physically showed me her medications which confirmed that she is taking the Aromasin and the calcium  She is up-to-date with her mammograms and will be due again in March 2020  Most recent laboratory studies from 11/11/2019 showed normal CBC, vitamin-D, AST barely above average 37 remaining CMP normal     Oncology History    Patient had bilateral mammogram October 2017 which showed a breast density with a left-sided asymmetry   An ultrasound was done which revealed 1 5 cm solid mass   Ultrasound-guided core biopsy was done January 24, 2018 the pathology revealed infiltrating lobular carcinoma grade 1   ER strongly positive 90% IA-0% and her 2 Jaylen negative as well  She had her definitive surgery April 3, 2018 as well as 2 sentinel lymph nodes from the left axilla which were both negative for metastatic disease   Her tumor was 2 8 cm and compatible with invasive lobular type adenocarcinoma   The lymphovascular invasion was not present grade 1 with multiple foci of lobular carcinoma in situ   The anterior margin was focally involved with invasive malignancy  Celeste Godoy final pathologic stage was to a PT2PN0 MX G1   Oncotype recurrence score came back 13 putting her in the low risk category      Prolia injections every 6 months to start July of 2018  [facility] has a history of osteopenia and will require long-term use of an aromatase inhibitor          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 ([facility] )    1/24/2018 Initial Diagnosis     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 (HCC)  Stage IA (pT2, pN0, cM0, G1, ER: Positive, WI: Positive, HER2: Negative)        1/24/2018 Biopsy     Left breast ultrasound guided core biopsies  Infiltrating lobular carcinoma  Grade 1  ER 98% positive, WI negative, HER2 negative        4/3/2018 Surgery     Left partial mastectomy with 2 sentinel node biopsy    Size at least 2 1 cm  Multiple foci of LCIS  LCIS present at superior margin  Invasive malignancy is also noted in close proximity to the superior margin of resection    Pathological staging: stage IIA pT2, pN0 sn, pMX, G1    - Dr Domingo Devlin      4/3/2018 Genomic Testing     Oncotype DX    Recurrence score 13, 10 year risk of distance recurrence  SANCHEZ alone 8%  ER score 10 6 positive  WI score 5 1 negative  HER2 9 6 negative       6/21/2018 - 8/27/2018 Radiation     Plan ID Energy Fractions Dose per Fraction (cGy) Dose Correction (cGy) Total Dose Delivered (cGy) Elapsed Days   FB L Breas # 10X/6X 28 / 28 180 0 5,040 54   L Brst Boost 12E 8 / 8 200 0 1,600 12      Treatment dates:  6/21/2018 - 8/27/2018     - Dr Love Dill     1  Anastrozole 5/16/18- 2/27/19 (d/c due to significant arthralgias)  2  Letrozole started 2/27/19  3  Aromasin started 9/2019        Malignant neoplasm of 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, estrogen receptor positive ([facility] [address] )    6/20/2019 Initial Diagnosis     Malignant neoplasm of 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, estrogen receptor positive (HCC)         Interval history:    ROS: Review of Systems   Constitutional: Positive for activity change, appetite change and fatigue  Negative for chills, fever and unexpected weight change  HENT: Positive for hearing loss and trouble swallowing  Negative for congestion, mouth sores, nosebleeds and sore throat  Eyes: Negative  Respiratory: Positive for cough and shortness of breath  Negative for chest tightness  Cardiovascular: Negative for chest pain, palpitations and leg swelling     Gastrointestinal: Positive for constipation, diarrhea and nausea  Negative for abdominal distention, abdominal pain, blood in stool and vomiting  Genitourinary: Positive for dysuria  Negative for difficulty urinating, frequency, hematuria and urgency  Musculoskeletal: Positive for arthralgias and myalgias  Negative for back pain, gait problem and joint swelling  Skin: Positive for rash  Negative for color change and pallor  Neurological: Positive for dizziness, numbness (And tingling moderate amount) and headaches  Negative for weakness  Hematological: Negative for adenopathy  Does not bruise/bleed easily  Psychiatric/Behavioral: Positive for dysphoric mood and sleep disturbance  The patient is nervous/anxious          Past Medical History:   Diagnosis Date    Anxiety     Arthritis     [facility] ) 2018    left    Chronic pain disorder     Depression     Depression     Diabetes mellitus ([facility] )     Hepatic steatosis     Hypercholesterolemia     Hypertension     Irritable bowel syndrome     Migraine     Type 2 diabetes mellitus with hyperglycemia, without long-term current use of insulin ([facility] ) 3/22/2019       Past Surgical History:   Procedure Laterality Date    BREAST BIOPSY Left 2011    benign    BREAST BIOPSY Left 2011    benign    BREAST LUMPECTOMY Left 2018    malignant    CARPAL TUNNEL RELEASE Right     right CTR    CARPAL TUNNEL RELEASE Left 2018     SECTION      CHOLECYSTECTOMY      MASTECTOMY Left     sentinae node mapping    [place] INCISE FINGER TENDON SHEATH Left 2018    Procedure: RELEASE 3RD TRIGGER FINGER  ;  Surgeon: Ludin Justice MD;  Location: [address] [place] [address];  Service: Plastics    [place] WRIST [place] LIG Left 2018    Procedure: [address];  Surgeon: Ludin Justice MD;  Location: [address] [place] [address];  Service: Plastics    SENTINEL LYMPH NODE BIOPSY      TRIGGER FINGER RELEASE Left 2018       Social History Socioeconomic History    Marital status: /Civil Union     Spouse name: None    Number of children: None    Years of education: None    Highest education level: None   Occupational History    None   Social Needs    Financial resource strain: None    Food insecurity:     Worry: None     Inability: None    Transportation needs:     Medical: None     Non-medical: None   Tobacco Use    Smoking status: Passive Smoke Exposure - Never Smoker    Smokeless tobacco: Never Used   Substance and Sexual Activity    Alcohol use: No    Drug use: No    Sexual activity: Yes     Partners: Male   Lifestyle    Physical activity:     Days per week: None     Minutes per session: None    Stress: None   Relationships    Social connections:     Talks on phone: None     Gets together: None     Attends Alevism service: None     Active member of club or organization: None     Attends meetings of clubs or organizations: None     Relationship status: None    Intimate partner violence:     Fear of current or ex partner: None     Emotionally abused: None     Physically abused: None     Forced sexual activity: None   Other Topics Concern    None   Social History Narrative    None       Family History   Problem Relation Age of Onset    Heart disease Mother         cardiovascular disease    Heart disease Father        Allergies   Allergen Reactions    Chocolate     Chocolate Flavor     Ibuprofen Swelling    Pork-Derived Products          Current Outpatient Medications:     atorvastatin (LIPITOR) 40 mg tablet, TAKE ONE TABLET BY MOUTH AT BEDTIMETOMAR 1 TABLETA POR VIA ORAL ANTES DE DORMIR EN LA NOCHE, Disp: 30 tablet, Rfl: 0    CALCIUM 600 600 MG tablet, TAKE 1 TABLET (600 MG TOTAL) BY MOUTH 2 (TWO) TIMES A DAY WITH A MEAL, Disp: 90 tablet, Rfl: 3    celecoxib (CeleBREX) 200 mg capsule, TAKE ONE CAPSULE BY MOUTH DAILYTOMAR CAROLEE CAPSULA POR VIA ORAL DIARIAMENTE, Disp: 30 capsule, Rfl: 5    DULoxetine (CYMBALTA) 60 mg delayed release capsule, TAKE ONE CAPSULE BY MOUTH DAILY DINH CAROLEE CAPSULA POR VIA ORAL DIARIAMENTE, Disp: , Rfl: 0    exemestane (AROMASIN) 25 MG tablet, TAKE 1 TABLET (25 MG TOTAL) BY MOUTH DAILY, Disp: 30 tablet, Rfl: 3    gabapentin (NEURONTIN) 300 mg capsule, TAKE 1 CAPSULE (300 MG TOTAL) BY MOUTH 3 (THREE) TIMES A DAY, Disp: 90 capsule, Rfl: 0    memantine (NAMENDA) 5 mg tablet, , Disp: , Rfl:     Naloxone HCl (NARCAN) 4 MG/0 1ML LIQD, 0 1 mL (4 mg total) into each nostril once as needed (for drug overdose) for up to 12 doses, Disp: 0 1 mL, Rfl: 11    oxyCODONE-acetaminophen (PERCOCET) 5-325 mg per tablet, Take 1 table at bed time as needed for severe pain, Disp: 30 tablet, Rfl: 0    acetaminophen (TYLENOL) 650 mg CR tablet, Take 1 tablet (650 mg total) by mouth every 8 (eight) hours as needed for mild pain (Patient not taking: Reported on 6/20/2019), Disp: 90 tablet, Rfl: 5    gabapentin (NEURONTIN) 600 MG tablet, , Disp: , Rfl:   No current facility-administered medications for this visit  Physical Exam:  /74 (BP Location: Right arm, Patient Position: Sitting, Cuff Size: Adult)   Pulse 96   Temp 97 5 °F (36 4 °C) (Tympanic)   Resp 16   Ht 5' (1 524 m)   Wt 79 9 kg (176 lb 3 2 oz)   SpO2 97%   BMI 34 41 kg/m²     Physical Exam   Constitutional: She is oriented to person, place, and time  She appears well-developed and well-nourished  She appears distressed  HENT:   Head: Normocephalic and atraumatic  Mouth/Throat: Oropharynx is clear and moist  No oropharyngeal exudate  Eyes: Pupils are equal, round, and reactive to light  Conjunctivae are normal  No scleral icterus  Neck: Normal range of motion  Neck supple  No thyromegaly present  Cardiovascular: Normal rate, regular rhythm, normal heart sounds and intact distal pulses  No murmur heard  Pulmonary/Chest: Effort normal and breath sounds normal  No respiratory distress  Abdominal: Soft   Bowel sounds are normal  She exhibits no distension  There is no hepatosplenomegaly  There is no tenderness  Musculoskeletal: Normal range of motion  She exhibits no edema  Patient has exaggerated pain response to light touch multiple tender points   Lymphadenopathy:     She has no cervical adenopathy  She has no axillary adenopathy  Neurological: She is alert and oriented to person, place, and time  Skin: Skin is warm and dry  No rash noted  She is not diaphoretic  No erythema  No pallor  Psychiatric: Her behavior is normal  Judgment and thought content normal  Her affect is blunt  She exhibits a depressed mood  Vitals reviewed  Labs:  Lab Results   Component Value Date    WBC 6 20 11/11/2019    HGB 12 9 11/11/2019    HCT 38 3 11/11/2019    MCV 87 11/11/2019     11/11/2019     Lab Results   Component Value Date    K 4 3 11/11/2019     11/11/2019    CO2 32 (H) 11/11/2019    BUN 13 11/11/2019    CREATININE 0 65 11/11/2019    GLUF 95 11/11/2019    CALCIUM 9 5 11/11/2019    AST 37 (H) 11/11/2019    ALT 32 11/11/2019    ALKPHOS 84 11/11/2019    EGFR 100 11/11/2019         Patient voiced understanding and agreement in the above discussion  Aware to contact our office with questions/symptoms in the interim  This note has been generated by voice recognition software system  Therefore, there may be spelling, grammar, and or syntax errors  Please contact if questions arise

## 2019-12-23 DIAGNOSIS — C50.312 MALIGNANT NEOPLASM OF LOWER-INNER QUADRANT OF LEFT BREAST IN FEMALE, ESTROGEN RECEPTOR POSITIVE (HCC): ICD-10-CM

## 2019-12-23 DIAGNOSIS — Z17.0 MALIGNANT NEOPLASM OF LOWER-INNER QUADRANT OF LEFT BREAST IN FEMALE, ESTROGEN RECEPTOR POSITIVE (HCC): ICD-10-CM

## 2019-12-23 RX ORDER — EXEMESTANE 25 MG/1
25 TABLET ORAL DAILY
Qty: 30 TABLET | Refills: 5 | Status: SHIPPED | OUTPATIENT
Start: 2019-12-23 | End: 2020-05-07
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
## 2020-01-03 RX ORDER — CELECOXIB 200 MG/1
CAPSULE ORAL
Qty: 30 CAPSULE | Refills: 0 | Status: SHIPPED | OUTPATIENT
Start: 2020-01-03 | End: 2020-02-03

## 2020-01-31 DIAGNOSIS — E78.2 MIXED HYPERLIPIDEMIA: ICD-10-CM
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
## 2020-02-03 RX ORDER — ATORVASTATIN CALCIUM 40 MG/1
TABLET, FILM COATED ORAL
Qty: 30 TABLET | Refills: 0 | Status: SHIPPED | OUTPATIENT
Start: 2020-02-03 | End: 2020-03-01

## 2020-02-03 RX ORDER — CELECOXIB 200 MG/1
CAPSULE ORAL
Qty: 30 CAPSULE | Refills: 0 | Status: SHIPPED | OUTPATIENT
Start: 2020-02-03 | End: 2020-02-10 | Stop reason: ALTCHOICE

## 2020-02-10 ENCOUNTER — OFFICE VISIT (OUTPATIENT)
Dept: FAMILY MEDICINE CLINIC | Facility: CLINIC | Age: 75
End: 2020-02-10
Payer: MEDICARE

## 2020-02-10 VITALS
SYSTOLIC BLOOD PRESSURE: 110 MMHG | TEMPERATURE: 98.1 F | OXYGEN SATURATION: 96 % | RESPIRATION RATE: 16 BRPM | HEIGHT: 60 IN | HEART RATE: 92 BPM | BODY MASS INDEX: 35.34 KG/M2 | DIASTOLIC BLOOD PRESSURE: 70 MMHG | WEIGHT: 180 LBS

## 2020-02-10 DIAGNOSIS — Z17.0 MALIGNANT NEOPLASM OF LOWER-INNER QUADRANT OF LEFT BREAST IN FEMALE, ESTROGEN RECEPTOR POSITIVE (HCC): ICD-10-CM

## 2020-02-10 DIAGNOSIS — R73.9 HYPERGLYCEMIA: Primary | ICD-10-CM

## 2020-02-10 DIAGNOSIS — I73.9 PERIPHERAL VASCULAR DISEASE (HCC): ICD-10-CM

## 2020-02-10 DIAGNOSIS — G63 NEUROPATHY ASSOCIATED WITH CANCER (HCC): ICD-10-CM

## 2020-02-10 DIAGNOSIS — C80.1 NEUROPATHY ASSOCIATED WITH CANCER (HCC): ICD-10-CM

## 2020-02-10 DIAGNOSIS — G90.513 COMPLEX REGIONAL PAIN SYNDROME TYPE 1 AFFECTING BOTH HANDS: ICD-10-CM

## 2020-02-10 DIAGNOSIS — C50.312 MALIGNANT NEOPLASM OF LOWER-INNER QUADRANT OF LEFT BREAST IN FEMALE, ESTROGEN RECEPTOR POSITIVE (HCC): ICD-10-CM

## 2020-02-10 DIAGNOSIS — E78.2 MIXED HYPERLIPIDEMIA: ICD-10-CM

## 2020-02-10 PROCEDURE — 1160F RVW MEDS BY RX/DR IN RCRD: CPT | Performed by: FAMILY MEDICINE

## 2020-02-10 PROCEDURE — 4040F PNEUMOC VAC/ADMIN/RCVD: CPT | Performed by: FAMILY MEDICINE

## 2020-02-10 PROCEDURE — 1036F TOBACCO NON-USER: CPT | Performed by: FAMILY MEDICINE

## 2020-02-10 PROCEDURE — 99214 OFFICE O/P EST MOD 30 MIN: CPT | Performed by: FAMILY MEDICINE

## 2020-02-10 RX ORDER — OXYCODONE HYDROCHLORIDE AND ACETAMINOPHEN 5; 325 MG/1; MG/1
TABLET ORAL
Qty: 30 TABLET | Refills: 0 | Status: SHIPPED | OUTPATIENT
Start: 2020-02-10 | End: 2020-03-18 | Stop reason: SDUPTHER

## 2020-02-10 RX ORDER — ERGOCALCIFEROL 1.25 MG/1
50000 CAPSULE ORAL WEEKLY
COMMUNITY
Start: 2019-12-17 | End: 2020-02-25

## 2020-02-10 NOTE — PROGRESS NOTES
Assessment/Plan:  1  Mixed hyperlipidemia  Patient has hyperlipidemia , current recommendations are exercise and decrease  saturated fat in his diet  Will repeat that this labs and make further decisions in base non-HDL numbers and the vascular disease risks  - Lipid panel; Future    2  Hyperglycemia  Patient has elevation of blood sugar without diagnosis of diabetes  Are going to check hemoglobin A1c today     - HEMOGLOBIN A1C W/ EAG ESTIMATION; Future    3  Neuropathy associated with cancer [facility])  The choice of NSAID's and gabapentin in this patient depends of her current pain syndrome  I explained to patient that response of NSAIDs varies between patient's and also is differs in regarding to the area of the body that is affected in the progression of the damage  The drug interactions and comorbidities affected  by these medications were explained to the patient also; the caution in toxicity in the GI tract was also discussed  Prevent the long-term use of these medications may be wise  The use ice and physical therapy is necessary in order to get the best results  NSAID's might elevate BP, or block effect of certain antihypertensive agents  It is to used with caution  Always use ice and therapy to decrease or avoid the need for a Pharmacological agent  4  Peripheral vascular disease ([address] )  Condition is stable with current treatment, to  continue the same  Controlling risks  A challenge with education  5  Malignant neoplasm of 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, estrogen receptor positive ([address] )  function was encouraged to continue care with Oncologist       6  Complex regional pain syndrome type 1 affecting both hands  - oxyCODONE-acetaminophen (PERCOCET) 5-325 mg per tablet; Take 1 table at bed time as needed for severe pain  Dispense: 30 tablet;  Refill: 0  I reviewed [facility] and i spoke with patient regarding use of such drug, current issues with diversion and abuse of drugs  Patient understood the facts of missuse of prescribed medication may cause death of herself or other in diversion is the case  We will monitor medications with a ramdonized call to bring pills to be counted and make sure there is no diversion  Urine screnn also might be requested  No problem-specific Assessment & Plan notes found for this encounter  Diagnoses and all orders for this visit:    Hyperglycemia  -     HEMOGLOBIN A1C W/ EAG ESTIMATION; Future    Mixed hyperlipidemia  -     Lipid panel; Future    Neuropathy associated with cancer ([facility] )    Peripheral vascular disease (HCC)    Malignant neoplasm of 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, estrogen receptor positive ([facility] )    Other orders  -     ergocalciferol (VITAMIN D2) 50,000 units; Take 50,000 Units by mouth once a week          Subjective:      Patient ID: Garold Favre is a 66 y o  female  Patient is here complaining of left arm pain and dizziness  Unable to sleep due to pain in her wrists  She used to do well at bed time when taking percocet  She had bleeding of rectum with naproxen in the past  Tylenol not doing its job  Level of pain is 7/10, worsening duering night, failed to get improved under previous surgery and use of braces, She would like to try Medical Marijuana but she can not afford it  The following portions of the patient's history were reviewed and updated as appropriate: allergies, current medications, past family history, past medical history, past social history, past surgical history and problem list     Review of Systems   Constitutional: Negative for diaphoresis, fatigue, fever and unexpected weight change  Respiratory: Negative for cough, chest tightness, shortness of breath and wheezing  Cardiovascular: Negative for chest pain, palpitations and leg swelling  Gastrointestinal: Negative for abdominal pain, blood in stool and constipation  Musculoskeletal: Positive for myalgias (left arm)  Neurological: Positive for dizziness  Negative for syncope, light-headedness and headaches  Hematological: Does not bruise/bleed easily  Psychiatric/Behavioral: Negative for behavioral problems, self-injury and sleep disturbance  The patient is not nervous/anxious  Objective:      /70 (BP Location: Left arm, Patient Position: Sitting, Cuff Size: Standard)   Pulse 92   Temp 98 1 °F (36 7 °C) (Oral)   Resp 16   Ht 5' (1 524 m)   Wt 81 6 kg (180 lb)   SpO2 96%   Breastfeeding No   BMI 35 15 kg/m²          Physical Exam   HENT:   Head: Normocephalic  Eyes: Pupils are equal, round, and reactive to light  EOM are normal    Neck: Neck supple  Cardiovascular: Normal rate and regular rhythm  Pulmonary/Chest: Effort normal    Abdominal: Soft  There is tenderness  Musculoskeletal: Normal range of motion  She exhibits tenderness  Neurological: She is alert  A sensory deficit (and weakness of hands and wrists ) is present  Skin: Skin is warm and dry  Psychiatric: She has a normal mood and affect  Her behavior is normal    Nursing note and vitals reviewed

## 2020-02-14 ENCOUNTER — LAB (OUTPATIENT)
Dept: LAB | Facility: HOSPITAL | Age: 75
End: 2020-02-14
Payer: MEDICARE

## 2020-02-14 DIAGNOSIS — Z17.0 MALIGNANT NEOPLASM OF LOWER-INNER QUADRANT OF LEFT BREAST IN FEMALE, ESTROGEN RECEPTOR POSITIVE (HCC): ICD-10-CM

## 2020-02-14 DIAGNOSIS — Z79.811 USE OF AROMATASE INHIBITORS: ICD-10-CM

## 2020-02-14 DIAGNOSIS — E78.2 MIXED HYPERLIPIDEMIA: ICD-10-CM

## 2020-02-14 DIAGNOSIS — M85.89 OSTEOPENIA OF MULTIPLE SITES: ICD-10-CM

## 2020-02-14 DIAGNOSIS — C50.312 MALIGNANT NEOPLASM OF LOWER-INNER QUADRANT OF LEFT BREAST IN FEMALE, ESTROGEN RECEPTOR POSITIVE (HCC): ICD-10-CM

## 2020-02-14 DIAGNOSIS — R73.9 HYPERGLYCEMIA: ICD-10-CM

## 2020-02-14 LAB
ALBUMIN SERPL BCP-MCNC: 3.8 G/DL (ref 3–5.2)
ALP SERPL-CCNC: 84 U/L (ref 43–122)
ALT SERPL W P-5'-P-CCNC: 31 U/L (ref 9–52)
ANION GAP SERPL CALCULATED.3IONS-SCNC: 8 MMOL/L (ref 5–14)
AST SERPL W P-5'-P-CCNC: 33 U/L (ref 14–36)
BILIRUB SERPL-MCNC: 0.4 MG/DL
BUN SERPL-MCNC: 15 MG/DL (ref 5–25)
CALCIUM SERPL-MCNC: 9.3 MG/DL (ref 8.4–10.2)
CHLORIDE SERPL-SCNC: 102 MMOL/L (ref 97–108)
CHOLEST SERPL-MCNC: 141 MG/DL
CO2 SERPL-SCNC: 29 MMOL/L (ref 22–30)
CREAT SERPL-MCNC: 0.6 MG/DL (ref 0.6–1.2)
EST. AVERAGE GLUCOSE BLD GHB EST-MCNC: 120 MG/DL
GFR SERPL CREATININE-BSD FRML MDRD: 102 ML/MIN/1.73SQ M
GLUCOSE P FAST SERPL-MCNC: 91 MG/DL (ref 70–99)
HBA1C MFR BLD: 5.8 %
HDLC SERPL-MCNC: 50 MG/DL
LDLC SERPL CALC-MCNC: 62 MG/DL
NONHDLC SERPL-MCNC: 91 MG/DL
POTASSIUM SERPL-SCNC: 4.1 MMOL/L (ref 3.6–5)
PROT SERPL-MCNC: 7.2 G/DL (ref 5.9–8.4)
SODIUM SERPL-SCNC: 139 MMOL/L (ref 137–147)
TRIGL SERPL-MCNC: 144 MG/DL

## 2020-02-14 PROCEDURE — 80053 COMPREHEN METABOLIC PANEL: CPT

## 2020-02-14 PROCEDURE — 36415 COLL VENOUS BLD VENIPUNCTURE: CPT

## 2020-02-14 PROCEDURE — 83036 HEMOGLOBIN GLYCOSYLATED A1C: CPT

## 2020-02-14 PROCEDURE — 80061 LIPID PANEL: CPT

## 2020-02-24 DIAGNOSIS — M85.89 OSTEOPENIA OF MULTIPLE SITES: Primary | ICD-10-CM

## 2020-02-25 RX ORDER — ERGOCALCIFEROL 1.25 MG/1
CAPSULE ORAL
Qty: 8 CAPSULE | Refills: 3 | Status: SHIPPED | OUTPATIENT
Start: 2020-02-25 | End: 2021-10-26 | Stop reason: SDUPTHER

## 2020-02-28 DIAGNOSIS — E78.2 MIXED HYPERLIPIDEMIA: ICD-10-CM

## 2020-03-01 RX ORDER — ATORVASTATIN CALCIUM 40 MG/1
TABLET, FILM COATED ORAL
Qty: 30 TABLET | Refills: 0 | Status: SHIPPED | OUTPATIENT
Start: 2020-03-01 | End: 2020-03-28

## 2020-03-08 DIAGNOSIS — G56.03 BILATERAL CARPAL TUNNEL SYNDROME: ICD-10-CM

## 2020-03-09 RX ORDER — GABAPENTIN 300 MG/1
300 CAPSULE ORAL 3 TIMES DAILY
Qty: 270 CAPSULE | Refills: 3 | Status: SHIPPED | OUTPATIENT
Start: 2020-03-09 | End: 2020-07-27 | Stop reason: SDUPTHER

## 2020-03-18 ENCOUNTER — OFFICE VISIT (OUTPATIENT)
Dept: FAMILY MEDICINE CLINIC | Facility: CLINIC | Age: 75
End: 2020-03-18
Payer: MEDICARE

## 2020-03-18 VITALS
DIASTOLIC BLOOD PRESSURE: 60 MMHG | BODY MASS INDEX: 38.2 KG/M2 | OXYGEN SATURATION: 96 % | TEMPERATURE: 97.9 F | WEIGHT: 182 LBS | HEART RATE: 96 BPM | RESPIRATION RATE: 16 BRPM | SYSTOLIC BLOOD PRESSURE: 98 MMHG | HEIGHT: 58 IN

## 2020-03-18 DIAGNOSIS — Z17.0 MALIGNANT NEOPLASM OF LOWER-INNER QUADRANT OF LEFT BREAST IN FEMALE, ESTROGEN RECEPTOR POSITIVE (HCC): ICD-10-CM

## 2020-03-18 DIAGNOSIS — G63 NEUROPATHY ASSOCIATED WITH CANCER (HCC): ICD-10-CM

## 2020-03-18 DIAGNOSIS — C80.1 NEUROPATHY ASSOCIATED WITH CANCER (HCC): ICD-10-CM

## 2020-03-18 DIAGNOSIS — C50.312 MALIGNANT NEOPLASM OF LOWER-INNER QUADRANT OF LEFT BREAST IN FEMALE, ESTROGEN RECEPTOR POSITIVE (HCC): ICD-10-CM

## 2020-03-18 DIAGNOSIS — I73.9 PERIPHERAL VASCULAR DISEASE (HCC): ICD-10-CM

## 2020-03-18 DIAGNOSIS — G90.513 COMPLEX REGIONAL PAIN SYNDROME TYPE 1 AFFECTING BOTH HANDS: ICD-10-CM

## 2020-03-18 DIAGNOSIS — Z00.01 ENCOUNTER FOR GENERAL ADULT MEDICAL EXAMINATION WITH ABNORMAL FINDINGS: Primary | ICD-10-CM

## 2020-03-18 PROCEDURE — 1160F RVW MEDS BY RX/DR IN RCRD: CPT | Performed by: FAMILY MEDICINE

## 2020-03-18 PROCEDURE — 99214 OFFICE O/P EST MOD 30 MIN: CPT | Performed by: FAMILY MEDICINE

## 2020-03-18 PROCEDURE — 1036F TOBACCO NON-USER: CPT | Performed by: FAMILY MEDICINE

## 2020-03-18 PROCEDURE — 4040F PNEUMOC VAC/ADMIN/RCVD: CPT | Performed by: FAMILY MEDICINE

## 2020-03-18 PROCEDURE — 1125F AMNT PAIN NOTED PAIN PRSNT: CPT | Performed by: FAMILY MEDICINE

## 2020-03-18 PROCEDURE — 3044F HG A1C LEVEL LT 7.0%: CPT | Performed by: FAMILY MEDICINE

## 2020-03-18 PROCEDURE — G0439 PPPS, SUBSEQ VISIT: HCPCS | Performed by: FAMILY MEDICINE

## 2020-03-18 PROCEDURE — 1170F FXNL STATUS ASSESSED: CPT | Performed by: FAMILY MEDICINE

## 2020-03-18 PROCEDURE — 1123F ACP DISCUSS/DSCN MKR DOCD: CPT | Performed by: FAMILY MEDICINE

## 2020-03-18 RX ORDER — OXYCODONE HYDROCHLORIDE AND ACETAMINOPHEN 5; 325 MG/1; MG/1
TABLET ORAL
Qty: 30 TABLET | Refills: 0 | Status: SHIPPED | OUTPATIENT
Start: 2020-03-18 | End: 2020-04-16 | Stop reason: SDUPTHER

## 2020-03-18 NOTE — PROGRESS NOTES
Assessment and Plan:   1  Encounter for general adult medical examination with abnormal findings  During this visit we have a goal to personalize prevention  I discussed the patient about Arthritis Cancer chronic back pain- Depression- Gait Dysfunction, the need for a life style plan and decrease the impact of current problems  Health risk assessment was discussed with patient also and the ways to stay healthier  We reviewed also the current medications, the need to avoid polypharmacy in her current treatment; also about how the chronic conditions are impacting now and later  Recommended a healthy diet and exercising frequently will help to control better patient's current chronic conditions;  Immunizations, and the need to compliance with current CDC's recommendations  Patient declined at this time advanced directives  I encouraged against the use alcohol, tobacco, recreational illegal prescribed and non-prescribed drugs  About smoking: recommended avoid exposure to second hand smoking  Avoid vaping  If  having casual sex, use protection  Do not drive and drink  Do not use cell phones while you are driving  Dispose all old medication  Keep medication safe and out of reach of kids  Problem List Items Addressed This Visit     None           Preventive health issues were discussed with patient, and age appropriate screening tests were ordered as noted in patient's After Visit Summary  Personalized health advice and appropriate referrals for health education or preventive services given if needed, as noted in patient's After Visit Summary       History of Present Illness:     Patient presents for Medicare Annual Wellness visit    Patient Care Team:  Idania Rhodes MD as PCP - General (Family Medicine)  Vladimir Munoz MD (Radiation Oncology)  Jude Valladares MD (Hematology and Oncology)     Problem List:     Patient Active Problem List   Diagnosis    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    Anemia, unspecified    Benign paroxysmal positional vertigo    Carpal tunnel syndrome    Chronic midline low back pain with bilateral sciatica    Chronic pain disorder    Depression    Perforation of tympanic membrane    Physical deconditioning    Poor dentition    Prediabetes    Spinal stenosis of lumbar region with radiculopathy    Vision abnormalities    Age-related incipient cataract of both eyes    Preoperative cardiovascular examination    Shortness of breath and palpitations    Carpal tunnel syndrome of left wrist    Trigger middle finger of left hand    Fall    Costochondral pain    Neuropathy associated with cancer (HCC)    Peripheral [facility]    Complex regional pain syndrome type 1 affecting both hands    Left wrist pain    Use of aromatase inhibitors    Osteopenia of multiple sites    Malignant neoplasm of 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, estrogen receptor positive ([facility] )    Urinary tract infection without hematuria    Long term current use of opiate analgesic      Past Medical and Surgical History:     Past Medical History:   Diagnosis Date    Anxiety     Arthritis     Breast cancer ([facility] ) 2018    left    Chronic pain disorder     Depression     Depression     Diabetes mellitus [facility]     Hepatic steatosis     Hypercholesterolemia     Hypertension     Irritable bowel syndrome     Migraine     Type 2 diabetes mellitus with hyperglycemia, without long-term current use of insulin ([facility] ) 3/22/2019     Past Surgical History:   Procedure Laterality Date    BREAST BIOPSY Left 2011    benign    BREAST BIOPSY Left 2011    benign    BREAST LUMPECTOMY Left 2018    malignant    CARPAL TUNNEL RELEASE Right     right CTR    CARPAL TUNNEL RELEASE Left 2018     SECTION      CHOLECYSTECTOMY      MASTECTOMY Left     sentinae node mapping    KS INCISE FINGER TENDON SHEATH Left 2018    Procedure: RELEASE 3RD TRIGGER FINGER  ;  Surgeon: Chelita Duncan MD;  Location: [address] [place] [address] OR;  Service: Plastics    KS WRIST Cliffton Katharine LIG Left 11/30/2018    Procedure: ENDOSCOPIOC RELEASE VOLAR CARPAL LIGAMENT;  Surgeon: Chelita Duncan MD;  Location: [address] [place] [address] OR;  Service: Plastics    SENTINEL LYMPH NODE BIOPSY      TRIGGER FINGER RELEASE Left 2018      Family History:     Family History   Problem Relation Age of Onset    Heart disease Mother         cardiovascular disease    Heart disease Father       Social History:        Social History     Socioeconomic History    Marital status: /Civil Union     Spouse name: None    Number of children: None    Years of education: None    Highest education level: None   Occupational History    None   Social Needs    Financial resource strain: None    Food insecurity:     Worry: None     Inability: None    Transportation needs:     Medical: None     Non-medical: None   Tobacco Use    Smoking status: Passive Smoke Exposure - Never Smoker    Smokeless tobacco: Never Used   Substance and Sexual Activity    Alcohol use: No    Drug use: No    Sexual activity: Yes     Partners: Male   Lifestyle    Physical activity:     Days per week: None     Minutes per session: None    Stress: None   Relationships    Social connections:     Talks on phone: None     Gets together: None     Attends Bahai service: None     Active member of club or organization: None     Attends meetings of clubs or organizations: None     Relationship status: None    Intimate partner violence:     Fear of current or ex partner: None     Emotionally abused: None     Physically abused: None     Forced sexual activity: None   Other Topics Concern    None   Social History Narrative    None      Medications and Allergies:     Current Outpatient Medications   Medication Sig Dispense Refill    atorvastatin (LIPITOR) 40 mg tablet TAKE ONE TABLET BY MOUTH AT BEDTIMETOMAR 1 TABLETA POR VIA ORAL ANTES DE DORMIR EN LA NOCHE 30 tablet 0    CALCIUM 600 600 MG tablet TAKE 1 TABLET (600 MG TOTAL) BY MOUTH 2 (TWO) TIMES A DAY WITH A MEAL 90 tablet 3    DULoxetine (CYMBALTA) 60 mg delayed release capsule TAKE ONE CAPSULE BY MOUTH DAILY DINH CAROLEE CAPSULA POR VIA ORAL DIARIAMENTE  0    ergocalciferol (VITAMIN D2) 50,000 units TAKE 1 CAPSULE (50,000 UNITS TOTAL) BY MOUTH ONCE A WEEK FOR 8 DOSES 8 capsule 3    exemestane (AROMASIN) 25 MG tablet TAKE 1 TABLET (25 MG TOTAL) BY MOUTH DAILY 30 tablet 5    gabapentin (NEURONTIN) 300 mg capsule TAKE 1 CAPSULE (300 MG TOTAL) BY MOUTH 3 (THREE) TIMES A  capsule 3    gabapentin (NEURONTIN) 600 MG tablet       memantine (NAMENDA) 5 mg tablet       Naloxone HCl (NARCAN) 4 MG/0 1ML LIQD 0 1 mL (4 mg total) into each nostril once as needed (for drug overdose) for up to 12 doses 0 1 mL 11    oxyCODONE-acetaminophen (PERCOCET) 5-325 mg per tablet Take 1 table at bed time as needed for severe pain 30 tablet 0     No current facility-administered medications for this visit  Allergies   Allergen Reactions    Chocolate     Chocolate Flavor     Ibuprofen Swelling    Pork-Derived Products       Immunizations:     Immunization History   Administered Date(s) Administered    INFLUENZA 01/20/2014, 01/07/2016, 01/07/2016, 09/19/2016, 09/19/2016, 02/14/2017, 10/02/2017    Influenza Split High Dose Preservative Free IM 02/14/2017, 10/02/2017    Influenza, high dose seasonal 0 5 mL 10/03/2018, 09/30/2019    Pneumococcal Conjugate 13-Valent 02/14/2017    Pneumococcal Polysaccharide PPV23 09/19/2016      Health Maintenance:         Topic Date Due    DXA SCAN  1945    CRC Screening: Colonoscopy  04/16/2020 (Originally 1945)     There are no preventive care reminders to display for this patient     Medicare Health Risk Assessment:     BP 98/60 (BP Location: Left arm, Patient Position: Sitting, Cuff Size: Standard)   Pulse 96   Temp 97 9 °F (36 6 °C) (Oral) Resp 16   Ht 4' 10" (1 473 m)   Wt 82 6 kg (182 lb)   SpO2 96%   Breastfeeding No   BMI 38 04 kg/m²          Health Risk Assessment:   Patient rates overall health as fair  Patient feels that their physical health rating is slightly worse  Eyesight was rated as slightly worse  Hearing was rated as slightly worse  Patient feels that their emotional and mental health rating is slightly worse  Pain experienced in the last 7 days has been some  Patient's pain rating has been 10/10  Patient states that she has experienced no weight loss or gain in last 6 months  Fall Risk Screening: In the past year, patient has experienced: history of falling in past year    Number of falls: 2 or more  Injured during fall?: No    Feels unsteady when standing or walking?: No    Worried about falling?: No      Urinary Incontinence Screening:   Patient has not leaked urine accidently in the last six months  Home Safety:  Patient has trouble with stairs inside or outside of their home  Patient has working smoke alarms and has working carbon monoxide detector  Home safety hazards include: none  Nutrition:   Current diet is Regular  Medications:   Patient is currently taking over-the-counter supplements  OTC medications include: see medication list  Patient is not able to manage medications  Activities of Daily Living (ADLs)/Instrumental Activities of Daily Living (IADLs):   Walk and transfer into and out of bed and chair?: Yes  Dress and groom yourself?: No    Bathe or shower yourself?: No    Feed yourself?  Yes  Do your laundry/housekeeping?: No  Manage your money, pay your bills and track your expenses?: No  Make your own meals?: No    Do your own shopping?: No    Previous Hospitalizations:   Any hospitalizations or ED visits within the last 12 months?: Yes    How many hospitalizations have you had in the last year?: 1-2    Advance Care Planning:   Living will: No    Durable POA for healthcare: No    Advanced directive: No    Advanced directive counseling given: Yes    Five wishes given: Yes    Patient declined ACP directive: No    End of Life Decisions reviewed with patient: Yes    Provider agrees with end of life decisions: Yes      Cognitive Screening:   Provider or family/friend/caregiver concerned regarding cognition?: Yes    PREVENTIVE SCREENINGS      Cardiovascular Screening:    General: Screening Not Indicated and History Lipid Disorder      Diabetes Screening:     General: Screening Not Indicated and History Diabetes      Colorectal Cancer Screening:     General: Screening Current      Breast Cancer Screening:     General: History Breast Cancer      Cervical Cancer Screening:    General: Screening Not Indicated      Lung Cancer Screening:     General: Screening Not Indicated    Other Counseling Topics:   Alcohol use counseling, car/seat belt/driving safety and calcium and vitamin D intake         Radha Portillo MD

## 2020-03-18 NOTE — PATIENT INSTRUCTIONS
Medicare Preventive Visit Patient Instructions  Thank you for completing your Welcome to Medicare Visit or Medicare Annual Wellness Visit today  Your next wellness visit will be due in one year (3/18/2021)  The screening/preventive services that you may require over the next 5-10 years are detailed below  Some tests may not apply to you based off risk factors and/or age  Screening tests ordered at today's visit but not completed yet may show as past due  Also, please note that scanned in results may not display below  Preventive Screenings:  Service Recommendations Previous Testing/Comments   Colorectal Cancer Screening  * Colonoscopy    * Fecal Occult Blood Test (FOBT)/Fecal Immunochemical Test (FIT)  * Fecal DNA/Cologuard Test  * Flexible Sigmoidoscopy Age: 54-65 years old   Colonoscopy: every 10 years (may be performed more frequently if at higher risk)  OR  FOBT/FIT: every 1 year  OR  Cologuard: every 3 years  OR  Sigmoidoscopy: every 5 years  Screening may be recommended earlier than age 48 if at higher risk for colorectal cancer  Also, an individualized decision between you and your healthcare provider will decide whether screening between the ages of 74-80 would be appropriate  Colonoscopy: Not on file  FOBT/FIT: 07/12/2018  Cologuard: Not on file  Sigmoidoscopy: Not on file    Screening Current     Breast Cancer Screening Age: 36 years old  Frequency: every 1-2 years  Not required if history of left and right mastectomy Mammogram: 03/26/2019    History Breast Cancer   Cervical Cancer Screening Between the ages of 21-29, pap smear recommended once every 3 years  Between the ages of 33-67, can perform pap smear with HPV co-testing every 5 years     Recommendations may differ for women with a history of total hysterectomy, cervical cancer, or abnormal pap smears in past  Pap Smear: Not on file    Screening Not Indicated   Hepatitis C Screening Once for adults born between 1945 and 1965  More frequently in patients at high risk for Hepatitis C Hep C Antibody: Not on file       Diabetes Screening 1-2 times per year if you're at risk for diabetes or have pre-diabetes Fasting glucose: 91 mg/dL   A1C: 5 8 %    Screening Not Indicated  History Diabetes   Cholesterol Screening Once every 5 years if you don't have a lipid disorder  May order more often based on risk factors  Lipid panel: 02/14/2020    Screening Not Indicated  History Lipid Disorder     Other Preventive Screenings Covered by Medicare:  1  Abdominal Aortic Aneurysm (AAA) Screening: covered once if your at risk  You're considered to be at risk if you have a family history of AAA  2  Lung Cancer Screening: covers low dose CT scan once per year if you meet all of the following conditions: (1) Age 50-69; (2) No signs or symptoms of lung cancer; (3) Current smoker or have quit smoking within the last 15 years; (4) You have a tobacco smoking history of at least 30 pack years (packs per day multiplied by number of years you smoked); (5) You get a written order from a healthcare provider  3  Glaucoma Screening: covered annually if you're considered high risk: (1) You have diabetes OR (2) Family history of glaucoma OR (3)  aged 48 and older OR (3)  American aged 72 and older  3  Osteoporosis Screening: covered every 2 years if you meet one of the following conditions: (1) You're estrogen deficient and at risk for osteoporosis based off medical history and other findings; (2) Have a vertebral abnormality; (3) On glucocorticoid therapy for more than 3 months; (4) Have primary hyperparathyroidism; (5) On osteoporosis medications and need to assess response to drug therapy  · Last bone density test (DXA Scan): Not on file  5  HIV Screening: covered annually if you're between the age of 12-76  Also covered annually if you are younger than 13 and older than 72 with risk factors for HIV infection   For pregnant patients, it is covered up to 3 times per pregnancy  Immunizations:  Immunization Recommendations   Influenza Vaccine Annual influenza vaccination during flu season is recommended for all persons aged >= 6 months who do not have contraindications   Pneumococcal Vaccine (Prevnar and Pneumovax)  * Prevnar = PCV13  * Pneumovax = PPSV23   Adults 25-60 years old: 1-3 doses may be recommended based on certain risk factors  Adults 72 years old: Prevnar (PCV13) vaccine recommended followed by Pneumovax (PPSV23) vaccine  If already received PPSV23 since turning 65, then PCV13 recommended at least one year after PPSV23 dose  Hepatitis B Vaccine 3 dose series if at intermediate or high risk (ex: diabetes, end stage renal disease, liver disease)   Tetanus (Td) Vaccine - COST NOT COVERED BY MEDICARE PART B Following completion of primary series, a booster dose should be given every 10 years to maintain immunity against tetanus  Td may also be given as tetanus wound prophylaxis  Tdap Vaccine - COST NOT COVERED BY MEDICARE PART B Recommended at least once for all adults  For pregnant patients, recommended with each pregnancy  Shingles Vaccine (Shingrix) - COST NOT COVERED BY MEDICARE PART B  2 shot series recommended in those aged 48 and above     Health Maintenance Due:      Topic Date Due    DXA SCAN  1945    CRC Screening: Colonoscopy  04/16/2020 (Originally 1945)     Immunizations Due:  There are no preventive care reminders to display for this patient  Advance Directives   What are advance directives? Advance directives are legal documents that state your wishes and plans for medical care  These plans are made ahead of time in case you lose your ability to make decisions for yourself  Advance directives can apply to any medical decision, such as the treatments you want, and if you want to donate organs  What are the types of advance directives? There are many types of advance directives, and each state has rules about how to use them  You may choose a combination of any of the following:  · Living will: This is a written record of the treatment you want  You can also choose which treatments you do not want, which to limit, and which to stop at a certain time  This includes surgery, medicine, IV fluid, and tube feedings  · Durable power of  for healthcare [facility]): This is a written record that states who you want to make healthcare choices for you when you are unable to make them for yourself  This person, called a proxy, is usually a family member or a friend  You may choose more than 1 proxy  · Do not resuscitate (DNR) order:  A DNR order is used in case your heart stops beating or you stop breathing  It is a request not to have certain forms of treatment, such as CPR  A DNR order may be included in other types of advance directives  · Medical directive: This covers the care that you want if you are in a coma, near death, or unable to make decisions for yourself  You can list the treatments you want for each condition  Treatment may include pain medicine, surgery, blood transfusions, dialysis, IV or tube feedings, and a ventilator (breathing machine)  · Values history: This document has questions about your views, beliefs, and how you feel and think about life  This information can help others choose the care that you would choose  Why are advance directives important? An advance directive helps you control your care  Although spoken wishes may be used, it is better to have your wishes written down  Spoken wishes can be misunderstood, or not followed  Treatments may be given even if you do not want them  An advance directive may make it easier for your family to make difficult choices about your care  Fall Prevention    Fall prevention  includes ways to make your home and other areas safer  It also includes ways you can move more carefully to prevent a fall   Health conditions that cause changes in your blood pressure, vision, or muscle strength and coordination may increase your risk for falls  Medicines may also increase your risk for falls if they make you dizzy, weak, or sleepy  Fall prevention tips:   · Stand or sit up slowly  · Use assistive devices as directed  · Wear shoes that fit well and have soles that   · Wear a personal alarm  · Stay active  · Manage your medical conditions  Home Safety Tips:  · Add items to prevent falls in the bathroom  · Keep paths clear  · Install bright lights in your home  · Keep items you use often on shelves within reach  · Paint or place reflective tape on the edges of your stairs  Weight Management   Why it is important to manage your weight:  Being overweight increases your risk of health conditions such as heart disease, high blood pressure, type 2 diabetes, and certain types of cancer  It can also increase your risk for osteoarthritis, sleep apnea, and other respiratory problems  Aim for a slow, steady weight loss  Even a small amount of weight loss can lower your risk of health problems  How to lose weight safely:  A safe and healthy way to lose weight is to eat fewer calories and get regular exercise  You can lose up about 1 pound a week by decreasing the number of calories you eat by 500 calories each day  Healthy meal plan for weight management:  A healthy meal plan includes a variety of foods, contains fewer calories, and helps you stay healthy  A healthy meal plan includes the following:  · Eat whole-grain foods more often  A healthy meal plan should contain fiber  Fiber is the part of grains, fruits, and vegetables that is not broken down by your body  Whole-grain foods are healthy and provide extra fiber in your diet  Some examples of whole-grain foods are whole-wheat breads and pastas, oatmeal, brown rice, and bulgur  · Eat a variety of vegetables every day    Include dark, leafy greens such as spinach, kale, driss greens, and mustard greens  Eat yellow and orange vegetables such as carrots, sweet potatoes, and winter squash  · Eat a variety of fruits every day  Choose fresh or canned fruit (canned in its own juice or light syrup) instead of juice  Fruit juice has very little or no fiber  · Eat low-fat dairy foods  Drink fat-free (skim) milk or 1% milk  Eat fat-free yogurt and low-fat cottage cheese  Try low-fat cheeses such as mozzarella and other reduced-fat cheeses  · Choose meat and other protein foods that are low in fat  Choose beans or other legumes such as split peas or lentils  Choose fish, skinless poultry (chicken or turkey), or lean cuts of red meat (beef or pork)  Before you cook meat or poultry, cut off any visible fat  · Use less fat and oil  Try baking foods instead of frying them  Add less fat, such as margarine, sour cream, regular salad dressing and mayonnaise to foods  Eat fewer high-fat foods  Some examples of high-fat foods include french fries, doughnuts, ice cream, and cakes  · Eat fewer sweets  Limit foods and drinks that are high in sugar  This includes candy, cookies, regular soda, and sweetened drinks  Exercise:  Exercise at least 30 minutes per day on most days of the week  Some examples of exercise include walking, biking, dancing, and swimming  You can also fit in more physical activity by taking the stairs instead of the elevator or parking farther away from stores  Ask your healthcare provider about the best exercise plan for you  © Copyright [employer] 2018 Information is for End User's use only and may not be sold, redistributed or otherwise used for commercial purposes   All illustrations and images included in CareNotes® are the copyrighted property of A D A M [employer]  or [address] [place] [address] [employer]

## 2020-03-19 ENCOUNTER — TELEPHONE (OUTPATIENT)
Dept: HEMATOLOGY ONCOLOGY | Facility: CLINIC | Age: 75
End: 2020-03-19

## 2020-03-19 DIAGNOSIS — Z79.811 USE OF AROMATASE INHIBITORS: ICD-10-CM

## 2020-03-19 RX ORDER — IBUPROFEN 200 MG
CAPSULE ORAL
Qty: 90 TABLET | Refills: 3 | Status: SHIPPED | OUTPATIENT
Start: 2020-03-19 | End: 2020-09-08

## 2020-03-19 NOTE — TELEPHONE ENCOUNTER
The patient was called to complete the blood work prior the visit on 3/20/2020  The patient disconnected the phone call

## 2020-03-20 ENCOUNTER — OFFICE VISIT (OUTPATIENT)
Dept: HEMATOLOGY ONCOLOGY | Facility: CLINIC | Age: 75
End: 2020-03-20
Payer: MEDICARE

## 2020-03-20 VITALS
SYSTOLIC BLOOD PRESSURE: 160 MMHG | DIASTOLIC BLOOD PRESSURE: 94 MMHG | RESPIRATION RATE: 16 BRPM | HEIGHT: 61 IN | WEIGHT: 180.6 LBS | OXYGEN SATURATION: 96 % | TEMPERATURE: 98.4 F | HEART RATE: 86 BPM | BODY MASS INDEX: 34.1 KG/M2

## 2020-03-20 DIAGNOSIS — C50.312 MALIGNANT NEOPLASM OF LOWER-INNER QUADRANT OF LEFT BREAST IN FEMALE, ESTROGEN RECEPTOR POSITIVE (HCC): Primary | ICD-10-CM

## 2020-03-20 DIAGNOSIS — Z17.0 MALIGNANT NEOPLASM OF LOWER-INNER QUADRANT OF LEFT BREAST IN FEMALE, ESTROGEN RECEPTOR POSITIVE (HCC): Primary | ICD-10-CM

## 2020-03-20 PROCEDURE — 1160F RVW MEDS BY RX/DR IN RCRD: CPT | Performed by: INTERNAL MEDICINE

## 2020-03-20 PROCEDURE — 4040F PNEUMOC VAC/ADMIN/RCVD: CPT | Performed by: INTERNAL MEDICINE

## 2020-03-20 PROCEDURE — 99213 OFFICE O/P EST LOW 20 MIN: CPT | Performed by: INTERNAL MEDICINE

## 2020-03-20 PROCEDURE — 3008F BODY MASS INDEX DOCD: CPT | Performed by: INTERNAL MEDICINE

## 2020-03-20 PROCEDURE — 1170F FXNL STATUS ASSESSED: CPT | Performed by: INTERNAL MEDICINE

## 2020-03-20 PROCEDURE — 3044F HG A1C LEVEL LT 7.0%: CPT | Performed by: INTERNAL MEDICINE

## 2020-03-20 PROCEDURE — 1036F TOBACCO NON-USER: CPT | Performed by: INTERNAL MEDICINE

## 2020-03-20 NOTE — PROGRESS NOTES
Hematology/Oncology Outpatient Follow-up  Steph Pack 66 y o  female 1945 [id number]    Date:  3/20/2020    Assessment and Plan:  1  Malignant neoplasm of 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, estrogen receptor positive ([facility] [address] )  It was very difficult to understand the patient's complains even though we used the  system  The patient seems to have chronic headaches decreased appetite and very low energy  I think the office of aging should be contacted to better understand her living circumstances at home  The patient seemed hungry and dehydrated in the office  She was given a bottle of water which she trunk immediately  I did offer the patient to go to the emergency room for further evaluation the patient refused  We also got in touch with the primary care doctor who was nice enough to take a look at her immediately after the completion of this office visit  It is not entirely clear if the patient is taking the Aromasin as endocrine therapy  She was told in the past bring all her medication with her for the office visit which she did not do  We will continue to monitor her closely on every 3 months basis  - CBC and differential; Future  - Comprehensive metabolic panel; Future  - Magnesium; Future  - Vitamin D 25 hydroxy; Future      HPI:  The patient came today for a follow-up visit  She only speaks [address]  The whole visit was interpreted through the [employer] interpretation system  The patient seems to be symptomatic with the chronic problems including fatigue, dizziness, decreased appetite, severe headaches, etc   She apparently did not eat since last evening in did not drink enough water  It is not entirely clear if she is taking the Aromasin as endocrine therapy or not she stated that she is taking all her medications  Her blood pressure during this office visit was 160/94  She complained about severe fatigue and headaches    She stated that she lives with her   Her  comes at night to [employer] for both of them since he continues to work full-time  Her most recent blood work was 02/14/2020 with the normal CMP hemoglobin A1c of 5 8  No recent blood work was done otherwise  Oncology History    Patient had bilateral mammogram October 2017 which showed a breast density with a left-sided asymmetry   An ultrasound was done which revealed 1 5 cm solid mass   Ultrasound-guided core biopsy was done January 24, 2018 the pathology revealed infiltrating lobular carcinoma grade 1   ER strongly positive 90% WV-0% and her 2 Jaylen negative as well  She had her definitive surgery April 3, 2018 as well as 2 sentinel lymph nodes from the left axilla which were both negative for metastatic disease   Her tumor was 2 8 cm and compatible with invasive lobular type adenocarcinoma   The lymphovascular invasion was not present grade 1 with multiple foci of lobular carcinoma in situ   The anterior margin was focally involved with invasive malignancy  Elvia Shazia final pathologic stage was to a PT2PN0 MX G1   Oncotype recurrence score came back 13 putting her in the low risk category      Prolia injections every 6 months to start July of 2018  Sathya Byers has a history of osteopenia and will require long-term use of an aromatase inhibitor  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 ([facility] [address] )    1/24/2018 Initial Diagnosis     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 (HCC)  Stage IA (pT2, pN0, cM0, G1, ER: Positive, WV: Positive, HER2: Negative)        1/24/2018 Biopsy     Left breast ultrasound guided core biopsies  Infiltrating lobular carcinoma  Grade 1  ER 98% positive, WV negative, HER2 negative        4/3/2018 Surgery     Left partial mastectomy with 2 sentinel node biopsy    Size at least 2 1 cm  Multiple foci of LCIS  LCIS present at superior margin   Invasive malignancy is also noted in close proximity to the superior margin of resection    Pathological staging: stage IIA pT2, pN0 sn, pMX, G1    - Dr Jakub Mccarthy      4/3/2018 Genomic Testing     Oncotype DX    Recurrence score 13, 10 year risk of distance recurrence  SANCHEZ alone 8%  ER score 10 6 positive  ME score 5 1 negative  HER2 9 6 negative       6/21/2018 - 8/27/2018 Radiation     Plan ID Energy Fractions Dose per Fraction (cGy) Dose Correction (cGy) Total Dose Delivered (cGy) Elapsed Days   FB L Breas # 10X/6X 28 / 28 180 0 5,040 54   L Brst Boost 12E 8 / 8 200 0 1,600 12      Treatment dates:  6/21/2018 - 8/27/2018     - Dr Lindsay Schumacher     1  Anastrozole 5/16/18- 2/27/19 (d/c due to significant arthralgias)  2  Letrozole started 2/27/19  3  Aromasin started 9/2019        Malignant neoplasm of 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, estrogen receptor positive ([facility] [address] )    6/20/2019 Initial Diagnosis     Malignant neoplasm of 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, estrogen receptor positive (HCC)           Interval history:    ROS: Review of Systems   Constitutional: Positive for fatigue  Negative for activity change, appetite change, chills, diaphoresis, fever and unexpected weight change  HENT: Negative for congestion, dental problem, ear discharge, ear pain, facial swelling, hearing loss, mouth sores, nosebleeds, postnasal drip, sore throat, tinnitus and trouble swallowing  Eyes: Negative for discharge, redness, itching and visual disturbance  Respiratory: Negative for cough, chest tightness, shortness of breath and wheezing  Cardiovascular: Negative for chest pain, palpitations and leg swelling  Gastrointestinal: Negative for abdominal distention, abdominal pain, anal bleeding, blood in stool, constipation, diarrhea, nausea and vomiting  Genitourinary: Negative for difficulty urinating, dysuria, flank pain, frequency, hematuria and urgency  Musculoskeletal: Positive for arthralgias and myalgias   Negative for back pain, gait problem, joint swelling and neck pain  Skin: Negative for color change, pallor, rash and wound  Neurological: Positive for dizziness and headaches  Negative for syncope, speech difficulty, weakness, light-headedness and numbness  Hematological: Negative for adenopathy  Does not bruise/bleed easily  Psychiatric/Behavioral: Negative for agitation, behavioral problems, confusion and sleep disturbance         Past Medical History:   Diagnosis Date    Anxiety     Arthritis     Breast cancer ([place] [id number] ) 2018    left    Chronic pain disorder     Depression     Depression     Diabetes mellitus ([place] [address] )     Hepatic steatosis     Hypercholesterolemia     Hypertension     Irritable bowel syndrome     Migraine     Type 2 diabetes mellitus with hyperglycemia, without long-term current use of insulin ([place] [id number] ) 3/22/2019       Past Surgical History:   Procedure Laterality Date    BREAST BIOPSY Left 2011    benign    BREAST BIOPSY Left     benign    BREAST LUMPECTOMY Left 2018    malignant    CARPAL TUNNEL RELEASE Right     right CTR    CARPAL TUNNEL RELEASE Left 2018     SECTION      CHOLECYSTECTOMY      MASTECTOMY Left     sentinae node mapping    ME INCISE FINGER TENDON SHEATH Left 2018    Procedure: RELEASE 3RD TRIGGER FINGER  ;  Surgeon: Noé Naidu MD;  Location: [address] [place] [address];  Service: Plastics    ME WRIST Gary Goss LIG Left 2018    Procedure: [address];  Surgeon: Noé Naidu MD;  Location: [address] [place] [address];  Service: Plastics    SENTINEL LYMPH NODE BIOPSY      TRIGGER FINGER RELEASE Left        Social History     Socioeconomic History    Marital status: /Civil Union     Spouse name: None    Number of children: None    Years of education: None    Highest education level: None   Occupational History    None   Social Needs    Financial resource strain: None    Food insecurity:     Worry: None     Inability: None  Transportation needs:     Medical: None     Non-medical: None   Tobacco Use    Smoking status: Passive Smoke Exposure - Never Smoker    Smokeless tobacco: Never Used   Substance and Sexual Activity    Alcohol use: No    Drug use: No    Sexual activity: Yes     Partners: Male   Lifestyle    Physical activity:     Days per week: None     Minutes per session: None    Stress: None   Relationships    Social connections:     Talks on phone: None     Gets together: None     Attends Shinto service: None     Active member of club or organization: None     Attends meetings of clubs or organizations: None     Relationship status: None    Intimate partner violence:     Fear of current or ex partner: None     Emotionally abused: None     Physically abused: None     Forced sexual activity: None   Other Topics Concern    None   Social History Narrative    None       Family History   Problem Relation Age of Onset    Heart disease Mother         cardiovascular disease    Heart disease Father        Allergies   Allergen Reactions    Chocolate     Chocolate Flavor     Ibuprofen Swelling    Pork-Derived Products          Current Outpatient Medications:     atorvastatin (LIPITOR) 40 mg tablet, TAKE ONE TABLET BY MOUTH AT BEDTIMETOMAR 1 TABLETA POR VIA ORAL ANTES DE DORMIR EN LA NOCHE, Disp: 30 tablet, Rfl: 0    CALCIUM 600 600 MG tablet, TAKE 1 TABLET (600 MG TOTAL) BY MOUTH 2 (TWO) TIMES A DAY WITH A MEAL, Disp: 90 tablet, Rfl: 3    oxyCODONE-acetaminophen (PERCOCET) 5-325 mg per tablet, Take 1 table at bed time as needed for severe pain, Disp: 30 tablet, Rfl: 0    DULoxetine (CYMBALTA) 60 mg delayed release capsule, TAKE ONE CAPSULE BY MOUTH DAILY DINH CAROLEE CAPSULA POR VIA ORAL DIARIAMENTE, Disp: , Rfl: 0    ergocalciferol (VITAMIN D2) 50,000 units, TAKE 1 CAPSULE (50,000 UNITS TOTAL) BY MOUTH ONCE A WEEK FOR 8 DOSES, Disp: 8 capsule, Rfl: 3    exemestane (AROMASIN) 25 MG tablet, TAKE 1 TABLET (25 MG TOTAL) BY MOUTH DAILY, Disp: 30 tablet, Rfl: 5    gabapentin (NEURONTIN) 300 mg capsule, TAKE 1 CAPSULE (300 MG TOTAL) BY MOUTH 3 (THREE) TIMES A DAY, Disp: 270 capsule, Rfl: 3    gabapentin (NEURONTIN) 600 MG tablet, , Disp: , Rfl:     memantine (NAMENDA) 5 mg tablet, , Disp: , Rfl:     Naloxone HCl (NARCAN) 4 MG/0 1ML LIQD, 0 1 mL (4 mg total) into each nostril once as needed (for drug overdose) for up to 12 doses (Patient not taking: Reported on 3/20/2020), Disp: 0 1 mL, Rfl: 11      Physical Exam:  /94 (BP Location: Right arm, Cuff Size: Adult)   Pulse 86   Temp 98 4 °F (36 9 °C) (Tympanic)   Resp 16   Ht 5' 1" (1 549 m)   Wt 81 9 kg (180 lb 9 6 oz)   SpO2 96%   BMI 34 12 kg/m²     Physical Exam   Constitutional: She is oriented to person, place, and time  She appears well-developed and well-nourished  No distress  HENT:   Head: Normocephalic and atraumatic  Nose: Nose normal    Mouth/Throat: Oropharynx is clear and moist    Eyes: Pupils are equal, round, and reactive to light  Conjunctivae and EOM are normal  Right eye exhibits no discharge  Left eye exhibits no discharge  No scleral icterus  Neck: Normal range of motion  Neck supple  No JVD present  No tracheal deviation present  No thyromegaly present  Cardiovascular: Normal rate, regular rhythm and normal heart sounds  Exam reveals no friction rub  No murmur heard  Pulmonary/Chest: Effort normal and breath sounds normal  No stridor  No respiratory distress  She has no wheezes  She has no rales  She exhibits no tenderness  Abdominal: Soft  Bowel sounds are normal  She exhibits no distension and no mass  There is no hepatosplenomegaly, splenomegaly or hepatomegaly  There is no tenderness  There is no rebound and no guarding  Obese abdomen   Musculoskeletal: Normal range of motion  She exhibits no edema, tenderness or deformity  Shuffled gait   Lymphadenopathy:     She has no cervical adenopathy     Neurological: She is alert and oriented to person, place, and time  She has normal reflexes  No cranial nerve deficit  Coordination normal    Skin: Skin is warm and dry  No rash noted  She is not diaphoretic  No erythema  No pallor  Psychiatric: She has a normal mood and affect  Her behavior is normal  Judgment and thought content normal          Labs:  Lab Results   Component Value Date    WBC 6 20 11/11/2019    HGB 12 9 11/11/2019    HCT 38 3 11/11/2019    MCV 87 11/11/2019     11/11/2019     Lab Results   Component Value Date    K 4 1 02/14/2020     02/14/2020    CO2 29 02/14/2020    BUN 15 02/14/2020    CREATININE 0 60 02/14/2020    GLUF 91 02/14/2020    CALCIUM 9 3 02/14/2020    AST 33 02/14/2020    ALT 31 02/14/2020    ALKPHOS 84 02/14/2020    EGFR 102 02/14/2020       Patient voiced understanding and agreement in the above discussion  Aware to contact our office with questions/symptoms in the interim

## 2020-03-23 NOTE — PROGRESS NOTES
Assessment/Plan:  1  Complex regional pain syndrome type 1 affecting both hands  We discussed in the past different option that she went through  Patient still quality of life is not good, at the degree that she can not sleep if she does not take Percocet  She recently had wrong urine toxycology that did not check for Oxycodone  She will repeat test again in this office in next appt  - oxyCODONE-acetaminophen (PERCOCET) 5-325 mg per tablet; Take 1 table at bed time as needed for severe pain  Dispense: 30 tablet; Refill: 0    Peripheral vascular disease ([facility] [address] )  Condition is stable with current treatment, to  continue the same      Neuropathy associated with cancer [facility])  she is stable in current treatment  Medications were reviewed with  Lifestyle modification was encouraged  Side effect of medications were discussed  function was encouraged to continue care with Oncologist          Diagnoses and all orders for this visit:    Encounter for general adult medical examination with abnormal findings    Complex regional pain syndrome type 1 affecting both hands  -     oxyCODONE-acetaminophen (PERCOCET) 5-325 mg per tablet; Take 1 table at bed time as needed for severe pain    Malignant neoplasm of 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, estrogen receptor positive ([facility] [address] )    Neuropathy associated with cancer ([facility] [address] )    Peripheral vascular disease (HCC)          Subjective:      Patient ID: Wilma Bassett is a 66 y o  female  Wilma Bassett 66 y o  complaining of   Chronic pain in elbows and hands; started about 2 year(s) ago  The pain is  continuous and has been gradually worsening since onset  The quality of the pain is described as aching, burning, heaviness and knife-like  The pain does not radiate  The pain is at a severity of 5/10  The symptoms are aggravated by movement  Associated symptoms include numbness, paresthesias and tingling    She had tried multiple alternatives without improved her symptoms  She wants to try Medical Marijuana but she is not able to afford it  The following portions of the patient's history were reviewed and updated as appropriate: allergies, current medications, past family history, past medical history, past social history, past surgical history and problem list     Review of Systems   Constitutional: Positive for fatigue and unexpected weight change  Negative for fever  HENT: Negative for sore throat and trouble swallowing  Eyes: Negative for photophobia  Respiratory: Negative for cough, chest tightness, shortness of breath and wheezing  Cardiovascular: Negative for chest pain, palpitations and leg swelling  Gastrointestinal: Positive for constipation  Negative for abdominal pain, blood in stool, nausea and vomiting  Endocrine: Positive for cold intolerance  Negative for polydipsia, polyphagia and polyuria  Genitourinary: Negative for difficulty urinating and hematuria  Musculoskeletal: Positive for arthralgias, myalgias, neck pain and neck stiffness  Skin: Positive for pallor  Neurological: Positive for weakness and headaches  Negative for dizziness, syncope and light-headedness  Hematological: Does not bruise/bleed easily  Psychiatric/Behavioral: Positive for decreased concentration and sleep disturbance  The patient is nervous/anxious  Objective:      BP 98/60 (BP Location: Left arm, Patient Position: Sitting, Cuff Size: Standard)   Pulse 96   Temp 97 9 °F (36 6 °C) (Oral)   Resp 16   Ht 4' 10" (1 473 m)   Wt 82 6 kg (182 lb)   SpO2 96%   Breastfeeding No   BMI 38 04 kg/m²          Physical Exam   HENT:   Head: Normocephalic  Eyes: Pupils are equal, round, and reactive to light  EOM are normal    Neck: Neck supple  Cardiovascular: Normal rate and regular rhythm  Pulmonary/Chest: Effort normal    Abdominal: Soft  There is tenderness  Musculoskeletal: Normal range of motion  She exhibits tenderness     Neurological: She is alert  Skin: Skin is warm and dry  Psychiatric: She has a normal mood and affect  Her behavior is normal    Nursing note and vitals reviewed  BMI Counseling: Body mass index is 38 04 kg/m²  The BMI is above normal  Nutrition recommendations include decreasing portion sizes, encouraging healthy choices of fruits and vegetables, decreasing fast food intake, consuming healthier snacks and limiting drinks that contain sugar  Exercise recommendations include moderate physical activity 150 minutes/week and strength training exercises  No pharmacotherapy was ordered  Patient referred to PCP due to patient being overweight  Falls Plan of Care: balance, strength, and gait training instructions were provided and referral to physical therapy

## 2020-03-23 NOTE — ASSESSMENT & PLAN NOTE
she is stable in current treatment  Medications were reviewed with  Lifestyle modification was encouraged  Side effect of medications were discussed  function was encouraged to continue care with Oncologist

## 2020-03-27 DIAGNOSIS — E78.2 MIXED HYPERLIPIDEMIA: ICD-10-CM

## 2020-03-28 RX ORDER — ATORVASTATIN CALCIUM 40 MG/1
TABLET, FILM COATED ORAL
Qty: 30 TABLET | Refills: 0 | Status: SHIPPED | OUTPATIENT
Start: 2020-03-28 | End: 2020-04-22

## 2020-04-16 ENCOUNTER — OFFICE VISIT (OUTPATIENT)
Dept: FAMILY MEDICINE CLINIC | Facility: CLINIC | Age: 75
End: 2020-04-16
Payer: MEDICARE

## 2020-04-16 VITALS
RESPIRATION RATE: 16 BRPM | BODY MASS INDEX: 38.41 KG/M2 | WEIGHT: 183 LBS | OXYGEN SATURATION: 98 % | HEART RATE: 78 BPM | DIASTOLIC BLOOD PRESSURE: 70 MMHG | HEIGHT: 58 IN | SYSTOLIC BLOOD PRESSURE: 110 MMHG | TEMPERATURE: 97.8 F

## 2020-04-16 DIAGNOSIS — G90.513 COMPLEX REGIONAL PAIN SYNDROME TYPE 1 AFFECTING BOTH HANDS: ICD-10-CM

## 2020-04-16 PROCEDURE — 3044F HG A1C LEVEL LT 7.0%: CPT | Performed by: FAMILY MEDICINE

## 2020-04-16 PROCEDURE — 1036F TOBACCO NON-USER: CPT | Performed by: FAMILY MEDICINE

## 2020-04-16 PROCEDURE — 99214 OFFICE O/P EST MOD 30 MIN: CPT | Performed by: FAMILY MEDICINE

## 2020-04-16 PROCEDURE — 1160F RVW MEDS BY RX/DR IN RCRD: CPT | Performed by: FAMILY MEDICINE

## 2020-04-16 PROCEDURE — 1170F FXNL STATUS ASSESSED: CPT | Performed by: FAMILY MEDICINE

## 2020-04-16 PROCEDURE — 4040F PNEUMOC VAC/ADMIN/RCVD: CPT | Performed by: FAMILY MEDICINE

## 2020-04-16 PROCEDURE — 3008F BODY MASS INDEX DOCD: CPT | Performed by: FAMILY MEDICINE

## 2020-04-16 RX ORDER — OXYCODONE HYDROCHLORIDE AND ACETAMINOPHEN 5; 325 MG/1; MG/1
TABLET ORAL
Qty: 30 TABLET | Refills: 0 | Status: SHIPPED | OUTPATIENT
Start: 2020-04-16 | End: 2020-05-14 | Stop reason: SDUPTHER

## 2020-04-22 DIAGNOSIS — E78.2 MIXED HYPERLIPIDEMIA: ICD-10-CM

## 2020-04-22 RX ORDER — ATORVASTATIN CALCIUM 40 MG/1
TABLET, FILM COATED ORAL
Qty: 90 TABLET | Refills: 3 | Status: SHIPPED | OUTPATIENT
Start: 2020-04-22 | End: 2020-05-21

## 2020-04-22 RX ORDER — ATORVASTATIN CALCIUM 40 MG/1
TABLET, FILM COATED ORAL
Qty: 30 TABLET | Refills: 0 | Status: SHIPPED | OUTPATIENT
Start: 2020-04-22 | End: 2020-04-22

## 2020-05-07 DIAGNOSIS — C50.312 MALIGNANT NEOPLASM OF LOWER-INNER QUADRANT OF LEFT BREAST IN FEMALE, ESTROGEN RECEPTOR POSITIVE (HCC): ICD-10-CM

## 2020-05-07 DIAGNOSIS — Z17.0 MALIGNANT NEOPLASM OF LOWER-INNER QUADRANT OF LEFT BREAST IN FEMALE, ESTROGEN RECEPTOR POSITIVE (HCC): ICD-10-CM

## 2020-05-07 RX ORDER — EXEMESTANE 25 MG/1
25 TABLET ORAL DAILY
Qty: 30 TABLET | Refills: 5 | Status: SHIPPED | OUTPATIENT
Start: 2020-05-07 | End: 2020-11-19

## 2020-05-14 ENCOUNTER — OFFICE VISIT (OUTPATIENT)
Dept: FAMILY MEDICINE CLINIC | Facility: CLINIC | Age: 75
End: 2020-05-14
Payer: MEDICARE

## 2020-05-14 VITALS
TEMPERATURE: 98.4 F | WEIGHT: 184 LBS | HEART RATE: 96 BPM | HEIGHT: 58 IN | DIASTOLIC BLOOD PRESSURE: 80 MMHG | RESPIRATION RATE: 16 BRPM | BODY MASS INDEX: 38.62 KG/M2 | OXYGEN SATURATION: 95 % | SYSTOLIC BLOOD PRESSURE: 130 MMHG

## 2020-05-14 DIAGNOSIS — G90.513 COMPLEX REGIONAL PAIN SYNDROME TYPE 1 AFFECTING BOTH HANDS: ICD-10-CM

## 2020-05-14 PROCEDURE — 99214 OFFICE O/P EST MOD 30 MIN: CPT | Performed by: FAMILY MEDICINE

## 2020-05-14 PROCEDURE — 1036F TOBACCO NON-USER: CPT | Performed by: FAMILY MEDICINE

## 2020-05-14 PROCEDURE — 4040F PNEUMOC VAC/ADMIN/RCVD: CPT | Performed by: FAMILY MEDICINE

## 2020-05-14 PROCEDURE — 1160F RVW MEDS BY RX/DR IN RCRD: CPT | Performed by: FAMILY MEDICINE

## 2020-05-14 PROCEDURE — 3044F HG A1C LEVEL LT 7.0%: CPT | Performed by: FAMILY MEDICINE

## 2020-05-14 RX ORDER — NALOXONE HYDROCHLORIDE 4 MG/.1ML
SPRAY NASAL
COMMUNITY
Start: 2020-04-27 | End: 2020-06-05

## 2020-05-14 RX ORDER — OXYCODONE HYDROCHLORIDE AND ACETAMINOPHEN 5; 325 MG/1; MG/1
TABLET ORAL
Qty: 60 TABLET | Refills: 0 | Status: SHIPPED | OUTPATIENT
Start: 2020-05-14 | End: 2020-06-15 | Stop reason: SDUPTHER

## 2020-05-14 RX ORDER — TOPIRAMATE 25 MG/1
TABLET ORAL
COMMUNITY
Start: 2020-04-21 | End: 2020-12-01 | Stop reason: ALTCHOICE

## 2020-05-20 DIAGNOSIS — E78.2 MIXED HYPERLIPIDEMIA: ICD-10-CM

## 2020-05-21 RX ORDER — ATORVASTATIN CALCIUM 40 MG/1
TABLET, FILM COATED ORAL
Qty: 90 TABLET | Refills: 3 | Status: SHIPPED | OUTPATIENT
Start: 2020-05-21 | End: 2020-06-19

## 2020-06-04 DIAGNOSIS — Z79.891 LONG TERM CURRENT USE OF OPIATE ANALGESIC: Primary | ICD-10-CM

## 2020-06-05 RX ORDER — NALOXONE HYDROCHLORIDE 4 MG/.1ML
SPRAY NASAL
Qty: 2 ML | Refills: 5 | Status: SHIPPED | OUTPATIENT
Start: 2020-06-05 | End: 2020-06-15 | Stop reason: SDUPTHER

## 2020-06-15 ENCOUNTER — OFFICE VISIT (OUTPATIENT)
Dept: FAMILY MEDICINE CLINIC | Facility: CLINIC | Age: 75
End: 2020-06-15
Payer: MEDICARE

## 2020-06-15 VITALS
RESPIRATION RATE: 16 BRPM | TEMPERATURE: 97.8 F | WEIGHT: 175 LBS | DIASTOLIC BLOOD PRESSURE: 70 MMHG | SYSTOLIC BLOOD PRESSURE: 100 MMHG | HEIGHT: 58 IN | BODY MASS INDEX: 36.73 KG/M2 | OXYGEN SATURATION: 98 % | HEART RATE: 88 BPM

## 2020-06-15 DIAGNOSIS — G90.513 COMPLEX REGIONAL PAIN SYNDROME TYPE 1 AFFECTING BOTH HANDS: ICD-10-CM

## 2020-06-15 DIAGNOSIS — Z17.0 MALIGNANT NEOPLASM OF LOWER-INNER QUADRANT OF LEFT BREAST IN FEMALE, ESTROGEN RECEPTOR POSITIVE (HCC): Primary | ICD-10-CM

## 2020-06-15 DIAGNOSIS — C50.312 MALIGNANT NEOPLASM OF LOWER-INNER QUADRANT OF LEFT BREAST IN FEMALE, ESTROGEN RECEPTOR POSITIVE (HCC): Primary | ICD-10-CM

## 2020-06-15 DIAGNOSIS — G89.29 CHRONIC BILATERAL LOW BACK PAIN WITH SCIATICA, SCIATICA LATERALITY UNSPECIFIED: ICD-10-CM

## 2020-06-15 DIAGNOSIS — M54.40 CHRONIC BILATERAL LOW BACK PAIN WITH SCIATICA, SCIATICA LATERALITY UNSPECIFIED: ICD-10-CM

## 2020-06-15 DIAGNOSIS — Z79.891 LONG TERM CURRENT USE OF OPIATE ANALGESIC: ICD-10-CM

## 2020-06-15 DIAGNOSIS — M85.89 OSTEOPENIA OF MULTIPLE SITES: ICD-10-CM

## 2020-06-15 PROCEDURE — 3044F HG A1C LEVEL LT 7.0%: CPT | Performed by: FAMILY MEDICINE

## 2020-06-15 PROCEDURE — 1036F TOBACCO NON-USER: CPT | Performed by: FAMILY MEDICINE

## 2020-06-15 PROCEDURE — 99214 OFFICE O/P EST MOD 30 MIN: CPT | Performed by: FAMILY MEDICINE

## 2020-06-15 PROCEDURE — 4040F PNEUMOC VAC/ADMIN/RCVD: CPT | Performed by: FAMILY MEDICINE

## 2020-06-15 PROCEDURE — 1160F RVW MEDS BY RX/DR IN RCRD: CPT | Performed by: FAMILY MEDICINE

## 2020-06-15 RX ORDER — DULOXETIN HYDROCHLORIDE 60 MG/1
60 CAPSULE, DELAYED RELEASE ORAL DAILY
Qty: 30 CAPSULE | Refills: 5 | Status: SHIPPED | OUTPATIENT
Start: 2020-06-15 | End: 2022-05-03 | Stop reason: SDUPTHER

## 2020-06-15 RX ORDER — OXYCODONE HYDROCHLORIDE AND ACETAMINOPHEN 5; 325 MG/1; MG/1
TABLET ORAL
Qty: 60 TABLET | Refills: 0 | Status: SHIPPED | OUTPATIENT
Start: 2020-06-15 | End: 2020-07-27 | Stop reason: SDUPTHER

## 2020-06-15 RX ORDER — NALOXONE HYDROCHLORIDE 4 MG/.1ML
4 SPRAY NASAL ONCE AS NEEDED
Qty: 2 ML | Refills: 5 | Status: SHIPPED | OUTPATIENT
Start: 2020-06-15 | End: 2020-07-27 | Stop reason: SDUPTHER

## 2020-06-16 DIAGNOSIS — E78.2 MIXED HYPERLIPIDEMIA: ICD-10-CM

## 2020-06-17 ENCOUNTER — TELEPHONE (OUTPATIENT)
Dept: HEMATOLOGY ONCOLOGY | Facility: CLINIC | Age: 75
End: 2020-06-17

## 2020-06-18 ENCOUNTER — DOCUMENTATION (OUTPATIENT)
Dept: HEMATOLOGY ONCOLOGY | Facility: CLINIC | Age: 75
End: 2020-06-18

## 2020-06-19 ENCOUNTER — OFFICE VISIT (OUTPATIENT)
Dept: HEMATOLOGY ONCOLOGY | Facility: CLINIC | Age: 75
End: 2020-06-19
Payer: MEDICARE

## 2020-06-19 ENCOUNTER — LAB (OUTPATIENT)
Dept: LAB | Facility: HOSPITAL | Age: 75
End: 2020-06-19
Payer: MEDICARE

## 2020-06-19 ENCOUNTER — HOSPITAL ENCOUNTER (OUTPATIENT)
Dept: INFUSION CENTER | Facility: HOSPITAL | Age: 75
Discharge: HOME | End: 2020-06-19
Attending: INTERNAL MEDICINE
Payer: MEDICARE

## 2020-06-19 VITALS
WEIGHT: 174.8 LBS | HEART RATE: 97 BPM | RESPIRATION RATE: 18 BRPM | BODY MASS INDEX: 36.69 KG/M2 | OXYGEN SATURATION: 97 % | SYSTOLIC BLOOD PRESSURE: 112 MMHG | HEIGHT: 58 IN | DIASTOLIC BLOOD PRESSURE: 60 MMHG | TEMPERATURE: 99.2 F

## 2020-06-19 DIAGNOSIS — M85.89 OSTEOPENIA OF MULTIPLE SITES: ICD-10-CM

## 2020-06-19 DIAGNOSIS — C50.312 MALIGNANT NEOPLASM OF LOWER-INNER QUADRANT OF LEFT BREAST IN FEMALE, ESTROGEN RECEPTOR POSITIVE (HCC): ICD-10-CM

## 2020-06-19 DIAGNOSIS — Z79.811 USE OF AROMATASE INHIBITORS: Primary | ICD-10-CM

## 2020-06-19 DIAGNOSIS — Z17.0 MALIGNANT NEOPLASM OF LOWER-INNER QUADRANT OF LEFT BREAST IN FEMALE, ESTROGEN RECEPTOR POSITIVE (HCC): ICD-10-CM

## 2020-06-19 DIAGNOSIS — Z17.0 MALIGNANT NEOPLASM OF LOWER-INNER QUADRANT OF LEFT BREAST IN FEMALE, ESTROGEN RECEPTOR POSITIVE (HCC): Primary | ICD-10-CM

## 2020-06-19 DIAGNOSIS — R91.8 ABNORMAL CT SCAN OF LUNG: ICD-10-CM

## 2020-06-19 DIAGNOSIS — E55.9 VITAMIN D DEFICIENCY: ICD-10-CM

## 2020-06-19 DIAGNOSIS — M89.8X9 BONE PAIN: ICD-10-CM

## 2020-06-19 DIAGNOSIS — Z79.811 USE OF AROMATASE INHIBITORS: ICD-10-CM

## 2020-06-19 DIAGNOSIS — C50.312 MALIGNANT NEOPLASM OF LOWER-INNER QUADRANT OF LEFT BREAST IN FEMALE, ESTROGEN RECEPTOR POSITIVE (HCC): Primary | ICD-10-CM

## 2020-06-19 LAB
25(OH)D3 SERPL-MCNC: 104.4 NG/ML (ref 30–100)
ALBUMIN SERPL BCP-MCNC: 3.8 G/DL (ref 3–5.2)
ALP SERPL-CCNC: 78 U/L (ref 43–122)
ALT SERPL W P-5'-P-CCNC: 38 U/L (ref 9–52)
ANION GAP SERPL CALCULATED.3IONS-SCNC: 8 MMOL/L (ref 5–14)
AST SERPL W P-5'-P-CCNC: 35 U/L (ref 14–36)
BILIRUB SERPL-MCNC: 0.8 MG/DL
BUN SERPL-MCNC: 12 MG/DL (ref 5–25)
CALCIUM SERPL-MCNC: 9.6 MG/DL (ref 8.4–10.2)
CHLORIDE SERPL-SCNC: 102 MMOL/L (ref 97–108)
CO2 SERPL-SCNC: 25 MMOL/L (ref 22–30)
CREAT SERPL-MCNC: 0.77 MG/DL (ref 0.6–1.2)
EOSINOPHIL # BLD AUTO: 0.06 THOUSAND/UL (ref 0–0.4)
EOSINOPHIL NFR BLD MANUAL: 1 % (ref 0–6)
ERYTHROCYTE [DISTWIDTH] IN BLOOD BY AUTOMATED COUNT: 15.3 %
GFR SERPL CREATININE-BSD FRML MDRD: 87 ML/MIN/1.73SQ M
GLUCOSE P FAST SERPL-MCNC: 89 MG/DL (ref 70–99)
HCT VFR BLD AUTO: 37.8 % (ref 36–46)
HGB BLD-MCNC: 12.7 G/DL (ref 12–16)
LYMPHOCYTES # BLD AUTO: 2.43 THOUSAND/UL (ref 0.5–4)
LYMPHOCYTES # BLD AUTO: 38 % (ref 25–45)
MAGNESIUM SERPL-MCNC: 2.1 MG/DL (ref 1.6–2.3)
MCH RBC QN AUTO: 30 PG (ref 26–34)
MCHC RBC AUTO-ENTMCNC: 33.6 G/DL (ref 31–36)
MCV RBC AUTO: 89 FL (ref 80–100)
MONOCYTES # BLD AUTO: 0.83 THOUSAND/UL (ref 0.2–0.9)
MONOCYTES NFR BLD AUTO: 13 % (ref 1–10)
NEUTS BAND NFR BLD MANUAL: 1 % (ref 0–8)
NEUTS SEG # BLD: 3.07 THOUSAND/UL (ref 1.8–7.8)
NEUTS SEG NFR BLD AUTO: 47 %
PLATELET # BLD AUTO: 239 THOUSANDS/UL (ref 150–450)
PLATELET BLD QL SMEAR: ADEQUATE
PMV BLD AUTO: 7.8 FL (ref 8.9–12.7)
POTASSIUM SERPL-SCNC: 3.8 MMOL/L (ref 3.6–5)
PROT SERPL-MCNC: 6.9 G/DL (ref 5.9–8.4)
RBC # BLD AUTO: 4.23 MILLION/UL (ref 4–5.2)
RBC MORPH BLD: NORMAL
SODIUM SERPL-SCNC: 135 MMOL/L (ref 137–147)
TOTAL CELLS COUNTED SPEC: 100
WBC # BLD AUTO: 6.4 THOUSAND/UL (ref 4.5–11)

## 2020-06-19 PROCEDURE — 3044F HG A1C LEVEL LT 7.0%: CPT | Performed by: NURSE PRACTITIONER

## 2020-06-19 PROCEDURE — 85007 BL SMEAR W/DIFF WBC COUNT: CPT

## 2020-06-19 PROCEDURE — 1036F TOBACCO NON-USER: CPT | Performed by: NURSE PRACTITIONER

## 2020-06-19 PROCEDURE — 4040F PNEUMOC VAC/ADMIN/RCVD: CPT | Performed by: NURSE PRACTITIONER

## 2020-06-19 PROCEDURE — 80053 COMPREHEN METABOLIC PANEL: CPT

## 2020-06-19 PROCEDURE — 99215 OFFICE O/P EST HI 40 MIN: CPT | Performed by: NURSE PRACTITIONER

## 2020-06-19 PROCEDURE — 83735 ASSAY OF MAGNESIUM: CPT

## 2020-06-19 PROCEDURE — 3008F BODY MASS INDEX DOCD: CPT | Performed by: NURSE PRACTITIONER

## 2020-06-19 PROCEDURE — 85027 COMPLETE CBC AUTOMATED: CPT

## 2020-06-19 PROCEDURE — 82306 VITAMIN D 25 HYDROXY: CPT

## 2020-06-19 PROCEDURE — 96401 CHEMO ANTI-NEOPL SQ/IM: CPT

## 2020-06-19 PROCEDURE — 36415 COLL VENOUS BLD VENIPUNCTURE: CPT

## 2020-06-19 PROCEDURE — 1160F RVW MEDS BY RX/DR IN RCRD: CPT | Performed by: NURSE PRACTITIONER

## 2020-06-19 RX ORDER — ATORVASTATIN CALCIUM 40 MG/1
TABLET, FILM COATED ORAL
Qty: 30 TABLET | Refills: 3 | Status: SHIPPED | OUTPATIENT
Start: 2020-06-19 | End: 2021-04-14

## 2020-06-19 RX ADMIN — DENOSUMAB 60 MG: 60 INJECTION SUBCUTANEOUS at 14:14

## 2020-06-26 ENCOUNTER — HOSPITAL ENCOUNTER (OUTPATIENT)
Dept: RADIOLOGY | Facility: HOSPITAL | Age: 75
Discharge: HOME | End: 2020-06-26
Payer: MEDICARE

## 2020-06-26 DIAGNOSIS — M89.8X9 BONE PAIN: ICD-10-CM

## 2020-06-26 PROCEDURE — A9503 TC99M MEDRONATE: HCPCS

## 2020-06-26 PROCEDURE — 78306 BONE IMAGING WHOLE BODY: CPT

## 2020-07-27 ENCOUNTER — OFFICE VISIT (OUTPATIENT)
Dept: FAMILY MEDICINE CLINIC | Facility: CLINIC | Age: 75
End: 2020-07-27
Payer: MEDICARE

## 2020-07-27 VITALS
DIASTOLIC BLOOD PRESSURE: 70 MMHG | OXYGEN SATURATION: 97 % | HEART RATE: 92 BPM | HEIGHT: 58 IN | TEMPERATURE: 97.8 F | WEIGHT: 184 LBS | SYSTOLIC BLOOD PRESSURE: 110 MMHG | RESPIRATION RATE: 16 BRPM | BODY MASS INDEX: 38.62 KG/M2

## 2020-07-27 DIAGNOSIS — N39.0 URINARY TRACT INFECTION WITHOUT HEMATURIA, SITE UNSPECIFIED: ICD-10-CM

## 2020-07-27 DIAGNOSIS — Z79.891 LONG TERM CURRENT USE OF OPIATE ANALGESIC: ICD-10-CM

## 2020-07-27 DIAGNOSIS — G56.03 BILATERAL CARPAL TUNNEL SYNDROME: ICD-10-CM

## 2020-07-27 DIAGNOSIS — G90.513 COMPLEX REGIONAL PAIN SYNDROME TYPE 1 AFFECTING BOTH HANDS: Primary | ICD-10-CM

## 2020-07-27 PROCEDURE — 3044F HG A1C LEVEL LT 7.0%: CPT | Performed by: FAMILY MEDICINE

## 2020-07-27 PROCEDURE — 4040F PNEUMOC VAC/ADMIN/RCVD: CPT | Performed by: FAMILY MEDICINE

## 2020-07-27 PROCEDURE — 1160F RVW MEDS BY RX/DR IN RCRD: CPT | Performed by: FAMILY MEDICINE

## 2020-07-27 PROCEDURE — 99214 OFFICE O/P EST MOD 30 MIN: CPT | Performed by: FAMILY MEDICINE

## 2020-07-27 PROCEDURE — 1036F TOBACCO NON-USER: CPT | Performed by: FAMILY MEDICINE

## 2020-07-27 PROCEDURE — 3008F BODY MASS INDEX DOCD: CPT | Performed by: FAMILY MEDICINE

## 2020-07-27 RX ORDER — OXYCODONE HYDROCHLORIDE AND ACETAMINOPHEN 5; 325 MG/1; MG/1
TABLET ORAL
Qty: 60 TABLET | Refills: 0 | Status: SHIPPED | OUTPATIENT
Start: 2020-07-27 | End: 2020-08-31 | Stop reason: SDUPTHER

## 2020-07-27 RX ORDER — NALOXONE HYDROCHLORIDE 4 MG/.1ML
1 SPRAY NASAL ONCE AS NEEDED
Qty: 1 EACH | Refills: 5 | Status: SHIPPED | OUTPATIENT
Start: 2020-07-27 | End: 2020-08-31 | Stop reason: SDUPTHER

## 2020-07-27 RX ORDER — GABAPENTIN 300 MG/1
300 CAPSULE ORAL 3 TIMES DAILY
Qty: 270 CAPSULE | Refills: 3 | Status: SHIPPED | OUTPATIENT
Start: 2020-07-27 | End: 2021-08-08

## 2020-07-27 RX ORDER — SULFAMETHOXAZOLE AND TRIMETHOPRIM 800; 160 MG/1; MG/1
1 TABLET ORAL EVERY 12 HOURS SCHEDULED
Qty: 14 TABLET | Refills: 0 | Status: SHIPPED | OUTPATIENT
Start: 2020-07-27 | End: 2020-08-03

## 2020-07-27 NOTE — PROGRESS NOTES
Assessment/Plan:  1  Complex regional pain syndrome type 1 affecting both hands  Narcotic treatment given to patient due to failed of conventional treatment in the past   I reviewed [address] and i spoke with patient regarding use of such drug, current issues with diversion and abuse of drugs  Patient understood the facts of missuse of prescribed medication may cause death of herself or other in diversion is the case  We will monitor medications with a ramdonized call to bring pills to be counted and make sure there is no diversion  Urine screnn also might be requested  - oxyCODONE-acetaminophen (PERCOCET) 5-325 mg per tablet; Take 1 table at twice a day as needed for severe pain  Dispense: 60 tablet; Refill: 0    2  Bilateral carpal tunnel syndrome    - gabapentin (NEURONTIN) 300 mg capsule; Take 1 capsule (300 mg total) by mouth 3 (three) times a day  Dispense: 270 capsule; Refill: 3    3  Long term current use of opiate analgesic  The use of naloxone again explained to patient and her   Keep all meds out of reach from children   - naloxone (Narcan) 4 mg/0 1 mL nasal spray; 0 1 mL (4 mg total) into each nostril once as needed for opioid reversal or respiratory depression for up to 1 dose  Dispense: 1 each; Refill: 5    4  Urinary tract infection without hematuria, site unspecified  Empirically treatment  - sulfamethoxazole-trimethoprim (BACTRIM DS) 800-160 mg per tablet; Take 1 tablet by mouth every 12 (twelve) hours for 7 days  Dispense: 14 tablet; Refill: 0    No problem-specific Assessment & Plan notes found for this encounter  Diagnoses and all orders for this visit:    Complex regional pain syndrome type 1 affecting both hands  -     oxyCODONE-acetaminophen (PERCOCET) 5-325 mg per tablet; Take 1 table at twice a day as needed for severe pain    Bilateral carpal tunnel syndrome  -     gabapentin (NEURONTIN) 300 mg capsule;  Take 1 capsule (300 mg total) by mouth 3 (three) times a day    Long term current use of opiate analgesic  -     naloxone (Narcan) 4 mg/0 1 mL nasal spray; 0 1 mL (4 mg total) into each nostril once as needed for opioid reversal or respiratory depression for up to 1 dose    Urinary tract infection without hematuria, site unspecified  -     sulfamethoxazole-trimethoprim (BACTRIM DS) 800-160 mg per tablet; Take 1 tablet by mouth every 12 (twelve) hours for 7 days          Subjective:      Patient ID: Madison Nath is a 66 y o  female  function 66 y o  y o  complaining of   Acute in a chronic pain in Lower back; started about years ago  This is a montly follow up to ensure compliance with guidelines  The pain is  continuos and has been gradually worsening since onset  The quality of the pain is described as heaviness  The pain does not radiate  The pain is at a severity of 5/10  The symptoms are aggravated by movement  Associated symptoms include numbness, paresthesias and tingling  He had tried multiple alternatives without improved him symptoms        The following portions of the patient's history were reviewed and updated as appropriate: allergies, current medications, past family history, past medical history, past social history, past surgical history and problem list     Review of Systems   Constitutional: Negative for diaphoresis, fatigue, fever and unexpected weight change  Respiratory: Negative for cough, chest tightness, shortness of breath and wheezing  Cardiovascular: Negative for chest pain, palpitations and leg swelling  Gastrointestinal: Negative for abdominal pain, blood in stool and constipation  Genitourinary: Positive for difficulty urinating, dyspareunia and dysuria  Musculoskeletal: Positive for arthralgias, back pain, gait problem and myalgias  Neurological: Positive for headaches  Negative for dizziness, syncope and light-headedness  Hematological: Does not bruise/bleed easily  Psychiatric/Behavioral: Negative for behavioral problems, self-injury and sleep disturbance  The patient is not nervous/anxious  Objective:      /70 (BP Location: Left arm, Patient Position: Sitting, Cuff Size: Standard)   Pulse 92   Temp 97 8 °F (36 6 °C) (Temporal)   Resp 16   Ht 4' 10" (1 473 m)   Wt 83 5 kg (184 lb)   SpO2 97%   BMI 38 46 kg/m²          Physical Exam   HENT:   Head: Normocephalic  Eyes: Pupils are equal, round, and reactive to light  EOM are normal    Neck: Neck supple  Cardiovascular: Normal rate and regular rhythm  Pulmonary/Chest: Effort normal    Abdominal: Soft  Musculoskeletal: Normal range of motion  Neurological: She is alert  Skin: Skin is warm and dry  Psychiatric: She has a normal mood and affect   Her behavior is normal

## 2020-07-28 ENCOUNTER — TRANSCRIBE ORDERS (OUTPATIENT)
Dept: ADMINISTRATIVE | Facility: HOSPITAL | Age: 75
End: 2020-07-28

## 2020-07-28 ENCOUNTER — HOSPITAL ENCOUNTER (OUTPATIENT)
Dept: BONE DENSITY | Facility: CLINIC | Age: 75
Discharge: HOME | End: 2020-07-28
Payer: MEDICARE

## 2020-07-28 ENCOUNTER — HOSPITAL ENCOUNTER (OUTPATIENT)
Dept: MAMMOGRAPHY | Facility: CLINIC | Age: 75
Discharge: HOME | End: 2020-07-28
Payer: MEDICARE

## 2020-07-28 VITALS — WEIGHT: 184 LBS | BODY MASS INDEX: 38.62 KG/M2 | HEIGHT: 58 IN

## 2020-07-28 DIAGNOSIS — Z85.3 ENCOUNTER FOR FOLLOW-UP SURVEILLANCE OF BREAST CANCER: Primary | ICD-10-CM

## 2020-07-28 DIAGNOSIS — Z08 ENCOUNTER FOR FOLLOW-UP SURVEILLANCE OF BREAST CANCER: Primary | ICD-10-CM

## 2020-07-28 DIAGNOSIS — M85.89 OSTEOPENIA OF MULTIPLE SITES: ICD-10-CM

## 2020-07-28 DIAGNOSIS — Z17.0 MALIGNANT NEOPLASM OF LOWER-INNER QUADRANT OF LEFT BREAST IN FEMALE, ESTROGEN RECEPTOR POSITIVE (HCC): ICD-10-CM

## 2020-07-28 DIAGNOSIS — C50.312 MALIGNANT NEOPLASM OF LOWER-INNER QUADRANT OF LEFT BREAST IN FEMALE, ESTROGEN RECEPTOR POSITIVE (HCC): ICD-10-CM

## 2020-07-28 PROCEDURE — 77080 DXA BONE DENSITY AXIAL: CPT

## 2020-07-28 PROCEDURE — G0279 TOMOSYNTHESIS, MAMMO: HCPCS

## 2020-07-28 PROCEDURE — 77066 DX MAMMO INCL CAD BI: CPT

## 2020-08-31 ENCOUNTER — OFFICE VISIT (OUTPATIENT)
Dept: FAMILY MEDICINE CLINIC | Facility: CLINIC | Age: 75
End: 2020-08-31
Payer: MEDICARE

## 2020-08-31 VITALS
DIASTOLIC BLOOD PRESSURE: 80 MMHG | HEART RATE: 88 BPM | WEIGHT: 189 LBS | HEIGHT: 58 IN | RESPIRATION RATE: 16 BRPM | OXYGEN SATURATION: 97 % | TEMPERATURE: 97.8 F | BODY MASS INDEX: 39.67 KG/M2 | SYSTOLIC BLOOD PRESSURE: 126 MMHG

## 2020-08-31 DIAGNOSIS — G90.513 COMPLEX REGIONAL PAIN SYNDROME TYPE 1 AFFECTING BOTH HANDS: Primary | ICD-10-CM

## 2020-08-31 DIAGNOSIS — Z79.891 LONG TERM (CURRENT) USE OF OPIATE ANALGESIC: ICD-10-CM

## 2020-08-31 PROCEDURE — 3008F BODY MASS INDEX DOCD: CPT | Performed by: FAMILY MEDICINE

## 2020-08-31 PROCEDURE — 1036F TOBACCO NON-USER: CPT | Performed by: FAMILY MEDICINE

## 2020-08-31 PROCEDURE — 1160F RVW MEDS BY RX/DR IN RCRD: CPT | Performed by: FAMILY MEDICINE

## 2020-08-31 PROCEDURE — 99214 OFFICE O/P EST MOD 30 MIN: CPT | Performed by: FAMILY MEDICINE

## 2020-08-31 PROCEDURE — 3044F HG A1C LEVEL LT 7.0%: CPT | Performed by: FAMILY MEDICINE

## 2020-08-31 PROCEDURE — 4040F PNEUMOC VAC/ADMIN/RCVD: CPT | Performed by: FAMILY MEDICINE

## 2020-08-31 RX ORDER — NALOXONE HYDROCHLORIDE 4 MG/.1ML
1 SPRAY NASAL ONCE AS NEEDED
Qty: 1 EACH | Refills: 5 | Status: SHIPPED | OUTPATIENT
Start: 2020-08-31 | End: 2021-02-15 | Stop reason: ALTCHOICE

## 2020-08-31 RX ORDER — OXYCODONE HYDROCHLORIDE AND ACETAMINOPHEN 5; 325 MG/1; MG/1
TABLET ORAL
Qty: 60 TABLET | Refills: 0 | Status: SHIPPED | OUTPATIENT
Start: 2020-08-31 | End: 2020-10-06 | Stop reason: ALTCHOICE

## 2020-08-31 NOTE — ASSESSMENT & PLAN NOTE
Patient stable in current therapy  I reviewed with her the guidelines for opioid therapy and the need to continue of home muscle conditioning, safety in the use of opioid

## 2020-08-31 NOTE — PROGRESS NOTES
Assessment/Plan:    Complex regional pain syndrome type 1 affecting both hands  Patient stable in current therapy  I reviewed with her the guidelines for opioid therapy and the need to continue of home muscle conditioning, safety in the use of opioid  Diagnoses and all orders for this visit:    Complex regional pain syndrome type 1 affecting both hands  -     oxyCODONE-acetaminophen (PERCOCET) 5-325 mg per tablet; Take 1 table at twice a day as needed for severe pain    Long term (current) use of opiate analgesic  -     Toxicology screen, urine  -     naloxone (Narcan) 4 mg/0 1 mL nasal spray; 0 1 mL (4 mg total) into each nostril once as needed for opioid reversal or respiratory depression for up to 1 dose          Subjective:      Patient ID: Edith Beltre is a 66 y o  female  HPI  She is here to follow up for continue and severe pain of her hands  She states sleeping better and improved with pain medication  She does not want to follow up with pain management due to poor response in different treatments  Best control of pain is the combination of oxycodone with tylenol  She does not drink alcohol, has past h/o of drug abuse or use of illegal or prescribed substances  The following portions of the patient's history were reviewed and updated as appropriate: allergies, current medications, past family history, past medical history, past social history, past surgical history and problem list     Review of Systems   Constitutional: Negative for diaphoresis, fatigue, fever and unexpected weight change  Respiratory: Negative for cough, chest tightness, shortness of breath and wheezing  Cardiovascular: Negative for chest pain, palpitations and leg swelling  Gastrointestinal: Negative for abdominal pain, blood in stool and constipation  Musculoskeletal: Positive for arthralgias, back pain and gait problem (using cane)  Neurological: Negative for dizziness, syncope, light-headedness and headaches  Hematological: Does not bruise/bleed easily  Psychiatric/Behavioral: Negative for behavioral problems, self-injury and sleep disturbance  The patient is not nervous/anxious  Objective:      /80 (BP Location: Left arm, Patient Position: Sitting, Cuff Size: Standard)   Pulse 88   Temp 97 8 °F (36 6 °C) (Temporal)   Resp 16   Ht 4' 10" (1 473 m)   Wt 85 7 kg (189 lb)   SpO2 97%   BMI 39 50 kg/m²          Physical Exam  Constitutional:       Comments: Body mass index is 39 5 kg/m²  HENT:      Head: Normocephalic  Eyes:      Pupils: Pupils are equal, round, and reactive to light  Neck:      Musculoskeletal: Neck supple  Cardiovascular:      Rate and Rhythm: Normal rate and regular rhythm  Pulmonary:      Effort: Pulmonary effort is normal    Abdominal:      Palpations: Abdomen is soft  Musculoskeletal:         General: Tenderness present  Skin:     General: Skin is warm and dry  Neurological:      Mental Status: She is alert     Psychiatric:         Behavior: Behavior normal

## 2020-09-04 DIAGNOSIS — Z79.811 USE OF AROMATASE INHIBITORS: ICD-10-CM

## 2020-09-08 ENCOUNTER — APPOINTMENT (OUTPATIENT)
Dept: LAB | Facility: HOSPITAL | Age: 75
End: 2020-09-08
Payer: MEDICARE

## 2020-09-08 ENCOUNTER — TRANSCRIBE ORDERS (OUTPATIENT)
Dept: LAB | Facility: HOSPITAL | Age: 75
End: 2020-09-08

## 2020-09-08 DIAGNOSIS — Z79.891 LONG TERM (CURRENT) USE OF OPIATE ANALGESIC: ICD-10-CM

## 2020-09-08 DIAGNOSIS — E78.2 MIXED HYPERLIPIDEMIA: ICD-10-CM

## 2020-09-08 DIAGNOSIS — F33.1 DEPRESSION, MAJOR, RECURRENT, MODERATE (HCC): ICD-10-CM

## 2020-09-08 DIAGNOSIS — F33.1 MAJOR DEPRESSIVE DISORDER, RECURRENT EPISODE, MODERATE (HCC): Primary | ICD-10-CM

## 2020-09-08 DIAGNOSIS — F33.1 MODERATE RECURRENT MAJOR DEPRESSION (HCC): ICD-10-CM

## 2020-09-08 LAB
ALBUMIN SERPL BCP-MCNC: 3.9 G/DL (ref 3–5.2)
ALP SERPL-CCNC: 74 U/L (ref 43–122)
ALT SERPL W P-5'-P-CCNC: 27 U/L (ref 9–52)
ANION GAP SERPL CALCULATED.3IONS-SCNC: 5 MMOL/L (ref 5–14)
AST SERPL W P-5'-P-CCNC: 34 U/L (ref 14–36)
BILIRUB SERPL-MCNC: 0.5 MG/DL
BUN SERPL-MCNC: 11 MG/DL (ref 5–25)
CALCIUM SERPL-MCNC: 9.6 MG/DL (ref 8.4–10.2)
CHLORIDE SERPL-SCNC: 105 MMOL/L (ref 97–108)
CHOLEST SERPL-MCNC: 117 MG/DL
CO2 SERPL-SCNC: 30 MMOL/L (ref 22–30)
CREAT SERPL-MCNC: 0.59 MG/DL (ref 0.6–1.2)
EOSINOPHIL # BLD AUTO: 0.26 THOUSAND/UL (ref 0–0.4)
EOSINOPHIL NFR BLD MANUAL: 4 % (ref 0–6)
ERYTHROCYTE [DISTWIDTH] IN BLOOD BY AUTOMATED COUNT: 14.2 %
GFR SERPL CREATININE-BSD FRML MDRD: 103 ML/MIN/1.73SQ M
GLUCOSE P FAST SERPL-MCNC: 89 MG/DL (ref 70–99)
HCT VFR BLD AUTO: 35.6 % (ref 36–46)
HDLC SERPL-MCNC: 38 MG/DL
HGB BLD-MCNC: 12 G/DL (ref 12–16)
LDLC SERPL CALC-MCNC: 54 MG/DL
LYMPHOCYTES # BLD AUTO: 2.5 THOUSAND/UL (ref 0.5–4)
LYMPHOCYTES # BLD AUTO: 39 % (ref 25–45)
MCH RBC QN AUTO: 30.8 PG (ref 26–34)
MCHC RBC AUTO-ENTMCNC: 33.8 G/DL (ref 31–36)
MCV RBC AUTO: 91 FL (ref 80–100)
MONOCYTES # BLD AUTO: 0.58 THOUSAND/UL (ref 0.2–0.9)
MONOCYTES NFR BLD AUTO: 9 % (ref 1–10)
NEUTS BAND NFR BLD MANUAL: 1 % (ref 0–8)
NEUTS SEG # BLD: 3.07 THOUSAND/UL (ref 1.8–7.8)
NEUTS SEG NFR BLD AUTO: 47 %
NONHDLC SERPL-MCNC: 79 MG/DL
PLATELET # BLD AUTO: 226 THOUSANDS/UL (ref 150–450)
PLATELET BLD QL SMEAR: ADEQUATE
PMV BLD AUTO: 8.5 FL (ref 8.9–12.7)
POTASSIUM SERPL-SCNC: 4.4 MMOL/L (ref 3.6–5)
PROT SERPL-MCNC: 6.9 G/DL (ref 5.9–8.4)
RBC # BLD AUTO: 3.91 MILLION/UL (ref 4–5.2)
RBC MORPH BLD: NORMAL
SODIUM SERPL-SCNC: 140 MMOL/L (ref 137–147)
TOTAL CELLS COUNTED SPEC: 100
TRIGL SERPL-MCNC: 127 MG/DL
TSH SERPL DL<=0.05 MIU/L-ACNC: 1.19 UIU/ML (ref 0.47–4.68)
WBC # BLD AUTO: 6.4 THOUSAND/UL (ref 4.5–11)

## 2020-09-08 PROCEDURE — 80061 LIPID PANEL: CPT

## 2020-09-08 PROCEDURE — 85007 BL SMEAR W/DIFF WBC COUNT: CPT

## 2020-09-08 PROCEDURE — 80053 COMPREHEN METABOLIC PANEL: CPT

## 2020-09-08 PROCEDURE — 36415 COLL VENOUS BLD VENIPUNCTURE: CPT

## 2020-09-08 PROCEDURE — 85027 COMPLETE CBC AUTOMATED: CPT

## 2020-09-08 PROCEDURE — 84443 ASSAY THYROID STIM HORMONE: CPT

## 2020-09-08 PROCEDURE — 80307 DRUG TEST PRSMV CHEM ANLYZR: CPT | Performed by: FAMILY MEDICINE

## 2020-09-08 RX ORDER — IBUPROFEN 200 MG
CAPSULE ORAL
Qty: 90 TABLET | Refills: 3 | Status: SHIPPED | OUTPATIENT
Start: 2020-09-08 | End: 2021-02-23

## 2020-09-16 ENCOUNTER — HOSPITAL ENCOUNTER (OUTPATIENT)
Dept: CT IMAGING | Facility: HOSPITAL | Age: 75
Discharge: HOME | End: 2020-09-16
Payer: MEDICARE

## 2020-09-16 DIAGNOSIS — R91.8 ABNORMAL CT SCAN OF LUNG: ICD-10-CM

## 2020-09-16 DIAGNOSIS — C50.312 MALIGNANT NEOPLASM OF LOWER-INNER QUADRANT OF LEFT BREAST IN FEMALE, ESTROGEN RECEPTOR POSITIVE (HCC): ICD-10-CM

## 2020-09-16 DIAGNOSIS — Z17.0 MALIGNANT NEOPLASM OF LOWER-INNER QUADRANT OF LEFT BREAST IN FEMALE, ESTROGEN RECEPTOR POSITIVE (HCC): ICD-10-CM

## 2020-09-16 PROCEDURE — 71260 CT THORAX DX C+: CPT

## 2020-09-16 RX ADMIN — IOHEXOL 85 ML: 350 INJECTION, SOLUTION INTRAVENOUS at 09:14

## 2020-09-21 ENCOUNTER — OFFICE VISIT (OUTPATIENT)
Dept: HEMATOLOGY ONCOLOGY | Facility: CLINIC | Age: 75
End: 2020-09-21
Payer: MEDICARE

## 2020-09-21 VITALS
DIASTOLIC BLOOD PRESSURE: 84 MMHG | TEMPERATURE: 97.8 F | RESPIRATION RATE: 16 BRPM | HEART RATE: 86 BPM | WEIGHT: 182.4 LBS | BODY MASS INDEX: 38.29 KG/M2 | SYSTOLIC BLOOD PRESSURE: 130 MMHG | HEIGHT: 58 IN

## 2020-09-21 DIAGNOSIS — R91.8 ABNORMAL CT SCAN OF LUNG: ICD-10-CM

## 2020-09-21 DIAGNOSIS — C50.312 MALIGNANT NEOPLASM OF LOWER-INNER QUADRANT OF LEFT BREAST IN FEMALE, ESTROGEN RECEPTOR POSITIVE (HCC): Primary | ICD-10-CM

## 2020-09-21 DIAGNOSIS — E55.9 VITAMIN D DEFICIENCY: ICD-10-CM

## 2020-09-21 DIAGNOSIS — Z79.811 USE OF AROMATASE INHIBITORS: ICD-10-CM

## 2020-09-21 DIAGNOSIS — Z17.0 MALIGNANT NEOPLASM OF LOWER-INNER QUADRANT OF LEFT BREAST IN FEMALE, ESTROGEN RECEPTOR POSITIVE (HCC): Primary | ICD-10-CM

## 2020-09-21 DIAGNOSIS — M85.89 OSTEOPENIA OF MULTIPLE SITES: ICD-10-CM

## 2020-09-21 PROCEDURE — 99214 OFFICE O/P EST MOD 30 MIN: CPT | Performed by: INTERNAL MEDICINE

## 2020-09-21 NOTE — PROGRESS NOTES
Hematology/Oncology Outpatient Follow-up  Beena Bagley 66 y o  female 1945 [id number]    Date:  9/21/2020        Assessment and Plan:  1  Malignant neoplasm of 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, estrogen receptor positive ([address] )  The patient was asked to continue with the Aromasin as endocrine therapy  She does seem to have significant pain in her right middle finger due to recent surgery  This is possibly exaggerated by the endocrine therapy  I discussed with the patient the diagnostic mammogram which was done the end of July and came back benign   - CBC and differential; Future  - Comprehensive metabolic panel; Future  - Magnesium; Future  - Vitamin D 25 hydroxy; Future    2  Use of aromatase inhibitors  She was encouraged to take vitamin-D supplements and to continue with the exemestane   - Vitamin D 25 hydroxy; Future    3  Abnormal CT scan of lung  We discussed the CT scan of the chest which showed chronic changes in the left upper lobe which showed a seem to have improved slightly and could be related to the radiation therapy treatment in the past     4  Osteopenia of multiple sites  We discussed the bone density scan which continues to show osteopenia she will be continue on the Prolia injections every 6 months   - Vitamin D 25 hydroxy; Future    5  Vitamin D deficiency  We will check her vitamin-D level prior to her next visit  - Vitamin D 25 hydroxy; Future        HPI:  The patient came today for a follow-up visit  She continues to be on the Aromasin on a daily basis as endocrine therapy  She recently had surgery to her right middle finger which is hurting her significantly  Her most recent blood work on 09/08/2020 showed a hemoglobin of 12 0 with normal white cells and platelets  The CMP showed normal creatinine with normal liver enzymes  She did have a bone density scan on 07/28/2020 which showed osteopenia    She had bilateral mammogram on 07/28/2020 which show was read as benign the next the do diagnostic mammogram will be in July of 2021  A CT scan of the chest with contrast on 09/16/2020 showed:  IMPRESSION:     Small groundglass opacity and pleural thickening in the left upper lobe is unchanged to slightly less pronounced  This could be related to radiation therapy treatment      Medial left breast density is slightly smaller than the prior examination  Oncology History   Patient had bilateral mammogram October 2017 which showed a breast density with a left-sided asymmetry   An ultrasound was done which revealed 1 5 cm solid mass   Ultrasound-guided core biopsy was done January 24, 2018 the pathology revealed infiltrating lobular carcinoma grade 1   ER strongly positive 90% AR-0% and her 2 Jaylen negative as well  She had her definitive surgery April 3, 2018 as well as 2 sentinel lymph nodes from the left axilla which were both negative for metastatic disease   Her tumor was 2 8 cm and compatible with invasive lobular type adenocarcinoma   The lymphovascular invasion was not present grade 1 with multiple foci of lobular carcinoma in situ   The anterior margin was focally involved with invasive malignancy  Yeison Romero final pathologic stage was to a PT2PN0 MX G1   Oncotype recurrence score came back 13 putting her in the low risk category      Prolia injections every 6 months to start July of 2018  Jacque Gupta has a history of osteopenia and will require long-term use of an aromatase inhibitor  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 ([facility] [address] )   1/24/2018 Initial Diagnosis    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 (HCC)  Stage IA (pT2, pN0, cM0, G1, ER: Positive, AR: Positive, HER2: Negative)       1/24/2018 Biopsy    Left breast ultrasound guided core biopsies  Infiltrating lobular carcinoma  Grade 1    ER 98% positive, AR negative, HER2 negative       4/3/2018 Surgery    Left partial mastectomy with 2 sentinel node biopsy    Size at least 2 1 cm  Multiple foci of LCIS  LCIS present at superior margin  Invasive malignancy is also noted in close proximity to the superior margin of resection    Pathological staging: stage IIA pT2, pN0 sn, pMX, G1    - Dr David Montano     4/3/2018 Genomic Testing    Oncotype DX    Recurrence score 13, 10 year risk of distance recurrence  SANCHEZ alone 8%  ER score 10 6 positive  MD score 5 1 negative  HER2 9 6 negative      6/21/2018 - 8/27/2018 Radiation    Plan ID Energy Fractions Dose per Fraction (cGy) Dose Correction (cGy) Total Dose Delivered (cGy) Elapsed Days   FB L Breas # 10X/6X 28 / 28 180 0 5,040 54   L Brst Boost 12E 8 / 8 200 0 1,600 12      Treatment dates:  6/21/2018 - 8/27/2018     - Dr Cade Bell    1  Anastrozole 5/16/18- 2/27/19 (d/c due to significant arthralgias)  2  Letrozole started 2/27/19  3  Aromasin started 9/2019     Malignant neoplasm of 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, estrogen receptor positive ([facility] [place] [address] )   6/20/2019 Initial Diagnosis    Malignant neoplasm of 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, estrogen receptor positive (HCC)         Interval history:    ROS: Review of Systems   Constitutional: Positive for fatigue  Negative for activity change, appetite change, chills, diaphoresis, fever and unexpected weight change  HENT: Negative for congestion, dental problem, ear discharge, ear pain, facial swelling, hearing loss, mouth sores, nosebleeds, postnasal drip, sore throat, tinnitus and trouble swallowing  Eyes: Negative for discharge, redness, itching and visual disturbance  Respiratory: Negative for cough, chest tightness, shortness of breath and wheezing  Cardiovascular: Negative for chest pain, palpitations and leg swelling  Gastrointestinal: Negative for abdominal distention, abdominal pain, anal bleeding, blood in stool, constipation, diarrhea, nausea and vomiting     Genitourinary: Negative for difficulty urinating, dysuria, flank pain, frequency, hematuria and urgency  Musculoskeletal: Positive for arthralgias  Negative for back pain, gait problem, joint swelling, myalgias and neck pain  Skin: Negative for color change, pallor, rash and wound  Neurological: Negative for dizziness, syncope, speech difficulty, weakness, light-headedness, numbness and headaches  Hematological: Negative for adenopathy  Does not bruise/bleed easily  Psychiatric/Behavioral: Positive for sleep disturbance  Negative for agitation, behavioral problems and confusion         Past Medical History:   Diagnosis Date    Anxiety     Arthritis     Breast cancer ([place] [id number] ) 2018    left    Cancer [facility])     breast    Chronic pain disorder     Depression     Depression     Diabetes mellitus ([place] [address] )     Hepatic steatosis     History of chemotherapy     Hypercholesterolemia     Hypertension     Irritable bowel syndrome     Migraine     Type 2 diabetes mellitus with hyperglycemia, without long-term current use of insulin ([place] [address] ) 3/22/2019       Past Surgical History:   Procedure Laterality Date    BREAST BIOPSY Left 2011    benign    BREAST BIOPSY Left 2011    benign    BREAST LUMPECTOMY Left 2018    malignant    CARPAL TUNNEL RELEASE Right     right CTR    CARPAL TUNNEL RELEASE Left 2018     SECTION      CHOLECYSTECTOMY      MASTECTOMY Left     sentinae node mapping    SC INCISE FINGER TENDON SHEATH Left 2018    Procedure: RELEASE 3RD TRIGGER FINGER  ;  Surgeon: Wilton Escalante MD;  Location: [address] [place] [address];  Service: Plastics    SC WRIST Floyd Enter LIG Left 2018    Procedure: [address];  Surgeon: Wilton Escalante MD;  Location: [address] [place] [address];  Service: Plastics    SENTINEL LYMPH NODE BIOPSY      TRIGGER FINGER RELEASE Left 2018       Social History     Socioeconomic History    Marital status: /Civil Union     Spouse name: None    Number of children: None    Years of education: None    Highest education level: None   Occupational History    None   Social Needs    Financial resource strain: None    Food insecurity     Worry: None     Inability: None    Transportation needs     Medical: None     Non-medical: None   Tobacco Use    Smoking status: Passive Smoke Exposure - Never Smoker    Smokeless tobacco: Never Used   Substance and Sexual Activity    Alcohol use: No    Drug use: No    Sexual activity: Yes     Partners: Male   Lifestyle    Physical activity     Days per week: None     Minutes per session: None    Stress: None   Relationships    Social connections     Talks on phone: None     Gets together: None     Attends [employer] service: None     Active member of club or organization: None     Attends meetings of clubs or organizations: None     Relationship status: None    Intimate partner violence     Fear of current or ex partner: None     Emotionally abused: None     Physically abused: None     Forced sexual activity: None   Other Topics Concern    None   Social History Narrative    None       Family History   Problem Relation Age of Onset    Heart disease Mother         cardiovascular disease    Heart disease Father     No Known Problems Sister     No Known Problems Daughter     No Known Problems Maternal Grandmother     No Known Problems Maternal Grandfather     No Known Problems Paternal Grandmother     No Known Problems Paternal Grandfather        Allergies   Allergen Reactions    Chocolate     Chocolate Flavor     Ibuprofen Swelling    Pork-Derived Products          Current Outpatient Medications:     atorvastatin (LIPITOR) 40 mg tablet, TAKE ONE TABLET BY MOUTH AT BEDTIMETOMAR 1 TABLETA POR VIA ORAL ANTES DE DORMIR EN LA NOCHE, Disp: 30 tablet, Rfl: 3    Calcium 600 600 MG tablet, TAKE 1 TABLET (600 MG TOTAL) BY MOUTH 2 (TWO) TIMES A DAY WITH A MEAL, Disp: 90 tablet, Rfl: 3    DULoxetine (CYMBALTA) 60 mg delayed release capsule, Take 1 capsule (60 mg total) by mouth daily, Disp: 30 capsule, Rfl: 5    ergocalciferol (VITAMIN D2) 50,000 units, TAKE 1 CAPSULE (50,000 UNITS TOTAL) BY MOUTH ONCE A WEEK FOR 8 DOSES, Disp: 8 capsule, Rfl: 3    exemestane (AROMASIN) 25 MG tablet, TAKE 1 TABLET (25 MG TOTAL) BY MOUTH DAILY, Disp: 30 tablet, Rfl: 5    gabapentin (NEURONTIN) 300 mg capsule, Take 1 capsule (300 mg total) by mouth 3 (three) times a day, Disp: 270 capsule, Rfl: 3    naloxone (Narcan) 4 mg/0 1 mL nasal spray, 0 1 mL (4 mg total) into each nostril once as needed for opioid reversal or respiratory depression for up to 1 dose, Disp: 1 each, Rfl: 5    oxyCODONE-acetaminophen (PERCOCET) 5-325 mg per tablet, Take 1 table at twice a day as needed for severe pain, Disp: 60 tablet, Rfl: 0    topiramate (TOPAMAX) 25 mg tablet, 1 ORAL TWO TIMES A DAY 1 ORAL TWO VECES A DAY, Disp: , Rfl:       Physical Exam:  /84 (BP Location: Right arm, Patient Position: Sitting, Cuff Size: Adult)   Pulse 86   Temp 97 8 °F (36 6 °C) (Tympanic)   Resp 16   Ht 4' 10" (1 473 m)   Wt 82 7 kg (182 lb 6 4 oz)   BMI 38 12 kg/m²     Physical Exam  Constitutional:       General: She is in acute distress  Appearance: She is well-developed  She is obese  She is ill-appearing  She is not diaphoretic  HENT:      Head: Normocephalic and atraumatic  Nose: Nose normal    Eyes:      General: No scleral icterus  Right eye: No discharge  Left eye: No discharge  Conjunctiva/sclera: Conjunctivae normal       Pupils: Pupils are equal, round, and reactive to light  Neck:      Musculoskeletal: Normal range of motion and neck supple  Thyroid: No thyromegaly  Vascular: No JVD  Trachea: No tracheal deviation  Cardiovascular:      Rate and Rhythm: Normal rate and regular rhythm  Heart sounds: Normal heart sounds  No murmur  No friction rub  Pulmonary:      Effort: Pulmonary effort is normal  No respiratory distress  Breath sounds: Normal breath sounds  No stridor  No wheezing or rales  Chest:      Chest wall: No tenderness  Abdominal:      General: Bowel sounds are normal  There is no distension  Palpations: Abdomen is soft  There is no hepatomegaly, splenomegaly or mass  Tenderness: There is no abdominal tenderness  There is no guarding or rebound  Musculoskeletal:         General: No tenderness or deformity  Comments: Decreased range of motion of the right hand   Lymphadenopathy:      Cervical: No cervical adenopathy  Skin:     General: Skin is warm and dry  Coloration: Skin is not pale  Findings: No erythema or rash  Neurological:      Mental Status: She is alert and oriented to person, place, and time  Cranial Nerves: No cranial nerve deficit  Coordination: Coordination normal       Deep Tendon Reflexes: Reflexes are normal and symmetric  Psychiatric:         Behavior: Behavior normal          Thought Content: Thought content normal          Judgment: Judgment normal            Labs:  Lab Results   Component Value Date    WBC 6 40 09/08/2020    HGB 12 0 09/08/2020    HCT 35 6 (L) 09/08/2020    MCV 91 09/08/2020     09/08/2020     Lab Results   Component Value Date    K 4 4 09/08/2020     09/08/2020    CO2 30 09/08/2020    BUN 11 09/08/2020    CREATININE 0 59 (L) 09/08/2020    GLUF 89 09/08/2020    CALCIUM 9 6 09/08/2020    AST 34 09/08/2020    ALT 27 09/08/2020    ALKPHOS 74 09/08/2020    EGFR 103 09/08/2020     No results found for: TSH    Patient voiced understanding and agreement in the above discussion  Aware to contact our office with questions/symptoms in the interim

## 2020-10-06 ENCOUNTER — OFFICE VISIT (OUTPATIENT)
Dept: FAMILY MEDICINE CLINIC | Facility: CLINIC | Age: 75
End: 2020-10-06
Payer: MEDICARE

## 2020-10-06 ENCOUNTER — TELEPHONE (OUTPATIENT)
Dept: FAMILY MEDICINE CLINIC | Facility: CLINIC | Age: 75
End: 2020-10-06

## 2020-10-06 VITALS
HEART RATE: 88 BPM | RESPIRATION RATE: 16 BRPM | TEMPERATURE: 98 F | WEIGHT: 182 LBS | DIASTOLIC BLOOD PRESSURE: 70 MMHG | OXYGEN SATURATION: 98 % | BODY MASS INDEX: 38.2 KG/M2 | HEIGHT: 58 IN | SYSTOLIC BLOOD PRESSURE: 120 MMHG

## 2020-10-06 DIAGNOSIS — G90.513 COMPLEX REGIONAL PAIN SYNDROME TYPE 1 AFFECTING BOTH HANDS: Primary | ICD-10-CM

## 2020-10-06 DIAGNOSIS — Z23 NEEDS FLU SHOT: ICD-10-CM

## 2020-10-06 DIAGNOSIS — Z79.891 LONG TERM (CURRENT) USE OF OPIATE ANALGESIC: ICD-10-CM

## 2020-10-06 PROCEDURE — G0008 ADMIN INFLUENZA VIRUS VAC: HCPCS | Performed by: FAMILY MEDICINE

## 2020-10-06 PROCEDURE — 99214 OFFICE O/P EST MOD 30 MIN: CPT | Performed by: FAMILY MEDICINE

## 2020-10-06 PROCEDURE — 90662 IIV NO PRSV INCREASED AG IM: CPT | Performed by: FAMILY MEDICINE

## 2020-10-06 RX ORDER — KETOROLAC TROMETHAMINE 10 MG/1
10 TABLET, FILM COATED ORAL EVERY 6 HOURS PRN
Qty: 60 TABLET | Refills: 0 | Status: SHIPPED | OUTPATIENT
Start: 2020-10-06 | End: 2020-10-17

## 2020-10-16 DIAGNOSIS — G90.513 COMPLEX REGIONAL PAIN SYNDROME TYPE 1 AFFECTING BOTH HANDS: ICD-10-CM

## 2020-10-17 RX ORDER — KETOROLAC TROMETHAMINE 10 MG/1
10 TABLET, FILM COATED ORAL EVERY 6 HOURS PRN
Qty: 60 TABLET | Refills: 0 | Status: SHIPPED | OUTPATIENT
Start: 2020-10-17 | End: 2020-12-01 | Stop reason: ALTCHOICE

## 2020-11-05 RX ORDER — BUSPIRONE HYDROCHLORIDE 5 MG/1
TABLET ORAL
COMMUNITY
Start: 2020-10-12 | End: 2021-10-26 | Stop reason: SDUPTHER

## 2020-11-06 ENCOUNTER — OFFICE VISIT (OUTPATIENT)
Dept: FAMILY MEDICINE CLINIC | Facility: CLINIC | Age: 75
End: 2020-11-06
Payer: COMMERCIAL

## 2020-11-06 VITALS
DIASTOLIC BLOOD PRESSURE: 70 MMHG | WEIGHT: 178 LBS | HEART RATE: 96 BPM | HEIGHT: 58 IN | BODY MASS INDEX: 37.36 KG/M2 | RESPIRATION RATE: 16 BRPM | SYSTOLIC BLOOD PRESSURE: 120 MMHG | OXYGEN SATURATION: 98 % | TEMPERATURE: 97.9 F

## 2020-11-06 DIAGNOSIS — M54.2 CERVICALGIA: Primary | ICD-10-CM

## 2020-11-06 PROCEDURE — 99214 OFFICE O/P EST MOD 30 MIN: CPT | Performed by: FAMILY MEDICINE

## 2020-11-06 RX ORDER — METHOCARBAMOL 500 MG/1
500 TABLET, FILM COATED ORAL 4 TIMES DAILY
Qty: 90 TABLET | Refills: 3 | Status: SHIPPED | OUTPATIENT
Start: 2020-11-06 | End: 2020-12-01 | Stop reason: ALTCHOICE

## 2020-11-10 ENCOUNTER — APPOINTMENT (OUTPATIENT)
Dept: RADIATION ONCOLOGY | Facility: CLINIC | Age: 75
End: 2020-11-10
Attending: RADIOLOGY
Payer: COMMERCIAL

## 2020-11-10 ENCOUNTER — TELEMEDICINE (OUTPATIENT)
Dept: RADIATION ONCOLOGY | Facility: HOSPITAL | Age: 75
End: 2020-11-10
Attending: RADIOLOGY

## 2020-11-10 DIAGNOSIS — Z17.0 MALIGNANT NEOPLASM OF LOWER-INNER QUADRANT OF LEFT BREAST IN FEMALE, ESTROGEN RECEPTOR POSITIVE (HCC): Primary | ICD-10-CM

## 2020-11-10 DIAGNOSIS — C50.312 MALIGNANT NEOPLASM OF LOWER-INNER QUADRANT OF LEFT BREAST IN FEMALE, ESTROGEN RECEPTOR POSITIVE (HCC): Primary | ICD-10-CM

## 2020-11-13 ENCOUNTER — RADIATION ONCOLOGY FOLLOW-UP (OUTPATIENT)
Dept: RADIATION ONCOLOGY | Facility: CLINIC | Age: 75
End: 2020-11-13
Attending: RADIOLOGY
Payer: COMMERCIAL

## 2020-11-13 DIAGNOSIS — C50.312 MALIGNANT NEOPLASM OF LOWER-INNER QUADRANT OF LEFT BREAST IN FEMALE, ESTROGEN RECEPTOR POSITIVE (HCC): Primary | ICD-10-CM

## 2020-11-13 DIAGNOSIS — Z17.0 MALIGNANT NEOPLASM OF LOWER-INNER QUADRANT OF LEFT BREAST IN FEMALE, ESTROGEN RECEPTOR POSITIVE (HCC): Primary | ICD-10-CM

## 2020-11-13 PROCEDURE — 99211 OFF/OP EST MAY X REQ PHY/QHP: CPT | Performed by: RADIOLOGY

## 2020-11-19 DIAGNOSIS — C50.312 MALIGNANT NEOPLASM OF LOWER-INNER QUADRANT OF LEFT BREAST IN FEMALE, ESTROGEN RECEPTOR POSITIVE (HCC): ICD-10-CM

## 2020-11-19 DIAGNOSIS — Z17.0 MALIGNANT NEOPLASM OF LOWER-INNER QUADRANT OF LEFT BREAST IN FEMALE, ESTROGEN RECEPTOR POSITIVE (HCC): ICD-10-CM

## 2020-11-19 RX ORDER — EXEMESTANE 25 MG/1
25 TABLET ORAL DAILY
Qty: 30 TABLET | Refills: 11 | Status: SHIPPED | OUTPATIENT
Start: 2020-11-19 | End: 2021-11-01 | Stop reason: SDUPTHER

## 2020-12-01 ENCOUNTER — TELEMEDICINE (OUTPATIENT)
Dept: FAMILY MEDICINE CLINIC | Facility: CLINIC | Age: 75
End: 2020-12-01
Payer: COMMERCIAL

## 2020-12-01 VITALS — WEIGHT: 170 LBS | HEIGHT: 58 IN | BODY MASS INDEX: 35.68 KG/M2

## 2020-12-01 DIAGNOSIS — Z20.822 EXPOSURE TO COVID-19 VIRUS: Primary | ICD-10-CM

## 2020-12-01 PROCEDURE — 99213 OFFICE O/P EST LOW 20 MIN: CPT | Performed by: FAMILY MEDICINE

## 2020-12-01 PROCEDURE — 1036F TOBACCO NON-USER: CPT | Performed by: FAMILY MEDICINE

## 2020-12-30 ENCOUNTER — TELEPHONE (OUTPATIENT)
Dept: HEMATOLOGY ONCOLOGY | Facility: CLINIC | Age: 75
End: 2020-12-30

## 2021-01-25 ENCOUNTER — TELEPHONE (OUTPATIENT)
Dept: FAMILY MEDICINE CLINIC | Facility: CLINIC | Age: 76
End: 2021-01-25

## 2021-01-25 NOTE — TELEPHONE ENCOUNTER
Patient called left message for patient regarding scheduling AWV and to find out if patient is interested in being scheduled for the COVID 19 Vaccine

## 2021-02-02 ENCOUNTER — TELEPHONE (OUTPATIENT)
Dept: HEMATOLOGY ONCOLOGY | Facility: CLINIC | Age: 76
End: 2021-02-02

## 2021-02-02 NOTE — TELEPHONE ENCOUNTER
I tried several times but I can't get through  It seems like someone picks up but its just dead air

## 2021-02-02 NOTE — TELEPHONE ENCOUNTER
Please call the patient who speaks Sur[place] to reschedule her appointment with AMERICO Esteban from 2/3/21 @ 9:00am to 2/17/21 @ 9:30  Would you please remind her to have her labs done prior to her appointment? Thank you

## 2021-02-06 DIAGNOSIS — Z79.891 LONG TERM (CURRENT) USE OF OPIATE ANALGESIC: ICD-10-CM

## 2021-02-09 RX ORDER — NALOXONE HYDROCHLORIDE 4 MG/.1ML
SPRAY NASAL
Qty: 2 ML | OUTPATIENT
Start: 2021-02-09

## 2021-02-12 RX ORDER — CHOLECALCIFEROL (VITAMIN D3) 125 MCG
CAPSULE ORAL
COMMUNITY
Start: 2021-01-26 | End: 2022-06-02 | Stop reason: ALTCHOICE

## 2021-02-15 ENCOUNTER — OFFICE VISIT (OUTPATIENT)
Dept: FAMILY MEDICINE CLINIC | Facility: CLINIC | Age: 76
End: 2021-02-15
Payer: COMMERCIAL

## 2021-02-15 VITALS
HEART RATE: 84 BPM | HEIGHT: 58 IN | SYSTOLIC BLOOD PRESSURE: 110 MMHG | WEIGHT: 180 LBS | TEMPERATURE: 98 F | OXYGEN SATURATION: 98 % | RESPIRATION RATE: 16 BRPM | DIASTOLIC BLOOD PRESSURE: 70 MMHG | BODY MASS INDEX: 37.79 KG/M2

## 2021-02-15 DIAGNOSIS — C80.1 NEUROPATHY ASSOCIATED WITH CANCER (HCC): ICD-10-CM

## 2021-02-15 DIAGNOSIS — E66.01 OBESITY, MORBID (HCC): ICD-10-CM

## 2021-02-15 DIAGNOSIS — F33.9 DEPRESSION, RECURRENT (HCC): ICD-10-CM

## 2021-02-15 DIAGNOSIS — G89.29 CHRONIC BILATERAL LOW BACK PAIN WITHOUT SCIATICA: Primary | ICD-10-CM

## 2021-02-15 DIAGNOSIS — M54.2 CERVICALGIA: ICD-10-CM

## 2021-02-15 DIAGNOSIS — M54.50 CHRONIC BILATERAL LOW BACK PAIN WITHOUT SCIATICA: Primary | ICD-10-CM

## 2021-02-15 DIAGNOSIS — R73.03 PRE-DIABETES: ICD-10-CM

## 2021-02-15 DIAGNOSIS — K21.9 GASTROESOPHAGEAL REFLUX DISEASE WITHOUT ESOPHAGITIS: ICD-10-CM

## 2021-02-15 DIAGNOSIS — G63 NEUROPATHY ASSOCIATED WITH CANCER (HCC): ICD-10-CM

## 2021-02-15 DIAGNOSIS — I73.9 PERIPHERAL VASCULAR DISEASE (HCC): ICD-10-CM

## 2021-02-15 DIAGNOSIS — C50.312 MALIGNANT NEOPLASM OF LOWER-INNER QUADRANT OF LEFT BREAST IN FEMALE, ESTROGEN RECEPTOR POSITIVE (HCC): ICD-10-CM

## 2021-02-15 DIAGNOSIS — Z17.0 MALIGNANT NEOPLASM OF LOWER-INNER QUADRANT OF LEFT BREAST IN FEMALE, ESTROGEN RECEPTOR POSITIVE (HCC): ICD-10-CM

## 2021-02-15 LAB — SL AMB POCT HEMOGLOBIN AIC: 5.7 (ref ?–6.5)

## 2021-02-15 PROCEDURE — 1100F PTFALLS ASSESS-DOCD GE2>/YR: CPT | Performed by: FAMILY MEDICINE

## 2021-02-15 PROCEDURE — 1036F TOBACCO NON-USER: CPT | Performed by: FAMILY MEDICINE

## 2021-02-15 PROCEDURE — 83036 HEMOGLOBIN GLYCOSYLATED A1C: CPT | Performed by: FAMILY MEDICINE

## 2021-02-15 PROCEDURE — 3288F FALL RISK ASSESSMENT DOCD: CPT | Performed by: FAMILY MEDICINE

## 2021-02-15 PROCEDURE — 1160F RVW MEDS BY RX/DR IN RCRD: CPT | Performed by: FAMILY MEDICINE

## 2021-02-15 PROCEDURE — 99214 OFFICE O/P EST MOD 30 MIN: CPT | Performed by: FAMILY MEDICINE

## 2021-02-15 RX ORDER — OMEPRAZOLE 20 MG/1
20 CAPSULE, DELAYED RELEASE ORAL
Qty: 30 CAPSULE | Refills: 5 | Status: SHIPPED | OUTPATIENT
Start: 2021-02-15 | End: 2021-06-11

## 2021-02-15 NOTE — PATIENT INSTRUCTIONS
Fall Prevention   AMBULATORY CARE:   Fall prevention  includes ways to make your home and other areas safer  It also includes ways you can move more carefully to prevent a fall  Health conditions that cause changes in your blood pressure, vision, or muscle strength and coordination may increase your risk for falls  Medicines may also increase your risk for falls if they make you dizzy, weak, or sleepy  Call 911 or have someone else call if:   · You have fallen and are unconscious  · You have fallen and cannot move part of your body  Contact your healthcare provider if:   · You have fallen and have pain or a headache  · You have questions or concerns about your condition or care  Fall prevention tips:   · Stand or sit up slowly  This may help you keep your balance and prevent falls  · Use assistive devices as directed  Your healthcare provider may suggest that you use a cane or walker to help you keep your balance  You may need to have grab bars put in your bathroom near the toilet or in the shower  · Wear shoes that fit well and have soles that   Wear shoes both inside and outside  Use slippers with good   Do not wear shoes with high heels  · Wear a personal alarm  This is a device that allows you to call 911 if you fall and need help  Ask your healthcare provider for more information  · Stay active  Exercise can help strengthen your muscles and improve your balance  Your healthcare provider may recommend water aerobics or walking  He or she may also recommend physical therapy to improve your coordination  Never start an exercise program without talking to your healthcare provider first          · Manage your medical conditions  Keep all appointments with your healthcare providers  Visit your eye doctor as directed  Home safety tips:       · Add items to prevent falls in the bathroom  Put nonslip strips on your bath or shower floor to prevent you from slipping   Use a bath mat if you do not have carpet in the bathroom  This will prevent you from falling when you step out of the bath or shower  Use a shower seat so you do not need to stand while you shower  Sit on the toilet or a chair in your bathroom to dry yourself and put on clothing  This will prevent you from losing your balance from drying or dressing yourself while you are standing  · Keep paths clear  Remove books, shoes, and other objects from walkways and stairs  Place cords for telephones and lamps out of the way so that you do not need to walk over them  Tape them down if you cannot move them  Remove small rugs  If you cannot remove a rug, secure it with double-sided tape  This will prevent you from tripping  · Install bright lights in your home  Use night lights to help light paths to the bathroom or kitchen  Always turn on the light before you start walking  · Keep items you use often on shelves within reach  Do not use a step stool to help you reach an item  · Paint or place reflective tape on the edges of your stairs  This will help you see the stairs better  Follow up with your healthcare provider as directed:  Write down your questions so you remember to ask them during your visits  © Copyright [address] Information is for End User's use only and may not be sold, redistributed or otherwise used for commercial purposes  All illustrations and images included in CareNotes® are the copyrighted property of [employer]  or [facility] Tanner Rust   The above information is an  only  It is not intended as medical advice for individual conditions or treatments  Talk to your doctor, nurse or pharmacist before following any medical regimen to see if it is safe and effective for you

## 2021-02-15 NOTE — PROGRESS NOTES
Assessment/Plan:    Neuropathy associated with cancer ([facility] )  Continue pain care, patient was encouraged to continue care with Oncologist       Depression, recurrent ([place] [id number] )  Condition is stable with current treatment, to  continue the same      Malignant neoplasm of 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, estrogen receptor positive ([facility] )  Continue care with oncology  Currently on Aromasin  Diagnoses and all orders for this visit:    Chronic bilateral low back pain without sciatica  -     Ambulatory referral to Physical Therapy; Future    Cervicalgia  -     diclofenac sodium (VOLTAREN) 50 mg EC tablet; Take 1 tablet (50 mg total) by mouth 2 (two) times a day    BMI 37 0-37 9, adult    Pre-diabetes  -     POCT hemoglobin A1c    Neuropathy associated with cancer (HCC)    Depression, recurrent (HCC)    Peripheral vascular disease (HCC)    Obesity, morbid ([facility] )    Malignant neoplasm of 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, estrogen receptor positive (HCC)    Gastroesophageal reflux disease without esophagitis  -     omeprazole (PriLOSEC) 20 mg delayed release capsule; Take 1 capsule (20 mg total) by mouth daily before breakfast    Other orders  -     Melatonin 5 MG TABS; TAKE 1 TABLET BY ORAL ROUTE AT BEDTIME  -     sertraline (ZOLOFT) 50 mg tablet; TAKE 1 TABLET (50 MG) BY ORAL ROUTE 1 TIME PER DAY          Subjective:      Patient ID: Azeem Thurman is a 66 y o  female  Azeem Thurman 66 y o  complaining of   Chronic pain in arms, hands, Lower back and neck; started about 2 year(s) ago  The pain is continuos and has been gradually worsening since onset  The quality of the pain is described as aching  The pain is at a severity of 5/10  The symptoms are aggravated by movement  Associated symptoms include numbness, paresthesias and tingling  She had tried multiple alternatives without improved her symptoms  patient states taking Tylenol without  Helping        She also complains of abdominal distension and gases  The following portions of the patient's history were reviewed and updated as appropriate: allergies, current medications, past family history, past medical history, past social history, past surgical history and problem list     Review of Systems   Constitutional: Negative for diaphoresis, fatigue, fever and unexpected weight change  Respiratory: Negative for cough, chest tightness, shortness of breath and wheezing  Cardiovascular: Negative for chest pain, palpitations and leg swelling  Gastrointestinal: Positive for abdominal distention  Negative for abdominal pain, blood in stool and constipation  Musculoskeletal: Positive for arthralgias, back pain and neck pain  Neurological: Negative for dizziness, syncope, light-headedness and headaches  Hematological: Does not bruise/bleed easily  Psychiatric/Behavioral: Negative for behavioral problems, self-injury and sleep disturbance  The patient is not nervous/anxious  Objective:      /70 (BP Location: Left arm, Patient Position: Sitting, Cuff Size: Standard)   Pulse 84   Temp 98 °F (36 7 °C) (Temporal)   Resp 16   Ht 4' 10" (1 473 m)   Wt 81 6 kg (180 lb)   SpO2 98%   Breastfeeding No   BMI 37 62 kg/m²          Physical Exam  HENT:      Head: Normocephalic  Eyes:      Pupils: Pupils are equal, round, and reactive to light  Neck:      Musculoskeletal: Neck supple  Cardiovascular:      Rate and Rhythm: Normal rate and regular rhythm  Pulmonary:      Effort: Pulmonary effort is normal    Musculoskeletal:         General: Tenderness (  Of small joints of hands and wrist, neck pain to the pressure ) present  Skin:     General: Skin is warm and dry  Neurological:      Mental Status: She is alert  Psychiatric:         Behavior: Behavior normal          Falls Plan of Care: Balance, strength, and gait training instructions were provided  BMI Counseling: Body mass index is 37 62 kg/m²   The BMI is above normal  Nutrition recommendations include reducing portion sizes and decreasing overall calorie intake

## 2021-02-16 ENCOUNTER — TELEPHONE (OUTPATIENT)
Dept: HEMATOLOGY ONCOLOGY | Facility: CLINIC | Age: 76
End: 2021-02-16

## 2021-02-16 NOTE — TELEPHONE ENCOUNTER
LVM to go  over the following COVID-19 pre appt screening questions  And Lab work reminder for 2/17/21     1  Are you currently experiencing any symptoms of fever, cough, shortness of breath, chills, repeated shaking with chills, muscle pain, headache, sore throat, or new loss of taste/smell? 2  In the last 14 days, have you traveled to any of the currently restricted states that would need a self quarantine? 3  Have you been tested for COVID-19  The patient was advised the following,     You are required to bring your own mask to the appointment  And you are only allowed once person in the exam room with you  We will be giving your guest a COVID-19 screening questioner when they arrive to fill out

## 2021-02-17 ENCOUNTER — TELEPHONE (OUTPATIENT)
Dept: HEMATOLOGY ONCOLOGY | Facility: CLINIC | Age: 76
End: 2021-02-17

## 2021-02-23 DIAGNOSIS — M54.2 CERVICALGIA: ICD-10-CM

## 2021-02-23 DIAGNOSIS — Z79.811 USE OF AROMATASE INHIBITORS: ICD-10-CM

## 2021-02-23 DIAGNOSIS — G90.513 COMPLEX REGIONAL PAIN SYNDROME TYPE 1 AFFECTING BOTH HANDS: ICD-10-CM

## 2021-02-23 RX ORDER — KETOROLAC TROMETHAMINE 10 MG/1
10 TABLET, FILM COATED ORAL EVERY 6 HOURS PRN
Qty: 60 TABLET | Refills: 0 | OUTPATIENT
Start: 2021-02-23

## 2021-02-23 RX ORDER — IBUPROFEN 200 MG
CAPSULE ORAL
Qty: 90 TABLET | Refills: 3 | Status: SHIPPED | OUTPATIENT
Start: 2021-02-23 | End: 2021-09-08

## 2021-02-25 DIAGNOSIS — G90.513 COMPLEX REGIONAL PAIN SYNDROME TYPE 1 AFFECTING BOTH HANDS: ICD-10-CM

## 2021-02-25 DIAGNOSIS — M54.2 CERVICALGIA: ICD-10-CM

## 2021-02-26 RX ORDER — KETOROLAC TROMETHAMINE 10 MG/1
10 TABLET, FILM COATED ORAL EVERY 6 HOURS PRN
Qty: 60 TABLET | Refills: 0 | OUTPATIENT
Start: 2021-02-26

## 2021-03-01 ENCOUNTER — EVALUATION (OUTPATIENT)
Dept: PHYSICAL THERAPY | Facility: CLINIC | Age: 76
End: 2021-03-01
Payer: COMMERCIAL

## 2021-03-01 DIAGNOSIS — M54.50 CHRONIC BILATERAL LOW BACK PAIN WITHOUT SCIATICA: ICD-10-CM

## 2021-03-01 DIAGNOSIS — G89.29 CHRONIC BILATERAL LOW BACK PAIN WITHOUT SCIATICA: ICD-10-CM

## 2021-03-01 PROCEDURE — 97110 THERAPEUTIC EXERCISES: CPT

## 2021-03-01 PROCEDURE — 97162 PT EVAL MOD COMPLEX 30 MIN: CPT

## 2021-03-01 NOTE — PROGRESS NOTES
PT Evaluation     Today's date: 3/1/2021  Patient name: Timmy Shepherd  : 1945  MRN: [id number]  Referring provider: Rahul Guzman MD  Dx:   Encounter Diagnosis     ICD-10-CM    1  Chronic bilateral low back pain without sciatica  M54 5 Ambulatory referral to Physical Therapy    G89 29                   Assessment  Assessment details: Timmy Shepherd is a 66 y o  female with history of cervicalgia, depression, and multiple co-morbidities presenting for chronic bilateral low back pain without sciatica  She presents on evaluation with back pain along spine (waist to neck) rated 8/10, impaired gross lumbar ROM, impaired B LE strength and flexibility, impaired balance, and gait requiring use of quad cane; in addition she requires assistance from  for ADLs  Of note, patient at times closed her eyes and looked uncomfortable during evaluation, declined offer to change positions as she stated that doesn't help the pain  Patient is also a poor historian and is unsure if had back MRI  In addition, pain response during lumbar palpation was disproportionate suggesting extreme sensitivity  She would benefit from skilled PT intervention to address above impairments and promote ease and tolerance for mobility to promote return to prior functional level  Impairments: abnormal gait, abnormal or restricted ROM, abnormal movement, activity intolerance, difficulty understanding, impaired balance, impaired physical strength, lacks appropriate home exercise program, pain with function and poor posture   Understanding of Dx/Px/POC: fair   Prognosis: fair  Prognosis details: Chronicity of symptoms impact prognosis    Goals  ST) Patient will decrease low back pain to less than 5/10 at worst to promote ease with mobility  2) Patient will increase gross B LE strength by 1/2 grade to promote ease with functional activity      LT) Patient will decrease low back pain at worst to <3/10 to promote tolerance for mobility  2) Patient will ambulate 1 block using quad cane with assistance from caregiver to promote tolerance for mobility  Plan  Plan details: RE in 4 weeks  Patient would benefit from: skilled physical therapy  Planned modality interventions: TENS and hydrotherapy  Planned therapy interventions: activity modification, ADL training, balance, body mechanics training, breathing training, flexibility, functional ROM exercises, gait training, home exercise program, manual therapy, neuromuscular re-education, patient education, strengthening, stretching, therapeutic exercise, therapeutic activities, therapeutic training and transfer training  Frequency: 2x week  Duration in visits: 8  Duration in weeks: 4  Treatment plan discussed with: patient (used  phone)        Subjective Evaluation    History of Present Illness  Onset date: chronic, years ago  Mechanism of injury:  #[id number]- of note, patient appears in pain during interview and at time closes her eyes  Patient reports her back has bothered her for many years from her waist up to her neck  It is a strong pain into her butt, denies radiating pain, says has N/T in legs  She went to MD on 2/15 who referred her to PT, is unsure what they said about her back  She has pain when sitting, changing positions, standing, and walking  She mainly stays home due to it and her  helps with dressing and bathing since she is scared to fall  She uses her cane on the stairs  Medication helps and she just puts up with the pain  Her  or nephew  her to appointments  Quality of life: fair    Pain  Current pain ratin  Pain scale at lowest: goes down but cannot give number  At worst pain ratin  Pain location: waist to neck  Quality: pain  Relieving factors: medications  Aggravating factors: standing, walking and sitting  Progression: no change    Social Support  Stairs in house: yes (uses cane)   Patient lives at: house    Lives with: spouse    Stress factors comments: says she has desperation  Diagnostic Tests  Abnormal MRI: unsure if she had one  Treatments  Previous treatment: medication and physical therapy (says PT didn't help)  Current treatment: medication  Patient Goals  Patient goals for therapy: decreased pain and improved balance  Patient's goals regarding treatment: I want to be helped  Objective     Postural Observations  Seated posture: poor  Standing posture: fair        Palpation   Left   Tenderness of the erector spinae, gluteus medius and lumbar paraspinals  Right   Tenderness of the erector spinae, gluteus medius and lumbar paraspinals  Tenderness     Lumbar Spine  Tenderness in the spinous process  Left Hip   Tenderness in the PSIS and sacroiliac joint  Right Hip   Tenderness in the PSIS and iliac crest      Additional Tenderness Details  Patient's response disproportionate to therapist pressure  Therapist provided light touch and patient winced in pain  Disproportionate, glute med, sp proc  B     Neurological Testing     Sensation     Lumbar   Left   Intact: light touch    Right   Intact: light touch    Active Range of Motion     Additional Active Range of Motion Details  Grossly limited, patient in pain seated thus deferred  Strength/Myotome Testing     Left Hip   Planes of Motion   Flexion: 3-    Right Hip   Planes of Motion   Flexion: 3-    Left Knee   Extension: 3-    Right Knee   Extension: 3-    Left Ankle/Foot   Dorsiflexion: 4-    Right Ankle/Foot   Dorsiflexion: 4-    Ambulation     Observational Gait   Decreased walking speed and stride length  Additional Observational Gait Details  Patient left quad cane in waiting room to walk to exam room with mild med-lat sway, decreased heel strike  When used cane to leave clinic, mildly improved heel strike               Precautions: previous falls, depression, cervicalgia          Manuals 3/1       Measurements Neuro Re-Ed                                                                 Ther Ex        Seated hip flex 1x10 B       Seated KE 1x10 B       Seated DF 1x10 B                                               Ther Activity                        Gait Training        observation With and without quad cane               Modalities

## 2021-03-11 ENCOUNTER — APPOINTMENT (OUTPATIENT)
Dept: PHYSICAL THERAPY | Facility: CLINIC | Age: 76
End: 2021-03-11
Payer: COMMERCIAL

## 2021-03-18 ENCOUNTER — OFFICE VISIT (OUTPATIENT)
Dept: PHYSICAL THERAPY | Facility: CLINIC | Age: 76
End: 2021-03-18
Payer: COMMERCIAL

## 2021-03-18 DIAGNOSIS — M54.50 CHRONIC BILATERAL LOW BACK PAIN WITHOUT SCIATICA: Primary | ICD-10-CM

## 2021-03-18 DIAGNOSIS — G89.29 CHRONIC BILATERAL LOW BACK PAIN WITHOUT SCIATICA: Primary | ICD-10-CM

## 2021-03-18 PROCEDURE — 97112 NEUROMUSCULAR REEDUCATION: CPT | Performed by: PHYSICAL THERAPIST

## 2021-03-18 PROCEDURE — 97110 THERAPEUTIC EXERCISES: CPT | Performed by: PHYSICAL THERAPIST

## 2021-03-18 NOTE — PROGRESS NOTES
Daily Note     Today's date: 3/18/2021  Patient name: Miguel Randolph  : 1945  MRN: [id number]  Referring provider: Ashely Braun MD  Dx:   Encounter Diagnosis     ICD-10-CM    1  Chronic bilateral low back pain without sciatica  M54 5     G89 29                   Subjective: Pt arrives to therapy stating that her neck and hands are where her PC is located  However, believes that the low back pain is attributing this to the neck and shoulders  She would like to continue PT, and has recently been canceling 2/2 pain and poor tolerance  Objective: See treatment diary below      Assessment: Tolerated treatment fair  Patient was taken through all LE interventions toady to improve her core and hip strength  She will continue to progress next visit  Due to no show and cancels she will be scheduled 1 visit at a time  Plan: Continue per plan of care  Precautions: cervical neck pain     Daily Treatment Diary    Date 3/18            FOTO IE            Re-Eval IE               Manuals                                                        Neuro Re-Ed     Hip abd/add iso 10x5"             Bridge  2x10             SLR  2x10             TVA                                                     Ther Ex    Bike  2' pn!             Squat              Standing hip abd/ext  10x2            Hamstring stretch  20"x5            LTR  10x5"                                                   Ther Activity                                                        Modalities

## 2021-03-23 ENCOUNTER — IMMUNIZATIONS (OUTPATIENT)
Dept: FAMILY MEDICINE CLINIC | Facility: HOSPITAL | Age: 76
End: 2021-03-23

## 2021-03-23 DIAGNOSIS — Z23 ENCOUNTER FOR IMMUNIZATION: Primary | ICD-10-CM

## 2021-03-23 PROCEDURE — 91301 SARS-COV-2 / COVID-19 MRNA VACCINE (MODERNA) 100 MCG: CPT

## 2021-03-23 PROCEDURE — 0011A SARS-COV-2 / COVID-19 MRNA VACCINE (MODERNA) 100 MCG: CPT

## 2021-04-13 DIAGNOSIS — E78.2 MIXED HYPERLIPIDEMIA: ICD-10-CM

## 2021-04-14 RX ORDER — ATORVASTATIN CALCIUM 40 MG/1
TABLET, FILM COATED ORAL
Qty: 90 TABLET | Refills: 3 | Status: SHIPPED | OUTPATIENT
Start: 2021-04-14 | End: 2021-10-26 | Stop reason: SDUPTHER

## 2021-04-19 ENCOUNTER — LAB (OUTPATIENT)
Dept: LAB | Facility: HOSPITAL | Age: 76
End: 2021-04-19
Payer: COMMERCIAL

## 2021-04-19 ENCOUNTER — OFFICE VISIT (OUTPATIENT)
Dept: FAMILY MEDICINE CLINIC | Facility: CLINIC | Age: 76
End: 2021-04-19
Payer: COMMERCIAL

## 2021-04-19 VITALS
DIASTOLIC BLOOD PRESSURE: 70 MMHG | TEMPERATURE: 97.9 F | HEART RATE: 78 BPM | HEIGHT: 58 IN | RESPIRATION RATE: 20 BRPM | OXYGEN SATURATION: 98 % | BODY MASS INDEX: 37.57 KG/M2 | SYSTOLIC BLOOD PRESSURE: 106 MMHG | WEIGHT: 179 LBS

## 2021-04-19 DIAGNOSIS — E78.2 MIXED HYPERLIPIDEMIA: ICD-10-CM

## 2021-04-19 DIAGNOSIS — Z17.0 MALIGNANT NEOPLASM OF LOWER-INNER QUADRANT OF LEFT BREAST IN FEMALE, ESTROGEN RECEPTOR POSITIVE (HCC): ICD-10-CM

## 2021-04-19 DIAGNOSIS — E78.1 HYPERTRIGLYCERIDEMIA: Primary | ICD-10-CM

## 2021-04-19 DIAGNOSIS — C50.312 MALIGNANT NEOPLASM OF LOWER-INNER QUADRANT OF LEFT BREAST IN FEMALE, ESTROGEN RECEPTOR POSITIVE (HCC): ICD-10-CM

## 2021-04-19 DIAGNOSIS — Z00.00 MEDICARE ANNUAL WELLNESS VISIT, SUBSEQUENT: Primary | ICD-10-CM

## 2021-04-19 DIAGNOSIS — M65.321 TRIGGER FINGER, RIGHT INDEX FINGER: ICD-10-CM

## 2021-04-19 PROBLEM — Z74.09 IMPAIRED MOBILITY AND ADLS: Status: ACTIVE | Noted: 2017-02-14

## 2021-04-19 PROBLEM — R53.81 PHYSICAL DEBILITY: Status: ACTIVE | Noted: 2017-02-14

## 2021-04-19 PROBLEM — F99 ABNORMAL MMSE: Status: ACTIVE | Noted: 2017-02-14

## 2021-04-19 PROBLEM — Z78.9 IMPAIRED MOBILITY AND ADLS: Status: ACTIVE | Noted: 2017-02-14

## 2021-04-19 LAB
CHOLEST SERPL-MCNC: 290 MG/DL
HDLC SERPL-MCNC: 38 MG/DL
NONHDLC SERPL-MCNC: 252 MG/DL
TRIGL SERPL-MCNC: 466 MG/DL

## 2021-04-19 PROCEDURE — 3288F FALL RISK ASSESSMENT DOCD: CPT | Performed by: FAMILY MEDICINE

## 2021-04-19 PROCEDURE — 80053 COMPREHEN METABOLIC PANEL: CPT

## 2021-04-19 PROCEDURE — 1036F TOBACCO NON-USER: CPT | Performed by: FAMILY MEDICINE

## 2021-04-19 PROCEDURE — 83735 ASSAY OF MAGNESIUM: CPT

## 2021-04-19 PROCEDURE — G0439 PPPS, SUBSEQ VISIT: HCPCS | Performed by: FAMILY MEDICINE

## 2021-04-19 PROCEDURE — 1170F FXNL STATUS ASSESSED: CPT | Performed by: FAMILY MEDICINE

## 2021-04-19 PROCEDURE — 99213 OFFICE O/P EST LOW 20 MIN: CPT | Performed by: FAMILY MEDICINE

## 2021-04-19 PROCEDURE — 1125F AMNT PAIN NOTED PAIN PRSNT: CPT | Performed by: FAMILY MEDICINE

## 2021-04-19 PROCEDURE — 1160F RVW MEDS BY RX/DR IN RCRD: CPT | Performed by: FAMILY MEDICINE

## 2021-04-19 PROCEDURE — 36415 COLL VENOUS BLD VENIPUNCTURE: CPT

## 2021-04-19 PROCEDURE — 20550 NJX 1 TENDON SHEATH/LIGAMENT: CPT | Performed by: FAMILY MEDICINE

## 2021-04-19 PROCEDURE — 80061 LIPID PANEL: CPT

## 2021-04-19 RX ORDER — ICOSAPENT ETHYL 1000 MG/1
2 CAPSULE ORAL 2 TIMES DAILY
Qty: 120 CAPSULE | Refills: 5 | Status: SHIPPED | OUTPATIENT
Start: 2021-04-19 | End: 2021-08-13

## 2021-04-19 NOTE — PATIENT INSTRUCTIONS
Medicare Preventive Visit Patient Instructions  Thank you for completing your Welcome to Medicare Visit or Medicare Annual Wellness Visit today  Your next wellness visit will be due in one year (4/20/2022)  The screening/preventive services that you may require over the next 5-10 years are detailed below  Some tests may not apply to you based off risk factors and/or age  Screening tests ordered at today's visit but not completed yet may show as past due  Also, please note that scanned in results may not display below  Preventive Screenings:  Service Recommendations Previous Testing/Comments   Colorectal Cancer Screening  * Colonoscopy    * Fecal Occult Blood Test (FOBT)/Fecal Immunochemical Test (FIT)  * Fecal DNA/Cologuard Test  * Flexible Sigmoidoscopy Age: 54-65 years old   Colonoscopy: every 10 years (may be performed more frequently if at higher risk)  OR  FOBT/FIT: every 1 year  OR  Cologuard: every 3 years  OR  Sigmoidoscopy: every 5 years  Screening may be recommended earlier than age 48 if at higher risk for colorectal cancer  Also, an individualized decision between you and your healthcare provider will decide whether screening between the ages of 74-80 would be appropriate  Colonoscopy: Not on file  FOBT/FIT: 07/12/2018  Cologuard: Not on file  Sigmoidoscopy: Not on file          Breast Cancer Screening Age: 36 years old  Frequency: every 1-2 years  Not required if history of left and right mastectomy Mammogram: 07/28/2020    History Breast Cancer   Cervical Cancer Screening Between the ages of 21-29, pap smear recommended once every 3 years  Between the ages of 33-67, can perform pap smear with HPV co-testing every 5 years     Recommendations may differ for women with a history of total hysterectomy, cervical cancer, or abnormal pap smears in past  Pap Smear: Not on file    Screening Not Indicated   Hepatitis C Screening Once for adults born between 1945 and 1965  More frequently in patients at high risk for Hepatitis C Hep C Antibody: Not on file        Diabetes Screening 1-2 times per year if you're at risk for diabetes or have pre-diabetes Fasting glucose: 89 mg/dL   A1C: 5 7    Screening Current   Cholesterol Screening Once every 5 years if you don't have a lipid disorder  May order more often based on risk factors  Lipid panel: 09/08/2020    Screening Not Indicated  History Lipid Disorder     Other Preventive Screenings Covered by Medicare:  1  Abdominal Aortic Aneurysm (AAA) Screening: covered once if your at risk  You're considered to be at risk if you have a family history of AAA  2  Lung Cancer Screening: covers low dose CT scan once per year if you meet all of the following conditions: (1) Age 50-69; (2) No signs or symptoms of lung cancer; (3) Current smoker or have quit smoking within the last 15 years; (4) You have a tobacco smoking history of at least 30 pack years (packs per day multiplied by number of years you smoked); (5) You get a written order from a healthcare provider  3  Glaucoma Screening: covered annually if you're considered high risk: (1) You have diabetes OR (2) Family history of glaucoma OR (3)  aged 48 and older OR (3)  American aged 72 and older  3  Osteoporosis Screening: covered every 2 years if you meet one of the following conditions: (1) You're estrogen deficient and at risk for osteoporosis based off medical history and other findings; (2) Have a vertebral abnormality; (3) On glucocorticoid therapy for more than 3 months; (4) Have primary hyperparathyroidism; (5) On osteoporosis medications and need to assess response to drug therapy  · Last bone density test (DXA Scan): 07/28/2020   5  HIV Screening: covered annually if you're between the age of 15-65  Also covered annually if you are younger than 13 and older than 72 with risk factors for HIV infection   For pregnant patients, it is covered up to 3 times per pregnancy  Immunizations:  Immunization Recommendations   Influenza Vaccine Annual influenza vaccination during flu season is recommended for all persons aged >= 6 months who do not have contraindications   Pneumococcal Vaccine (Prevnar and Pneumovax)  * Prevnar = PCV13  * Pneumovax = PPSV23   Adults 25-60 years old: 1-3 doses may be recommended based on certain risk factors  Adults 72 years old: Prevnar (PCV13) vaccine recommended followed by Pneumovax (PPSV23) vaccine  If already received PPSV23 since turning 65, then PCV13 recommended at least one year after PPSV23 dose  Hepatitis B Vaccine 3 dose series if at intermediate or high risk (ex: diabetes, end stage renal disease, liver disease)   Tetanus (Td) Vaccine - COST NOT COVERED BY MEDICARE PART B Following completion of primary series, a booster dose should be given every 10 years to maintain immunity against tetanus  Td may also be given as tetanus wound prophylaxis  Tdap Vaccine - COST NOT COVERED BY MEDICARE PART B Recommended at least once for all adults  For pregnant patients, recommended with each pregnancy  Shingles Vaccine (Shingrix) - COST NOT COVERED BY MEDICARE PART B  2 shot series recommended in those aged 48 and above     Health Maintenance Due:      Topic Date Due    DXA SCAN  07/28/2022     Immunizations Due:      Topic Date Due    DTaP,Tdap,and Td Vaccines (1 - Tdap) Never done    COVID-19 Vaccine (2 - Moderna 2-dose series) 04/20/2021     Advance Directives   What are advance directives? Advance directives are legal documents that state your wishes and plans for medical care  These plans are made ahead of time in case you lose your ability to make decisions for yourself  Advance directives can apply to any medical decision, such as the treatments you want, and if you want to donate organs  What are the types of advance directives? There are many types of advance directives, and each state has rules about how to use them   You may choose a combination of any of the following:  · Living will: This is a written record of the treatment you want  You can also choose which treatments you do not want, which to limit, and which to stop at a certain time  This includes surgery, medicine, IV fluid, and tube feedings  · Durable power of  for healthcare [facility]): This is a written record that states who you want to make healthcare choices for you when you are unable to make them for yourself  This person, called a proxy, is usually a family member or a friend  You may choose more than 1 proxy  · Do not resuscitate (DNR) order:  A DNR order is used in case your heart stops beating or you stop breathing  It is a request not to have certain forms of treatment, such as CPR  A DNR order may be included in other types of advance directives  · Medical directive: This covers the care that you want if you are in a coma, near death, or unable to make decisions for yourself  You can list the treatments you want for each condition  Treatment may include pain medicine, surgery, blood transfusions, dialysis, IV or tube feedings, and a ventilator (breathing machine)  · Values history: This document has questions about your views, beliefs, and how you feel and think about life  This information can help others choose the care that you would choose  Why are advance directives important? An advance directive helps you control your care  Although spoken wishes may be used, it is better to have your wishes written down  Spoken wishes can be misunderstood, or not followed  Treatments may be given even if you do not want them  An advance directive may make it easier for your family to make difficult choices about your care  Fall Prevention    Fall prevention  includes ways to make your home and other areas safer  It also includes ways you can move more carefully to prevent a fall   Health conditions that cause changes in your blood pressure, vision, or muscle strength and coordination may increase your risk for falls  Medicines may also increase your risk for falls if they make you dizzy, weak, or sleepy  Fall prevention tips:   · Stand or sit up slowly  · Use assistive devices as directed  · Wear shoes that fit well and have soles that   · Wear a personal alarm  · Stay active  · Manage your medical conditions  Home Safety Tips:  · Add items to prevent falls in the bathroom  · Keep paths clear  · Install bright lights in your home  · Keep items you use often on shelves within reach  · Paint or place reflective tape on the edges of your stairs  Urinary Incontinence   Urinary incontinence (UI)  is when you lose control of your bladder  UI develops because your bladder cannot store or empty urine properly  The 3 most common types of UI are stress incontinence, urge incontinence, or both  Medicines:   · May be given to help strengthen your bladder control  Report any side effects of medication to your healthcare provider  Do pelvic muscle exercises often:  Your pelvic muscles help you stop urinating  Squeeze these muscles tight for 5 seconds, then relax for 5 seconds  Gradually work up to squeezing for 10 seconds  Do 3 sets of 15 repetitions a day, or as directed  This will help strengthen your pelvic muscles and improve bladder control  Train your bladder:  Go to the bathroom at set times, such as every 2 hours, even if you do not feel the urge to go  You can also try to hold your urine when you feel the urge to go  For example, hold your urine for 5 minutes when you feel the urge to go  As that becomes easier, hold your urine for 10 minutes  Self-care:   · Keep a UI record  Write down how often you leak urine and how much you leak  Make a note of what you were doing when you leaked urine  · Drink liquids as directed  You may need to limit the amount of liquid you drink to help control your urine leakage   Do not drink any liquid right before you go to bed  Limit or do not have drinks that contain caffeine or alcohol  · Prevent constipation  Eat a variety of high-fiber foods  Good examples are high-fiber cereals, beans, vegetables, and whole-grain breads  Walking is the best way to trigger your intestines to have a bowel movement  · Exercise regularly and maintain a healthy weight  Weight loss and exercise will decrease pressure on your bladder and help you control your leakage  · Use a catheter as directed  to help empty your bladder  A catheter is a tiny, plastic tube that is put into your bladder to drain your urine  · Go to behavior therapy as directed  Behavior therapy may be used to help you learn to control your urge to urinate  Weight Management   Why it is important to manage your weight:  Being overweight increases your risk of health conditions such as heart disease, high blood pressure, type 2 diabetes, and certain types of cancer  It can also increase your risk for osteoarthritis, sleep apnea, and other respiratory problems  Aim for a slow, steady weight loss  Even a small amount of weight loss can lower your risk of health problems  How to lose weight safely:  A safe and healthy way to lose weight is to eat fewer calories and get regular exercise  You can lose up about 1 pound a week by decreasing the number of calories you eat by 500 calories each day  Healthy meal plan for weight management:  A healthy meal plan includes a variety of foods, contains fewer calories, and helps you stay healthy  A healthy meal plan includes the following:  · Eat whole-grain foods more often  A healthy meal plan should contain fiber  Fiber is the part of grains, fruits, and vegetables that is not broken down by your body  Whole-grain foods are healthy and provide extra fiber in your diet  Some examples of whole-grain foods are whole-wheat breads and pastas, oatmeal, brown rice, and bulgur    · Eat a variety of vegetables every day  Include dark, leafy greens such as spinach, kale, driss greens, and mustard greens  Eat yellow and orange vegetables such as carrots, sweet potatoes, and winter squash  · Eat a variety of fruits every day  Choose fresh or canned fruit (canned in its own juice or light syrup) instead of juice  Fruit juice has very little or no fiber  · Eat low-fat dairy foods  Drink fat-free (skim) milk or 1% milk  Eat fat-free yogurt and low-fat cottage cheese  Try low-fat cheeses such as mozzarella and other reduced-fat cheeses  · Choose meat and other protein foods that are low in fat  Choose beans or other legumes such as split peas or lentils  Choose fish, skinless poultry (chicken or turkey), or lean cuts of red meat (beef or pork)  Before you cook meat or poultry, cut off any visible fat  · Use less fat and oil  Try baking foods instead of frying them  Add less fat, such as margarine, sour cream, regular salad dressing and mayonnaise to foods  Eat fewer high-fat foods  Some examples of high-fat foods include french fries, doughnuts, ice cream, and cakes  · Eat fewer sweets  Limit foods and drinks that are high in sugar  This includes candy, cookies, regular soda, and sweetened drinks  Exercise:  Exercise at least 30 minutes per day on most days of the week  Some examples of exercise include walking, biking, dancing, and swimming  You can also fit in more physical activity by taking the stairs instead of the elevator or parking farther away from stores  Ask your healthcare provider about the best exercise plan for you     Narcotic (Opioid) Safety    Use narcotics safely:  · Take prescribed narcotics exactly as directed  · Do not give narcotics to others or take narcotics that belong to someone else  · Do not mix narcotics without medicines or alcohol  · Do not drive or operate heavy machinery after you take the narcotic  · Monitor for side effects and notify your healthcare provider if you experienced side effects such as nausea, sleepiness, itching, or trouble thinking clearly  Manage constipation:    Constipation is the most common side effect of narcotic medicine  Constipation is when you have hard, dry bowel movements, or you go longer than usual between bowel movements  Tell your healthcare provider about all changes in your bowel movements while you are taking narcotics  He or she may recommend laxative medicine to help you have a bowel movement  He or she may also change the kind of narcotic you are taking, or change when you take it  The following are more ways you can prevent or relieve constipation:    · Drink liquids as directed  You may need to drink extra liquids to help soften and move your bowels  Ask how much liquid to drink each day and which liquids are best for you  · Eat high-fiber foods  This may help decrease constipation by adding bulk to your bowel movements  High-fiber foods include fruits, vegetables, whole-grain breads and cereals, and beans  Your healthcare provider or dietitian can help you create a high-fiber meal plan  Your provider may also recommend a fiber supplement if you cannot get enough fiber from food  · Exercise regularly  Regular physical activity can help stimulate your intestines  Walking is a good exercise to prevent or relieve constipation  Ask which exercises are best for you  · Schedule a time each day to have a bowel movement  This may help train your body to have regular bowel movements  Bend forward while you are on the toilet to help move the bowel movement out  Sit on the toilet for at least 10 minutes, even if you do not have a bowel movement  Store narcotics safely:   · Store narcotics where others cannot easily get them  Keep them in a locked cabinet or secure area  Do not  keep them in a purse or other bag you carry with you  A person may be looking for something else and find the narcotics    · Make sure narcotics are stored out of the reach of children  A child can easily overdose on narcotics  Narcotics may look like candy to a small child  The best way to dispose of narcotics: The laws vary by country and area  In the [place], the best way is to return the narcotics through a take-back program  This program is offered by the [employer])  The following are options for using the program:  · Take the narcotics to a ARI collection site  The site is often a law enforcement center  Call your local law enforcement center for scheduled take-back days in your area  You will be given information on where to go if the collection site is in a different location  · Take the narcotics to an approved pharmacy or hospital   A pharmacy or hospital may be set up as a collection site  You will need to ask if it is a ARI collection site if you were not directed there  A pharmacy or doctor's office may not be able to take back narcotics unless it is a ARI site  · Use a mail-back system  This means you are given containers to put the narcotics into  You will then mail them in the containers  · Use a take-back drop box  This is a place to leave the narcotics at any time  People and animals will not be able to get into the box  Your local law enforcement agency can tell you where to find a drop box in your area  Other ways to manage pain:   · Ask your healthcare provider about non-narcotic medicines to control pain  Nonprescription medicines include NSAIDs (such as ibuprofen) and acetaminophen  Prescription medicines include muscle relaxers, antidepressants, and steroids  · Pain may be managed without any medicines  Some ways to relieve pain include massage, aromatherapy, or meditation  Physical or occupational therapy may also help      For more information:   · Drug Enforcement Administration  [address] [place] [address]  Samantha Miller [address]  Phone: 4- 790 - 259-0217  Web Address: [employer] it  usdoj gov/drug_disposal/    · [address] and Drug Administration  [place]er , [address]  Phone: 7- 714 - 653-8247  Web Address: http://Normal/     © Copyright [employer] 2018 Information is for End User's use only and may not be sold, redistributed or otherwise used for commercial purposes   All illustrations and images included in CareNotes® are the copyrighted property of A D A M , Inc  or [address] [place] [address]

## 2021-04-19 NOTE — RESULT ENCOUNTER NOTE
Please call patient   Triglycerides are high a medication was sent to her pharmacy to take 2 caps BID/

## 2021-04-19 NOTE — PROGRESS NOTES
Assessment and Plan:     Problem List Items Addressed This Visit        Other    Mixed hyperlipidemia    Relevant Orders    Lipid panel (Completed)      Other Visit Diagnoses     Medicare annual wellness visit, subsequent    -  Primary           Preventive health issues were discussed with patient, and age appropriate screening tests were ordered as noted in patient's After Visit Summary  Personalized health advice and appropriate referrals for health education or preventive services given if needed, as noted in patient's After Visit Summary       History of Present Illness:     Patient presents for Medicare Annual Wellness visit    Patient Care Team:  Modesta Maier MD as PCP - General (Family Medicine)  Maral Vazquez MD (Radiation Oncology)  Kye Kaplan MD (Hematology and Oncology)     Problem List:     Patient Active Problem List   Diagnosis    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 ([address] )    Anemia, unspecified    Benign paroxysmal positional vertigo    Carpal tunnel syndrome    Chronic bilateral low back pain without sciatica    Chronic pain disorder    Depression, recurrent ([address] )    Perforation of tympanic membrane    Physical deconditioning    Poor dentition    Prediabetes    Spinal stenosis of lumbar region with radiculopathy    Vision abnormalities    Age-related incipient cataract of both eyes    Preoperative cardiovascular examination    Shortness of breath and palpitations    Carpal tunnel syndrome of left wrist    Trigger middle finger of left hand    Fall    Costochondral pain    Neuropathy associated with cancer ([address] )    Peripheral vascular disease ([address] )    Complex regional pain syndrome type 1 affecting both hands    Left wrist pain    Use of aromatase inhibitors    Osteopenia of multiple sites    Malignant neoplasm of 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, estrogen receptor positive ([address] )    Urinary tract infection without hematuria    Long term current use of opiate analgesic    Vitamin D deficiency    Obesity, morbid (HCC)    Cervicalgia    Abnormal MMSE    Impaired mobility and ADLs    Major depressive disorder, single episode    Mixed hyperlipidemia    Physical debility      Past Medical and Surgical History:     Past Medical History:   Diagnosis Date    Anxiety     Arthritis     Breast cancer ([facility] ) 2018    left    Cancer ([facility] )     breast    Chronic pain disorder     Depression     Depression     Diabetes mellitus ([place] [id number] )     Hepatic steatosis     History of chemotherapy     Hypercholesterolemia     Hypertension     Irritable bowel syndrome     Migraine     Type 2 diabetes mellitus with hyperglycemia, without long-term current use of insulin ([facility] ) 3/22/2019     Past Surgical History:   Procedure Laterality Date    BREAST BIOPSY Left 2011    benign    BREAST BIOPSY Left 2011    benign    BREAST LUMPECTOMY Left 2018    malignant    CARPAL TUNNEL RELEASE Right     right CTR    CARPAL TUNNEL RELEASE Left 2018     SECTION      CHOLECYSTECTOMY      MASTECTOMY Left     sentinae node mapping    NH INCISE FINGER TENDON SHEATH Left 2018    Procedure: RELEASE 3RD TRIGGER FINGER  ;  Surgeon: Tj Goddard MD;  Location: [facility] MAIN OR;  Service: Plastics    NH WRIST [facility] LIG Left 2018    Procedure: ENDOSCOPIOC [facility];  Surgeon: Tj Goddard MD;  Location: [facility] MAIN OR;  Service: Plastics    SENTINEL LYMPH NODE BIOPSY      TRIGGER FINGER RELEASE Left 2018      Family History:     Family History   Problem Relation Age of Onset    Heart disease Mother         cardiovascular disease    Heart disease Father     No Known Problems Sister     No Known Problems Daughter     No Known Problems Maternal Grandmother     No Known Problems Maternal Grandfather     No Known Problems Paternal Grandmother     No Known Problems Paternal Grandfather Social History:        Social History     Socioeconomic History    Marital status: /Civil Union     Spouse name: None    Number of children: None    Years of education: None    Highest education level: None   Occupational History    None   Social Needs    Financial resource strain: None    Food insecurity     Worry: None     Inability: None    Transportation needs     Medical: None     Non-medical: None   Tobacco Use    Smoking status: Passive Smoke Exposure - Never Smoker    Smokeless tobacco: Never Used   Substance and Sexual Activity    Alcohol use: No    Drug use: No    Sexual activity: Yes     Partners: Male   Lifestyle    Physical activity     Days per week: None     Minutes per session: None    Stress: None   Relationships    Social connections     Talks on phone: None     Gets together: None     Attends Sikhism service: None     Active member of club or organization: None     Attends meetings of clubs or organizations: None     Relationship status: None    Intimate partner violence     Fear of current or ex partner: None     Emotionally abused: None     Physically abused: None     Forced sexual activity: None   Other Topics Concern    None   Social History Narrative    None      Medications and Allergies:     Current Outpatient Medications   Medication Sig Dispense Refill    atorvastatin (LIPITOR) 40 mg tablet TAKE ONE TABLET BY MOUTH AT BEDTIMETOMAR 1 TABLETA POR VIA ORAL ANTES DE DORMIR EN LA NOCHE 90 tablet 3    busPIRone (BUSPAR) 5 mg tablet TAKE ONE TABLET BY MOUTH TWICE DAILY DINH 1 TABLETA POR VIA ORAL DOS VECES AL BLAYNE      Calcium 600 600 MG tablet TAKE 1 TABLET (600 MG TOTAL) BY MOUTH 2 (TWO) TIMES A DAY WITH A MEAL 90 tablet 3    diclofenac sodium (VOLTAREN) 50 mg EC tablet Take 1 tablet (50 mg total) by mouth 2 (two) times a day 30 tablet 0    DULoxetine (CYMBALTA) 60 mg delayed release capsule Take 1 capsule (60 mg total) by mouth daily 30 capsule 5    ergocalciferol (VITAMIN D2) 50,000 units TAKE 1 CAPSULE (50,000 UNITS TOTAL) BY MOUTH ONCE A WEEK FOR 8 DOSES 8 capsule 3    exemestane (AROMASIN) 25 MG tablet TAKE 1 TABLET (25 MG TOTAL) BY MOUTH DAILY 30 tablet 11    gabapentin (NEURONTIN) 300 mg capsule Take 1 capsule (300 mg total) by mouth 3 (three) times a day 270 capsule 3    Icosapent Ethyl (Vascepa) 1 g CAPS Take 2 capsules (2 g total) by mouth 2 (two) times a day 120 capsule 5    Melatonin 5 MG TABS TAKE 1 TABLET BY ORAL ROUTE AT BEDTIME      omeprazole (PriLOSEC) 20 mg delayed release capsule Take 1 capsule (20 mg total) by mouth daily before breakfast 30 capsule 5    sertraline (ZOLOFT) 50 mg tablet TAKE 1 TABLET (50 MG) BY ORAL ROUTE 1 TIME PER DAY       No current facility-administered medications for this visit        Allergies   Allergen Reactions    Chocolate - Food Allergy     Chocolate Flavor - Food Allergy     Ibuprofen Swelling    Pork-Derived Products - Food Allergy       Immunizations:     Immunization History   Administered Date(s) Administered    INFLUENZA 01/20/2014, 01/07/2016, 01/07/2016, 09/19/2016, 09/19/2016, 02/14/2017, 10/02/2017    Influenza Split High Dose Preservative Free IM 02/14/2017, 10/02/2017    Influenza, high dose seasonal 0 7 mL 10/03/2018, 09/30/2019, 10/06/2020    Pneumococcal Conjugate 13-Valent 02/14/2017    Pneumococcal Polysaccharide PPV23 09/19/2016    SARS-CoV-2 / COVID-19 mRNA IM (Moderna) 03/23/2021      Health Maintenance:         Topic Date Due    DXA SCAN  07/28/2022         Topic Date Due    DTaP,Tdap,and Td Vaccines (1 - Tdap) Never done    COVID-19 Vaccine (2 - Moderna 2-dose series) 04/20/2021      Medicare Health Risk Assessment:     /70 (BP Location: Left arm, Patient Position: Sitting, Cuff Size: Standard)   Pulse 78   Temp 97 9 °F (36 6 °C) (Temporal)   Resp 20   Ht 4' 10" (1 473 m)   Wt 81 2 kg (179 lb)   SpO2 98%   Breastfeeding No   BMI 37 41 kg/m²      Artist Person is here for her Subsequent Wellness visit  Last Medicare Wellness visit information reviewed, patient interviewed and updates made to the record today  Health Risk Assessment:   Patient rates overall health as fair  Patient feels that their physical health rating is slightly worse  Patient is satisfied with their life  Eyesight was rated as slightly worse  Hearing was rated as slightly worse  Patient feels that their emotional and mental health rating is slightly worse  Patients states they are never, rarely angry  Patient states they are often unusually tired/fatigued  Pain experienced in the last 7 days has been some  Patient's pain rating has been 4/10  Patient states that she has experienced no weight loss or gain in last 6 months  Fall Risk Screening: In the past year, patient has experienced: history of falling in past year    Number of falls: 2 or more  Injured during fall?: Yes    Feels unsteady when standing or walking?: Yes    Worried about falling?: Yes      Urinary Incontinence Screening:   Patient has leaked urine accidently in the last six months  Home Safety:  Patient has trouble with stairs inside or outside of their home  Patient has working smoke alarms and has working carbon monoxide detector  Home safety hazards include: none  Nutrition:   Current diet is Diabetic  Medications:   Patient is currently taking over-the-counter supplements  OTC medications include: see medication list  Patient is able to manage medications  Activities of Daily Living (ADLs)/Instrumental Activities of Daily Living (IADLs):   Walk and transfer into and out of bed and chair?: Yes  Dress and groom yourself?: Yes    Bathe or shower yourself?: Yes    Feed yourself?  Yes  Do your laundry/housekeeping?: No  Manage your money, pay your bills and track your expenses?: Yes  Make your own meals?: Yes    Do your own shopping?: No    Previous Hospitalizations:   Any hospitalizations or ED visits within the last 12 months?: Yes    How many hospitalizations have you had in the last year?: 3-4    Advance Care Planning:   Living will: No    Durable POA for healthcare: No    Advanced directive: No    Advanced directive counseling given: Yes    Five wishes given: Yes    Patient declined ACP directive: No    End of Life Decisions reviewed with patient: Yes    Provider agrees with end of life decisions: Yes      Cognitive Screening:   Provider or family/friend/caregiver concerned regarding cognition?: No    PREVENTIVE SCREENINGS      Cardiovascular Screening:    General: Screening Not Indicated and History Lipid Disorder      Diabetes Screening:     General: Screening Current      Colorectal Cancer Screening:     General: Screening Not Indicated      Breast Cancer Screening:     General: History Breast Cancer      Cervical Cancer Screening:    General: Screening Not Indicated      Osteoporosis Screening:    General: Screening Current      Abdominal Aortic Aneurysm (AAA) Screening:        General: Screening Not Indicated      Lung Cancer Screening:     General: Screening Not Indicated      Hepatitis C Screening:    General: Screening Not Indicated    Review of Current Opioid Use    Opioid Risk Tool (ORT) Interpretation: Complete Opioid Risk Tool (ORT)    Other Counseling Topics:   Alcohol use counseling, car/seat belt/driving safety, skin self-exam, sunscreen and calcium and vitamin D intake and regular weightbearing exercise         Ja Hendrix MD

## 2021-04-21 DIAGNOSIS — C50.312 MALIGNANT NEOPLASM OF LOWER-INNER QUADRANT OF LEFT BREAST IN FEMALE, ESTROGEN RECEPTOR POSITIVE (HCC): Primary | ICD-10-CM

## 2021-04-21 DIAGNOSIS — Z17.0 MALIGNANT NEOPLASM OF LOWER-INNER QUADRANT OF LEFT BREAST IN FEMALE, ESTROGEN RECEPTOR POSITIVE (HCC): Primary | ICD-10-CM

## 2021-04-21 DIAGNOSIS — E55.9 VITAMIN D DEFICIENCY: ICD-10-CM

## 2021-04-21 LAB
ALBUMIN SERPL BCP-MCNC: 3.9 G/DL (ref 3–5.2)
ALP SERPL-CCNC: 104 U/L (ref 43–122)
ALT SERPL W P-5'-P-CCNC: 22 U/L
ANION GAP SERPL CALCULATED.3IONS-SCNC: 5 MMOL/L (ref 5–14)
AST SERPL W P-5'-P-CCNC: 38 U/L (ref 14–36)
BILIRUB SERPL-MCNC: 0.29 MG/DL
BUN SERPL-MCNC: 11 MG/DL (ref 5–25)
CALCIUM SERPL-MCNC: 9.8 MG/DL (ref 8.4–10.2)
CHLORIDE SERPL-SCNC: 102 MMOL/L (ref 97–108)
CO2 SERPL-SCNC: 30 MMOL/L (ref 22–30)
CREAT SERPL-MCNC: 0.64 MG/DL (ref 0.6–1.2)
GFR SERPL CREATININE-BSD FRML MDRD: 100 ML/MIN/1.73SQ M
GLUCOSE SERPL-MCNC: 78 MG/DL (ref 70–99)
MAGNESIUM SERPL-MCNC: 2.1 MG/DL (ref 1.6–2.3)
POTASSIUM SERPL-SCNC: 4.4 MMOL/L (ref 3.6–5)
PROT SERPL-MCNC: 7.4 G/DL (ref 5.9–8.4)
SODIUM SERPL-SCNC: 137 MMOL/L (ref 137–147)

## 2021-04-21 NOTE — PROGRESS NOTES
Assessment/Plan:  Right trigger finger injected at max pain level only after cleaing with betadine and nerve block with lidocaine w/o epi  20 mg of triamcinolone at the level of tendon sheet in the area  to 2nd MCP joint     Patient tolerated the procedure very well  No complications  Recommended to put ice  home I will follow up the patient in the needed basis  Patient has hyperlipidemia , current recommendations are exercise and decrease  saturated fat in his diet  Will repeat that this labs and make further decisions in base non-HDL numbers and the vascular disease risks  No problem-specific Assessment & Plan notes found for this encounter  Diagnoses and all orders for this visit:    Medicare annual wellness visit, subsequent    Mixed hyperlipidemia  -     Lipid panel; Future    Trigger finger, right index finger          Subjective:      Patient ID: Anne Biggs is a 66 y o  female  Anne Biggs 66 y o  complaining of   Acute on chronic pain in Right index finger and the palmar area; started about 1 month(s) ago  The pain is continuos and has been gradually worsening since onset  The quality of the pain is described as aching  The pain radiates down finger  The pain is at a severity of 5/10  The symptoms are aggravated by movement  Associated symptoms include numbness, paresthesias and tingling  He had tried multiple alternatives without improved him symptoms  The following portions of the patient's history were reviewed and updated as appropriate: allergies, current medications, past family history, past medical history, past social history, past surgical history and problem list     Review of Systems   Constitutional: Positive for fatigue  HENT: Negative  Respiratory: Negative  Cardiovascular: Negative  Gastrointestinal: Negative  Musculoskeletal: Positive for arthralgias           Objective:      /70 (BP Location: Left arm, Patient Position: Sitting, Cuff Size: Standard)   Pulse 78   Temp 97 9 °F (36 6 °C) (Temporal)   Resp 20   Ht 4' 10" (1 473 m)   Wt 81 2 kg (179 lb)   SpO2 98%   Breastfeeding No   BMI 37 41 kg/m²          Physical Exam  Constitutional:       General: She is not in acute distress  HENT:      Head: Normocephalic and atraumatic  Cardiovascular:      Rate and Rhythm: Normal rate and regular rhythm  Pulses: Normal pulses  Pulmonary:      Effort: Pulmonary effort is normal    Musculoskeletal:         General: Tenderness (of hand palm area and right index fingerover the flexor tendon of right index finger  Thikening of base of finger, max point of tenderness ) present

## 2021-04-22 ENCOUNTER — IMMUNIZATIONS (OUTPATIENT)
Dept: FAMILY MEDICINE CLINIC | Facility: HOSPITAL | Age: 76
End: 2021-04-22

## 2021-04-22 DIAGNOSIS — Z23 ENCOUNTER FOR IMMUNIZATION: Primary | ICD-10-CM

## 2021-04-22 PROCEDURE — 0012A SARS-COV-2 / COVID-19 MRNA VACCINE (MODERNA) 100 MCG: CPT

## 2021-04-22 PROCEDURE — 91301 SARS-COV-2 / COVID-19 MRNA VACCINE (MODERNA) 100 MCG: CPT

## 2021-05-17 ENCOUNTER — TELEPHONE (OUTPATIENT)
Dept: HEMATOLOGY ONCOLOGY | Facility: CLINIC | Age: 76
End: 2021-05-17

## 2021-05-17 NOTE — TELEPHONE ENCOUNTER
Patient missed 8:30 a m  f/u apt today Monday 05/17/21 w/Kendra Null at the [address] [place] [address] office  Called pt and left message to call the office to reschedule, [phone]

## 2021-06-10 ENCOUNTER — VBI (OUTPATIENT)
Dept: ADMINISTRATIVE | Facility: OTHER | Age: 76
End: 2021-06-10

## 2021-06-10 DIAGNOSIS — K21.9 GASTROESOPHAGEAL REFLUX DISEASE WITHOUT ESOPHAGITIS: ICD-10-CM

## 2021-06-11 RX ORDER — OMEPRAZOLE 20 MG/1
20 CAPSULE, DELAYED RELEASE ORAL
Qty: 30 CAPSULE | Refills: 5 | Status: SHIPPED | OUTPATIENT
Start: 2021-06-11 | End: 2021-10-26

## 2021-07-20 ENCOUNTER — OFFICE VISIT (OUTPATIENT)
Dept: FAMILY MEDICINE CLINIC | Facility: CLINIC | Age: 76
End: 2021-07-20
Payer: COMMERCIAL

## 2021-07-20 VITALS
BODY MASS INDEX: 37.16 KG/M2 | TEMPERATURE: 97.9 F | WEIGHT: 177 LBS | RESPIRATION RATE: 16 BRPM | DIASTOLIC BLOOD PRESSURE: 70 MMHG | OXYGEN SATURATION: 96 % | SYSTOLIC BLOOD PRESSURE: 106 MMHG | HEIGHT: 58 IN | HEART RATE: 84 BPM

## 2021-07-20 DIAGNOSIS — Z11.59 NEED FOR HEPATITIS C SCREENING TEST: Primary | ICD-10-CM

## 2021-07-20 DIAGNOSIS — R21 RASH OF FACE: ICD-10-CM

## 2021-07-20 PROCEDURE — 1036F TOBACCO NON-USER: CPT | Performed by: FAMILY MEDICINE

## 2021-07-20 PROCEDURE — 1160F RVW MEDS BY RX/DR IN RCRD: CPT | Performed by: FAMILY MEDICINE

## 2021-07-20 PROCEDURE — 99213 OFFICE O/P EST LOW 20 MIN: CPT | Performed by: FAMILY MEDICINE

## 2021-07-20 RX ORDER — MOMETASONE FUROATE 1 MG/G
CREAM TOPICAL DAILY
Qty: 45 G | Refills: 0 | Status: SHIPPED | OUTPATIENT
Start: 2021-07-20 | End: 2022-06-02 | Stop reason: ALTCHOICE

## 2021-07-20 RX ORDER — NALOXONE HYDROCHLORIDE 4 MG/.1ML
SPRAY NASAL
COMMUNITY
Start: 2021-06-28 | End: 2022-06-02 | Stop reason: ALTCHOICE

## 2021-07-23 NOTE — PROGRESS NOTES
Assessment/Plan:    No problem-specific Assessment & Plan notes found for this encounter  Diagnoses and all orders for this visit:    Need for hepatitis C screening test  -     Hepatitis C antibody; Future    Rash of face  -     mometasone (ELOCON) 0 1 % cream; Apply topically daily    Other orders  -     Narcan 4 MG/0 1ML nasal spray; INSTILL 0 1 ML (4 MG TOTAL) INTO EACH NOSTRIL ONCE AS NEEDED FOR OPIOID REVERSAL OR RESPIRATORY DEPRESSION FOR UP TO 1 DOSE          Subjective:      Patient ID: Chandra Gotti is a 66 y o  female  Depression     cancer care followup    Back Pain followup   Arm Pain left side   Allergies           The following portions of the patient's history were reviewed and updated as appropriate: allergies, current medications, past family history, past medical history, past social history, past surgical history and problem list     Review of Systems   Constitutional: Negative for diaphoresis, fatigue, fever and unexpected weight change  Respiratory: Negative for cough, chest tightness, shortness of breath and wheezing  Cardiovascular: Negative for chest pain, palpitations and leg swelling  Gastrointestinal: Negative for abdominal pain, blood in stool and constipation  Neurological: Negative for dizziness, syncope, light-headedness and headaches  Hematological: Does not bruise/bleed easily  Psychiatric/Behavioral: Negative for behavioral problems, self-injury and sleep disturbance  The patient is not nervous/anxious  Objective:      /70 (BP Location: Left arm, Patient Position: Sitting, Cuff Size: Standard)   Pulse 84   Temp 97 9 °F (36 6 °C) (Temporal)   Resp 16   Ht 4' 10" (1 473 m)   Wt 80 3 kg (177 lb)   SpO2 96%   Breastfeeding No   BMI 36 99 kg/m²          Physical Exam  HENT:      Head: Normocephalic  Eyes:      Pupils: Pupils are equal, round, and reactive to light  Cardiovascular:      Rate and Rhythm: Normal rate and regular rhythm  Pulmonary:      Effort: Pulmonary effort is normal    Abdominal:      Palpations: Abdomen is soft  Musculoskeletal:         General: Normal range of motion  Cervical back: Neck supple  Skin:     General: Skin is warm and dry  Neurological:      Mental Status: She is alert     Psychiatric:         Behavior: Behavior normal

## 2021-08-03 ENCOUNTER — HOSPITAL ENCOUNTER (OUTPATIENT)
Dept: MAMMOGRAPHY | Facility: CLINIC | Age: 76
Discharge: HOME | End: 2021-08-03
Payer: COMMERCIAL

## 2021-08-03 ENCOUNTER — APPOINTMENT (OUTPATIENT)
Dept: LAB | Facility: HOSPITAL | Age: 76
End: 2021-08-03
Payer: COMMERCIAL

## 2021-08-03 DIAGNOSIS — Z08 ENCOUNTER FOR FOLLOW-UP SURVEILLANCE OF BREAST CANCER: ICD-10-CM

## 2021-08-03 DIAGNOSIS — Z85.3 ENCOUNTER FOR FOLLOW-UP SURVEILLANCE OF BREAST CANCER: ICD-10-CM

## 2021-08-03 DIAGNOSIS — Z11.59 NEED FOR HEPATITIS C SCREENING TEST: ICD-10-CM

## 2021-08-03 LAB — HCV AB SER QL: NORMAL

## 2021-08-03 PROCEDURE — G0279 TOMOSYNTHESIS, MAMMO: HCPCS

## 2021-08-03 PROCEDURE — 36415 COLL VENOUS BLD VENIPUNCTURE: CPT

## 2021-08-03 PROCEDURE — 77066 DX MAMMO INCL CAD BI: CPT

## 2021-08-03 PROCEDURE — 86803 HEPATITIS C AB TEST: CPT

## 2021-08-06 DIAGNOSIS — G56.03 BILATERAL CARPAL TUNNEL SYNDROME: ICD-10-CM

## 2021-08-08 RX ORDER — GABAPENTIN 300 MG/1
CAPSULE ORAL
Qty: 270 CAPSULE | Refills: 3 | Status: SHIPPED | OUTPATIENT
Start: 2021-08-08 | End: 2022-03-31

## 2021-08-13 DIAGNOSIS — E78.1 HYPERTRIGLYCERIDEMIA: ICD-10-CM

## 2021-08-13 RX ORDER — ICOSAPENT ETHYL 1000 MG/1
CAPSULE ORAL
Qty: 120 CAPSULE | Refills: 5 | Status: SHIPPED | OUTPATIENT
Start: 2021-08-13 | End: 2021-12-29

## 2021-08-26 ENCOUNTER — CLINICAL SUPPORT (OUTPATIENT)
Dept: FAMILY MEDICINE CLINIC | Facility: CLINIC | Age: 76
End: 2021-08-26

## 2021-08-26 DIAGNOSIS — Z71.84 TRAVEL ADVICE ENCOUNTER: Primary | ICD-10-CM

## 2021-08-26 PROCEDURE — U0003 INFECTIOUS AGENT DETECTION BY NUCLEIC ACID (DNA OR RNA); SEVERE ACUTE RESPIRATORY SYNDROME CORONAVIRUS 2 (SARS-COV-2) (CORONAVIRUS DISEASE [COVID-19]), AMPLIFIED PROBE TECHNIQUE, MAKING USE OF HIGH THROUGHPUT TECHNOLOGIES AS DESCRIBED BY CMS-2020-01-R: HCPCS | Performed by: FAMILY MEDICINE

## 2021-08-26 PROCEDURE — U0005 INFEC AGEN DETEC AMPLI PROBE: HCPCS | Performed by: FAMILY MEDICINE

## 2021-08-27 LAB — SARS-COV-2 RNA RESP QL NAA+PROBE: NEGATIVE

## 2021-09-07 DIAGNOSIS — Z79.811 USE OF AROMATASE INHIBITORS: ICD-10-CM

## 2021-09-08 RX ORDER — CALCIUM CARBONATE 600 MG
TABLET ORAL
Qty: 90 TABLET | Refills: 0 | Status: SHIPPED | OUTPATIENT
Start: 2021-09-08 | End: 2021-11-01 | Stop reason: SDUPTHER

## 2021-10-26 ENCOUNTER — OFFICE VISIT (OUTPATIENT)
Dept: FAMILY MEDICINE CLINIC | Facility: CLINIC | Age: 76
End: 2021-10-26
Payer: COMMERCIAL

## 2021-10-26 VITALS
HEART RATE: 80 BPM | RESPIRATION RATE: 18 BRPM | DIASTOLIC BLOOD PRESSURE: 80 MMHG | HEIGHT: 58 IN | OXYGEN SATURATION: 98 % | TEMPERATURE: 97.8 F | BODY MASS INDEX: 37.36 KG/M2 | SYSTOLIC BLOOD PRESSURE: 110 MMHG | WEIGHT: 178 LBS

## 2021-10-26 DIAGNOSIS — E55.9 VITAMIN D DEFICIENCY: ICD-10-CM

## 2021-10-26 DIAGNOSIS — E78.2 MIXED HYPERLIPIDEMIA: ICD-10-CM

## 2021-10-26 DIAGNOSIS — Z23 NEEDS FLU SHOT: ICD-10-CM

## 2021-10-26 DIAGNOSIS — F33.9 DEPRESSION, RECURRENT (HCC): ICD-10-CM

## 2021-10-26 DIAGNOSIS — M85.89 OSTEOPENIA OF MULTIPLE SITES: ICD-10-CM

## 2021-10-26 DIAGNOSIS — G89.29 CHRONIC MIDLINE THORACIC BACK PAIN: Primary | ICD-10-CM

## 2021-10-26 DIAGNOSIS — R53.83 OTHER FATIGUE: ICD-10-CM

## 2021-10-26 DIAGNOSIS — K21.9 GASTROESOPHAGEAL REFLUX DISEASE WITHOUT ESOPHAGITIS: ICD-10-CM

## 2021-10-26 DIAGNOSIS — M54.2 CERVICALGIA: ICD-10-CM

## 2021-10-26 DIAGNOSIS — R73.9 HYPERGLYCEMIA: ICD-10-CM

## 2021-10-26 DIAGNOSIS — M54.6 CHRONIC MIDLINE THORACIC BACK PAIN: Primary | ICD-10-CM

## 2021-10-26 PROCEDURE — 99214 OFFICE O/P EST MOD 30 MIN: CPT | Performed by: NURSE PRACTITIONER

## 2021-10-26 PROCEDURE — 1036F TOBACCO NON-USER: CPT | Performed by: NURSE PRACTITIONER

## 2021-10-26 PROCEDURE — 1160F RVW MEDS BY RX/DR IN RCRD: CPT | Performed by: NURSE PRACTITIONER

## 2021-10-26 PROCEDURE — 90662 IIV NO PRSV INCREASED AG IM: CPT | Performed by: NURSE PRACTITIONER

## 2021-10-26 PROCEDURE — G0008 ADMIN INFLUENZA VIRUS VAC: HCPCS | Performed by: NURSE PRACTITIONER

## 2021-10-26 RX ORDER — OMEPRAZOLE 20 MG/1
20 CAPSULE, DELAYED RELEASE ORAL
Qty: 30 CAPSULE | Refills: 5 | Status: SHIPPED | OUTPATIENT
Start: 2021-10-26 | End: 2021-11-22

## 2021-10-26 RX ORDER — BUSPIRONE HYDROCHLORIDE 5 MG/1
5 TABLET ORAL 2 TIMES DAILY
Qty: 60 TABLET | Refills: 0 | Status: SHIPPED | OUTPATIENT
Start: 2021-10-26 | End: 2022-08-08 | Stop reason: ALTCHOICE

## 2021-10-26 RX ORDER — OMEPRAZOLE 20 MG/1
20 CAPSULE, DELAYED RELEASE ORAL
Qty: 30 CAPSULE | Refills: 5 | Status: SHIPPED | OUTPATIENT
Start: 2021-10-26 | End: 2021-10-26 | Stop reason: SDUPTHER

## 2021-10-26 RX ORDER — ATORVASTATIN CALCIUM 40 MG/1
40 TABLET, FILM COATED ORAL
Qty: 90 TABLET | Refills: 3 | Status: SHIPPED | OUTPATIENT
Start: 2021-10-26 | End: 2022-06-09

## 2021-10-26 RX ORDER — ERGOCALCIFEROL 1.25 MG/1
50000 CAPSULE ORAL WEEKLY
Qty: 4 CAPSULE | Refills: 5 | Status: SHIPPED | OUTPATIENT
Start: 2021-10-26 | End: 2022-08-08 | Stop reason: SDUPTHER

## 2021-11-01 DIAGNOSIS — C50.312 MALIGNANT NEOPLASM OF LOWER-INNER QUADRANT OF LEFT BREAST IN FEMALE, ESTROGEN RECEPTOR POSITIVE (HCC): ICD-10-CM

## 2021-11-01 DIAGNOSIS — Z79.811 USE OF AROMATASE INHIBITORS: ICD-10-CM

## 2021-11-01 DIAGNOSIS — Z17.0 MALIGNANT NEOPLASM OF LOWER-INNER QUADRANT OF LEFT BREAST IN FEMALE, ESTROGEN RECEPTOR POSITIVE (HCC): ICD-10-CM

## 2021-11-01 RX ORDER — EXEMESTANE 25 MG/1
25 TABLET ORAL DAILY
Qty: 30 TABLET | Refills: 11 | Status: SHIPPED | OUTPATIENT
Start: 2021-11-01 | End: 2022-06-23

## 2021-11-05 ENCOUNTER — HOSPITAL ENCOUNTER (OUTPATIENT)
Dept: RADIOLOGY | Facility: HOSPITAL | Age: 76
Discharge: HOME | End: 2021-11-05
Payer: COMMERCIAL

## 2021-11-05 DIAGNOSIS — M54.6 CHRONIC MIDLINE THORACIC BACK PAIN: ICD-10-CM

## 2021-11-05 DIAGNOSIS — G89.29 CHRONIC MIDLINE THORACIC BACK PAIN: ICD-10-CM

## 2021-11-05 DIAGNOSIS — M54.2 CERVICALGIA: ICD-10-CM

## 2021-11-05 PROCEDURE — 72072 X-RAY EXAM THORAC SPINE 3VWS: CPT

## 2021-11-08 ENCOUNTER — APPOINTMENT (OUTPATIENT)
Dept: LAB | Facility: HOSPITAL | Age: 76
End: 2021-11-08
Payer: COMMERCIAL

## 2021-11-08 DIAGNOSIS — R73.9 HYPERGLYCEMIA: ICD-10-CM

## 2021-11-08 DIAGNOSIS — R53.83 OTHER FATIGUE: ICD-10-CM

## 2021-11-08 DIAGNOSIS — E78.2 MIXED HYPERLIPIDEMIA: ICD-10-CM

## 2021-11-08 DIAGNOSIS — E55.9 VITAMIN D DEFICIENCY: ICD-10-CM

## 2021-11-08 LAB
25(OH)D3 SERPL-MCNC: 36.8 NG/ML (ref 30–100)
ALBUMIN SERPL BCP-MCNC: 3.9 G/DL (ref 3–5.2)
ALP SERPL-CCNC: 109 U/L (ref 43–122)
ALT SERPL W P-5'-P-CCNC: 28 U/L
ANION GAP SERPL CALCULATED.3IONS-SCNC: 3 MMOL/L (ref 5–14)
AST SERPL W P-5'-P-CCNC: 39 U/L (ref 14–36)
BASOPHILS # BLD AUTO: 0 THOUSANDS/ΜL (ref 0–0.1)
BASOPHILS NFR BLD AUTO: 0 % (ref 0–1)
BILIRUB SERPL-MCNC: 0.33 MG/DL
BUN SERPL-MCNC: 13 MG/DL (ref 5–25)
CALCIUM SERPL-MCNC: 9.9 MG/DL (ref 8.4–10.2)
CHLORIDE SERPL-SCNC: 106 MMOL/L (ref 97–108)
CHOLEST SERPL-MCNC: 176 MG/DL
CO2 SERPL-SCNC: 32 MMOL/L (ref 22–30)
CREAT SERPL-MCNC: 0.62 MG/DL (ref 0.6–1.2)
EOSINOPHIL # BLD AUTO: 0.1 THOUSAND/ΜL (ref 0–0.4)
EOSINOPHIL NFR BLD AUTO: 2 % (ref 0–6)
ERYTHROCYTE [DISTWIDTH] IN BLOOD BY AUTOMATED COUNT: 14.6 %
EST. AVERAGE GLUCOSE BLD GHB EST-MCNC: 120 MG/DL
GFR SERPL CREATININE-BSD FRML MDRD: 101 ML/MIN/1.73SQ M
GLUCOSE P FAST SERPL-MCNC: 92 MG/DL (ref 70–99)
HBA1C MFR BLD: 5.8 %
HCT VFR BLD AUTO: 37.3 % (ref 36–46)
HDLC SERPL-MCNC: 42 MG/DL
HGB BLD-MCNC: 12.2 G/DL (ref 12–16)
LDLC SERPL CALC-MCNC: 101 MG/DL
LYMPHOCYTES # BLD AUTO: 2.6 THOUSANDS/ΜL (ref 0.5–4)
LYMPHOCYTES NFR BLD AUTO: 44 % (ref 25–45)
MCH RBC QN AUTO: 29 PG (ref 26–34)
MCHC RBC AUTO-ENTMCNC: 32.6 G/DL (ref 31–36)
MCV RBC AUTO: 89 FL (ref 80–100)
MONOCYTES # BLD AUTO: 0.6 THOUSAND/ΜL (ref 0.2–0.9)
MONOCYTES NFR BLD AUTO: 11 % (ref 1–10)
NEUTROPHILS # BLD AUTO: 2.4 THOUSANDS/ΜL (ref 1.8–7.8)
NEUTS SEG NFR BLD AUTO: 43 % (ref 45–65)
NONHDLC SERPL-MCNC: 134 MG/DL
PLATELET # BLD AUTO: 248 THOUSANDS/UL (ref 150–450)
PMV BLD AUTO: 8.3 FL (ref 8.9–12.7)
POTASSIUM SERPL-SCNC: 4.4 MMOL/L (ref 3.6–5)
PROT SERPL-MCNC: 7.5 G/DL (ref 5.9–8.4)
RBC # BLD AUTO: 4.2 MILLION/UL (ref 4–5.2)
SODIUM SERPL-SCNC: 141 MMOL/L (ref 137–147)
TRIGL SERPL-MCNC: 163 MG/DL
TSH SERPL DL<=0.05 MIU/L-ACNC: 1.81 UIU/ML (ref 0.47–4.68)
WBC # BLD AUTO: 5.7 THOUSAND/UL (ref 4.5–11)

## 2021-11-08 PROCEDURE — 80061 LIPID PANEL: CPT

## 2021-11-08 PROCEDURE — 85025 COMPLETE CBC W/AUTO DIFF WBC: CPT

## 2021-11-08 PROCEDURE — 80053 COMPREHEN METABOLIC PANEL: CPT

## 2021-11-08 PROCEDURE — 84443 ASSAY THYROID STIM HORMONE: CPT

## 2021-11-08 PROCEDURE — 83036 HEMOGLOBIN GLYCOSYLATED A1C: CPT

## 2021-11-08 PROCEDURE — 82306 VITAMIN D 25 HYDROXY: CPT

## 2021-11-08 PROCEDURE — 36415 COLL VENOUS BLD VENIPUNCTURE: CPT

## 2021-11-20 DIAGNOSIS — K21.9 GASTROESOPHAGEAL REFLUX DISEASE WITHOUT ESOPHAGITIS: ICD-10-CM

## 2021-11-22 RX ORDER — OMEPRAZOLE 20 MG/1
20 CAPSULE, DELAYED RELEASE ORAL
Qty: 30 CAPSULE | Refills: 5 | Status: SHIPPED | OUTPATIENT
Start: 2021-11-22 | End: 2022-02-09

## 2021-12-02 ENCOUNTER — OFFICE VISIT (OUTPATIENT)
Dept: FAMILY MEDICINE CLINIC | Facility: CLINIC | Age: 76
End: 2021-12-02
Payer: COMMERCIAL

## 2021-12-02 VITALS
WEIGHT: 181 LBS | TEMPERATURE: 97.8 F | RESPIRATION RATE: 16 BRPM | BODY MASS INDEX: 37.99 KG/M2 | HEIGHT: 58 IN | HEART RATE: 92 BPM | SYSTOLIC BLOOD PRESSURE: 110 MMHG | OXYGEN SATURATION: 97 % | DIASTOLIC BLOOD PRESSURE: 70 MMHG

## 2021-12-02 DIAGNOSIS — G56.03 BILATERAL CARPAL TUNNEL SYNDROME: ICD-10-CM

## 2021-12-02 DIAGNOSIS — F02.80 LATE ONSET ALZHEIMER'S DEMENTIA WITHOUT BEHAVIORAL DISTURBANCE (HCC): Primary | ICD-10-CM

## 2021-12-02 DIAGNOSIS — G30.1 LATE ONSET ALZHEIMER'S DEMENTIA WITHOUT BEHAVIORAL DISTURBANCE (HCC): Primary | ICD-10-CM

## 2021-12-02 PROBLEM — Z23 NEEDS FLU SHOT: Status: RESOLVED | Noted: 2021-10-26 | Resolved: 2021-12-02

## 2021-12-02 PROBLEM — G63 NEUROPATHY ASSOCIATED WITH CANCER (HCC): Status: RESOLVED | Noted: 2019-03-22 | Resolved: 2021-12-02

## 2021-12-02 PROBLEM — G56.02 CARPAL TUNNEL SYNDROME OF LEFT WRIST: Status: RESOLVED | Noted: 2018-11-30 | Resolved: 2021-12-02

## 2021-12-02 PROBLEM — E55.9 VITAMIN D DEFICIENCY: Status: RESOLVED | Noted: 2020-09-21 | Resolved: 2021-12-02

## 2021-12-02 PROBLEM — M25.532 LEFT WRIST PAIN: Status: RESOLVED | Noted: 2019-06-18 | Resolved: 2021-12-02

## 2021-12-02 PROBLEM — Z01.810 PREOPERATIVE CARDIOVASCULAR EXAMINATION: Status: RESOLVED | Noted: 2018-07-10 | Resolved: 2021-12-02

## 2021-12-02 PROBLEM — N39.0 URINARY TRACT INFECTION WITHOUT HEMATURIA: Status: RESOLVED | Noted: 2019-07-30 | Resolved: 2021-12-02

## 2021-12-02 PROBLEM — R53.81 PHYSICAL DEBILITY: Status: RESOLVED | Noted: 2017-02-14 | Resolved: 2021-12-02

## 2021-12-02 PROBLEM — R07.89 COSTOCHONDRAL PAIN: Status: RESOLVED | Noted: 2019-02-05 | Resolved: 2021-12-02

## 2021-12-02 PROBLEM — C50.312 MALIGNANT NEOPLASM OF LOWER-INNER QUADRANT OF LEFT FEMALE BREAST (HCC): Status: RESOLVED | Noted: 2018-05-24 | Resolved: 2021-12-02

## 2021-12-02 PROBLEM — F33.9 DEPRESSION, RECURRENT (HCC): Status: RESOLVED | Noted: 2017-02-14 | Resolved: 2021-12-02

## 2021-12-02 PROBLEM — M54.2 CERVICALGIA: Status: RESOLVED | Noted: 2021-02-15 | Resolved: 2021-12-02

## 2021-12-02 PROBLEM — H53.9 VISION ABNORMALITIES: Status: RESOLVED | Noted: 2017-02-15 | Resolved: 2021-12-02

## 2021-12-02 PROBLEM — Z79.891 LONG TERM CURRENT USE OF OPIATE ANALGESIC: Status: RESOLVED | Noted: 2019-11-11 | Resolved: 2021-12-02

## 2021-12-02 PROBLEM — R53.83 OTHER FATIGUE: Status: RESOLVED | Noted: 2021-10-26 | Resolved: 2021-12-02

## 2021-12-02 PROBLEM — D64.9 ANEMIA, UNSPECIFIED: Status: RESOLVED | Noted: 2018-07-09 | Resolved: 2021-12-02

## 2021-12-02 PROBLEM — R06.02 SHORTNESS OF BREATH: Status: RESOLVED | Noted: 2018-09-28 | Resolved: 2021-12-02

## 2021-12-02 PROBLEM — R73.9 HYPERGLYCEMIA: Status: RESOLVED | Noted: 2021-10-26 | Resolved: 2021-12-02

## 2021-12-02 PROBLEM — F99 ABNORMAL MMSE: Status: RESOLVED | Noted: 2017-02-14 | Resolved: 2021-12-02

## 2021-12-02 PROBLEM — C80.1 NEUROPATHY ASSOCIATED WITH CANCER (HCC): Status: RESOLVED | Noted: 2019-03-22 | Resolved: 2021-12-02

## 2021-12-02 PROCEDURE — 1160F RVW MEDS BY RX/DR IN RCRD: CPT | Performed by: FAMILY MEDICINE

## 2021-12-02 PROCEDURE — 3725F SCREEN DEPRESSION PERFORMED: CPT | Performed by: FAMILY MEDICINE

## 2021-12-02 PROCEDURE — 1036F TOBACCO NON-USER: CPT | Performed by: FAMILY MEDICINE

## 2021-12-02 PROCEDURE — 99214 OFFICE O/P EST MOD 30 MIN: CPT | Performed by: FAMILY MEDICINE

## 2021-12-02 RX ORDER — DONEPEZIL HYDROCHLORIDE 5 MG/1
5 TABLET, FILM COATED ORAL
Qty: 30 TABLET | Refills: 11 | Status: SHIPPED | OUTPATIENT
Start: 2021-12-02 | End: 2022-08-08 | Stop reason: ALTCHOICE

## 2021-12-29 DIAGNOSIS — E78.1 HYPERTRIGLYCERIDEMIA: ICD-10-CM

## 2021-12-29 RX ORDER — ICOSAPENT ETHYL 1000 MG/1
CAPSULE ORAL
Qty: 120 CAPSULE | Refills: 5 | Status: SHIPPED | OUTPATIENT
Start: 2021-12-29 | End: 2022-02-08

## 2022-01-04 DIAGNOSIS — M54.2 CERVICALGIA: ICD-10-CM

## 2022-01-21 DIAGNOSIS — M54.2 CERVICALGIA: ICD-10-CM

## 2022-02-08 DIAGNOSIS — M54.2 CERVICALGIA: ICD-10-CM

## 2022-02-08 DIAGNOSIS — E78.1 HYPERTRIGLYCERIDEMIA: ICD-10-CM

## 2022-02-08 DIAGNOSIS — K21.9 GASTROESOPHAGEAL REFLUX DISEASE WITHOUT ESOPHAGITIS: ICD-10-CM

## 2022-02-08 RX ORDER — ICOSAPENT ETHYL 1000 MG/1
CAPSULE ORAL
Qty: 120 CAPSULE | Refills: 5 | Status: SHIPPED | OUTPATIENT
Start: 2022-02-08 | End: 2022-02-09

## 2022-02-08 NOTE — TELEPHONE ENCOUNTER
Requested medication(s) are due for refill today: Yes  Patient has already received a courtesy refill: No  Other reason request has been forwarded to provider:
Clear bilaterally, pupils equal, round and reactive to light.

## 2022-02-09 RX ORDER — OMEPRAZOLE 20 MG/1
20 CAPSULE, DELAYED RELEASE ORAL
Qty: 30 CAPSULE | Refills: 5 | Status: SHIPPED | OUTPATIENT
Start: 2022-02-09 | End: 2022-05-31

## 2022-02-09 RX ORDER — ICOSAPENT ETHYL 1000 MG/1
CAPSULE ORAL
Qty: 120 CAPSULE | Refills: 5 | Status: SHIPPED | OUTPATIENT
Start: 2022-02-09 | End: 2022-05-31 | Stop reason: CLARIF

## 2022-03-07 ENCOUNTER — OFFICE VISIT (OUTPATIENT)
Dept: FAMILY MEDICINE CLINIC | Facility: CLINIC | Age: 77
End: 2022-03-07
Payer: MEDICARE

## 2022-03-07 VITALS
HEIGHT: 58 IN | DIASTOLIC BLOOD PRESSURE: 80 MMHG | HEART RATE: 87 BPM | SYSTOLIC BLOOD PRESSURE: 118 MMHG | BODY MASS INDEX: 39.34 KG/M2 | WEIGHT: 187.4 LBS | RESPIRATION RATE: 20 BRPM | TEMPERATURE: 97.9 F | OXYGEN SATURATION: 95 %

## 2022-03-07 DIAGNOSIS — M65.331 TRIGGER MIDDLE FINGER OF RIGHT HAND: Primary | ICD-10-CM
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
## 2022-03-07 PROCEDURE — 99213 OFFICE O/P EST LOW 20 MIN: CPT | Performed by: PHYSICIAN ASSISTANT

## 2022-03-07 NOTE — ASSESSMENT & PLAN NOTE
Pt reports that she had trigger finger surgery 1-2 years ago, unable to recall when it was exactly  Surgical scar visible at base of R middle finger  Finger is stuck straight out and pt is unable to bend it due to stiffness and pain; it feels stuck  Advised pt to follow up with orthopedic specialist for evaluation; unsure if injections are an option or not after surgical intervention  Referral given

## 2022-03-07 NOTE — PROGRESS NOTES
Assessment/Plan:    Trigger middle finger of right hand  Pt reports that she had trigger finger surgery 1-2 years ago, unable to recall when it was exactly  Surgical scar visible at base of R middle finger  Finger is stuck straight out and pt is unable to bend it due to stiffness and pain; it feels stuck  Advised pt to follow up with orthopedic specialist for evaluation; unsure if injections are an option or not after surgical intervention  Referral given  Diagnoses and all orders for this visit:    Trigger middle finger of right hand  -     Ambulatory Referral to Orthopedic Surgery; Future          Subjective:      Patient ID: Yady Higginbotham is a 64 y o  female  Devora Mg presents to the office today for evaluation of R middle finger pain  Pt is Sammarinese speaking only, audio  [id number] provided interpretive services throughout encounter  The following portions of the patient's history were reviewed and updated as appropriate: allergies, current medications, past family history, past medical history, past social history, past surgical history and problem list     Review of Systems   Constitutional: Negative for chills, fatigue and fever  HENT: Negative for congestion, rhinorrhea, sinus pressure, sinus pain, sneezing and sore throat  Respiratory: Negative for cough, shortness of breath and wheezing  Cardiovascular: Negative for chest pain, palpitations and leg swelling  Gastrointestinal: Negative for abdominal pain, constipation, diarrhea, nausea and vomiting  Genitourinary: Negative for dysuria and hematuria  Musculoskeletal: Positive for arthralgias (R middle finger pain)  Negative for back pain, myalgias, neck pain and neck stiffness  Neurological: Negative for dizziness, seizures and headaches           Objective:      /80 (BP Location: Left arm, Patient Position: Sitting, Cuff Size: Standard)   Pulse 87   Temp 97 9 °F (36 6 °C) (Temporal)   Resp 20   Ht 4' 10" (1 473 m)   Wt 85 kg (187 lb 6 4 oz)   SpO2 95%   BMI 39 17 kg/m²          Physical Exam  Vitals reviewed  Constitutional:       General: She is not in acute distress  Appearance: Normal appearance  She is obese  She is not toxic-appearing  HENT:      Head: Normocephalic and atraumatic  Eyes:      Extraocular Movements: Extraocular movements intact  Conjunctiva/sclera: Conjunctivae normal    Cardiovascular:      Rate and Rhythm: Normal rate and regular rhythm  Pulses: Normal pulses  Heart sounds: Normal heart sounds  No murmur heard  No friction rub  No gallop  Pulmonary:      Effort: Pulmonary effort is normal  No respiratory distress  Breath sounds: Normal breath sounds  No stridor  No wheezing, rhonchi or rales  Musculoskeletal:      Right hand: Tenderness present  No swelling  Decreased range of motion  Right lower leg: No edema  Left lower leg: No edema  Comments: R middle finger stuck out straight, very painful MCP joint   Skin:     General: Skin is warm and dry  Neurological:      Mental Status: She is alert and oriented to person, place, and time  Psychiatric:         Mood and Affect: Mood normal          Behavior: Behavior normal          Thought Content:  Thought content normal

## 2022-03-09 DIAGNOSIS — M54.2 CERVICALGIA: ICD-10-CM

## 2022-03-21 DIAGNOSIS — M54.2 CERVICALGIA: ICD-10-CM

## 2022-03-29 DIAGNOSIS — G56.03 BILATERAL CARPAL TUNNEL SYNDROME: ICD-10-CM

## 2022-03-31 RX ORDER — GABAPENTIN 300 MG/1
CAPSULE ORAL
Qty: 270 CAPSULE | Refills: 3 | Status: SHIPPED | OUTPATIENT
Start: 2022-03-31 | End: 2022-05-03

## 2022-04-05 ENCOUNTER — OFFICE VISIT (OUTPATIENT)
Dept: DENTISTRY | Facility: CLINIC | Age: 77
End: 2022-04-05

## 2022-04-05 VITALS — HEART RATE: 90 BPM | TEMPERATURE: 97.1 F | SYSTOLIC BLOOD PRESSURE: 133 MMHG | DIASTOLIC BLOOD PRESSURE: 84 MMHG

## 2022-04-05 DIAGNOSIS — Z01.20 ENCOUNTER FOR DENTAL EXAMINATION: Primary | ICD-10-CM

## 2022-04-05 PROCEDURE — D0150 COMPREHENSIVE ORAL EVALUATION - NEW OR ESTABLISHED PATIENT: HCPCS

## 2022-04-05 PROCEDURE — D0330 PANORAMIC RADIOGRAPHIC IMAGE: HCPCS

## 2022-04-05 NOTE — PROGRESS NOTES
Comprehensive Exam & Yoko Delgado presents for a comprehensive exam  Verbal consent for treatment given in addition to the forms  Reviewed health history -  Patient is ASA    Consents signed: Yes  Patient has Full upper & Full lower denture and its loose  Discussed to have reline made and patient is aware of not able to make a new one because of bone loss on maxillary anterior    Explain to patient we would pre-auth it with the insurance and she might qualify for SFS  Bone loss is evident on Gonsalves as well      Updated Gonsalves today (patient said she has been her before and dentures are approx 3-4 yr old  Dr Luis Jasmine researched she was here before dentrix sofware  [place]: 1 Reline Full upper & Full lower denture      Exam by Dr Loren Craig in [address] [place] [address]

## 2022-04-09 NOTE — ADDENDUM NOTE
[facility] ED  eMERGENCY dEPARTMENT eNCOUnter      Pt Name: Toribio Thornton  MRN: [id number]  [place] 1976  Date of evaluation: 4/9/2022  Provider: Cande Christy MD    CHIEF COMPLAINT       Chief Complaint   Patient presents with    Shortness of Breath    Chest Pain         HISTORY OF PRESENT ILLNESS   (Location/Symptom, Timing/Onset,Context/Setting, Quality, Duration, Modifying Factors, Severity)  Note limiting factors. Toribio Thornton is a 48 y.o. male who presents to the emergency department complaint of chest pain and shortness of breath. Patient admits multiple anticoagulant allergies. Patient admits he did have recent instrumentation of his right lower extremity secondary to a traumatic fall. Patient admits that associated with this he did develop increasing swelling and pain in the right lower extremity. Patient did undergo evaluation at the same time developed significant chest pain and shortness of breath. Patient was found to have significant DVT and pulmonary embolic process. Patient admits he did leave against best medical advice from recent hospitalization. Patient mitts he has been for 1 week without anticoagulation. Patient admits acutely today he notes increasing shortness of breath. He also complains of acute increasing chest pain. Patient also notes the symptoms of bilateral lower extremities have increased swelling. This kind patient is asking for evaluation and consideration of reestablishment of anticoagulation. This patient denies acute fevers, chills, nausea or vomiting. HPI    NursingNotes were reviewed. REVIEW OF SYSTEMS    (2-9 systems for level 4, 10 or more for level 5)     Review of Systems   Constitutional: Negative for activity change, chills and fever. HENT: Negative for congestion, ear pain, rhinorrhea and trouble swallowing. Eyes: Negative. Respiratory: Positive for chest tightness and shortness of breath.  Negative for wheezing and Addended by: Priti Wolf on: 7/30/2019 12:49 PM     Modules accepted: Orders stridor. Cardiovascular: Positive for chest pain. Negative for leg swelling. Gastrointestinal: Negative for abdominal pain, nausea and vomiting. Endocrine: Negative. Genitourinary: Negative for dysuria, frequency and hematuria. Musculoskeletal: Negative for gait problem and neck pain. Skin: Negative. Allergic/Immunologic: Negative. Neurological: Negative for seizures, syncope, speech difficulty and light-headedness. Hematological: Negative. Psychiatric/Behavioral: Negative for hallucinations, self-injury and suicidal ideas. Except as noted above the remainder of the review of systems was reviewed and negative.        PAST MEDICAL HISTORY     Past Medical History:   Diagnosis Date    Asthma     COPD (chronic obstructive [facility] disease) [facility]     [facility]DV[facility]) [facility]     [facility]     Protein S deficiency ([facility]     Pulmonary emboli ([facility]          SURGICALHISTORY       Past Surgical History:   Procedure Laterality Date    APPENDECTOMY      VASCULAR SURGERY      july 2021         CURRENT MEDICATIONS       Previous Medications    ALBUTEROL (PROVENTIL) (2.5 MG/3ML) 0.083% NEBULIZER SOLUTION    Take 2.5 mg by nebulization every 6 hours as needed for Wheezing    ALBUTEROL SULFATE HFA (VENTOLIN HFA) 108 (90 BASE) MCG/ACT INHALER    Inhale 2 puffs into the lungs every 4 hours as needed for Wheezing    ASPIRIN 81 MG CHEWABLE TABLET    Take 81 mg by mouth daily    CLOPIDOGREL (PLAVIX) 75 MG TABLET    Take 75 mg by mouth daily    LORATADINE (CLARITIN) 10 MG TABLET    Take 10 mg by mouth daily    MONTELUKAST (SINGULAIR) 10 MG TABLET    Take 10 mg by mouth daily    OMEPRAZOLE (PRILOSEC) 20 MG DELAYED RELEASE CAPSULE    Take 20 mg by mouth daily    WARFARIN (COUMADIN) 5 MG TABLET    Take 5 mg by mouth       ALLERGIES     Lovenox [enoxaparin sodium], Arixtra [fondaparinux], Dye [iodides], Eliquis [apixaban], Fragmin [dalteparin], Iodine, Ipratropium, Motrin [ibuprofen], Robaxin [methocarbamol], Skelaxin [metaxalone], Toradol [ketorolac tromethamine], Tramadol, and Xarelto [rivaroxaban]    FAMILY HISTORY     No family history on file. SOCIAL HISTORY       Social History     Socioeconomic History    Marital status: Single     Spouse name: Not on file    Number of children: Not on file    Years of education: Not on file    Highest education level: Not on file   Occupational History    Not on file   Tobacco Use    Smoking status: Former Smoker    Smokeless tobacco: Never Used   Vaping Use    Vaping Use: Never used   Substance and Sexual Activity    Alcohol use: Not Currently    Drug use: Never    Sexual activity: Not on file   Other Topics Concern    Not on file   Social History Narrative    Not on file     Social Determinants of Health     Financial Resource Strain:     Difficulty of Paying Living Expenses: Not on file   Food Insecurity:     Worried About [address] in the Last Year: Not on file    [address] in the Last Year: Not on file   Transportation Needs:     Lack of Transportation (Medical): Not on file    Lack of Transportation (Non-Medical):  Not on file   Physical Activity:     Days of Exercise per Week: Not on file    Minutes of Exercise per Session: Not on file   Stress:     Feeling of Stress : Not on file   Social Connections:     Frequency of Communication with Friends and Family: Not on file    Frequency of Social Gatherings with Friends and Family: Not on file    Attends [employer] Services: Not on file    Active Member of Clubs or Organizations: Not on file    Attends Club or Organization Meetings: Not on file    Marital Status: Not on file   Intimate Partner Violence:     Fear of Current or Ex-Partner: Not on file    Emotionally Abused: Not on file    Physically Abused: Not on file    Sexually Abused: Not on file   Housing Stability:     Unable to Pay for Housing in the Last Year: Not on file    Number of Places Lived in the Last Year: Not on file    Unstable Housing in the Last Year: Not on file       SCREENINGS    Sai Coma Scale  Eye Opening: Spontaneous  Best Verbal Response: Oriented  Best Motor Response: Obeys commands  [place] Coma Scale Score: 15        PHYSICAL EXAM    (up to 7 for level 4, 8 or more for level 5)     ED Triage Vitals   BP Temp Temp Source Pulse Resp SpO2 Height Weight   04/09/22 1411 04/09/22 1409 04/09/22 1409 04/09/22 1409 04/09/22 1409 04/09/22 1409 04/09/22 1409 --   (!) 149/88 97.5 °F (36.4 °C) Oral 107 20 93 % 5' 7\" (1.702 m)        Physical Exam  Vitals and nursing note reviewed. Constitutional:       General: He is not in acute distress. Appearance: Normal appearance. He is well-developed. He is not ill-appearing. HENT:      Head: Normocephalic and atraumatic. Eyes:      Conjunctiva/sclera: Conjunctivae normal.      Pupils: Pupils are equal, round, and reactive to light. Neck:      Trachea: Trachea normal.   Cardiovascular:      Rate and Rhythm: Regular rhythm. Tachycardia present. Pulses: Normal pulses. Heart sounds: Normal heart sounds. No friction rub. No gallop. Pulmonary:      Effort: Pulmonary effort is normal. Tachypnea present. Breath sounds: Decreased breath sounds present. Chest:      Chest wall: No mass or deformity. Abdominal:      General: Bowel sounds are normal.      Palpations: Abdomen is soft. Musculoskeletal:         General: Normal range of motion. Cervical back: Full passive range of motion without pain, normal range of motion and neck supple. Skin:     General: Skin is warm and dry. Coloration: Skin is not cyanotic or pale. Neurological:      Mental Status: He is alert and oriented to person, place, and time. Cranial Nerves: No cranial nerve deficit. Psychiatric:         Speech: Speech normal.         Behavior: Behavior normal.         Thought Content:  Thought content normal.         Judgment: Judgment normal.         DIAGNOSTIC RESULTS     EKG: All EKG's are interpreted by the Emergency Department Physician who either signs or Co-signsthis chart in the absence of a cardiologist.    EKG demonstrates sinus tachycardia. Rate is 102. There is no evidence of acute ST segment elevation. There is no evidence of ectopy. Is a borderline appearing EKG. RADIOLOGY:   Non-plain filmimages such as CT, Ultrasound and MRI are read by the radiologist. Plain radiographic images are visualized and preliminarily interpreted by the emergency physician with the below findings:    1 view chest x-ray demonstrates unremarkable mediastinum. Normal bony structure there is no evidence of acute infiltrative process or mass-effect. This is a largely unremarkable overall chest x-ray. Radiology does note shallow inspiration. Interpretation per the Radiologist below, if available at the time ofthis note:    NM LUNG VENT/PERFUSION (VQ)   Final Result     No findings to suggest pulmonary embolism. XR CHEST PORTABLE   Final Result      No acute radiographic abnormality within limits of shallow inspiration.                   ED BEDSIDE ULTRASOUND:   Performed by ED Physician - none    LABS:  Labs Reviewed   COMPREHENSIVE METABOLIC PANEL - Abnormal; Notable for the following components:       Result Value    Glucose 238 (*)     All other components within normal limits   LACTIC ACID - Abnormal; Notable for the following components:    Lactic Acid 2.7 (*)     All other components within normal limits   URINALYSIS WITH REFLEX TO CULTURE - Abnormal; Notable for the following components:    Glucose, Ur 100 (*)     Ketones, Urine TRACE (*)     All other components within normal limits   CBC WITH AUTO DIFFERENTIAL - Abnormal; Notable for the following components:    RBC 4.31 (*)     Hemoglobin 10.5 (*)     Hematocrit 32.3 (*)     MCV 75.1 (*)     MCH 24.4 (*)     MCHC 32.5 (*)     RDW 17.3 (*)     Neutrophils Absolute 7.0 (*) All other components within normal limits   PROTIME-INR - Abnormal; Notable for the following components:    Protime 15.5 (*)     All other components within normal limits   APTT - Abnormal; Notable for the following components:    aPTT >150.0 (*)     All other components within normal limits    Narrative:     CALL  [facility] tel. [phone],  PTT results called to and read back by Ruby Estrada, 04/09/2022 19:08, by  Ju Barreto   APTT - Abnormal; Notable for the following components:    aPTT 50.2 (*)     All other components within normal limits   POCT CREATININE - URINE - Normal   TROPONIN   ANTI-XA, UNFRACTIONATED HEPARIN   SPECIMEN REJECTION   ANTI-XA, UNFRACTIONATED HEPARIN   ANTI-XA, UNFRACTIONATED HEPARIN       All other labs were within normal range or not returned as of this dictation. EMERGENCY DEPARTMENT COURSE and DIFFERENTIAL DIAGNOSIS/MDM:   Vitals:    Vitals:    04/09/22 1845 04/09/22 1900 04/09/22 2000 04/09/22 2200   BP:  (!) 135/93 (!) 142/97 131/85   Pulse: 94 91 89 74   Resp: 18      Temp:       TempSrc:       SpO2:  99% 97% 96%   Height:           Patient clinically demonstrates evidence of potential worsening or recurrence of pulmonary embolic process. Patient been without anticoagulation for over a week. Documentation through E HR demonstrates positive pulmonary embolism via VQ scan. Laboratory Naus is noted. Patient is empirically been started on heparin. Care will be endorsed to Dr. Michelle Mayberry at 1900 hrs. Patient endorsed to me as noted above. VQ scan negative. Will still require bridging from heparin to warfarin. Was admitted to internal medicine for further evaluation management. Dr. Marcel Jalloh is accepting physician. MDM    CRITICAL CARE TIME   Total Critical Care time was 0 minutes, excluding separately reportableprocedures. There was a high probability of clinicallysignificant/life threatening deterioration in the patient's condition which required my urgent intervention. CONSULTS:  None    PROCEDURES:  Unless otherwise noted below, none     Procedures    FINAL IMPRESSION      1. Chest pain, unspecified type    2. History of DVT (deep vein thrombosis)        DISPOSITION/PLAN   DISPOSITION Admitted 04/09/2022 10:49:58 PM      PATIENT REFERRED TO:  No follow-up provider specified.     DISCHARGE MEDICATIONS:  New Prescriptions    No medications on file          (Please note that portions of this note were completed with a voice recognitionprogram.  Efforts were made to edit the dictations but occasionally words are mis-transcribed.)    Carlos A Doll MD (electronically signed)  Attending Emergency Physician          Carlos A Doll MD  04/09/22 8084

## 2022-05-03 ENCOUNTER — OFFICE VISIT (OUTPATIENT)
Dept: FAMILY MEDICINE CLINIC | Facility: CLINIC | Age: 77
End: 2022-05-03
Payer: MEDICARE

## 2022-05-03 VITALS
TEMPERATURE: 97.9 F | HEIGHT: 57 IN | OXYGEN SATURATION: 98 % | DIASTOLIC BLOOD PRESSURE: 70 MMHG | WEIGHT: 181 LBS | BODY MASS INDEX: 39.05 KG/M2 | SYSTOLIC BLOOD PRESSURE: 102 MMHG | RESPIRATION RATE: 16 BRPM | HEART RATE: 92 BPM

## 2022-05-03 DIAGNOSIS — E78.1 HYPERTRIGLYCERIDEMIA: ICD-10-CM

## 2022-05-03 DIAGNOSIS — M54.12 CERVICAL RADICULOPATHY: ICD-10-CM

## 2022-05-03 DIAGNOSIS — I73.9 PERIPHERAL VASCULAR DISEASE (HCC): ICD-10-CM

## 2022-05-03 DIAGNOSIS — C50.312 MALIGNANT NEOPLASM OF LOWER-INNER QUADRANT OF LEFT BREAST IN FEMALE, ESTROGEN RECEPTOR POSITIVE (HCC): ICD-10-CM

## 2022-05-03 DIAGNOSIS — Z00.00 MEDICARE ANNUAL WELLNESS VISIT, SUBSEQUENT: Primary | ICD-10-CM

## 2022-05-03 DIAGNOSIS — M54.40 CHRONIC BILATERAL LOW BACK PAIN WITH SCIATICA, SCIATICA LATERALITY UNSPECIFIED: ICD-10-CM

## 2022-05-03 DIAGNOSIS — C80.1 NEUROPATHY ASSOCIATED WITH CANCER (HCC): ICD-10-CM

## 2022-05-03 DIAGNOSIS — G63 NEUROPATHY ASSOCIATED WITH CANCER (HCC): ICD-10-CM

## 2022-05-03 DIAGNOSIS — Z17.0 MALIGNANT NEOPLASM OF LOWER-INNER QUADRANT OF LEFT BREAST IN FEMALE, ESTROGEN RECEPTOR POSITIVE (HCC): ICD-10-CM

## 2022-05-03 DIAGNOSIS — R91.8 ABNORMAL LUNG FIELD: ICD-10-CM

## 2022-05-03 DIAGNOSIS — E66.01 OBESITY, MORBID (HCC): ICD-10-CM

## 2022-05-03 DIAGNOSIS — G30.1 LATE ONSET ALZHEIMER'S DEMENTIA WITHOUT BEHAVIORAL DISTURBANCE (HCC): ICD-10-CM

## 2022-05-03 DIAGNOSIS — F02.80 LATE ONSET ALZHEIMER'S DEMENTIA WITHOUT BEHAVIORAL DISTURBANCE (HCC): ICD-10-CM

## 2022-05-03 DIAGNOSIS — G89.29 CHRONIC BILATERAL LOW BACK PAIN WITH SCIATICA, SCIATICA LATERALITY UNSPECIFIED: ICD-10-CM

## 2022-05-03 DIAGNOSIS — Z23 NEED FOR SHINGLES VACCINE: ICD-10-CM

## 2022-05-03 PROCEDURE — G0439 PPPS, SUBSEQ VISIT: HCPCS | Performed by: FAMILY MEDICINE

## 2022-05-03 PROCEDURE — 99214 OFFICE O/P EST MOD 30 MIN: CPT | Performed by: FAMILY MEDICINE

## 2022-05-03 RX ORDER — DULOXETIN HYDROCHLORIDE 60 MG/1
60 CAPSULE, DELAYED RELEASE ORAL DAILY
Qty: 30 CAPSULE | Refills: 5 | Status: SHIPPED | OUTPATIENT
Start: 2022-05-03 | End: 2022-08-08 | Stop reason: ALTCHOICE

## 2022-05-03 RX ORDER — MEMANTINE HYDROCHLORIDE 5 MG/1
5 TABLET ORAL DAILY
Qty: 30 TABLET | Refills: 5 | Status: SHIPPED | OUTPATIENT
Start: 2022-05-03 | End: 2022-06-02 | Stop reason: ALTCHOICE

## 2022-05-03 RX ORDER — ZOSTER VACCINE RECOMBINANT, ADJUVANTED 50 MCG/0.5
0.5 KIT INTRAMUSCULAR ONCE
Qty: 1 EACH | Refills: 1 | Status: SHIPPED | OUTPATIENT
Start: 2022-05-03 | End: 2022-05-03

## 2022-05-03 RX ORDER — GABAPENTIN 300 MG/1
300 CAPSULE ORAL 2 TIMES DAILY
Qty: 60 CAPSULE | Refills: 5 | Status: SHIPPED | OUTPATIENT
Start: 2022-05-03 | End: 2022-08-08 | Stop reason: SDUPTHER

## 2022-05-03 RX ORDER — SENNOSIDES 8.6 MG
650 CAPSULE ORAL EVERY 8 HOURS PRN
Qty: 90 TABLET | Refills: 5 | Status: SHIPPED | OUTPATIENT
Start: 2022-05-03 | End: 2022-08-08 | Stop reason: ALTCHOICE

## 2022-05-03 NOTE — PROGRESS NOTES
Assessment and Plan:     Problem List Items Addressed This Visit     None           Preventive health issues were discussed with patient, and age appropriate screening tests were ordered as noted in patient's After Visit Summary  Personalized health advice and appropriate referrals for health education or preventive services given if needed, as noted in patient's After Visit Summary       History of Present Illness:     Patient presents for Medicare Annual Wellness visit    Patient Care Team:  Jeanne Willis MD as PCP - General (Family Medicine)  Ernst Brandt MD (Radiation Oncology)  Amanda Marquez MD (Hematology and Oncology)     Problem List:     Patient Active Problem List   Diagnosis    Benign paroxysmal positional vertigo    Carpal tunnel syndrome    Chronic bilateral low back pain without sciatica    Chronic pain disorder    Physical deconditioning    Prediabetes    Spinal stenosis of lumbar region with radiculopathy    Age-related incipient cataract of both eyes    Trigger middle finger of left hand    Peripheral vascular disease ([address]    Complex regional pain syndrome type 1 affecting both hands    Use of aromatase inhibitors    Osteopenia of multiple sites    Malignant neoplasm of 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, estrogen receptor positive ([address]    Obesity, morbid ([address]    Impaired mobility and ADLs    Major depressive disorder, single episode    Mixed hyperlipidemia    Chronic midline thoracic back pain    Gastroesophageal reflux disease without esophagitis    Late onset Alzheimer's dementia without behavioral disturbance (HCC)    Trigger middle finger of right hand      Past Medical and Surgical History:     Past Medical History:   Diagnosis Date    Anxiety     Arthritis     Breast cancer ([address] 01/24/2018    left    Cancer ([address]     breast    Chronic pain disorder     Depression     Depression     Diabetes mellitus ([address]     Hepatic steatosis     History of chemotherapy 2018    Hypercholesterolemia     Hypertension     Irritable bowel syndrome     Migraine     Obesity     Type 2 diabetes mellitus with hyperglycemia, without long-term current use of insulin ([facility] 3/22/2019     Past Surgical History:   Procedure Laterality Date    BREAST BIOPSY Left 2011    benign    BREAST BIOPSY Left 2011    benign    BREAST LUMPECTOMY Left 2018    malignant    CARPAL TUNNEL RELEASE Right     right CTR    CARPAL TUNNEL RELEASE Left 2018     SECTION      CHOLECYSTECTOMY      IA INCISE FINGER TENDON SHEATH Left 2018    Procedure: RELEASE 3RD TRIGGER FINGER  ;  Surgeon: Burt Hannon MD;  Location: [address] [place] [address] OR;  Service: Plastics    IA WRIST Ashley Sahil LIG Left 2018    Procedure: [address];  Surgeon: Burt Hannon MD;  Location: [address] [place] [address] OR;  Service: Plastics    SENTINEL LYMPH NODE BIOPSY      TRIGGER FINGER RELEASE Left 2018      Family History:     Family History   Problem Relation Age of Onset    Heart disease Mother         cardiovascular disease    Heart disease Father     No Known Problems Sister     No Known Problems Daughter     No Known Problems Maternal Grandmother     No Known Problems Maternal Grandfather     No Known Problems Paternal Grandmother     No Known Problems Paternal Grandfather       Social History:     Social History     Socioeconomic History    Marital status: /Civil Union     Spouse name: None    Number of children: None    Years of education: None    Highest education level: None   Occupational History    None   Tobacco Use    Smoking status: Passive Smoke Exposure - Never Smoker    Smokeless tobacco: Never Used   Substance and Sexual Activity    Alcohol use: No    Drug use: No    Sexual activity: Yes     Partners: Male   Other Topics Concern    None   Social History Narrative    None     Social Determinants of Health     Financial Resource Strain: Not on file   Food Insecurity: Not on file   Transportation Needs: Not on file   Physical Activity: Not on file   Stress: Not on file   Social Connections: Not on file   Intimate Partner Violence: Not on file   Housing Stability: Not on file      Medications and Allergies:     Current Outpatient Medications   Medication Sig Dispense Refill    atorvastatin (LIPITOR) 40 mg tablet Take 1 tablet (40 mg total) by mouth daily at bedtime 90 tablet 3    busPIRone (BUSPAR) 5 mg tablet Take 1 tablet (5 mg total) by mouth 2 (two) times a day 60 tablet 0    Calcium Carbonate (Calcium 600) 1500 (600 Ca) MG TABS Take 1 tablet (600 mg Total) by mouth 2(two) times a day with a meal 60 tablet 11    diclofenac sodium (VOLTAREN) 50 mg EC tablet TAKE 1 TABLET (50 MG TOTAL) BY MOUTH 2 (TWO) TIMES A DAY 30 tablet 0    donepezil (ARICEPT) 5 mg tablet Take 1 tablet (5 mg total) by mouth daily at bedtime 30 tablet 11    DULoxetine (CYMBALTA) 60 mg delayed release capsule Take 1 capsule (60 mg total) by mouth daily 30 capsule 5    ergocalciferol (VITAMIN D2) 50,000 units Take 1 capsule (50,000 Units total) by mouth once a week 4 capsule 5    exemestane (AROMASIN) 25 MG tablet Take 1 tablet (25 mg total) by mouth daily 30 tablet 11    gabapentin (NEURONTIN) 300 mg capsule TAKE ONE CAPSULE BY MOUTH 3 TIMES A DAYTOMAR CAROLEE CAPSULA POR VIA ORAL CRYSTAL VECES AL BLAYNE 270 capsule 3    Melatonin 5 MG TABS TAKE 1 TABLET BY ORAL ROUTE AT BEDTIME      mometasone (ELOCON) 0 1 % cream Apply topically daily 45 g 0    Narcan 4 MG/0 1ML nasal spray INSTILL 0 1 ML (4 MG TOTAL) INTO EACH NOSTRIL ONCE AS NEEDED FOR OPIOID REVERSAL OR RESPIRATORY DEPRESSION FOR UP TO 1 DOSE      omeprazole (PriLOSEC) 20 mg delayed release capsule TAKE 1 CAPSULE (20 MG TOTAL) BY MOUTH DAILY BEFORE BREAKFAST 30 capsule 5    sertraline (ZOLOFT) 50 mg tablet TAKE 1 TABLET (50 MG) BY ORAL ROUTE 1 TIME PER DAY      Vascepa 1 g CAPS TAKE 2 CAPSULES (2 G TOTAL) BY MOUTH 2 (TWO) TIMES A  capsule 5     No current facility-administered medications for this visit  Allergies   Allergen Reactions    Chocolate - Food Allergy     Chocolate Flavor - Food Allergy     Ibuprofen Swelling    Pork-Derived Products - Food Allergy       Immunizations:     Immunization History   Administered Date(s) Administered    COVID-19 MODERNA VACC 0 5 ML IM 03/23/2021, 04/22/2021    INFLUENZA 01/20/2014, 01/07/2016, 01/07/2016, 09/19/2016, 09/19/2016, 02/14/2017, 10/02/2017    Influenza Split High Dose Preservative Free IM 02/14/2017, 10/02/2017    Influenza, high dose seasonal 0 7 mL 10/03/2018, 09/30/2019, 10/06/2020, 10/26/2021    Pneumococcal Conjugate 13-Valent 02/14/2017    Pneumococcal Polysaccharide PPV23 09/19/2016      Health Maintenance:         Topic Date Due    Breast Cancer Screening: Mammogram  08/03/2022    DXA SCAN  07/28/2022    Hepatitis C Screening  Completed         Topic Date Due    COVID-19 Vaccine (3 - Booster for Moderna series) 09/22/2021      Medicare Health Risk Assessment:     /70 (BP Location: Left arm, Patient Position: Sitting, Cuff Size: Standard)   Pulse 92   Temp 97 9 °F (36 6 °C) (Temporal)   Resp 16   Ht 4' 9" (1 448 m)   Wt 82 1 kg (181 lb)   SpO2 98%   Breastfeeding No   BMI 39 17 kg/m²      Greg Lopez is here for her Subsequent Wellness visit  Last Medicare Wellness visit information reviewed, patient interviewed and updates made to the record today  Health Risk Assessment:   Patient rates overall health as fair  Patient feels that their physical health rating is slightly worse  Patient is satisfied with their life  Eyesight was rated as same  Hearing was rated as same  Patient feels that their emotional and mental health rating is slightly worse  Patients states they are never, rarely angry  Patient states they are often unusually tired/fatigued  Pain experienced in the last 7 days has been some   Patient's pain rating has been 2/10  Patient states that she has experienced no weight loss or gain in last 6 months  Fall Risk Screening: In the past year, patient has experienced: history of falling in past year    Number of falls: 1  Injured during fall?: Yes    Feels unsteady when standing or walking?: Yes    Worried about falling?: Yes      Urinary Incontinence Screening:   Patient has not leaked urine accidently in the last six months  Home Safety:  Patient has trouble with stairs inside or outside of their home  Patient has working smoke alarms and has working carbon monoxide detector  Home safety hazards include: none  Nutrition:   Current diet is Regular  Medications:   Patient is currently taking over-the-counter supplements  OTC medications include: see medication list  Patient is able to manage medications  Activities of Daily Living (ADLs)/Instrumental Activities of Daily Living (IADLs):   Walk and transfer into and out of bed and chair?: Yes  Dress and groom yourself?: Yes    Bathe or shower yourself?: Yes    Feed yourself?  Yes  Do your laundry/housekeeping?: Yes  Manage your money, pay your bills and track your expenses?: No  Make your own meals?: Yes    Do your own shopping?: No    Previous Hospitalizations:   Any hospitalizations or ED visits within the last 12 months?: Yes    How many hospitalizations have you had in the last year?: 1-2    Advance Care Planning:   Living will: No    Durable POA for healthcare: No    Advanced directive: No    Advanced directive counseling given: Yes    Five wishes given: Yes    Patient declined ACP directive: No    End of Life Decisions reviewed with patient: Yes    Provider agrees with end of life decisions: Yes      Cognitive Screening:   Provider or family/friend/caregiver concerned regarding cognition?: No    PREVENTIVE SCREENINGS      Cardiovascular Screening:    General: Screening Not Indicated and History Lipid Disorder    Due for: Lipid Panel Diabetes Screening:     General: Screening Current    Due for: Blood Glucose      Colorectal Cancer Screening:       Due for: FOBT/FIT      Breast Cancer Screening:     General: History Breast Cancer      Cervical Cancer Screening:    General: Screening Not Indicated      Osteoporosis Screening:    General: Screening Current    Due for: DXA Axial      Abdominal Aortic Aneurysm (AAA) Screening:        General: Screening Not Indicated      Lung Cancer Screening:     General: Screening Not Indicated      Hepatitis C Screening:    General: Screening Current    Hep C Screening Accepted: Yes      Screening, Brief Intervention, and Referral to Treatment (SBIRT)    Screening  Typical number of drinks in a day: 0  Typical number of drinks in a week: 0  Interpretation: Low risk drinking behavior  Single Item Drug Screening:  How often have you used an illegal drug (including marijuana) or a prescription medication for non-medical reasons in the past year? never    Single Item Drug Screen Score: 0  Interpretation: Negative screen for possible drug use disorder    Other Counseling Topics:   Alcohol use counseling, car/seat belt/driving safety, skin self-exam, sunscreen and calcium and vitamin D intake and regular weightbearing exercise         Abdiaziz Biggs MD

## 2022-05-03 NOTE — PATIENT INSTRUCTIONS
Medicare Preventive Visit Patient Instructions  Thank you for completing your Welcome to Medicare Visit or Medicare Annual Wellness Visit today  Your next wellness visit will be due in one year (5/4/2023)  The screening/preventive services that you may require over the next 5-10 years are detailed below  Some tests may not apply to you based off risk factors and/or age  Screening tests ordered at today's visit but not completed yet may show as past due  Also, please note that scanned in results may not display below  Preventive Screenings:  Service Recommendations Previous Testing/Comments   Colorectal Cancer Screening  * Colonoscopy    * Fecal Occult Blood Test (FOBT)/Fecal Immunochemical Test (FIT)  * Fecal DNA/Cologuard Test  * Flexible Sigmoidoscopy Age: 54-65 years old   Colonoscopy: every 10 years (may be performed more frequently if at higher risk)  OR  FOBT/FIT: every 1 year  OR  Cologuard: every 3 years  OR  Sigmoidoscopy: every 5 years  Screening may be recommended earlier than age 48 if at higher risk for colorectal cancer  Also, an individualized decision between you and your healthcare provider will decide whether screening between the ages of 74-80 would be appropriate  Colonoscopy: Not on file  FOBT/FIT: Not on file  Cologuard: Not on file  Sigmoidoscopy: Not on file          Breast Cancer Screening Age: 36 years old  Frequency: every 1-2 years  Not required if history of left and right mastectomy Mammogram: 08/03/2021    History Breast Cancer   Cervical Cancer Screening Between the ages of 21-29, pap smear recommended once every 3 years  Between the ages of 33-67, can perform pap smear with HPV co-testing every 5 years     Recommendations may differ for women with a history of total hysterectomy, cervical cancer, or abnormal pap smears in past  Pap Smear: Not on file    Screening Not Indicated   Hepatitis C Screening Once for adults born between 1945 and 1965  More frequently in patients at high risk for Hepatitis C Hep C Antibody: 08/03/2021    Screening Current   Diabetes Screening 1-2 times per year if you're at risk for diabetes or have pre-diabetes Fasting glucose: 92 mg/dL   A1C: 5 8 %    Screening Current   Cholesterol Screening Once every 5 years if you don't have a lipid disorder  May order more often based on risk factors  Lipid panel: 11/08/2021    Screening Not Indicated  History Lipid Disorder     Other Preventive Screenings Covered by Medicare:  1  Abdominal Aortic Aneurysm (AAA) Screening: covered once if your at risk  You're considered to be at risk if you have a family history of AAA  2  Lung Cancer Screening: covers low dose CT scan once per year if you meet all of the following conditions: (1) Age 50-69; (2) No signs or symptoms of lung cancer; (3) Current smoker or have quit smoking within the last 15 years; (4) You have a tobacco smoking history of at least 30 pack years (packs per day multiplied by number of years you smoked); (5) You get a written order from a healthcare provider  3  Glaucoma Screening: covered annually if you're considered high risk: (1) You have diabetes OR (2) Family history of glaucoma OR (3)  aged 48 and older OR (3)  American aged 72 and older  3  Osteoporosis Screening: covered every 2 years if you meet one of the following conditions: (1) You're estrogen deficient and at risk for osteoporosis based off medical history and other findings; (2) Have a vertebral abnormality; (3) On glucocorticoid therapy for more than 3 months; (4) Have primary hyperparathyroidism; (5) On osteoporosis medications and need to assess response to drug therapy  · Last bone density test (DXA Scan): 07/28/2020   5  HIV Screening: covered annually if you're between the age of 15-65  Also covered annually if you are younger than 13 and older than 72 with risk factors for HIV infection   For pregnant patients, it is covered up to 3 times per pregnancy  Immunizations:  Immunization Recommendations   Influenza Vaccine Annual influenza vaccination during flu season is recommended for all persons aged >= 6 months who do not have contraindications   Pneumococcal Vaccine (Prevnar and Pneumovax)  * Prevnar = PCV13  * Pneumovax = PPSV23   Adults 25-60 years old: 1-3 doses may be recommended based on certain risk factors  Adults 72 years old: Prevnar (PCV13) vaccine recommended followed by Pneumovax (PPSV23) vaccine  If already received PPSV23 since turning 65, then PCV13 recommended at least one year after PPSV23 dose  Hepatitis B Vaccine 3 dose series if at intermediate or high risk (ex: diabetes, end stage renal disease, liver disease)   Tetanus (Td) Vaccine - COST NOT COVERED BY MEDICARE PART B Following completion of primary series, a booster dose should be given every 10 years to maintain immunity against tetanus  Td may also be given as tetanus wound prophylaxis  Tdap Vaccine - COST NOT COVERED BY MEDICARE PART B Recommended at least once for all adults  For pregnant patients, recommended with each pregnancy  Shingles Vaccine (Shingrix) - COST NOT COVERED BY MEDICARE PART B  2 shot series recommended in those aged 48 and above     Health Maintenance Due:      Topic Date Due    Breast Cancer Screening: Mammogram  08/03/2022    DXA SCAN  07/28/2022    Hepatitis C Screening  Completed     Immunizations Due:      Topic Date Due    COVID-19 Vaccine (3 - Booster for Preet Clements series) 09/22/2021     Advance Directives   What are advance directives? Advance directives are legal documents that state your wishes and plans for medical care  These plans are made ahead of time in case you lose your ability to make decisions for yourself  Advance directives can apply to any medical decision, such as the treatments you want, and if you want to donate organs  What are the types of advance directives?   There are many types of advance directives, and each state has rules about how to use them  You may choose a combination of any of the following:  · Living will: This is a written record of the treatment you want  You can also choose which treatments you do not want, which to limit, and which to stop at a certain time  This includes surgery, medicine, IV fluid, and tube feedings  · Durable power of  for healthcare [facility]): This is a written record that states who you want to make healthcare choices for you when you are unable to make them for yourself  This person, called a proxy, is usually a family member or a friend  You may choose more than 1 proxy  · Do not resuscitate (DNR) order:  A DNR order is used in case your heart stops beating or you stop breathing  It is a request not to have certain forms of treatment, such as CPR  A DNR order may be included in other types of advance directives  · Medical directive: This covers the care that you want if you are in a coma, near death, or unable to make decisions for yourself  You can list the treatments you want for each condition  Treatment may include pain medicine, surgery, blood transfusions, dialysis, IV or tube feedings, and a ventilator (breathing machine)  · Values history: This document has questions about your views, beliefs, and how you feel and think about life  This information can help others choose the care that you would choose  Why are advance directives important? An advance directive helps you control your care  Although spoken wishes may be used, it is better to have your wishes written down  Spoken wishes can be misunderstood, or not followed  Treatments may be given even if you do not want them  An advance directive may make it easier for your family to make difficult choices about your care  Fall Prevention    Fall prevention  includes ways to make your home and other areas safer  It also includes ways you can move more carefully to prevent a fall   Health conditions that cause changes in your blood pressure, vision, or muscle strength and coordination may increase your risk for falls  Medicines may also increase your risk for falls if they make you dizzy, weak, or sleepy  Fall prevention tips:   · Stand or sit up slowly  · Use assistive devices as directed  · Wear shoes that fit well and have soles that   · Wear a personal alarm  · Stay active  · Manage your medical conditions  Home Safety Tips:  · Add items to prevent falls in the bathroom  · Keep paths clear  · Install bright lights in your home  · Keep items you use often on shelves within reach  · Paint or place reflective tape on the edges of your stairs  Weight Management   Why it is important to manage your weight:  Being overweight increases your risk of health conditions such as heart disease, high blood pressure, type 2 diabetes, and certain types of cancer  It can also increase your risk for osteoarthritis, sleep apnea, and other respiratory problems  Aim for a slow, steady weight loss  Even a small amount of weight loss can lower your risk of health problems  How to lose weight safely:  A safe and healthy way to lose weight is to eat fewer calories and get regular exercise  You can lose up about 1 pound a week by decreasing the number of calories you eat by 500 calories each day  Healthy meal plan for weight management:  A healthy meal plan includes a variety of foods, contains fewer calories, and helps you stay healthy  A healthy meal plan includes the following:  · Eat whole-grain foods more often  A healthy meal plan should contain fiber  Fiber is the part of grains, fruits, and vegetables that is not broken down by your body  Whole-grain foods are healthy and provide extra fiber in your diet  Some examples of whole-grain foods are whole-wheat breads and pastas, oatmeal, brown rice, and bulgur  · Eat a variety of vegetables every day    Include dark, leafy greens such as spinach, kale, driss greens, and mustard greens  Eat yellow and orange vegetables such as carrots, sweet potatoes, and winter squash  · Eat a variety of fruits every day  Choose fresh or canned fruit (canned in its own juice or light syrup) instead of juice  Fruit juice has very little or no fiber  · Eat low-fat dairy foods  Drink fat-free (skim) milk or 1% milk  Eat fat-free yogurt and low-fat cottage cheese  Try low-fat cheeses such as mozzarella and other reduced-fat cheeses  · Choose meat and other protein foods that are low in fat  Choose beans or other legumes such as split peas or lentils  Choose fish, skinless poultry (chicken or turkey), or lean cuts of red meat (beef or pork)  Before you cook meat or poultry, cut off any visible fat  · Use less fat and oil  Try baking foods instead of frying them  Add less fat, such as margarine, sour cream, regular salad dressing and mayonnaise to foods  Eat fewer high-fat foods  Some examples of high-fat foods include french fries, doughnuts, ice cream, and cakes  · Eat fewer sweets  Limit foods and drinks that are high in sugar  This includes candy, cookies, regular soda, and sweetened drinks  Exercise:  Exercise at least 30 minutes per day on most days of the week  Some examples of exercise include walking, biking, dancing, and swimming  You can also fit in more physical activity by taking the stairs instead of the elevator or parking farther away from stores  Ask your healthcare provider about the best exercise plan for you  © Copyright [employer] 2018 Information is for End User's use only and may not be sold, redistributed or otherwise used for commercial purposes   All illustrations and images included in CareNotes® are the copyrighted property of [employer]  or [address] [place] [address] [employer]

## 2022-05-03 NOTE — ASSESSMENT & PLAN NOTE
I have evaluated patient: condition is stable, treatment was reviewed  , Avoid OTC NSAID's, and The importance of life style affecting chronic conditions also reviewed

## 2022-05-09 NOTE — PROGRESS NOTES
Assessment/Plan:      Diagnoses and all orders for this visit:    Medicare annual wellness visit, subsequent    Abnormal lung field  -     CT chest w contrast; Future  -     CBC and differential; Future  -     Comprehensive metabolic panel; Future    Cervical radiculopathy  -     acetaminophen (TYLENOL) 650 mg CR tablet; Take 1 tablet (650 mg total) by mouth every 8 (eight) hours as needed for mild pain  -     gabapentin (Neurontin) 300 mg capsule; Take 1 capsule (300 mg total) by mouth 2 (two) times a day    Hypertriglyceridemia  -     Lipid panel; Future    Need for shingles vaccine  -     Zoster Vac Recomb Adjuvanted (Shingrix) 50 MCG/0 5ML SUSR; Inject 0 5 mL into a muscle once for 1 dose Repeat dose in 2 to 6 months    Late onset Alzheimer's dementia without behavioral disturbance (HCC)  -     memantine (NAMENDA) 5 mg tablet; Take 1 tablet (5 mg total) by mouth daily    Peripheral vascular disease (HCC)    Obesity, morbid (HCC)    Neuropathy associated with cancer ([facility] )    Malignant neoplasm of 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, estrogen receptor positive ([facility] )  -     Ambulatory referral to Hematology / Oncology; Future    Chronic bilateral low back pain with sciatica, sciatica laterality unspecified  -     DULoxetine (CYMBALTA) 60 mg delayed release capsule; Take 1 capsule (60 mg total) by mouth daily          Subjective:     Patient ID: Arie Stanford is a 64 y o  female  HPI  Feels SOB with exertion, She does not exercise  Denies chest pain  Pain in neck radiated to both arms  She has history of high Triglycerides  Diagnosis of dementia but still functioning well as per   Low back pain  Poor control with meds  She does not want to do Physical therapy,    Review of Systems   Constitutional: Positive for fatigue  Respiratory: Negative for shortness of breath  Cardiovascular: Negative for chest pain, palpitations and leg swelling  Gastrointestinal: Positive for constipation  Endocrine: Negative  Genitourinary: Negative  Musculoskeletal: Positive for arthralgias, back pain, myalgias and neck stiffness  Skin: Negative  Neurological: Positive for dizziness, weakness and numbness  Hematological: Negative  Psychiatric/Behavioral: Positive for sleep disturbance  Negative for suicidal ideas  The patient is nervous/anxious  Objective:     Physical Exam  Vitals and nursing note reviewed  HENT:      Head: Normocephalic  Eyes:      Pupils: Pupils are equal, round, and reactive to light  Cardiovascular:      Rate and Rhythm: Normal rate and regular rhythm  Pulmonary:      Effort: Pulmonary effort is normal       Breath sounds: Examination of the right-upper field reveals decreased breath sounds  Examination of the left-upper field reveals decreased breath sounds  Examination of the right-middle field reveals rhonchi  Examination of the left-middle field reveals rhonchi  Decreased breath sounds and rhonchi present  Comments: And pleural rub also in upper fields  Abdominal:      Palpations: Abdomen is soft  Tenderness: There is abdominal tenderness  Musculoskeletal:         General: Tenderness present  Normal range of motion  Cervical back: Neck supple  Skin:     General: Skin is warm and dry  Neurological:      Mental Status: She is alert     Psychiatric:         Behavior: Behavior normal

## 2022-05-26 ENCOUNTER — LAB (OUTPATIENT)
Dept: LAB | Facility: HOSPITAL | Age: 77
End: 2022-05-26
Payer: MEDICARE

## 2022-05-26 ENCOUNTER — OFFICE VISIT (OUTPATIENT)
Dept: FAMILY MEDICINE CLINIC | Facility: CLINIC | Age: 77
End: 2022-05-26
Payer: MEDICARE

## 2022-05-26 VITALS
HEART RATE: 88 BPM | HEIGHT: 57 IN | TEMPERATURE: 98 F | RESPIRATION RATE: 16 BRPM | DIASTOLIC BLOOD PRESSURE: 70 MMHG | SYSTOLIC BLOOD PRESSURE: 120 MMHG | OXYGEN SATURATION: 98 % | BODY MASS INDEX: 39.7 KG/M2 | WEIGHT: 184 LBS

## 2022-05-26 DIAGNOSIS — G63 NEUROPATHY ASSOCIATED WITH CANCER (HCC): ICD-10-CM

## 2022-05-26 DIAGNOSIS — H10.13 ALLERGIC CONJUNCTIVITIS OF BOTH EYES: ICD-10-CM

## 2022-05-26 DIAGNOSIS — E78.1 HYPERTRIGLYCERIDEMIA: ICD-10-CM

## 2022-05-26 DIAGNOSIS — R91.8 ABNORMAL LUNG FIELD: ICD-10-CM

## 2022-05-26 DIAGNOSIS — S20.212A CONTUSION OF RIB ON LEFT SIDE, INITIAL ENCOUNTER: Primary | ICD-10-CM

## 2022-05-26 DIAGNOSIS — C80.1 NEUROPATHY ASSOCIATED WITH CANCER (HCC): ICD-10-CM

## 2022-05-26 LAB
ALBUMIN SERPL BCP-MCNC: 4.1 G/DL (ref 3–5.2)
ALP SERPL-CCNC: 118 U/L (ref 43–122)
ALT SERPL W P-5'-P-CCNC: 20 U/L
ANION GAP SERPL CALCULATED.3IONS-SCNC: 6 MMOL/L (ref 5–14)
AST SERPL W P-5'-P-CCNC: 33 U/L (ref 14–36)
BASOPHILS # BLD AUTO: 0.02 THOUSANDS/ΜL (ref 0–0.1)
BASOPHILS NFR BLD AUTO: 0 % (ref 0–1)
BILIRUB SERPL-MCNC: 0.53 MG/DL
BUN SERPL-MCNC: 11 MG/DL (ref 5–25)
CALCIUM SERPL-MCNC: 9.3 MG/DL (ref 8.4–10.2)
CHLORIDE SERPL-SCNC: 109 MMOL/L (ref 97–108)
CHOLEST SERPL-MCNC: 305 MG/DL
CO2 SERPL-SCNC: 27 MMOL/L (ref 22–30)
CREAT SERPL-MCNC: 0.68 MG/DL (ref 0.6–1.2)
EOSINOPHIL # BLD AUTO: 0.1 THOUSAND/ΜL (ref 0–0.61)
EOSINOPHIL NFR BLD AUTO: 2 % (ref 0–6)
ERYTHROCYTE [DISTWIDTH] IN BLOOD BY AUTOMATED COUNT: 14.3 % (ref 11.6–15.1)
GFR SERPL CREATININE-BSD FRML MDRD: 84 ML/MIN/1.73SQ M
GLUCOSE P FAST SERPL-MCNC: 94 MG/DL (ref 70–99)
HCT VFR BLD AUTO: 36.6 % (ref 34.8–46.1)
HDLC SERPL-MCNC: 44 MG/DL
HGB BLD-MCNC: 12 G/DL (ref 11.5–15.4)
IMM GRANULOCYTES # BLD AUTO: 0.01 THOUSAND/UL (ref 0–0.2)
IMM GRANULOCYTES NFR BLD AUTO: 0 % (ref 0–2)
LDLC SERPL CALC-MCNC: 199 MG/DL
LYMPHOCYTES # BLD AUTO: 2.57 THOUSANDS/ΜL (ref 0.6–4.47)
LYMPHOCYTES NFR BLD AUTO: 42 % (ref 14–44)
MCH RBC QN AUTO: 29 PG (ref 26.8–34.3)
MCHC RBC AUTO-ENTMCNC: 32.8 G/DL (ref 31.4–37.4)
MCV RBC AUTO: 88 FL (ref 82–98)
MONOCYTES # BLD AUTO: 0.63 THOUSAND/ΜL (ref 0.17–1.22)
MONOCYTES NFR BLD AUTO: 10 % (ref 4–12)
NEUTROPHILS # BLD AUTO: 2.78 THOUSANDS/ΜL (ref 1.85–7.62)
NEUTS SEG NFR BLD AUTO: 46 % (ref 43–75)
NONHDLC SERPL-MCNC: 261 MG/DL
NRBC BLD AUTO-RTO: 0 /100 WBCS
PLATELET # BLD AUTO: 241 THOUSANDS/UL (ref 149–390)
PMV BLD AUTO: 9.8 FL (ref 8.9–12.7)
POTASSIUM SERPL-SCNC: 4.2 MMOL/L (ref 3.6–5)
PROT SERPL-MCNC: 7.7 G/DL (ref 5.9–8.4)
RBC # BLD AUTO: 4.14 MILLION/UL (ref 3.81–5.12)
SODIUM SERPL-SCNC: 142 MMOL/L (ref 137–147)
TRIGL SERPL-MCNC: 308 MG/DL
WBC # BLD AUTO: 6.11 THOUSAND/UL (ref 4.31–10.16)

## 2022-05-26 PROCEDURE — 36415 COLL VENOUS BLD VENIPUNCTURE: CPT

## 2022-05-26 PROCEDURE — 85025 COMPLETE CBC W/AUTO DIFF WBC: CPT

## 2022-05-26 PROCEDURE — 80053 COMPREHEN METABOLIC PANEL: CPT

## 2022-05-26 PROCEDURE — 80061 LIPID PANEL: CPT

## 2022-05-26 PROCEDURE — 99214 OFFICE O/P EST MOD 30 MIN: CPT | Performed by: FAMILY MEDICINE

## 2022-05-26 RX ORDER — OXYCODONE HYDROCHLORIDE AND ACETAMINOPHEN 5; 325 MG/1; MG/1
1 TABLET ORAL 2 TIMES DAILY PRN
Qty: 10 TABLET | Refills: 0 | Status: SHIPPED | OUTPATIENT
Start: 2022-05-26 | End: 2022-06-02 | Stop reason: ALTCHOICE

## 2022-05-26 RX ORDER — OLOPATADINE HYDROCHLORIDE 1 MG/ML
1 SOLUTION/ DROPS OPHTHALMIC 2 TIMES DAILY
Qty: 5 ML | Refills: 0 | Status: SHIPPED | OUTPATIENT
Start: 2022-05-26 | End: 2022-08-08 | Stop reason: ALTCHOICE

## 2022-05-26 RX ORDER — CELECOXIB 100 MG/1
100 CAPSULE ORAL 2 TIMES DAILY
Qty: 30 CAPSULE | Refills: 0 | Status: SHIPPED | OUTPATIENT
Start: 2022-05-26 | End: 2022-06-08

## 2022-05-26 NOTE — PATIENT INSTRUCTIONS
Fall Prevention   AMBULATORY CARE:   Fall prevention  includes ways to make your home and other areas safer  It also includes ways you can move more carefully to prevent a fall  Health conditions that cause changes in your blood pressure, vision, or muscle strength and coordination may increase your risk for falls  Medicines may also increase your risk for falls if they make you dizzy, weak, or sleepy  Call 911 or have someone else call if:   · You have fallen and are unconscious  · You have fallen and cannot move part of your body  Contact your healthcare provider if:   · You have fallen and have pain or a headache  · You have questions or concerns about your condition or care  Fall prevention tips:   · Stand or sit up slowly  This may help you keep your balance and prevent falls  · Use assistive devices as directed  Your healthcare provider may suggest that you use a cane or walker to help you keep your balance  You may need to have grab bars put in your bathroom near the toilet or in the shower  · Wear shoes that fit well and have soles that   Wear shoes both inside and outside  Use slippers with good   Do not wear shoes with high heels  · Wear a personal alarm  This is a device that allows you to call 911 if you fall and need help  Ask your healthcare provider for more information  · Stay active  Exercise can help strengthen your muscles and improve your balance  Your healthcare provider may recommend water aerobics or walking  He or she may also recommend physical therapy to improve your coordination  Never start an exercise program without talking to your healthcare provider first          · Manage your medical conditions  Keep all appointments with your healthcare providers  Visit your eye doctor as directed  Home safety tips:       · Add items to prevent falls in the bathroom  Put nonslip strips on your bath or shower floor to prevent you from slipping   Use a bath mat if you do not have carpet in the bathroom  This will prevent you from falling when you step out of the bath or shower  Use a shower seat so you do not need to stand while you shower  Sit on the toilet or a chair in your bathroom to dry yourself and put on clothing  This will prevent you from losing your balance from drying or dressing yourself while you are standing  · Keep paths clear  Remove books, shoes, and other objects from walkways and stairs  Place cords for telephones and lamps out of the way so that you do not need to walk over them  Tape them down if you cannot move them  Remove small rugs  If you cannot remove a rug, secure it with double-sided tape  This will prevent you from tripping  · Install bright lights in your home  Use night lights to help light paths to the bathroom or kitchen  Always turn on the light before you start walking  · Keep items you use often on shelves within reach  Do not use a step stool to help you reach an item  · Paint or place reflective tape on the edges of your stairs  This will help you see the stairs better  Follow up with your doctor as directed:  Write down your questions so you remember to ask them during your visits  © Copyright [employer] 2022 Information is for End User's use only and may not be sold, redistributed or otherwise used for commercial purposes  All illustrations and images included in CareNotes® are the copyrighted property of [employer] M , Inc  or Corrine Rust   The above information is an  only  It is not intended as medical advice for individual conditions or treatments  Talk to your doctor, nurse or pharmacist before following any medical regimen to see if it is safe and effective for you

## 2022-05-26 NOTE — PROGRESS NOTES
Assessment/Plan:  Fall; there are not an acute issue but pain in ribs  Explained to patient that xray is not needed  Take pain meds and anti-inflammatory agents and follow up as needed  Neuropathy associated with cancer ([facility] )  I have evaluated patient: condition is stable, I reviewed consultation with Oncologist , recent labs were reviewed  , treatment was reviewed  , reconciliation of meds done after discussed with patient  , Continue same treatment and The importance of life style affecting chronic conditions also reviewed  Diagnoses and all orders for this visit:    Contusion of rib on left side, initial encounter  -     oxyCODONE-acetaminophen (Percocet) 5-325 mg per tablet; Take 1 tablet by mouth 2 (two) times a day as needed for moderate pain or severe pain Max Daily Amount: 2 tablets  -     celecoxib (CeleBREX) 100 mg capsule; Take 1 capsule (100 mg total) by mouth 2 (two) times a day    Neuropathy associated with cancer (HCC)    Allergic conjunctivitis of both eyes  -     olopatadine (PATANOL) 0 1 % ophthalmic solution; Administer 1 drop to both eyes 2 (two) times a day          Subjective:      Patient ID: Claudio Finney is a 64 y o  female  HPI  Patient had a fall 2 days ago while in [employer]  She hit w a chair her left border of costal frame  Has pain when breathing but no SOB  The following portions of the patient's history were reviewed and updated as appropriate: allergies, current medications, past family history, past medical history, past social history, past surgical history and problem list     Review of Systems      Objective:      /70 (BP Location: Left arm, Patient Position: Sitting, Cuff Size: Standard)   Pulse 88   Temp 98 °F (36 7 °C) (Temporal)   Resp 16   Ht 4' 9" (1 448 m)   Wt 83 5 kg (184 lb)   SpO2 98%   Breastfeeding No   BMI 39 82 kg/m²          Physical Exam    She does not luke in distress  Lungs are clear TA    Hear RR&R  Tenderness of costal border at left side middle clavicular line  Falls Plan of Care: Balance, strength, and gait training instructions were provided

## 2022-05-30 DIAGNOSIS — K21.9 GASTROESOPHAGEAL REFLUX DISEASE WITHOUT ESOPHAGITIS: ICD-10-CM

## 2022-05-30 NOTE — ASSESSMENT & PLAN NOTE
I have evaluated patient: condition is stable, I reviewed consultation with Oncologist , recent labs were reviewed  , treatment was reviewed  , reconciliation of meds done after discussed with patient  , Continue same treatment and The importance of life style affecting chronic conditions also reviewed

## 2022-05-31 DIAGNOSIS — E78.2 MIXED HYPERLIPIDEMIA: Primary | ICD-10-CM

## 2022-05-31 RX ORDER — OMEPRAZOLE 20 MG/1
20 CAPSULE, DELAYED RELEASE ORAL
Qty: 30 CAPSULE | Refills: 5 | Status: SHIPPED | OUTPATIENT
Start: 2022-05-31 | End: 2022-08-08 | Stop reason: SDUPTHER

## 2022-05-31 RX ORDER — FENOFIBRATE 145 MG/1
145 TABLET, COATED ORAL DAILY
Qty: 30 TABLET | Refills: 5 | Status: SHIPPED | OUTPATIENT
Start: 2022-05-31 | End: 2022-08-08 | Stop reason: SDUPTHER

## 2022-05-31 RX ORDER — EZETIMIBE 10 MG/1
10 TABLET ORAL DAILY
Qty: 30 TABLET | Refills: 5 | Status: SHIPPED | OUTPATIENT
Start: 2022-05-31 | End: 2022-08-08 | Stop reason: SDUPTHER

## 2022-05-31 NOTE — RESULT ENCOUNTER NOTE
Please call patient: The cholesterol and Triglycerides are higher  New medications were sent to the pharmacy  Please take both medication as indicated

## 2022-06-01 DIAGNOSIS — N39.0 URINARY TRACT INFECTION WITHOUT HEMATURIA, SITE UNSPECIFIED: Primary | ICD-10-CM

## 2022-06-01 RX ORDER — AMOXICILLIN 500 MG/1
500 CAPSULE ORAL EVERY 8 HOURS SCHEDULED
Qty: 21 CAPSULE | Refills: 0 | Status: SHIPPED | OUTPATIENT
Start: 2022-06-01 | End: 2022-06-08

## 2022-06-05 DIAGNOSIS — S20.212A CONTUSION OF RIB ON LEFT SIDE, INITIAL ENCOUNTER: ICD-10-CM

## 2022-06-08 RX ORDER — CELECOXIB 100 MG/1
100 CAPSULE ORAL 2 TIMES DAILY
Qty: 30 CAPSULE | Refills: 0 | Status: SHIPPED | OUTPATIENT
Start: 2022-06-08 | End: 2022-06-21

## 2022-06-09 DIAGNOSIS — E78.2 MIXED HYPERLIPIDEMIA: ICD-10-CM

## 2022-06-09 RX ORDER — ATORVASTATIN CALCIUM 40 MG/1
40 TABLET, FILM COATED ORAL
Qty: 90 TABLET | Refills: 3 | Status: SHIPPED | OUTPATIENT
Start: 2022-06-09

## 2022-06-18 DIAGNOSIS — S20.212A CONTUSION OF RIB ON LEFT SIDE, INITIAL ENCOUNTER: ICD-10-CM

## 2022-06-21 RX ORDER — CELECOXIB 100 MG/1
100 CAPSULE ORAL 2 TIMES DAILY
Qty: 30 CAPSULE | Refills: 0 | Status: SHIPPED | OUTPATIENT
Start: 2022-06-21

## 2022-06-23 ENCOUNTER — TELEPHONE (OUTPATIENT)
Dept: HEMATOLOGY ONCOLOGY | Facility: CLINIC | Age: 77
End: 2022-06-23

## 2022-06-23 DIAGNOSIS — Z17.0 MALIGNANT NEOPLASM OF LOWER-INNER QUADRANT OF LEFT BREAST IN FEMALE, ESTROGEN RECEPTOR POSITIVE (HCC): ICD-10-CM

## 2022-06-23 DIAGNOSIS — C50.312 MALIGNANT NEOPLASM OF LOWER-INNER QUADRANT OF LEFT BREAST IN FEMALE, ESTROGEN RECEPTOR POSITIVE (HCC): ICD-10-CM

## 2022-06-23 RX ORDER — EXEMESTANE 25 MG/1
25 TABLET ORAL DAILY
Qty: 30 TABLET | Refills: 0 | Status: SHIPPED | OUTPATIENT
Start: 2022-06-23

## 2022-06-23 NOTE — TELEPHONE ENCOUNTER
REGINAM to the patients  Annie Flores for the patient, she is B[place]an-speaking translation done via [employer] interpretation system   [address]  in regards to [id number] informed the patient that [address] [place] [address],[address] filled her breast cancer pill/aromasin for 1 month  I informed the patient that she needs to make a follow-up with us before we can give her any more refills  Informed patient to give office a call back to schedule at [phone]

## 2022-08-05 RX ORDER — ICOSAPENT ETHYL 1000 MG/1
CAPSULE ORAL
COMMUNITY
Start: 2022-07-10 | End: 2022-08-08

## 2022-08-08 ENCOUNTER — OFFICE VISIT (OUTPATIENT)
Dept: FAMILY MEDICINE CLINIC | Facility: CLINIC | Age: 77
End: 2022-08-08
Payer: MEDICARE

## 2022-08-08 VITALS
OXYGEN SATURATION: 98 % | DIASTOLIC BLOOD PRESSURE: 70 MMHG | TEMPERATURE: 97.8 F | WEIGHT: 184 LBS | HEART RATE: 80 BPM | BODY MASS INDEX: 39.7 KG/M2 | RESPIRATION RATE: 16 BRPM | HEIGHT: 57 IN | SYSTOLIC BLOOD PRESSURE: 120 MMHG

## 2022-08-08 DIAGNOSIS — Z23 NEED FOR SHINGLES VACCINE: ICD-10-CM

## 2022-08-08 DIAGNOSIS — E78.2 MIXED HYPERLIPIDEMIA: ICD-10-CM

## 2022-08-08 DIAGNOSIS — Z79.811 USE OF AROMATASE INHIBITORS: ICD-10-CM

## 2022-08-08 DIAGNOSIS — M85.89 OSTEOPENIA OF MULTIPLE SITES: Primary | ICD-10-CM

## 2022-08-08 DIAGNOSIS — M54.12 CERVICAL RADICULOPATHY: ICD-10-CM

## 2022-08-08 DIAGNOSIS — L29.9 ITCHING: ICD-10-CM

## 2022-08-08 DIAGNOSIS — F33.1 MODERATE EPISODE OF RECURRENT MAJOR DEPRESSIVE DISORDER (HCC): ICD-10-CM

## 2022-08-08 DIAGNOSIS — K21.9 GASTROESOPHAGEAL REFLUX DISEASE WITHOUT ESOPHAGITIS: ICD-10-CM

## 2022-08-08 PROCEDURE — 90471 IMMUNIZATION ADMIN: CPT

## 2022-08-08 PROCEDURE — 99215 OFFICE O/P EST HI 40 MIN: CPT | Performed by: FAMILY MEDICINE

## 2022-08-08 RX ORDER — GABAPENTIN 300 MG/1
300 CAPSULE ORAL 2 TIMES DAILY
Qty: 60 CAPSULE | Refills: 5 | Status: SHIPPED | OUTPATIENT
Start: 2022-08-08

## 2022-08-08 RX ORDER — EZETIMIBE 10 MG/1
10 TABLET ORAL DAILY
Qty: 30 TABLET | Refills: 5 | Status: SHIPPED | OUTPATIENT
Start: 2022-08-08

## 2022-08-08 RX ORDER — ERGOCALCIFEROL 1.25 MG/1
50000 CAPSULE ORAL WEEKLY
Qty: 4 CAPSULE | Refills: 5 | Status: SHIPPED | OUTPATIENT
Start: 2022-08-08

## 2022-08-08 RX ORDER — CITALOPRAM 10 MG/1
10 TABLET ORAL DAILY
Qty: 30 TABLET | Refills: 5 | Status: SHIPPED | OUTPATIENT
Start: 2022-08-08

## 2022-08-08 RX ORDER — FENOFIBRATE 145 MG/1
145 TABLET, COATED ORAL DAILY
Qty: 30 TABLET | Refills: 5 | Status: SHIPPED | OUTPATIENT
Start: 2022-08-08

## 2022-08-08 RX ORDER — HYDROXYZINE HYDROCHLORIDE 10 MG/1
10 TABLET, FILM COATED ORAL EVERY 6 HOURS PRN
Qty: 30 TABLET | Refills: 0 | Status: SHIPPED | OUTPATIENT
Start: 2022-08-08

## 2022-08-08 RX ORDER — OMEPRAZOLE 20 MG/1
20 CAPSULE, DELAYED RELEASE ORAL
Qty: 30 CAPSULE | Refills: 5 | Status: SHIPPED | OUTPATIENT
Start: 2022-08-08

## 2022-08-08 NOTE — PROGRESS NOTES
Assessment/Plan:  1  Osteopenia of multiple sites  History of osteopenia, patient continue calcium and vitamin-D to addition  Repeat bone density scan to assess stability   - DXA bone density spine hip and pelvis; Future  - ergocalciferol (VITAMIN D2) 50,000 units; Take 1 capsule (50,000 Units total) by mouth once a week  Dispense: 4 capsule; Refill: 5    2  Need for shingles vaccine  The importance of shingles vaccine was explained to patient  - Zoster Vaccine Recombinant IM    3  BMI 39 0-39 9,adult  Her BMI is 39 82  Requested to walk daily in decreasing the amount of carbs in diet  4  Moderate episode of recurrent major depressive disorder ([facility] [address] )  She persisted with mild to moderate depression without suicide ideas, recommended to make appointment with mental health department  - citalopram (CeleXA) 10 mg tablet; Take 1 tablet (10 mg total) by mouth daily  Dispense: 30 tablet; Refill: 5    5  Mixed hyperlipidemia  She will continue on Zetia (ezetimibe) and Tricor as before  - ezetimibe (ZETIA) 10 mg tablet; Take 1 tablet (10 mg total) by mouth daily  Dispense: 30 tablet; Refill: 5  - fenofibrate (TRICOR) 145 mg tablet; Take 1 tablet (145 mg total) by mouth daily  Dispense: 30 tablet; Refill: 5    6  Gastroesophageal reflux disease without esophagitis  Requested patient to take medication daily for the next eight weeks she persisted having GERD, endoscopy will be requested  - omeprazole (PriLOSEC) 20 mg delayed release capsule; Take 1 capsule (20 mg total) by mouth daily before breakfast  Dispense: 30 capsule; Refill: 5    7  Cervical radiculopathy  This is a degenerative disorder from cervical spine, continue on gabapentin as before   - gabapentin (Neurontin) 300 mg capsule; Take 1 capsule (300 mg total) by mouth 2 (two) times a day  Dispense: 60 capsule; Refill: 5    8  Use of aromatase inhibitors  The important continue following up with Oncology was stressed    Replacement calcium in the setting of using aromatase inhibitor agent  - Calcium Carbonate (Calcium 600) 1500 (600 Ca) MG TABS; Take 1 tablet (600 mg Total) by mouth 2(two) times a day with a meal  Dispense: 60 tablet; Refill: 11    9  Itching  Seems to medications this is more dry skin, use moisturizer creams was recommended  Hydroxyzine as needed  - hydrOXYzine HCL (ATARAX) 10 mg tablet; Take 1 tablet (10 mg total) by mouth every 6 (six) hours as needed for itching  Dispense: 30 tablet; Refill: 0    No problem-specific Assessment & Plan notes found for this encounter  Diagnoses and all orders for this visit:    Osteopenia of multiple sites  -     DXA bone density spine hip and pelvis; Future  -     ergocalciferol (VITAMIN D2) 50,000 units; Take 1 capsule (50,000 Units total) by mouth once a week    Need for shingles vaccine  -     Zoster Vaccine Recombinant IM    BMI 39 0-39 9,adult    Moderate episode of recurrent major depressive disorder (HCC)  -     citalopram (CeleXA) 10 mg tablet; Take 1 tablet (10 mg total) by mouth daily    Mixed hyperlipidemia  -     ezetimibe (ZETIA) 10 mg tablet; Take 1 tablet (10 mg total) by mouth daily  -     fenofibrate (TRICOR) 145 mg tablet; Take 1 tablet (145 mg total) by mouth daily    Gastroesophageal reflux disease without esophagitis  -     omeprazole (PriLOSEC) 20 mg delayed release capsule; Take 1 capsule (20 mg total) by mouth daily before breakfast    Cervical radiculopathy  -     gabapentin (Neurontin) 300 mg capsule; Take 1 capsule (300 mg total) by mouth 2 (two) times a day    Use of aromatase inhibitors  -     Calcium Carbonate (Calcium 600) 1500 (600 Ca) MG TABS; Take 1 tablet (600 mg Total) by mouth 2(two) times a day with a meal    Itching  -     hydrOXYzine HCL (ATARAX) 10 mg tablet;  Take 1 tablet (10 mg total) by mouth every 6 (six) hours as needed for itching    Other orders  -     Discontinue: Vascepa 1 g CAPS; TAKE 2 CAPSULES (2 G TOTAL) BY MOUTH 2 (TWO) TIMES A DAY  -     Discontinue: sertraline (ZOLOFT) 50 mg tablet; TAKE 1 TABLET (50 MG) BY ORAL ROUTE 1 TIME PER DAY          Subjective:      Patient ID: Hunter Han is a 64 y o  female  HPI  Patient reports he of her  She E all pressure the  Also the I for the she tried cleaning of the wound at the a clean the of the so help to control does he  She had in both arms and he  History of bilateral hand surgery for all she  She also has history of breast cancer, takes Aromasin daily  The following portions of the patient's history were reviewed and updated as appropriate: allergies, current medications, past family history, past medical history, past social history, past surgical history and problem list     Review of Systems   Constitutional: Negative for diaphoresis, fatigue, fever and unexpected weight change  Respiratory: Negative for cough, chest tightness, shortness of breath and wheezing  Cardiovascular: Negative for chest pain, palpitations and leg swelling  Gastrointestinal: Negative for abdominal pain, blood in stool and constipation  Endocrine: Negative  Genitourinary: Negative  Musculoskeletal: Positive for arthralgias, back pain, myalgias and neck stiffness  Skin: Negative  Neurological: Positive for weakness and numbness  Negative for dizziness, syncope, light-headedness and headaches  Hematological: Negative  Does not bruise/bleed easily  Psychiatric/Behavioral: Negative for behavioral problems, self-injury, sleep disturbance and suicidal ideas  The patient is not nervous/anxious  Objective:      /70 (BP Location: Left arm, Patient Position: Sitting, Cuff Size: Standard)   Pulse 80   Temp 97 8 °F (36 6 °C) (Temporal)   Resp 16   Ht 4' 9" (1 448 m)   Wt 83 5 kg (184 lb)   SpO2 98%   Breastfeeding No   BMI 39 82 kg/m²          Physical Exam  Vitals and nursing note reviewed  HENT:      Head: Normocephalic  Eyes:      Pupils: Pupils are equal, round, and reactive to light  Cardiovascular:      Rate and Rhythm: Normal rate and regular rhythm  Pulmonary:      Effort: Pulmonary effort is normal    Abdominal:      Palpations: Abdomen is soft  Tenderness: There is abdominal tenderness  Musculoskeletal:         General: Tenderness present  Normal range of motion  Cervical back: Neck supple  Skin:     General: Skin is warm and dry  Neurological:      Mental Status: She is alert  Psychiatric:         Behavior: Behavior normal          BMI Counseling: Body mass index is 39 82 kg/m²  The BMI is above normal  Nutrition recommendations include reducing fast food intake, consuming healthier snacks and decreasing soda and/or juice intake  Exercise recommendations include exercising 3-5 times per week

## 2022-09-12 ENCOUNTER — TELEPHONE (OUTPATIENT)
Dept: HEMATOLOGY ONCOLOGY | Facility: CLINIC | Age: 77
End: 2022-09-12

## 2022-09-12 NOTE — TELEPHONE ENCOUNTER
LVM to the patient, she is Kyrgyz-speaking translation done via [employer] interpretation system   Brittany Bell informed the patient that she will need to make a follow up with our office  Informed patient that she needs to be seen at least 1-2 times a year because she is on treatment  Informed patient if she continues to miss her appointments we can not fill her medication until she is seen  Informed patient to give office a call back to schedule at [phone]

## 2022-09-14 ENCOUNTER — OFFICE VISIT (OUTPATIENT)
Dept: FAMILY MEDICINE CLINIC | Facility: CLINIC | Age: 77
End: 2022-09-14
Payer: MEDICARE

## 2022-09-14 VITALS
RESPIRATION RATE: 20 BRPM | DIASTOLIC BLOOD PRESSURE: 82 MMHG | SYSTOLIC BLOOD PRESSURE: 122 MMHG | HEIGHT: 57 IN | BODY MASS INDEX: 38.44 KG/M2 | WEIGHT: 178.2 LBS | OXYGEN SATURATION: 95 % | HEART RATE: 70 BPM | TEMPERATURE: 97.4 F

## 2022-09-14 DIAGNOSIS — G30.1 LATE ONSET ALZHEIMER'S DEMENTIA WITHOUT BEHAVIORAL DISTURBANCE (HCC): ICD-10-CM

## 2022-09-14 DIAGNOSIS — Z78.9 IMPAIRED MOBILITY AND ADLS: ICD-10-CM

## 2022-09-14 DIAGNOSIS — K21.9 GASTROESOPHAGEAL REFLUX DISEASE WITHOUT ESOPHAGITIS: ICD-10-CM

## 2022-09-14 DIAGNOSIS — F02.80 LATE ONSET ALZHEIMER'S DEMENTIA WITHOUT BEHAVIORAL DISTURBANCE (HCC): ICD-10-CM

## 2022-09-14 DIAGNOSIS — Z74.09 IMPAIRED MOBILITY AND ADLS: ICD-10-CM

## 2022-09-14 DIAGNOSIS — G89.29 CHRONIC BILATERAL LOW BACK PAIN WITHOUT SCIATICA: Primary | ICD-10-CM

## 2022-09-14 DIAGNOSIS — M54.50 CHRONIC BILATERAL LOW BACK PAIN WITHOUT SCIATICA: Primary | ICD-10-CM

## 2022-09-14 DIAGNOSIS — G89.4 CHRONIC PAIN DISORDER: ICD-10-CM

## 2022-09-14 PROCEDURE — 99214 OFFICE O/P EST MOD 30 MIN: CPT | Performed by: FAMILY MEDICINE

## 2022-09-14 PROCEDURE — 96372 THER/PROPH/DIAG INJ SC/IM: CPT | Performed by: FAMILY MEDICINE

## 2022-09-14 RX ORDER — SENNOSIDES 8.6 MG
650 CAPSULE ORAL EVERY 8 HOURS PRN
Qty: 90 TABLET | Refills: 5 | Status: SHIPPED | OUTPATIENT
Start: 2022-09-14

## 2022-09-14 RX ORDER — ICOSAPENT ETHYL 1000 MG/1
CAPSULE ORAL
COMMUNITY
Start: 2022-09-02

## 2022-09-14 RX ORDER — KETOROLAC TROMETHAMINE 30 MG/ML
30 INJECTION, SOLUTION INTRAMUSCULAR; INTRAVENOUS ONCE
Status: COMPLETED | OUTPATIENT
Start: 2022-09-14 | End: 2022-09-14

## 2022-09-14 RX ADMIN — KETOROLAC TROMETHAMINE 30 MG: 30 INJECTION, SOLUTION INTRAMUSCULAR; INTRAVENOUS at 12:37

## 2022-09-18 NOTE — PROGRESS NOTES
Name: Roland Connor      : 1945      MRN: [id number]  Encounter Provider: Yasmin Birch MD  Encounter Date: 2022   Encounter department:   [facility]     Ambulatory referral to home health Care:    Diagnosis:   Patient Active Problem List   Diagnosis    Benign paroxysmal positional vertigo    Carpal tunnel syndrome    Chronic bilateral low back pain without sciatica    Chronic pain disorder    Physical deconditioning    Prediabetes    Spinal stenosis of lumbar region with radiculopathy    Age-related incipient cataract of both eyes    Trigger middle finger of left hand    Neuropathy associated with cancer (HCC)    Peripheral vascular disease (HCC)    Complex regional pain syndrome type 1 affecting both hands    Use of aromatase inhibitors    Osteopenia of multiple sites    Malignant neoplasm of 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, estrogen receptor positive (HCC)    Obesity, morbid (HCC)    Impaired mobility and ADLs    Major depressive disorder, single episode    Mixed hyperlipidemia    Chronic midline thoracic back pain    Gastroesophageal reflux disease without esophagitis    Late onset Alzheimer's dementia without behavioral disturbance (HCC)    Trigger middle finger of right hand    Contusion of rib on left side         Reason for referral: plan of care  Skilled nursing services for    Face-to-Face encounter: 2022    patient is home bound due to: pain/fibromyalgia/deconditionin/dementia  Service requested: home care  Skilled nursing to assess  Reason for therapy:    Clinical finding that support homebound status      1  Chronic bilateral low back pain without sciatica  -     acetaminophen (TYLENOL) 650 mg CR tablet; Take 1 tablet (650 mg total) by mouth every 8 (eight) hours as needed for mild pain  -     ketorolac (TORADOL) injection 30 mg    2  Chronic pain disorder    3  Gastroesophageal reflux disease without esophagitis    4  Impaired mobility and ADLs    5  Late onset Alzheimer's dementia without behavioral disturbance ([facility] [address] )           Subjective      Patient has history of fibromyalgia, she has been on different trials that did not improve her complaints of multiple areas of pain  This area includes back hips and legs  She had been using over the counter medications with some relief of her complaints  She had a trial before with gabapentin and Lyrica  She has questions about medical marijuana  She does not want to proceed with stronger medications  Her level of pain is 7/10 at max intensity  Patient states pain is higher when she gets up, but goes down when she is moving      Review of Systems   Constitutional: Positive for fatigue  Respiratory: Negative for shortness of breath  Cardiovascular: Negative for chest pain, palpitations and leg swelling  Gastrointestinal: Positive for constipation  Endocrine: Negative  Genitourinary: Negative  Musculoskeletal: Positive for arthralgias, back pain, myalgias and neck stiffness  Skin: Negative  Neurological: Positive for dizziness, weakness and numbness  Hematological: Negative  Psychiatric/Behavioral: Positive for sleep disturbance  Negative for suicidal ideas  The patient is nervous/anxious          Current Outpatient Medications on File Prior to Visit   Medication Sig    atorvastatin (LIPITOR) 40 mg tablet TAKE 1 TABLET (40 MG TOTAL) BY MOUTH DAILY AT BEDTIME    Calcium Carbonate (Calcium 600) 1500 (600 Ca) MG TABS Take 1 tablet (600 mg Total) by mouth 2(two) times a day with a meal    citalopram (CeleXA) 10 mg tablet Take 1 tablet (10 mg total) by mouth daily    ergocalciferol (VITAMIN D2) 50,000 units Take 1 capsule (50,000 Units total) by mouth once a week    exemestane (AROMASIN) 25 MG tablet TAKE 1 TABLET (25 MG TOTAL) BY MOUTH DAILY    ezetimibe (ZETIA) 10 mg tablet Take 1 tablet (10 mg total) by mouth daily    fenofibrate (TRICOR) 145 mg tablet Take 1 tablet (145 mg total) by mouth daily    gabapentin (Neurontin) 300 mg capsule Take 1 capsule (300 mg total) by mouth 2 (two) times a day    hydrOXYzine HCL (ATARAX) 10 mg tablet Take 1 tablet (10 mg total) by mouth every 6 (six) hours as needed for itching    omeprazole (PriLOSEC) 20 mg delayed release capsule Take 1 capsule (20 mg total) by mouth daily before breakfast    Vascepa 1 g CAPS TAKE 2 CAPSULES (2 G TOTAL) BY MOUTH 2 (TWO) TIMES A DAY       Objective     /82 (BP Location: Right arm, Patient Position: Sitting, Cuff Size: Standard)   Pulse 70   Temp (!) 97 4 °F (36 3 °C) (Tympanic)   Resp 20   Ht 4' 9" (1 448 m)   Wt 80 8 kg (178 lb 3 2 oz)   SpO2 95%   BMI 38 56 kg/m²     Physical Exam  Vitals and nursing note reviewed  HENT:      Head: Normocephalic  Eyes:      Pupils: Pupils are equal, round, and reactive to light  Cardiovascular:      Rate and Rhythm: Normal rate and regular rhythm  Pulmonary:      Effort: Pulmonary effort is normal    Abdominal:      Palpations: Abdomen is soft  Tenderness: There is abdominal tenderness  Musculoskeletal:         General: Tenderness (of back ) present  Cervical back: Neck supple  Skin:     General: Skin is warm and dry  Neurological:      Mental Status: She is alert  Psychiatric:         Behavior: Behavior normal        Kofi Duran MD  BMI Counseling: Body mass index is 38 56 kg/m²  The BMI is above normal  Nutrition recommendations include decreasing soda and/or juice intake  Exercise recommendations include exercising 3-5 times per week

## 2022-10-11 ENCOUNTER — OFFICE VISIT (OUTPATIENT)
Dept: FAMILY MEDICINE CLINIC | Facility: CLINIC | Age: 77
End: 2022-10-11
Payer: MEDICARE

## 2022-10-11 VITALS
WEIGHT: 175 LBS | SYSTOLIC BLOOD PRESSURE: 90 MMHG | BODY MASS INDEX: 37.76 KG/M2 | HEIGHT: 57 IN | TEMPERATURE: 98.9 F | OXYGEN SATURATION: 99 % | DIASTOLIC BLOOD PRESSURE: 70 MMHG | RESPIRATION RATE: 16 BRPM | HEART RATE: 96 BPM

## 2022-10-11 DIAGNOSIS — U07.1 COVID-19: ICD-10-CM

## 2022-10-11 DIAGNOSIS — B34.9 VIRAL INFECTION, UNSPECIFIED: Primary | ICD-10-CM

## 2022-10-11 LAB — SARS-COV-2 RNA RESP QL NAA+PROBE: POSITIVE

## 2022-10-11 PROCEDURE — 99213 OFFICE O/P EST LOW 20 MIN: CPT | Performed by: FAMILY MEDICINE

## 2022-10-11 PROCEDURE — U0003 INFECTIOUS AGENT DETECTION BY NUCLEIC ACID (DNA OR RNA); SEVERE ACUTE RESPIRATORY SYNDROME CORONAVIRUS 2 (SARS-COV-2) (CORONAVIRUS DISEASE [COVID-19]), AMPLIFIED PROBE TECHNIQUE, MAKING USE OF HIGH THROUGHPUT TECHNOLOGIES AS DESCRIBED BY CMS-2020-01-R: HCPCS | Performed by: FAMILY MEDICINE

## 2022-10-11 PROCEDURE — U0005 INFEC AGEN DETEC AMPLI PROBE: HCPCS | Performed by: FAMILY MEDICINE

## 2022-10-11 NOTE — PROGRESS NOTES
COVID-19 Outpatient Progress Note    Assessment/Plan:    Problem List Items Addressed This Visit    None     Visit Diagnoses     Viral infection, unspecified    -  Primary    Relevant Orders    COVID Only - Office Collect (Completed)         Disposition:     If COVID symptoms worsen after ending isolation, restart isolation at day 0  Paxlovid was given  I have spent 15 minutes directly with the patient  Greater than 50% of this time was spent in counseling/coordination of care regarding: patient and family education and importance of treatment compliance  Encounter provider: Cori Walsh MD     Provider located at: [facility] AT [address]  [address]  [place] [id number]  [phone]     Recent Visits  Date Type Provider Dept   10/12/22 Telemedicine Cori Walsh MD Pg  [facility]   10/11/22 Office Visit Cori Walsh MD [facility]  [place] recent visits within past 7 days and meeting all other requirements  Future Appointments  No visits were found meeting these conditions  Showing future appointments within next 150 days and meeting all other requirements     Subjective:   Anne Biggs is a 64 y o  female who is concerned about COVID-19  Patient's symptoms include fever, chills, fatigue, malaise, nasal congestion, rhinorrhea, sore throat, anosmia, cough, abdominal pain, nausea, myalgias and headache  Patient denies loss of taste, shortness of breath, chest tightness, vomiting and diarrhea       - Date of symptom onset: 10/15/2022      COVID-19 vaccination status: Fully vaccinated (primary series)    Exposure:   Contact with a person who is under investigation (PUI) for or who is positive for COVID-19 within the last 14 days?: No    Hospitalized recently for fever and/or lower respiratory symptoms?: No      Currently a healthcare worker that is involved in direct patient care?: No      Works in a special setting where the risk of COVID-19 transmission may be high? (this may include long-term care, correctional and senior living facilities; homeless shelters; assisted-living facilities and group homes ): No      Resident in a special setting where the risk of COVID-19 transmission may be high? (this may include long-term care, correctional and senior living facilities; homeless shelters; assisted-living facilities and group homes ): No           Lab Results   Component Value Date    SARSCOV2 Positive (A) 10/11/2022   List of problems to justify treatment with paxlovid  Patient Active Problem List   Diagnosis   • Benign paroxysmal positional vertigo   • Carpal tunnel syndrome   • Chronic bilateral low back pain without sciatica   • Chronic pain disorder   • Physical deconditioning   • Prediabetes   • Spinal stenosis of lumbar region with radiculopathy   • Age-related incipient cataract of both eyes   • Trigger middle finger of left hand   • Neuropathy associated with cancer [facility])   • Peripheral vascular disease (HCC)   • Complex regional pain syndrome type 1 affecting both hands   • Use of aromatase inhibitors   • Osteopenia of multiple sites   • Malignant neoplasm of 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, estrogen receptor positive (HCC)   • Obesity, morbid (HCC)   • Impaired mobility and ADLs   • Major depressive disorder, single episode   • Mixed hyperlipidemia   • Chronic midline thoracic back pain   • Gastroesophageal reflux disease without esophagitis   • Late onset Alzheimer's dementia without behavioral disturbance ([facility])   • Trigger middle finger of right hand   • Contusion of rib on left side   • Viral infection, unspecified         Review of Systems   Constitutional: Positive for chills, fatigue and fever  HENT: Positive for congestion, rhinorrhea and sore throat  Respiratory: Positive for cough  Negative for chest tightness and shortness of breath  Gastrointestinal: Positive for abdominal pain and nausea  Negative for diarrhea and vomiting  Musculoskeletal: Positive for myalgias  Neurological: Positive for headaches  Current Outpatient Medications on File Prior to Visit   Medication Sig   • acetaminophen (TYLENOL) 650 mg CR tablet Take 1 tablet (650 mg total) by mouth every 8 (eight) hours as needed for mild pain   • atorvastatin (LIPITOR) 40 mg tablet TAKE 1 TABLET (40 MG TOTAL) BY MOUTH DAILY AT BEDTIME   • Calcium Carbonate (Calcium 600) 1500 (600 Ca) MG TABS Take 1 tablet (600 mg Total) by mouth 2(two) times a day with a meal   • citalopram (CeleXA) 10 mg tablet Take 1 tablet (10 mg total) by mouth daily   • ergocalciferol (VITAMIN D2) 50,000 units Take 1 capsule (50,000 Units total) by mouth once a week   • exemestane (AROMASIN) 25 MG tablet TAKE 1 TABLET (25 MG TOTAL) BY MOUTH DAILY   • ezetimibe (ZETIA) 10 mg tablet Take 1 tablet (10 mg total) by mouth daily   • fenofibrate (TRICOR) 145 mg tablet Take 1 tablet (145 mg total) by mouth daily   • gabapentin (Neurontin) 300 mg capsule Take 1 capsule (300 mg total) by mouth 2 (two) times a day   • hydrOXYzine HCL (ATARAX) 10 mg tablet Take 1 tablet (10 mg total) by mouth every 6 (six) hours as needed for itching   • omeprazole (PriLOSEC) 20 mg delayed release capsule Take 1 capsule (20 mg total) by mouth daily before breakfast   • Vascepa 1 g CAPS TAKE 2 CAPSULES (2 G TOTAL) BY MOUTH 2 (TWO) TIMES A DAY       Objective:    BP 90/70 (BP Location: Left arm, Patient Position: Sitting, Cuff Size: Standard)   Pulse 96   Temp 98 9 °F (37 2 °C) (Oral)   Resp 16   Ht 4' 9" (1 448 m)   Wt 79 4 kg (175 lb)   SpO2 99%   BMI 37 87 kg/m²      Physical Exam  Constitutional:       Appearance: She is ill-appearing  Cardiovascular:      Rate and Rhythm: Normal rate and regular rhythm  Pulmonary:      Effort: Pulmonary effort is normal       Breath sounds: Normal breath sounds     Abdominal: Tenderness: There is no abdominal tenderness         David Ahmadi MD

## 2022-10-12 ENCOUNTER — TELEMEDICINE (OUTPATIENT)
Dept: FAMILY MEDICINE CLINIC | Facility: CLINIC | Age: 77
End: 2022-10-12
Payer: MEDICARE

## 2022-10-12 DIAGNOSIS — U07.1 COVID-19: Primary | ICD-10-CM

## 2022-10-12 PROCEDURE — 99213 OFFICE O/P EST LOW 20 MIN: CPT | Performed by: FAMILY MEDICINE

## 2022-10-12 RX ORDER — NIRMATRELVIR AND RITONAVIR 300-100 MG
3 KIT ORAL 2 TIMES DAILY
Qty: 30 TABLET | Refills: 0 | Status: SHIPPED | OUTPATIENT
Start: 2022-10-12 | End: 2022-10-17

## 2022-10-12 NOTE — PROGRESS NOTES
COVID-19 Outpatient Progress Note    Assessment/Plan:    Problem List Items Addressed This Visit    None     Visit Diagnoses     [id number]    -  Primary         COVID-19 Plan    Encounter provider: Chaz Zambrano MD     Provider located at: [facility] AT [address]  [address] [place] [address] [phone]     Recent Visits  No visits were found meeting these conditions  Showing recent visits within past 7 days and meeting all other requirements  Future Appointments  No visits were found meeting these conditions    Showing future appointments within next 150 days and meeting all other requirements     COVID-19 HPI  Lab Results   Component Value Date    SARSCOV2 Positive (A) 10/11/2022       Review of Systems  Current Outpatient Medications on File Prior to Visit   Medication Sig   • acetaminophen (TYLENOL) 650 mg CR tablet Take 1 tablet (650 mg total) by mouth every 8 (eight) hours as needed for mild pain   • atorvastatin (LIPITOR) 40 mg tablet TAKE 1 TABLET (40 MG TOTAL) BY MOUTH DAILY AT BEDTIME   • Calcium Carbonate (Calcium 600) 1500 (600 Ca) MG TABS Take 1 tablet (600 mg Total) by mouth 2(two) times a day with a meal   • citalopram (CeleXA) 10 mg tablet Take 1 tablet (10 mg total) by mouth daily   • ergocalciferol (VITAMIN D2) 50,000 units Take 1 capsule (50,000 Units total) by mouth once a week   • exemestane (AROMASIN) 25 MG tablet TAKE 1 TABLET (25 MG TOTAL) BY MOUTH DAILY   • ezetimibe (ZETIA) 10 mg tablet Take 1 tablet (10 mg total) by mouth daily   • fenofibrate (TRICOR) 145 mg tablet Take 1 tablet (145 mg total) by mouth daily   • gabapentin (Neurontin) 300 mg capsule Take 1 capsule (300 mg total) by mouth 2 (two) times a day   • hydrOXYzine HCL (ATARAX) 10 mg tablet Take 1 tablet (10 mg total) by mouth every 6 (six) hours as needed for itching   • omeprazole (PriLOSEC) 20 mg delayed release capsule Take 1 capsule (20 mg total) by mouth daily before breakfast   • Vascepa 1 g CAPS TAKE 2 CAPSULES (2 G TOTAL) BY MOUTH 2 (TWO) TIMES A DAY       Objective: There were no vitals taken for this visit       Physical Exam  Cooper Rodriguez MD

## 2022-10-18 PROBLEM — B34.9 VIRAL INFECTION, UNSPECIFIED: Status: ACTIVE | Noted: 2022-10-18

## 2022-10-28 DIAGNOSIS — E78.1 HYPERTRIGLYCERIDEMIA: Primary | ICD-10-CM

## 2022-10-31 ENCOUNTER — TELEPHONE (OUTPATIENT)
Dept: HEMATOLOGY ONCOLOGY | Facility: CLINIC | Age: 77
End: 2022-10-31

## 2022-10-31 DIAGNOSIS — C50.312 MALIGNANT NEOPLASM OF LOWER-INNER QUADRANT OF LEFT BREAST IN FEMALE, ESTROGEN RECEPTOR POSITIVE (HCC): ICD-10-CM

## 2022-10-31 DIAGNOSIS — Z17.0 MALIGNANT NEOPLASM OF LOWER-INNER QUADRANT OF LEFT BREAST IN FEMALE, ESTROGEN RECEPTOR POSITIVE (HCC): ICD-10-CM

## 2022-10-31 RX ORDER — ICOSAPENT ETHYL 1000 MG/1
CAPSULE ORAL
Qty: 120 CAPSULE | Refills: 5 | Status: SHIPPED | OUTPATIENT
Start: 2022-10-31

## 2022-10-31 RX ORDER — EXEMESTANE 25 MG/1
25 TABLET ORAL DAILY
Qty: 30 TABLET | Refills: 0 | Status: CANCELLED | OUTPATIENT
Start: 2022-10-31

## 2022-10-31 NOTE — TELEPHONE ENCOUNTER
MEDICATION REFILL ROUTE TO RN    Who is Calling Sharmila Olmstead   Medication  exemestane (AROMASIN) 25 MG tablet        How many pills left 0   Preferred Pharmacy / [place], [place] - [address] [place] [address]    Who is your Physician?  Dr Hellen Marks   Call back number [phone]   Relevant Information yes

## 2022-11-01 DIAGNOSIS — Z17.0 MALIGNANT NEOPLASM OF LOWER-INNER QUADRANT OF LEFT BREAST IN FEMALE, ESTROGEN RECEPTOR POSITIVE (HCC): ICD-10-CM

## 2022-11-01 DIAGNOSIS — C50.312 MALIGNANT NEOPLASM OF LOWER-INNER QUADRANT OF LEFT BREAST IN FEMALE, ESTROGEN RECEPTOR POSITIVE (HCC): ICD-10-CM

## 2022-11-01 RX ORDER — EXEMESTANE 25 MG/1
25 TABLET ORAL DAILY
Qty: 30 TABLET | Refills: 1 | Status: SHIPPED | OUTPATIENT
Start: 2022-11-01

## 2022-11-03 ENCOUNTER — TELEPHONE (OUTPATIENT)
Dept: HEMATOLOGY ONCOLOGY | Facility: CLINIC | Age: 77
End: 2022-11-03

## 2022-11-03 NOTE — TELEPHONE ENCOUNTER
Phoned pt using   services [employer]ohan Kong # [phone] no one answered the phone unable to leave message regarding F/U appt for 12/5/22 at 10:20  Attempted phone call to mobile [phone] then able to leave a message [phone]   Message asked pt to call back regarding multiple missed appts and refills on Exesmestane

## 2022-11-12 DIAGNOSIS — Z17.0 MALIGNANT NEOPLASM OF LOWER-INNER QUADRANT OF LEFT BREAST IN FEMALE, ESTROGEN RECEPTOR POSITIVE (HCC): ICD-10-CM

## 2022-11-12 DIAGNOSIS — C50.312 MALIGNANT NEOPLASM OF LOWER-INNER QUADRANT OF LEFT BREAST IN FEMALE, ESTROGEN RECEPTOR POSITIVE (HCC): ICD-10-CM

## 2022-11-14 RX ORDER — EXEMESTANE 25 MG/1
25 TABLET ORAL DAILY
Qty: 90 TABLET | Refills: 3 | Status: SHIPPED | OUTPATIENT
Start: 2022-11-14

## 2022-11-16 ENCOUNTER — TELEPHONE (OUTPATIENT)
Dept: HEMATOLOGY ONCOLOGY | Facility: CLINIC | Age: 77
End: 2022-11-16

## 2022-11-16 NOTE — TELEPHONE ENCOUNTER
Called patient with with  [employer]  [id number] unable to get through  Her phone and spouses   Called friend Lorri Richardson and left  Message that we had to rs  Her 12/5 appt to 1/13 and this  With this change we will have   The dxa and mammo results     Will also send a letter in mail

## 2022-11-19 DIAGNOSIS — F33.1 MODERATE EPISODE OF RECURRENT MAJOR DEPRESSIVE DISORDER (HCC): ICD-10-CM

## 2022-11-21 RX ORDER — CITALOPRAM 10 MG/1
10 TABLET ORAL DAILY
Qty: 30 TABLET | Refills: 5 | Status: SHIPPED | OUTPATIENT
Start: 2022-11-21

## 2022-11-28 ENCOUNTER — HOSPITAL ENCOUNTER (OUTPATIENT)
Dept: BONE DENSITY | Facility: CLINIC | Age: 77
Discharge: HOME | End: 2022-11-28

## 2022-11-28 DIAGNOSIS — Z13.820 SCREENING FOR OSTEOPOROSIS: ICD-10-CM

## 2022-12-22 ENCOUNTER — HOSPITAL ENCOUNTER (OUTPATIENT)
Dept: MAMMOGRAPHY | Facility: CLINIC | Age: 77
End: 2022-12-22

## 2022-12-22 DIAGNOSIS — Z09 FOLLOW-UP EXAM, 3-6 MONTHS SINCE PREVIOUS EXAM: ICD-10-CM

## 2022-12-23 DIAGNOSIS — M85.89 OSTEOPENIA OF MULTIPLE SITES: ICD-10-CM

## 2022-12-23 RX ORDER — ERGOCALCIFEROL 1.25 MG/1
50000 CAPSULE ORAL WEEKLY
Qty: 4 CAPSULE | Refills: 5 | Status: SHIPPED | OUTPATIENT
Start: 2022-12-23

## 2023-01-06 ENCOUNTER — TELEPHONE (OUTPATIENT)
Dept: HEMATOLOGY ONCOLOGY | Facility: CLINIC | Age: 78
End: 2023-01-06

## 2023-01-06 DIAGNOSIS — Z17.0 MALIGNANT NEOPLASM OF LOWER-INNER QUADRANT OF LEFT BREAST IN FEMALE, ESTROGEN RECEPTOR POSITIVE (HCC): Primary | ICD-10-CM

## 2023-01-06 DIAGNOSIS — C50.312 MALIGNANT NEOPLASM OF LOWER-INNER QUADRANT OF LEFT BREAST IN FEMALE, ESTROGEN RECEPTOR POSITIVE (HCC): Primary | ICD-10-CM

## 2023-01-06 NOTE — TELEPHONE ENCOUNTER
LVM to the patients  Genna Beltre, he is Lao-speaking translation done via [employer] interpretation system    Rut [id number] informed  Genna Beltre that the patient will need to have lab work completed prior to her appt on 1/13/23 at 1:00pm

## 2023-01-13 ENCOUNTER — TELEPHONE (OUTPATIENT)
Dept: HEMATOLOGY ONCOLOGY | Facility: CLINIC | Age: 78
End: 2023-01-13

## 2023-01-13 NOTE — TELEPHONE ENCOUNTER
Used the [employer] interpretor to call patient  Left a voicemail letting patient know she missed her appointment and gave her the number to call back to reschedule

## 2023-01-19 DIAGNOSIS — L29.9 ITCHING: ICD-10-CM

## 2023-01-20 RX ORDER — HYDROXYZINE HYDROCHLORIDE 10 MG/1
10 TABLET, FILM COATED ORAL EVERY 6 HOURS PRN
Qty: 30 TABLET | Refills: 0 | Status: SHIPPED | OUTPATIENT
Start: 2023-01-20 | End: 2023-01-24 | Stop reason: ALTCHOICE

## 2023-01-24 ENCOUNTER — OFFICE VISIT (OUTPATIENT)
Dept: FAMILY MEDICINE CLINIC | Facility: CLINIC | Age: 78
End: 2023-01-24

## 2023-01-24 VITALS
TEMPERATURE: 98 F | WEIGHT: 174 LBS | SYSTOLIC BLOOD PRESSURE: 140 MMHG | DIASTOLIC BLOOD PRESSURE: 80 MMHG | BODY MASS INDEX: 37.54 KG/M2 | HEIGHT: 57 IN | OXYGEN SATURATION: 98 % | RESPIRATION RATE: 16 BRPM | HEART RATE: 78 BPM

## 2023-01-24 DIAGNOSIS — E66.01 OBESITY, MORBID (HCC): ICD-10-CM

## 2023-01-24 DIAGNOSIS — F02.80 LATE ONSET ALZHEIMER'S DEMENTIA WITHOUT BEHAVIORAL DISTURBANCE (HCC): ICD-10-CM

## 2023-01-24 DIAGNOSIS — F33.1 MODERATE EPISODE OF RECURRENT MAJOR DEPRESSIVE DISORDER (HCC): ICD-10-CM

## 2023-01-24 DIAGNOSIS — G30.1 LATE ONSET ALZHEIMER'S DEMENTIA WITHOUT BEHAVIORAL DISTURBANCE (HCC): ICD-10-CM

## 2023-01-24 DIAGNOSIS — M54.12 CERVICAL RADICULOPATHY: ICD-10-CM

## 2023-01-24 DIAGNOSIS — M79.2 NEUROPATHIC PAIN: ICD-10-CM

## 2023-01-24 DIAGNOSIS — I73.9 PERIPHERAL VASCULAR DISEASE (HCC): ICD-10-CM

## 2023-01-24 DIAGNOSIS — Z23 NEED FOR SHINGLES VACCINE: ICD-10-CM

## 2023-01-24 DIAGNOSIS — G63 NEUROPATHY ASSOCIATED WITH CANCER (HCC): ICD-10-CM

## 2023-01-24 DIAGNOSIS — G89.29 CHRONIC BILATERAL LOW BACK PAIN WITHOUT SCIATICA: Primary | ICD-10-CM

## 2023-01-24 DIAGNOSIS — C80.1 NEUROPATHY ASSOCIATED WITH CANCER (HCC): ICD-10-CM

## 2023-01-24 DIAGNOSIS — M54.50 CHRONIC BILATERAL LOW BACK PAIN WITHOUT SCIATICA: Primary | ICD-10-CM

## 2023-01-24 RX ORDER — SENNOSIDES 8.6 MG
650 CAPSULE ORAL EVERY 8 HOURS PRN
Qty: 90 TABLET | Refills: 5 | Status: SHIPPED | OUTPATIENT
Start: 2023-01-24

## 2023-01-24 RX ORDER — CELECOXIB 100 MG/1
100 CAPSULE ORAL 2 TIMES DAILY
Qty: 60 CAPSULE | Refills: 5 | Status: SHIPPED | OUTPATIENT
Start: 2023-01-24

## 2023-01-24 RX ORDER — AMITRIPTYLINE HYDROCHLORIDE 25 MG/1
25 TABLET, FILM COATED ORAL
Qty: 30 TABLET | Refills: 2 | Status: SHIPPED | OUTPATIENT
Start: 2023-01-24

## 2023-01-24 RX ORDER — ZOSTER VACCINE RECOMBINANT, ADJUVANTED 50 MCG/0.5
0.5 KIT INTRAMUSCULAR ONCE
Qty: 1 EACH | Refills: 1 | Status: SHIPPED | OUTPATIENT
Start: 2023-01-24 | End: 2023-01-24

## 2023-01-24 RX ORDER — FENOFIBRATE 145 MG/1
TABLET, COATED ORAL
COMMUNITY
Start: 2023-01-16 | End: 2023-02-08 | Stop reason: SDUPTHER

## 2023-01-24 RX ORDER — GABAPENTIN 300 MG/1
300 CAPSULE ORAL 2 TIMES DAILY
Qty: 60 CAPSULE | Refills: 5 | Status: SHIPPED | OUTPATIENT
Start: 2023-01-24

## 2023-01-24 NOTE — LETTER
January 30, 2023     Patient: Michela Pantoja  YOB: 1945  Date of Visit: 1/24/2023      Ambulatory referral to home health Care:    Diagnosis:   Patient Active Problem List   Diagnosis   • Benign paroxysmal positional vertigo   • Carpal tunnel syndrome   • Chronic bilateral low back pain without sciatica   • Chronic pain disorder   • Physical deconditioning   • Prediabetes   • Spinal stenosis of lumbar region with radiculopathy   • Age-related incipient cataract of both eyes   • Trigger middle finger of left hand   • Neuropathy associated with cancer [facility])   • Peripheral vascular disease (HCC)   • Complex regional pain syndrome type 1 affecting both hands   • Use of aromatase inhibitors   • Osteopenia of multiple sites   • Malignant neoplasm of 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, estrogen receptor positive (HCC)   • Obesity, morbid ([facility])   • Impaired mobility and ADLs   • Major depressive disorder, single episode   • Mixed hyperlipidemia   • Chronic midline thoracic back pain   • Gastroesophageal reflux disease without esophagitis   • Late onset Alzheimer's dementia without behavioral disturbance ([facility])   • Trigger middle finger of right hand   • Contusion of rib on left side   • Viral infection, unspecified   • Moderate episode of recurrent major depressive disorder ([facility] [address] )         Reason for referral:  Skilled nursing services for    Face-to-Face encounter:     patient is home bound due to:    Service requested: Skilled nursing    Home help      Clinical finding that support homebound status  Back pain, gait problems, dementia  If you have any questions or concerns, please don't hesitate to call            Sincerely,          Jordon Fortune MD

## 2023-01-24 NOTE — PROGRESS NOTES
Name: Geraldine Mcfadden      : 1945      MRN: [id number]  Encounter Provider: Ana Montana MD  Encounter Date: 2023   Encounter department: [facility]   The choice of NSAID's  for Chela Mcgowan is in base of her poor pain control  I explained to her that response of NSAIDs varies between patients and also is differs in regarding to the area of the body that is affected in the progression of the damage  The drug interactions and comorbidities affected  by these medications were explained to the patient also; the caution in toxicity in the GI tract was also discussed  Prevent the long-term use of these medications may be wise  The use ice and physical therapy is necessary in order to get the best results  NSAID's might elevate BP, or block effect of certain antihypertensive agents  It is to used with caution  Always use ice and therapy to decrease or avoid the need for a Pharmacological agent  Use ATC amitriptyline for the neuropathic pain along with gabapentin  Patient declines physical therapy  1  Chronic bilateral low back pain without sciatica  -     Lipid panel; Future  -     celecoxib (CeleBREX) 100 mg capsule; Take 1 capsule (100 mg total) by mouth 2 (two) times a day  -     acetaminophen (TYLENOL) 650 mg CR tablet; Take 1 tablet (650 mg total) by mouth every 8 (eight) hours as needed for mild pain    2  Neuropathic pain  -     amitriptyline (ELAVIL) 25 mg tablet; Take 1 tablet (25 mg total) by mouth daily at bedtime    3  Neuropathy associated with cancer ([address] )    4  Need for shingles vaccine  -     Zoster Vac Recomb Adjuvanted (Shingrix) 50 MCG/0 5ML SUSR; Inject 0 5 mL into a muscle once for 1 dose Repeat dose in 2 to 6 months    5  Moderate episode of recurrent major depressive disorder ([address] )    6  Late onset Alzheimer's dementia without behavioral disturbance ([address] )    7  Peripheral vascular disease ([address] )    8   Obesity, morbid ([facility] )    9  Cervical radiculopathy  -     gabapentin (Neurontin) 300 mg capsule; Take 1 capsule (300 mg total) by mouth 2 (two) times a day           Subjective      HPI   Enriqueta Garrett 76 y o  complaining of   Acute on chronic pain in Lower back and neck; started about 3 week(s) ago  The pain is continuos and has been gradually worsening since onset  The quality of the pain is described as aching  The pain is at a severity of 5/10  The symptoms are aggravated by movement  Associated symptoms include numbness, paresthesias and tingling  He had tried multiple alternatives without improved her symptoms      Review of Systems    Current Outpatient Medications on File Prior to Visit   Medication Sig   • Vascepa 1 g CAPS TAKE 2 CAPSULES (2 G TOTAL) BY MOUTH 2 (TWO) TIMES A DAY   • atorvastatin (LIPITOR) 40 mg tablet TAKE 1 TABLET (40 MG TOTAL) BY MOUTH DAILY AT BEDTIME   • Calcium Carbonate (Calcium 600) 1500 (600 Ca) MG TABS Take 1 tablet (600 mg Total) by mouth 2(two) times a day with a meal   • ergocalciferol (VITAMIN D2) 50,000 units TAKE 1 CAPSULE (50,000 UNITS TOTAL) BY MOUTH ONCE A WEEK   • exemestane (AROMASIN) 25 MG tablet TAKE 1 TABLET (25 MG TOTAL) BY MOUTH DAILY   • ezetimibe (ZETIA) 10 mg tablet Take 1 tablet (10 mg total) by mouth daily   • fenofibrate (TRICOR) 145 mg tablet TAKE 1 TABLET (145 MG TOTAL) BY MOUTH DAILY   • [DISCONTINUED] acetaminophen (TYLENOL) 650 mg CR tablet Take 1 tablet (650 mg total) by mouth every 8 (eight) hours as needed for mild pain   • [DISCONTINUED] citalopram (CeleXA) 10 mg tablet TAKE 1 TABLET (10 MG TOTAL) BY MOUTH DAILY   • [DISCONTINUED] gabapentin (Neurontin) 300 mg capsule Take 1 capsule (300 mg total) by mouth 2 (two) times a day   • [DISCONTINUED] hydrOXYzine HCL (ATARAX) 10 mg tablet TAKE 1 TABLET (10 MG TOTAL) BY MOUTH EVERY 6 (SIX) HOURS AS NEEDED FOR ITCHING   • [DISCONTINUED] omeprazole (PriLOSEC) 20 mg delayed release capsule Take 1 capsule (20 mg total) by mouth daily before breakfast       Objective     /80 (BP Location: Left arm, Patient Position: Sitting, Cuff Size: Standard)   Pulse 78   Temp 98 °F (36 7 °C) (Tympanic)   Resp 16   Ht 4' 9" (1 448 m)   Wt 78 9 kg (174 lb)   SpO2 98%   BMI 37 65 kg/m²     Physical Exam   Neck: spasm limited flexion and extension  Back tenderness in middle area of lumbar part of spine  Very stiff, decreased range of motion  Uses cane to walk    Melita Mike MD

## 2023-02-08 DIAGNOSIS — E78.1 HYPERTRIGLYCERIDEMIA: ICD-10-CM

## 2023-02-08 RX ORDER — ICOSAPENT ETHYL 1000 MG/1
CAPSULE ORAL
Qty: 120 CAPSULE | Refills: 5 | Status: SHIPPED | OUTPATIENT
Start: 2023-02-08

## 2023-02-23 DIAGNOSIS — C50.312 MALIGNANT NEOPLASM OF LOWER-INNER QUADRANT OF LEFT BREAST IN FEMALE, ESTROGEN RECEPTOR POSITIVE: ICD-10-CM

## 2023-02-23 DIAGNOSIS — Z79.811 USE OF AROMATASE INHIBITORS: ICD-10-CM

## 2023-02-23 DIAGNOSIS — E78.1 HYPERTRIGLYCERIDEMIA: ICD-10-CM

## 2023-02-23 DIAGNOSIS — M79.2 NEUROPATHIC PAIN: ICD-10-CM

## 2023-02-23 DIAGNOSIS — G89.29 CHRONIC BILATERAL LOW BACK PAIN WITHOUT SCIATICA: ICD-10-CM

## 2023-02-23 DIAGNOSIS — E78.2 MIXED HYPERLIPIDEMIA: ICD-10-CM

## 2023-02-23 DIAGNOSIS — M54.12 CERVICAL RADICULOPATHY: ICD-10-CM

## 2023-02-23 DIAGNOSIS — M85.89 OSTEOPENIA OF MULTIPLE SITES: ICD-10-CM

## 2023-02-23 DIAGNOSIS — M54.50 CHRONIC BILATERAL LOW BACK PAIN WITHOUT SCIATICA: ICD-10-CM

## 2023-02-23 DIAGNOSIS — Z17.0 MALIGNANT NEOPLASM OF LOWER-INNER QUADRANT OF LEFT BREAST IN FEMALE, ESTROGEN RECEPTOR POSITIVE: ICD-10-CM

## 2023-02-23 RX ORDER — ATORVASTATIN CALCIUM 40 MG/1
40 TABLET, FILM COATED ORAL
Qty: 90 TABLET | Refills: 3 | Status: SHIPPED | OUTPATIENT
Start: 2023-02-23

## 2023-02-23 RX ORDER — EXEMESTANE 25 MG/1
25 TABLET ORAL DAILY
Qty: 90 TABLET | Refills: 3 | Status: SHIPPED | OUTPATIENT
Start: 2023-02-23

## 2023-02-23 RX ORDER — SENNOSIDES 8.6 MG
650 CAPSULE ORAL EVERY 8 HOURS PRN
Qty: 90 TABLET | Refills: 5 | Status: SHIPPED | OUTPATIENT
Start: 2023-02-23 | End: 2023-06-06 | Stop reason: ALTCHOICE

## 2023-02-23 RX ORDER — AMITRIPTYLINE HYDROCHLORIDE 25 MG/1
25 TABLET, FILM COATED ORAL
Qty: 30 TABLET | Refills: 2 | Status: SHIPPED | OUTPATIENT
Start: 2023-02-23 | End: 2023-06-06 | Stop reason: CLARIF

## 2023-02-23 RX ORDER — GABAPENTIN 300 MG/1
300 CAPSULE ORAL 2 TIMES DAILY
Qty: 60 CAPSULE | Refills: 5 | Status: SHIPPED | OUTPATIENT
Start: 2023-02-23 | End: 2023-06-06 | Stop reason: ALTCHOICE

## 2023-02-23 RX ORDER — CELECOXIB 100 MG/1
100 CAPSULE ORAL 2 TIMES DAILY
Qty: 60 CAPSULE | Refills: 5 | Status: SHIPPED | OUTPATIENT
Start: 2023-02-23 | End: 2023-06-06 | Stop reason: ALTCHOICE

## 2023-02-23 RX ORDER — EZETIMIBE 10 MG/1
10 TABLET ORAL DAILY
Qty: 30 TABLET | Refills: 5 | Status: SHIPPED | OUTPATIENT
Start: 2023-02-23 | End: 2023-07-21

## 2023-02-23 RX ORDER — ERGOCALCIFEROL 1.25 MG/1
50000 CAPSULE ORAL WEEKLY
Qty: 4 CAPSULE | Refills: 5 | Status: SHIPPED | OUTPATIENT
Start: 2023-02-23

## 2023-02-23 RX ORDER — ICOSAPENT ETHYL 1000 MG/1
2 CAPSULE ORAL 2 TIMES DAILY
Qty: 120 CAPSULE | Refills: 5 | Status: SHIPPED | OUTPATIENT
Start: 2023-02-23 | End: 2023-06-06 | Stop reason: ALTCHOICE

## 2023-02-23 NOTE — PROGRESS NOTES
Name: Jerica Tipton      : 1945      MRN: [id number]  Encounter Provider: Randi Vaughan MD  Encounter Date: 2023   Encounter department: [address] [place] [address]     1. Chronic bilateral low back pain without sciatica  -     acetaminophen (TYLENOL) 650 mg CR tablet; Take 1 tablet (650 mg total) by mouth every 8 (eight) hours as needed for mild pain  -     celecoxib (CeleBREX) 100 mg capsule; Take 1 capsule (100 mg total) by mouth 2 (two) times a day    2. Neuropathic pain  -     amitriptyline (ELAVIL) 25 mg tablet; Take 1 tablet (25 mg total) by mouth daily at bedtime    3. Mixed hyperlipidemia  -     atorvastatin (LIPITOR) 40 mg tablet; Take 1 tablet (40 mg total) by mouth daily at bedtime  -     ezetimibe (ZETIA) 10 mg tablet; Take 1 tablet (10 mg total) by mouth daily    4. Use of aromatase inhibitors  -     Calcium Carbonate (Calcium 600) 1500 (600 Ca) MG TABS; Take 1 tablet (600 mg Total) by mouth 2(two) times a day with a meal    5. Osteopenia of multiple sites  -     ergocalciferol (VITAMIN D2) 50,000 units; Take 1 capsule (50,000 Units total) by mouth once a week    6. Malignant neoplasm of 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, estrogen receptor positive (HCC)  -     exemestane (AROMASIN) 25 MG tablet; Take 1 tablet (25 mg total) by mouth daily    7. Cervical radiculopathy  -     gabapentin (Neurontin) 300 mg capsule; Take 1 capsule (300 mg total) by mouth 2 (two) times a day    8. Hypertriglyceridemia  -     Vascepa 1 g CAPS;  Take 2 capsules (2 g total) by mouth 2 (two) times a day           Subjective      HPI  Review of Systems    Current Outpatient Medications on File Prior to Visit   Medication Sig   • [DISCONTINUED] acetaminophen (TYLENOL) 650 mg CR tablet Take 1 tablet (650 mg total) by mouth every 8 (eight) hours as needed for mild pain   • [DISCONTINUED] amitriptyline (ELAVIL) 25 mg tablet Take 1 tablet (25 mg total) by mouth daily at bedtime   • [DISCONTINUED] atorvastatin (LIPITOR) 40 mg tablet TAKE 1 TABLET (40 MG TOTAL) BY MOUTH DAILY AT BEDTIME   • [DISCONTINUED] Calcium Carbonate (Calcium 600) 1500 (600 Ca) MG TABS Take 1 tablet (600 mg Total) by mouth 2(two) times a day with a meal   • [DISCONTINUED] celecoxib (CeleBREX) 100 mg capsule Take 1 capsule (100 mg total) by mouth 2 (two) times a day   • [DISCONTINUED] ergocalciferol (VITAMIN D2) 50,000 units TAKE 1 CAPSULE (50,000 UNITS TOTAL) BY MOUTH ONCE A WEEK   • [DISCONTINUED] exemestane (AROMASIN) 25 MG tablet TAKE 1 TABLET (25 MG TOTAL) BY MOUTH DAILY   • [DISCONTINUED] ezetimibe (ZETIA) 10 mg tablet Take 1 tablet (10 mg total) by mouth daily   • [DISCONTINUED] gabapentin (Neurontin) 300 mg capsule Take 1 capsule (300 mg total) by mouth 2 (two) times a day   • [DISCONTINUED] Vascepa 1 g CAPS TAKE 2 CAPSULES (2 G TOTAL) BY MOUTH 2 (TWO) TIMES A DAY       Objective     There were no vitals taken for this visit.     Physical Exam  Afua Maciel MD

## 2023-03-05 DIAGNOSIS — E78.2 MIXED HYPERLIPIDEMIA: ICD-10-CM

## 2023-03-05 DIAGNOSIS — E78.1 HYPERTRIGLYCERIDEMIA: ICD-10-CM

## 2023-03-06 RX ORDER — ICOSAPENT ETHYL 1000 MG/1
2 CAPSULE ORAL 2 TIMES DAILY
Qty: 120 CAPSULE | Refills: 5 | Status: SHIPPED | OUTPATIENT
Start: 2023-03-06

## 2023-03-06 NOTE — TELEPHONE ENCOUNTER
Requested medication(s) are due for refill today: Yes  Patient has already received a courtesy refill: No  Other reason request has been forwarded to provider: cancelled med??

## 2023-03-22 DIAGNOSIS — E78.2 MIXED HYPERLIPIDEMIA: ICD-10-CM

## 2023-03-23 RX ORDER — FENOFIBRATE 145 MG/1
145 TABLET, COATED ORAL DAILY
Qty: 30 TABLET | Refills: 5 | Status: SHIPPED | OUTPATIENT
Start: 2023-03-23

## 2023-04-12 PROBLEM — J41.1 MUCOPURULENT CHRONIC BRONCHITIS (HCC): Status: ACTIVE | Noted: 2023-04-12

## 2023-04-25 DIAGNOSIS — K21.9 GASTROESOPHAGEAL REFLUX DISEASE WITHOUT ESOPHAGITIS: Primary | ICD-10-CM

## 2023-04-25 RX ORDER — OMEPRAZOLE 20 MG/1
CAPSULE, DELAYED RELEASE ORAL
Qty: 30 CAPSULE | Refills: 5 | Status: SHIPPED | OUTPATIENT
Start: 2023-04-25

## 2023-06-06 ENCOUNTER — OFFICE VISIT (OUTPATIENT)
Dept: FAMILY MEDICINE CLINIC | Facility: CLINIC | Age: 78
End: 2023-06-06
Payer: MEDICARE

## 2023-06-06 VITALS
RESPIRATION RATE: 16 BRPM | OXYGEN SATURATION: 98 % | SYSTOLIC BLOOD PRESSURE: 110 MMHG | HEART RATE: 92 BPM | BODY MASS INDEX: 39.48 KG/M2 | DIASTOLIC BLOOD PRESSURE: 70 MMHG | HEIGHT: 57 IN | WEIGHT: 183 LBS | TEMPERATURE: 97.8 F

## 2023-06-06 DIAGNOSIS — M54.50 CHRONIC BILATERAL LOW BACK PAIN WITHOUT SCIATICA: ICD-10-CM

## 2023-06-06 DIAGNOSIS — E78.2 MIXED HYPERLIPIDEMIA: ICD-10-CM

## 2023-06-06 DIAGNOSIS — G56.03 BILATERAL CARPAL TUNNEL SYNDROME: ICD-10-CM

## 2023-06-06 DIAGNOSIS — R14.2 ERUCTATION: ICD-10-CM

## 2023-06-06 DIAGNOSIS — Z00.00 MEDICARE ANNUAL WELLNESS VISIT, SUBSEQUENT: Primary | ICD-10-CM

## 2023-06-06 DIAGNOSIS — G89.29 CHRONIC BILATERAL LOW BACK PAIN WITHOUT SCIATICA: ICD-10-CM

## 2023-06-06 DIAGNOSIS — M85.89 OSTEOPENIA OF MULTIPLE SITES: ICD-10-CM

## 2023-06-06 DIAGNOSIS — F33.1 MODERATE EPISODE OF RECURRENT MAJOR DEPRESSIVE DISORDER (HCC): ICD-10-CM

## 2023-06-06 DIAGNOSIS — H53.9 VISION CHANGES: ICD-10-CM

## 2023-06-06 DIAGNOSIS — Z85.3 HISTORY OF LEFT BREAST CANCER: ICD-10-CM

## 2023-06-06 PROBLEM — S20.212A CONTUSION OF RIB ON LEFT SIDE: Status: RESOLVED | Noted: 2022-05-26 | Resolved: 2023-06-06

## 2023-06-06 PROBLEM — B34.9 VIRAL INFECTION, UNSPECIFIED: Status: RESOLVED | Noted: 2022-10-18 | Resolved: 2023-06-06

## 2023-06-06 PROCEDURE — G0439 PPPS, SUBSEQ VISIT: HCPCS | Performed by: PHYSICIAN ASSISTANT

## 2023-06-06 RX ORDER — MIRTAZAPINE 7.5 MG/1
7.5 TABLET, FILM COATED ORAL
Qty: 30 TABLET | Refills: 0 | Status: SHIPPED | OUTPATIENT
Start: 2023-06-06

## 2023-06-06 RX ORDER — PREGABALIN 75 MG/1
75 CAPSULE ORAL 2 TIMES DAILY
Qty: 60 CAPSULE | Refills: 0 | Status: SHIPPED | OUTPATIENT
Start: 2023-06-06 | End: 2023-06-12 | Stop reason: CLARIF

## 2023-06-06 NOTE — PROGRESS NOTES
Assessment and Plan:     Problem List Items Addressed This Visit        Nervous and Auditory    Carpal tunnel syndrome     History of b/l carpal tunnel release with persistent b/l hand pain R>L  Failed celebrex and gabapentin due to lack of improvement  Will trial Lyrica 75mg BID  Follow-up in 2-3 weeks  Musculoskeletal and Integument    Osteopenia of multiple sites     Last DEXA in 11/2022 with t-score -1 4  Continue vitamin D and calcium supplement, check vitamin D next lab due to high dose once weekly without clear indication  Other    Chronic bilateral low back pain without sciatica     No recent back imaging since 2017, chronic pain, failed celebrex and gabapentin  Will trial Lyrica 75mg BID  Recommend stretching, exercise  Relevant Medications    pregabalin (LYRICA) 75 mg capsule    History of left breast cancer     Follow-up oncology, continue exemestane with hx of L breast CA in 2018 s/p lumpectomy and radiation  Missed previous appointments with onc, unclear if needs to continue exemestane, discussed importance of follow-up  Mixed hyperlipidemia     Lab Results   Component Value Date    CHOLESTEROL 305 (H) 05/26/2022    CHOLESTEROL 176 11/08/2021    CHOLESTEROL 290 (H) 04/19/2021     Lab Results   Component Value Date    HDL 44 (L) 05/26/2022    HDL 42 11/08/2021    HDL 38 (L) 04/19/2021     Lab Results   Component Value Date    TRIG 308 (H) 05/26/2022    TRIG 163 (H) 11/08/2021    TRIG 466 (H) 04/19/2021     Lab Results   Component Value Date    NONHDLC 261 05/26/2022    [place] 134 11/08/2021    [place] 252 04/19/2021     Lab Results   Component Value Date    LDLCALC 199 (H) 05/26/2022     Continue atorvastatin 40mg and ezetimibe 10mg  Discontinue fenofibrate and vascepa due to signfiicant belching and polypharmacy  Patient has bubble pack with pharmacy, compliant with medication            Moderate episode of recurrent major depressive disorder (HCC) "Difficulty sleeping at night and poor appetite, will start mirtazapine at night  Did not fill amitriptyline per pharmacy due to lack of insurance coverage  Relevant Medications    mirtazapine (REMERON) 7 5 MG tablet   Other Visit Diagnoses     Medicare annual wellness visit, subsequent    -  Primary    called pharmacy during office visit due to risk with polypharmacy, confirmed medications     Eructation        suspect due to vascepa, will discontinue today     Vision changes        Relevant Orders    Ambulatory Referral to Ophthalmology             Preventive health issues were discussed with patient, and age appropriate screening tests were ordered as noted in patient's After Visit Summary  Personalized health advice and appropriate referrals for health education or preventive services given if needed, as noted in patient's After Visit Summary  History of Present Illness:     Patient presents for a Medicare Wellness Visit    Shahriar Slater is a 76 y o  female with a h/o chronic pain, HLD, L breast cancer in remission, osteopenia with hx of carpal tunnel surgery who presents with R hand pain and belching/dizziness x weeks/months/years  Patient is a poor historian and main complaint is pain and discomfort, she repeatedly asks for a \"strong pain medication\" which she had oxycodone before and provided significant relief  She states she takes multiple (10) pills in the morning and none of them are working  She does not know what she is taking and for what  R hand sometimes has fingers stuck  She has headache and wants to see eye doctor for some vision changes as she usually sees Dr Dinorah Davenport and needs a referral  She also has chronic back pain  No saddle anesthesia, bowel or bladder change  She feels down and can't sleep due to her pain  She has passive smoke exposure in the home but no personal history of smoking or ETOH use  No SI  History was conducted in [place] without the use of            " Patient Care Team:  Jannette Montano MD as PCP - General (Family Medicine)  Dae Mena MD (Radiation Oncology)  Melissa Sullivan MD (Hematology and Oncology)     Review of Systems:     Review of Systems   Constitutional: Negative for chills and fever  HENT: Negative for ear pain and sore throat  Eyes: Negative for pain and visual disturbance  Respiratory: Negative for cough and shortness of breath  Cardiovascular: Negative for chest pain and palpitations  Gastrointestinal: Negative for abdominal pain and vomiting  Genitourinary: Negative for dysuria and hematuria  Musculoskeletal: Positive for arthralgias and back pain  Skin: Negative for color change and rash  Neurological: Negative for seizures and syncope  All other systems reviewed and are negative         Problem List:     Patient Active Problem List   Diagnosis   • Benign paroxysmal positional vertigo   • Carpal tunnel syndrome   • Chronic bilateral low back pain without sciatica   • Chronic pain disorder   • Physical deconditioning   • Prediabetes   • Spinal stenosis of lumbar region with radiculopathy   • Age-related incipient cataract of both eyes   • Trigger middle finger of left hand   • Neuropathy associated with cancer (HCC)   • Peripheral vascular disease (HCC)   • Complex regional pain syndrome type 1 affecting both hands   • Use of aromatase inhibitors   • Osteopenia of multiple sites   • History of left breast cancer   • Impaired mobility and ADLs   • Mixed hyperlipidemia   • Chronic midline thoracic back pain   • Gastroesophageal reflux disease without esophagitis   • Late onset Alzheimer's dementia without behavioral disturbance (HCC)   • BMI 38 0-38 9,adult   • Trigger middle finger of right hand   • Moderate episode of recurrent major depressive disorder [facility])      Past Medical and Surgical History:     Past Medical History:   Diagnosis Date   • Anxiety    • Arthritis    • Breast cancer ([facility] [address] ) 01/24/2018    left   • Cancer [facility])     breast   • Chronic pain disorder    • Contusion of rib on left side 2022   • Depression    • Depression    • Diabetes mellitus ([facility] [address] )    • Hepatic steatosis    • History of chemotherapy 2018   • Hypercholesterolemia    • Hypertension    • Irritable bowel syndrome    • Major depressive disorder, single episode 2013    Last Assessment & Plan:  Formatting of this note might be different from the original  - Pt on Trazodone - Will add Mirtazapine as low dose regimen  Not likely to risk serotonin syndrome   Discussed with pt and family over the phone  - No thoughts hurting self/others   • Migraine    • Mucopurulent chronic bronchitis ([facility] ) 2023   • Obesity    • Type 2 diabetes mellitus with hyperglycemia, without long-term current use of insulin ([facility] ) 3/22/2019     Past Surgical History:   Procedure Laterality Date   • BREAST BIOPSY Left 2011    benign   • BREAST BIOPSY Left     benign   • BREAST LUMPECTOMY Left 2018    malignant   • CARPAL TUNNEL RELEASE Right     right CTR   • CARPAL TUNNEL RELEASE Left 2018   •  SECTION     • CHOLECYSTECTOMY     • AL NDSC WRST SURG W/RLS TRANSVRS CARPL LIGM Left 2018    Procedure: ENDOSCOPIOC RELEASE VOLAR CARPAL LIGAMENT;  Surgeon: Trung Flores MD;  Location: [address] [place] [address] OR;  Service: Plastics   • AL TENDON SHEATH INCISION Left 2018    Procedure: RELEASE 3RD TRIGGER FINGER  ;  Surgeon: Trung Flores MD;  Location: [address] [place] [address];  Service: Plastics   • SENTINEL LYMPH NODE BIOPSY     • TRIGGER FINGER RELEASE Left 2018      Family History:     Family History   Problem Relation Age of Onset   • Heart disease Mother         cardiovascular disease   • Heart disease Father    • No Known Problems Sister    • No Known Problems Daughter    • No Known Problems Maternal Grandmother    • No Known Problems Maternal Grandfather    • No Known Problems Paternal Grandmother    • No Known Problems Paternal Grandfather       Social History: Social History     Socioeconomic History   • Marital status: /Civil Union     Spouse name: None   • Number of children: None   • Years of education: None   • Highest education level: None   Occupational History   • None   Tobacco Use   • Smoking status: Never     Passive exposure: Past   • Smokeless tobacco: Never   Substance and Sexual Activity   • Alcohol use: No   • Drug use: No   • Sexual activity: Yes     Partners: Male   Other Topics Concern   • None   Social History Narrative   • None     Social Determinants of Health     Financial Resource Strain: Low Risk  (6/6/2023)    Overall Financial Resource Strain (CARDIA)    • Difficulty of Paying Living Expenses: Not hard at all   Food Insecurity: Not on file   Transportation Needs: No Transportation Needs (6/6/2023)    PRAPARE - Transportation    • Lack of Transportation (Medical): No    • Lack of Transportation (Non-Medical):  No   Physical Activity: Not on file   Stress: Not on file   Social Connections: Not on file   Intimate Partner Violence: Not on file   Housing Stability: Not on file      Medications and Allergies:     Current Outpatient Medications   Medication Sig Dispense Refill   • mirtazapine (REMERON) 7 5 MG tablet Take 1 tablet (7 5 mg total) by mouth daily at bedtime Joyce 1 tableta en la noche para dormir 30 tablet 0   • pregabalin (LYRICA) 75 mg capsule Take 1 capsule (75 mg total) by mouth 2 (two) times a day Joyce 1 capsula dos veces al myla para dolor 60 capsule 0   • atorvastatin (LIPITOR) 40 mg tablet Take 1 tablet (40 mg total) by mouth daily at bedtime 90 tablet 3   • Calcium Carbonate (Calcium 600) 1500 (600 Ca) MG TABS Take 1 tablet (600 mg Total) by mouth 2(two) times a day with a meal 60 tablet 11   • ergocalciferol (VITAMIN D2) 50,000 units Take 1 capsule (50,000 Units total) by mouth once a week 4 capsule 5   • exemestane (AROMASIN) 25 MG tablet Take 1 tablet (25 mg total) by mouth daily 90 tablet 3   • ezetimibe (ZETIA) 10 mg tablet Take 1 tablet (10 mg total) by mouth daily 30 tablet 5   • omeprazole (PriLOSEC) 20 mg delayed release capsule TAKE 1 CAPSULE (20 MG TOTAL) BY MOUTH DAILY BEFORE BREAKFAST 30 capsule 5     No current facility-administered medications for this visit  Allergies   Allergen Reactions   • Chocolate - Food Allergy    • Chocolate Flavor - Food Allergy    • Ibuprofen Swelling   • Pork-Derived Products - Food Allergy       Immunizations:     Immunization History   Administered Date(s) Administered   • COVID-19 MODERNA VACC 0 5 ML IM 03/23/2021, 04/22/2021   • INFLUENZA 01/20/2014, 01/07/2016, 01/07/2016, 09/19/2016, 09/19/2016, 02/14/2017, 10/02/2017   • Influenza Split High Dose Preservative Free IM 02/14/2017, 10/02/2017   • Influenza, high dose seasonal 0 7 mL 10/03/2018, 09/30/2019, 10/06/2020, 10/26/2021   • Pneumococcal Conjugate 13-Valent 02/14/2017   • Pneumococcal Polysaccharide PPV23 09/19/2016   • Zoster Vaccine Recombinant 08/08/2022      Health Maintenance:         Topic Date Due   • Breast Cancer Screening: Mammogram  12/22/2023   • DXA SCAN  11/28/2024   • Hepatitis C Screening  Completed         Topic Date Due   • COVID-19 Vaccine (3 - Moderna series) 06/17/2021   • Influenza Vaccine (Season Ended) 09/01/2023      Medicare Screening Tests and Risk Assessments:     Durga Arreguin is here for her Subsequent Wellness visit  Last Medicare Wellness visit information reviewed, patient interviewed and updates made to the record today  Health Risk Assessment:   Patient rates overall health as fair  Patient feels that their physical health rating is slightly worse  Patient is satisfied with their life  Eyesight was rated as slightly worse  Hearing was rated as same  Patient feels that their emotional and mental health rating is slightly worse  Patients states they are never, rarely angry  Patient states they are often unusually tired/fatigued  Pain experienced in the last 7 days has been a lot   Patient's pain rating has been 4/10  Patient states that she has experienced no weight loss or gain in last 6 months  Depression Screening:   PHQ-9 Score: 11      Fall Risk Screening: In the past year, patient has experienced: history of falling in past year    Number of falls: 2 or more  Injured during fall?: Yes    Feels unsteady when standing or walking?: Yes    Worried about falling?: Yes      Urinary Incontinence Screening:   Patient has not leaked urine accidently in the last six months  Home Safety:  Patient has trouble with stairs inside or outside of their home  Patient has working smoke alarms and has working carbon monoxide detector  Home safety hazards include: none  Nutrition:   Current diet is Regular  Medications:   Patient is currently taking over-the-counter supplements  OTC medications include: see medication list  Patient is able to manage medications  Activities of Daily Living (ADLs)/Instrumental Activities of Daily Living (IADLs):   Walk and transfer into and out of bed and chair?: Yes  Dress and groom yourself?: Yes    Bathe or shower yourself?: Yes    Feed yourself?  Yes  Do your laundry/housekeeping?: Yes  Manage your money, pay your bills and track your expenses?: No  Make your own meals?: Yes    Do your own shopping?: No    Previous Hospitalizations:   Any hospitalizations or ED visits within the last 12 months?: Yes    How many hospitalizations have you had in the last year?: 1-2    Advance Care Planning:   Living will: No    Durable POA for healthcare: No    Advanced directive: No    Advanced directive counseling given: Yes    Five wishes given: Yes    Patient declined ACP directive: No    End of Life Decisions reviewed with patient: Yes    Provider agrees with end of life decisions: Yes      Cognitive Screening:   Provider or family/friend/caregiver concerned regarding cognition?: No    PREVENTIVE SCREENINGS      Cardiovascular Screening:    General: Screening Not Indicated and "History Lipid Disorder      Diabetes Screening:       Due for: Blood Glucose      Colorectal Cancer Screening:     General: Screening Not Indicated      Breast Cancer Screening:     General: History Breast Cancer      Cervical Cancer Screening:    General: Screening Not Indicated      Osteoporosis Screening:    General: Screening Current      Abdominal Aortic Aneurysm (AAA) Screening:        General: Screening Not Indicated      Lung Cancer Screening:     General: Screening Not Indicated      Hepatitis C Screening:    General: Screening Current    Screening, Brief Intervention, and Referral to Treatment (SBIRT)      Brief Intervention  Alcohol & drug use screenings were reviewed  No concerns regarding substance use disorder identified  Other Counseling Topics:   Calcium and vitamin D intake and regular weightbearing exercise  No results found  Physical Exam:     /70 (BP Location: Left arm, Patient Position: Sitting, Cuff Size: Standard)   Pulse 92   Temp 97 8 °F (36 6 °C) (Tympanic)   Resp 16   Ht 4' 9\" (1 448 m)   Wt 83 kg (183 lb)   SpO2 98%   BMI 39 60 kg/m²     Physical Exam  Vitals and nursing note reviewed  Constitutional:       General: She is not in acute distress  Appearance: She is well-developed  She is not ill-appearing  HENT:      Head: Normocephalic and atraumatic  Eyes:      Extraocular Movements: Extraocular movements intact  Conjunctiva/sclera: Conjunctivae normal       Pupils: Pupils are equal, round, and reactive to light  Cardiovascular:      Rate and Rhythm: Normal rate and regular rhythm  Heart sounds: No murmur heard  Pulmonary:      Effort: Pulmonary effort is normal  No respiratory distress  Breath sounds: Normal breath sounds  Abdominal:      Palpations: Abdomen is soft  Tenderness: There is no abdominal tenderness  Comments: Belching and eructation during exam   Musculoskeletal:         General: No swelling   Normal range of " motion  Cervical back: Neck supple  Comments: [place] and fabio's nodes b/l   Positive Tinel's test b/l    Skin:     General: Skin is warm and dry  Capillary Refill: Capillary refill takes less than 2 seconds  Neurological:      Mental Status: She is alert     Psychiatric:      Comments: Sighing and loudly expressive pain throughout visit          Janee Morrissey PA-C

## 2023-06-06 NOTE — PATIENT INSTRUCTIONS
Medicare Preventive Visit Patient Instructions  Thank you for completing your Welcome to Medicare Visit or Medicare Annual Wellness Visit today  Your next wellness visit will be due in one year (6/6/2024)  The screening/preventive services that you may require over the next 5-10 years are detailed below  Some tests may not apply to you based off risk factors and/or age  Screening tests ordered at today's visit but not completed yet may show as past due  Also, please note that scanned in results may not display below  Preventive Screenings:  Service Recommendations Previous Testing/Comments   Colorectal Cancer Screening  * Colonoscopy    * Fecal Occult Blood Test (FOBT)/Fecal Immunochemical Test (FIT)  * Fecal DNA/Cologuard Test  * Flexible Sigmoidoscopy Age: 39-70 years old   Colonoscopy: every 10 years (may be performed more frequently if at higher risk)  OR  FOBT/FIT: every 1 year  OR  Cologuard: every 3 years  OR  Sigmoidoscopy: every 5 years  Screening may be recommended earlier than age 39 if at higher risk for colorectal cancer  Also, an individualized decision between you and your healthcare provider will decide whether screening between the ages of 74-80 would be appropriate  Colonoscopy: Not on file  FOBT/FIT: Not on file  Cologuard: Not on file  Sigmoidoscopy: Not on file          Breast Cancer Screening Age: 36 years old  Frequency: every 1-2 years  Not required if history of left and right mastectomy Mammogram: 12/22/2022    History Breast Cancer   Cervical Cancer Screening Between the ages of 21-29, pap smear recommended once every 3 years  Between the ages of 33-67, can perform pap smear with HPV co-testing every 5 years     Recommendations may differ for women with a history of total hysterectomy, cervical cancer, or abnormal pap smears in past  Pap Smear: Not on file    Screening Not Indicated   Hepatitis C Screening Once for adults born between 1945 and 1965  More frequently in patients at high risk for Hepatitis C Hep C Antibody: 08/03/2021    Screening Current   Diabetes Screening 1-2 times per year if you're at risk for diabetes or have pre-diabetes Fasting glucose: 94 mg/dL (5/26/2022)  A1C: 5 8 % (11/8/2021)      Cholesterol Screening Once every 5 years if you don't have a lipid disorder  May order more often based on risk factors  Lipid panel: 05/26/2022    Screening Not Indicated  History Lipid Disorder     Other Preventive Screenings Covered by Medicare:  1  Abdominal Aortic Aneurysm (AAA) Screening: covered once if your at risk  You're considered to be at risk if you have a family history of AAA  2  Lung Cancer Screening: covers low dose CT scan once per year if you meet all of the following conditions: (1) Age 50-69; (2) No signs or symptoms of lung cancer; (3) Current smoker or have quit smoking within the last 15 years; (4) You have a tobacco smoking history of at least 20 pack years (packs per day multiplied by number of years you smoked); (5) You get a written order from a healthcare provider  3  Glaucoma Screening: covered annually if you're considered high risk: (1) You have diabetes OR (2) Family history of glaucoma OR (3)  aged 48 and older OR (3)  American aged 72 and older  3  Osteoporosis Screening: covered every 2 years if you meet one of the following conditions: (1) You're estrogen deficient and at risk for osteoporosis based off medical history and other findings; (2) Have a vertebral abnormality; (3) On glucocorticoid therapy for more than 3 months; (4) Have primary hyperparathyroidism; (5) On osteoporosis medications and need to assess response to drug therapy  · Last bone density test (DXA Scan): 11/28/2022   5  HIV Screening: covered annually if you're between the age of 15-65  Also covered annually if you are younger than 13 and older than 72 with risk factors for HIV infection   For pregnant patients, it is covered up to 3 times per pregnancy  Immunizations:  Immunization Recommendations   Influenza Vaccine Annual influenza vaccination during flu season is recommended for all persons aged >= 6 months who do not have contraindications   Pneumococcal Vaccine   * Pneumococcal conjugate vaccine = PCV13 (Prevnar 13), PCV15 (Vaxneuvance), PCV20 (Prevnar 20)  * Pneumococcal polysaccharide vaccine = PPSV23 (Pneumovax) Adults 25-60 years old: 1-3 doses may be recommended based on certain risk factors  Adults 72 years old: 1-2 doses may be recommended based off what pneumonia vaccine you previously received   Hepatitis B Vaccine 3 dose series if at intermediate or high risk (ex: diabetes, end stage renal disease, liver disease)   Tetanus (Td) Vaccine - COST NOT COVERED BY MEDICARE PART B Following completion of primary series, a booster dose should be given every 10 years to maintain immunity against tetanus  Td may also be given as tetanus wound prophylaxis  Tdap Vaccine - COST NOT COVERED BY MEDICARE PART B Recommended at least once for all adults  For pregnant patients, recommended with each pregnancy  Shingles Vaccine (Shingrix) - COST NOT COVERED BY MEDICARE PART B  2 shot series recommended in those aged 48 and above     Health Maintenance Due:      Topic Date Due   • Breast Cancer Screening: Mammogram  12/22/2023   • DXA SCAN  11/28/2024   • Hepatitis C Screening  Completed     Immunizations Due:      Topic Date Due   • COVID-19 Vaccine (3 - Moderna series) 06/17/2021   • Influenza Vaccine (Season Ended) 09/01/2023     Advance Directives   What are advance directives? Advance directives are legal documents that state your wishes and plans for medical care  These plans are made ahead of time in case you lose your ability to make decisions for yourself  Advance directives can apply to any medical decision, such as the treatments you want, and if you want to donate organs  What are the types of advance directives?   There are many types of advance directives, and each state has rules about how to use them  You may choose a combination of any of the following:  · Living will: This is a written record of the treatment you want  You can also choose which treatments you do not want, which to limit, and which to stop at a certain time  This includes surgery, medicine, IV fluid, and tube feedings  · Durable power of  for healthcare [facility]): This is a written record that states who you want to make healthcare choices for you when you are unable to make them for yourself  This person, called a proxy, is usually a family member or a friend  You may choose more than 1 proxy  · Do not resuscitate (DNR) order:  A DNR order is used in case your heart stops beating or you stop breathing  It is a request not to have certain forms of treatment, such as CPR  A DNR order may be included in other types of advance directives  · Medical directive: This covers the care that you want if you are in a coma, near death, or unable to make decisions for yourself  You can list the treatments you want for each condition  Treatment may include pain medicine, surgery, blood transfusions, dialysis, IV or tube feedings, and a ventilator (breathing machine)  · Values history: This document has questions about your views, beliefs, and how you feel and think about life  This information can help others choose the care that you would choose  Why are advance directives important? An advance directive helps you control your care  Although spoken wishes may be used, it is better to have your wishes written down  Spoken wishes can be misunderstood, or not followed  Treatments may be given even if you do not want them  An advance directive may make it easier for your family to make difficult choices about your care  Fall Prevention    Fall prevention  includes ways to make your home and other areas safer  It also includes ways you can move more carefully to prevent a fall  Health conditions that cause changes in your blood pressure, vision, or muscle strength and coordination may increase your risk for falls  Medicines may also increase your risk for falls if they make you dizzy, weak, or sleepy  Fall prevention tips:   · Stand or sit up slowly  · Use assistive devices as directed  · Wear shoes that fit well and have soles that   · Wear a personal alarm  · Stay active  · Manage your medical conditions  Home Safety Tips:  · Add items to prevent falls in the bathroom  · Keep paths clear  · Install bright lights in your home  · Keep items you use often on shelves within reach  · Paint or place reflective tape on the edges of your stairs  Weight Management   Why it is important to manage your weight:  Being overweight increases your risk of health conditions such as heart disease, high blood pressure, type 2 diabetes, and certain types of cancer  It can also increase your risk for osteoarthritis, sleep apnea, and other respiratory problems  Aim for a slow, steady weight loss  Even a small amount of weight loss can lower your risk of health problems  How to lose weight safely:  A safe and healthy way to lose weight is to eat fewer calories and get regular exercise  You can lose up about 1 pound a week by decreasing the number of calories you eat by 500 calories each day  Healthy meal plan for weight management:  A healthy meal plan includes a variety of foods, contains fewer calories, and helps you stay healthy  A healthy meal plan includes the following:  · Eat whole-grain foods more often  A healthy meal plan should contain fiber  Fiber is the part of grains, fruits, and vegetables that is not broken down by your body  Whole-grain foods are healthy and provide extra fiber in your diet  Some examples of whole-grain foods are whole-wheat breads and pastas, oatmeal, brown rice, and bulgur  · Eat a variety of vegetables every day    Include dark, leafy greens such as spinach, kale, driss greens, and mustard greens  Eat yellow and orange vegetables such as carrots, sweet potatoes, and winter squash  · Eat a variety of fruits every day  Choose fresh or canned fruit (canned in its own juice or light syrup) instead of juice  Fruit juice has very little or no fiber  · Eat low-fat dairy foods  Drink fat-free (skim) milk or 1% milk  Eat fat-free yogurt and low-fat cottage cheese  Try low-fat cheeses such as mozzarella and other reduced-fat cheeses  · Choose meat and other protein foods that are low in fat  Choose beans or other legumes such as split peas or lentils  Choose fish, skinless poultry (chicken or turkey), or lean cuts of red meat (beef or pork)  Before you cook meat or poultry, cut off any visible fat  · Use less fat and oil  Try baking foods instead of frying them  Add less fat, such as margarine, sour cream, regular salad dressing and mayonnaise to foods  Eat fewer high-fat foods  Some examples of high-fat foods include french fries, doughnuts, ice cream, and cakes  · Eat fewer sweets  Limit foods and drinks that are high in sugar  This includes candy, cookies, regular soda, and sweetened drinks  Exercise:  Exercise at least 30 minutes per day on most days of the week  Some examples of exercise include walking, biking, dancing, and swimming  You can also fit in more physical activity by taking the stairs instead of the elevator or parking farther away from stores  Ask your healthcare provider about the best exercise plan for you  © Copyright [employer] 2018 Information is for End User's use only and may not be sold, redistributed or otherwise used for commercial purposes   All illustrations and images included in CareNotes® are the copyrighted property of [employer]  or [address] [place] [address]

## 2023-06-08 PROBLEM — J41.1 MUCOPURULENT CHRONIC BRONCHITIS (HCC): Status: RESOLVED | Noted: 2023-04-12 | Resolved: 2023-06-08

## 2023-06-08 NOTE — ASSESSMENT & PLAN NOTE
Lab Results   Component Value Date    CHOLESTEROL 305 (H) 05/26/2022    CHOLESTEROL 176 11/08/2021    CHOLESTEROL 290 (H) 04/19/2021     Lab Results   Component Value Date    HDL 44 (L) 05/26/2022    HDL 42 11/08/2021    HDL 38 (L) 04/19/2021     Lab Results   Component Value Date    TRIG 308 (H) 05/26/2022    TRIG 163 (H) 11/08/2021    TRIG 466 (H) 04/19/2021     Lab Results   Component Value Date    NONHDLC 261 05/26/2022    [place] 134 11/08/2021    [place] 252 04/19/2021     Lab Results   Component Value Date    LDLCALC 199 (H) 05/26/2022     Continue atorvastatin 40mg and ezetimibe 10mg  Discontinue fenofibrate and vascepa due to signfiicant belching and polypharmacy  Patient has bubble pack with pharmacy, compliant with medication

## 2023-06-08 NOTE — ASSESSMENT & PLAN NOTE
Follow-up oncology, continue exemestane with hx of L breast CA in 2018 s/p lumpectomy and radiation  Missed previous appointments with onc, unclear if needs to continue exemestane, discussed importance of follow-up

## 2023-06-08 NOTE — ASSESSMENT & PLAN NOTE
History of b/l carpal tunnel release with persistent b/l hand pain R>L  Failed celebrex and gabapentin due to lack of improvement  Will trial Lyrica 75mg BID  Follow-up in 2-3 weeks

## 2023-06-08 NOTE — ASSESSMENT & PLAN NOTE
Last DEXA in 11/2022 with t-score -1 4  Continue vitamin D and calcium supplement, check vitamin D next lab due to high dose once weekly without clear indication

## 2023-06-08 NOTE — ASSESSMENT & PLAN NOTE
Difficulty sleeping at night and poor appetite, will start mirtazapine at night  Did not fill amitriptyline per pharmacy due to lack of insurance coverage

## 2023-06-08 NOTE — ASSESSMENT & PLAN NOTE
No recent back imaging since 2017, chronic pain, failed celebrex and gabapentin  Will trial Lyrica 75mg BID  Recommend stretching, exercise

## 2023-06-12 DIAGNOSIS — G56.03 BILATERAL CARPAL TUNNEL SYNDROME: Primary | ICD-10-CM

## 2023-06-12 RX ORDER — GABAPENTIN 300 MG/1
300 CAPSULE ORAL 2 TIMES DAILY
Qty: 60 CAPSULE | Refills: 5 | Status: SHIPPED | OUTPATIENT
Start: 2023-06-12

## 2023-06-26 ENCOUNTER — APPOINTMENT (OUTPATIENT)
Dept: LAB | Facility: HOSPITAL | Age: 78
End: 2023-06-26
Payer: MEDICARE

## 2023-06-26 DIAGNOSIS — M54.50 CHRONIC BILATERAL LOW BACK PAIN WITHOUT SCIATICA: ICD-10-CM

## 2023-06-26 DIAGNOSIS — G89.29 CHRONIC BILATERAL LOW BACK PAIN WITHOUT SCIATICA: ICD-10-CM

## 2023-06-26 DIAGNOSIS — Z17.0 MALIGNANT NEOPLASM OF LOWER-INNER QUADRANT OF LEFT BREAST IN FEMALE, ESTROGEN RECEPTOR POSITIVE (HCC): ICD-10-CM

## 2023-06-26 DIAGNOSIS — C50.312 MALIGNANT NEOPLASM OF LOWER-INNER QUADRANT OF LEFT BREAST IN FEMALE, ESTROGEN RECEPTOR POSITIVE (HCC): ICD-10-CM

## 2023-06-26 LAB
25(OH)D3 SERPL-MCNC: 26.4 NG/ML (ref 30–100)
ALBUMIN SERPL BCP-MCNC: 3.8 G/DL (ref 3.5–5)
ALP SERPL-CCNC: 60 U/L (ref 34–104)
ALT SERPL W P-5'-P-CCNC: 14 U/L (ref 7–52)
ANION GAP SERPL CALCULATED.3IONS-SCNC: 8 MMOL/L
AST SERPL W P-5'-P-CCNC: 21 U/L (ref 13–39)
BASOPHILS # BLD AUTO: 0.02 THOUSANDS/ÂΜL (ref 0–0.1)
BASOPHILS NFR BLD AUTO: 0 % (ref 0–1)
BILIRUB SERPL-MCNC: 0.38 MG/DL (ref 0.2–1)
BUN SERPL-MCNC: 15 MG/DL (ref 5–25)
CALCIUM SERPL-MCNC: 9.4 MG/DL (ref 8.4–10.2)
CHLORIDE SERPL-SCNC: 109 MMOL/L (ref 96–108)
CHOLEST SERPL-MCNC: 185 MG/DL
CO2 SERPL-SCNC: 26 MMOL/L (ref 21–32)
CREAT SERPL-MCNC: 0.69 MG/DL (ref 0.6–1.3)
EOSINOPHIL # BLD AUTO: 0.17 THOUSAND/ÂΜL (ref 0–0.61)
EOSINOPHIL NFR BLD AUTO: 3 % (ref 0–6)
ERYTHROCYTE [DISTWIDTH] IN BLOOD BY AUTOMATED COUNT: 14.6 % (ref 11.6–15.1)
GFR SERPL CREATININE-BSD FRML MDRD: 83 ML/MIN/1.73SQ M
GLUCOSE P FAST SERPL-MCNC: 97 MG/DL (ref 65–99)
HCT VFR BLD AUTO: 34.9 % (ref 34.8–46.1)
HDLC SERPL-MCNC: 39 MG/DL
HGB BLD-MCNC: 11.2 G/DL (ref 11.5–15.4)
IMM GRANULOCYTES # BLD AUTO: 0.01 THOUSAND/UL (ref 0–0.2)
IMM GRANULOCYTES NFR BLD AUTO: 0 % (ref 0–2)
LDLC SERPL CALC-MCNC: 93 MG/DL (ref 0–100)
LYMPHOCYTES # BLD AUTO: 2.44 THOUSANDS/ÂΜL (ref 0.6–4.47)
LYMPHOCYTES NFR BLD AUTO: 43 % (ref 14–44)
MAGNESIUM SERPL-MCNC: 2 MG/DL (ref 1.9–2.7)
MCH RBC QN AUTO: 28.6 PG (ref 26.8–34.3)
MCHC RBC AUTO-ENTMCNC: 32.1 G/DL (ref 31.4–37.4)
MCV RBC AUTO: 89 FL (ref 82–98)
MONOCYTES # BLD AUTO: 0.77 THOUSAND/ÂΜL (ref 0.17–1.22)
MONOCYTES NFR BLD AUTO: 13 % (ref 4–12)
NEUTROPHILS # BLD AUTO: 2.36 THOUSANDS/ÂΜL (ref 1.85–7.62)
NEUTS SEG NFR BLD AUTO: 41 % (ref 43–75)
NONHDLC SERPL-MCNC: 146 MG/DL
NRBC BLD AUTO-RTO: 0 /100 WBCS
PLATELET # BLD AUTO: 231 THOUSANDS/UL (ref 149–390)
PMV BLD AUTO: 10.2 FL (ref 8.9–12.7)
POTASSIUM SERPL-SCNC: 3.9 MMOL/L (ref 3.5–5.3)
PROT SERPL-MCNC: 6.5 G/DL (ref 6.4–8.4)
RBC # BLD AUTO: 3.91 MILLION/UL (ref 3.81–5.12)
SODIUM SERPL-SCNC: 143 MMOL/L (ref 135–147)
TRIGL SERPL-MCNC: 265 MG/DL
WBC # BLD AUTO: 5.77 THOUSAND/UL (ref 4.31–10.16)

## 2023-06-26 PROCEDURE — 82306 VITAMIN D 25 HYDROXY: CPT

## 2023-06-26 PROCEDURE — 85025 COMPLETE CBC W/AUTO DIFF WBC: CPT

## 2023-06-26 PROCEDURE — 80061 LIPID PANEL: CPT

## 2023-06-26 PROCEDURE — 36415 COLL VENOUS BLD VENIPUNCTURE: CPT

## 2023-06-26 PROCEDURE — 80053 COMPREHEN METABOLIC PANEL: CPT

## 2023-06-26 PROCEDURE — 83735 ASSAY OF MAGNESIUM: CPT

## 2023-06-30 ENCOUNTER — OFFICE VISIT (OUTPATIENT)
Dept: FAMILY MEDICINE CLINIC | Facility: CLINIC | Age: 78
End: 2023-06-30
Payer: MEDICARE

## 2023-06-30 VITALS
BODY MASS INDEX: 40.3 KG/M2 | WEIGHT: 186.8 LBS | OXYGEN SATURATION: 96 % | RESPIRATION RATE: 17 BRPM | HEIGHT: 57 IN | DIASTOLIC BLOOD PRESSURE: 70 MMHG | SYSTOLIC BLOOD PRESSURE: 118 MMHG | HEART RATE: 60 BPM | TEMPERATURE: 99.5 F

## 2023-06-30 DIAGNOSIS — G89.29 CHRONIC BILATERAL LOW BACK PAIN WITHOUT SCIATICA: ICD-10-CM

## 2023-06-30 DIAGNOSIS — R42 DIZZINESS: ICD-10-CM

## 2023-06-30 DIAGNOSIS — R63.0 POOR APPETITE: ICD-10-CM

## 2023-06-30 DIAGNOSIS — E78.2 MIXED HYPERLIPIDEMIA: ICD-10-CM

## 2023-06-30 DIAGNOSIS — R63.5 WEIGHT GAIN: ICD-10-CM

## 2023-06-30 DIAGNOSIS — M54.50 CHRONIC BILATERAL LOW BACK PAIN WITHOUT SCIATICA: ICD-10-CM

## 2023-06-30 DIAGNOSIS — R30.0 DYSURIA: Primary | ICD-10-CM

## 2023-06-30 DIAGNOSIS — F33.1 MODERATE EPISODE OF RECURRENT MAJOR DEPRESSIVE DISORDER (HCC): ICD-10-CM

## 2023-06-30 DIAGNOSIS — H91.93 BILATERAL HEARING LOSS, UNSPECIFIED HEARING LOSS TYPE: ICD-10-CM

## 2023-06-30 DIAGNOSIS — M65.331 TRIGGER MIDDLE FINGER OF RIGHT HAND: ICD-10-CM

## 2023-06-30 LAB
BACTERIA UR QL AUTO: ABNORMAL /HPF
BILIRUB UR QL STRIP: NEGATIVE
CLARITY UR: CLEAR
COLOR UR: COLORLESS
GLUCOSE UR STRIP-MCNC: NEGATIVE MG/DL
HGB UR QL STRIP.AUTO: NEGATIVE
KETONES UR STRIP-MCNC: NEGATIVE MG/DL
LEUKOCYTE ESTERASE UR QL STRIP: ABNORMAL
NITRITE UR QL STRIP: NEGATIVE
NON-SQ EPI CELLS URNS QL MICRO: ABNORMAL /HPF
PH UR STRIP.AUTO: 6.5 [PH]
PROT UR STRIP-MCNC: NEGATIVE MG/DL
RBC #/AREA URNS AUTO: ABNORMAL /HPF
SL AMB  POCT GLUCOSE, UA: ABNORMAL
SL AMB LEUKOCYTE ESTERASE,UA: ABNORMAL
SL AMB POCT BILIRUBIN,UA: ABNORMAL
SL AMB POCT BLOOD,UA: ABNORMAL
SL AMB POCT CLARITY,UA: CLEAR
SL AMB POCT COLOR,UA: YELLOW
SL AMB POCT KETONES,UA: ABNORMAL
SL AMB POCT NITRITE,UA: ABNORMAL
SL AMB POCT PH,UA: 6
SL AMB POCT SPECIFIC GRAVITY,UA: 1.01
SL AMB POCT URINE PROTEIN: ABNORMAL
SL AMB POCT UROBILINOGEN: NORMAL
SP GR UR STRIP.AUTO: 1.01 (ref 1–1.03)
UROBILINOGEN UR STRIP-ACNC: <2 MG/DL
WBC #/AREA URNS AUTO: ABNORMAL /HPF

## 2023-06-30 PROCEDURE — 81001 URINALYSIS AUTO W/SCOPE: CPT | Performed by: PHYSICIAN ASSISTANT

## 2023-06-30 PROCEDURE — 87086 URINE CULTURE/COLONY COUNT: CPT | Performed by: PHYSICIAN ASSISTANT

## 2023-06-30 PROCEDURE — 87186 SC STD MICRODIL/AGAR DIL: CPT | Performed by: PHYSICIAN ASSISTANT

## 2023-06-30 PROCEDURE — 87077 CULTURE AEROBIC IDENTIFY: CPT | Performed by: PHYSICIAN ASSISTANT

## 2023-06-30 RX ORDER — NITROFURANTOIN 25; 75 MG/1; MG/1
100 CAPSULE ORAL 2 TIMES DAILY
Qty: 10 CAPSULE | Refills: 0 | Status: SHIPPED | OUTPATIENT
Start: 2023-06-30 | End: 2023-07-05

## 2023-06-30 RX ORDER — ICOSAPENT ETHYL 1000 MG/1
CAPSULE ORAL
COMMUNITY
Start: 2023-06-30

## 2023-06-30 NOTE — ASSESSMENT & PLAN NOTE
History of right hand carpal tunnel release in the past   Also with right third digit trigger finger  Recommend return to ortho hand specialist for evaluation

## 2023-06-30 NOTE — ASSESSMENT & PLAN NOTE
Unclear cause, started on mirtazapine last visit, no improvement with appetite but improved sleep, consider GI referral due to excessive eructation

## 2023-06-30 NOTE — ASSESSMENT & PLAN NOTE
3 pound weight gain since last visit  No signs of fluid overload  No cough, shortness of breath, leg swelling  Patient not sure why she is gaining weight as she does not eat much since she has poor appetite but her daughter has been giving her fruit smoothies because of her poor appetite  Recommend balanced diet and exercise       Lab Results   Component Value Date    SODIUM 143 06/26/2023    K 3 9 06/26/2023     (H) 06/26/2023    CO2 26 06/26/2023    AGAP 8 06/26/2023    BUN 15 06/26/2023    CREATININE 0 69 06/26/2023    GLUC 78 04/19/2021    GLUF 97 06/26/2023    CALCIUM 9 4 06/26/2023    AST 21 06/26/2023    ALT 14 06/26/2023    ALKPHOS 60 06/26/2023    TP 6 5 06/26/2023    TBILI 0 38 06/26/2023    EGFR 83 06/26/2023     Lab Results   Component Value Date    [id number] 1 810 11/08/2021     Lab Results   Component Value Date    WBC 5 77 06/26/2023    HGB 11 2 (L) 06/26/2023    HCT 34 9 06/26/2023    MCV 89 06/26/2023     06/26/2023

## 2023-06-30 NOTE — ASSESSMENT & PLAN NOTE
R side back pain x with radiation down R side  No improvement with Lyrica  No recent imaging since 2017, check low back x-ray  Chronic pain with recent worsening failed Celebrex and Gabapentin but was switched back to gabapentin by Dr León Platt after failing Lyrica since last visit on 6/8/2023  Recommend stretching, exercise, topical heat, Tylenol as needed  Discussed with Dr León Platt, follow-up with primary PCP due to complicated history of pain  Falls Plan of Care: Balance, strength, and gait training instructions were provided  Ambulating with cane

## 2023-06-30 NOTE — PROGRESS NOTES
Name: Gloria Rivera      : 1945      MRN: [id number]  Encounter Provider: Anca Coto PA-C  Encounter Date: 2023   Encounter department[facility]   [facility]   Difficult historian and complicated history, tiger text to primary PCP Dr Yaima Nagy for better management and follow-up with chronic back pain  Discussed reasons to proceed to ER and follow-up after UA results  1  Dysuria  Comments:  X4 days, with positive leuks on dip, start Macrobid x5 days empirically and send for culture, increase fluid intake  Orders:  -     POCT urine dip  -     UA w Reflex to Microscopic w Reflex to Culture - Clinic Collect  -     nitrofurantoin (MACROBID) 100 mg capsule; Take 1 capsule (100 mg total) by mouth 2 (two) times a day for 5 days Joyce 1 capsula dos veces al myla x 5 days    2  Dizziness  Comments:  suspect due to acute UTI, with sudden standing, discussed fall risk, discussed reasons to proceed to ER    3  Chronic bilateral low back pain without sciatica  Assessment & Plan:  R side back pain x with radiation down R side  No improvement with Lyrica  No recent imaging since 2017, check low back x-ray  Chronic pain with recent worsening failed Celebrex and Gabapentin but was switched back to gabapentin by Dr Yaima Nagy after failing Lyrica since last visit on 2023  Recommend stretching, exercise, topical heat, Tylenol as needed  Discussed with Dr Yaima Nagy, follow-up with primary PCP due to complicated history of pain  Falls Plan of Care: Balance, strength, and gait training instructions were provided  Ambulating with cane  Orders:  -     XR spine lumbar minimum 4 views non injury; Future; Expected date: 2023    4  Bilateral hearing loss, unspecified hearing loss type  Comments:  Referred to ENT  Orders:  -     Ambulatory Referral to Otolaryngology; Future    5   Poor appetite  Assessment & Plan:  Unclear cause, started on mirtazapine last visit, no improvement with appetite but improved sleep, consider GI referral due to excessive eructation  6  Trigger middle finger of right hand  Assessment & Plan:  History of right hand carpal tunnel release in the past   Also with right third digit trigger finger  Recommend return to ortho hand specialist for evaluation  Orders:  -     Ambulatory Referral to Hand Surgery; Future    7  Weight gain  Comments:  10 pound weight gain in the past 2 months, suspect due to deconditioning and nutritional supplementation given by daughter    8  Moderate episode of recurrent major depressive disorder (HCC)  Assessment & Plan:  Improved sleeping at night with mirtazapine 7 5 mg, still with poor appetite  Continue same  9  BMI 40 0-44 9, adult [facility])  Assessment & Plan:  3 pound weight gain since last visit  No signs of fluid overload  No cough, shortness of breath, leg swelling  Patient not sure why she is gaining weight as she does not eat much since she has poor appetite but her daughter has been giving her fruit smoothies because of her poor appetite  Recommend balanced diet and exercise  Lab Results   Component Value Date    SODIUM 143 06/26/2023    K 3 9 06/26/2023     (H) 06/26/2023    CO2 26 06/26/2023    AGAP 8 06/26/2023    BUN 15 06/26/2023    CREATININE 0 69 06/26/2023    GLUC 78 04/19/2021    GLUF 97 06/26/2023    CALCIUM 9 4 06/26/2023    AST 21 06/26/2023    ALT 14 06/26/2023    ALKPHOS 60 06/26/2023    TP 6 5 06/26/2023    TBILI 0 38 06/26/2023    EGFR 83 06/26/2023     Lab Results   Component Value Date    VGQ8OGMKERBZ 1 810 11/08/2021     Lab Results   Component Value Date    WBC 5 77 06/26/2023    HGB 11 2 (L) 06/26/2023    HCT 34 9 06/26/2023    MCV 89 06/26/2023     06/26/2023           10   Mixed hyperlipidemia  Assessment & Plan:  Lab Results   Component Value Date    CHOLESTEROL 185 06/26/2023    CHOLESTEROL 305 (H) 05/26/2022    CHOLESTEROL 176 11/08/2021 "    Lab Results   Component Value Date    HDL 39 (L) 06/26/2023    HDL 44 (L) 05/26/2022    HDL 42 11/08/2021     Lab Results   Component Value Date    TRIG 265 (H) 06/26/2023    TRIG 308 (H) 05/26/2022    TRIG 163 (H) 11/08/2021     Lab Results   Component Value Date    NONHDLC 146 06/26/2023    [place] 261 05/26/2022    NONHDLC 134 11/08/2021     Lab Results   Component Value Date    LDLCALC 93 06/26/2023     Improved LDL since labs in 5/2022  Still with high triglycerides  Suspect Vascepa causing significant belching  Continue atorvastatin and ezetimibe 10 mg  Discontinue fenofibrate last visit  Reviewed 19 9% ASCVD risk or  Hola Norris is a 76 y o  female with a h/o chronic pain, HLD, L breast cancer in remission, osteopenia with hx of carpal tunnel surgery who presents with R hand pain and belching/dizziness x weeks/months/years with recent worsening in the past 4 days with burning with urination  Patient is a poor historian and main complaint is pain and discomfort, she repeatedly asks for a \"strong pain medication\" which she had oxycodone before and provided significant relief  R hand sometimes has fingers stuck  No saddle anesthesia, bowel or bladder incontinence  She is sleeping better with the mirtazapine and may be sleeping too much at times  She had a fall onto her chair last week without any injury  She does not have good appetite and her daughter has been giving her strawberry smoothies, and she does not know why she is gaining weight because she is not eating much  She has passive smoke exposure in the home but no personal history of smoking or ETOH use  No SI  She feels excessively tired and does not know why  Very difficult historian, with history of cognitive decline and concern primarily about pain  History was conducted in Taiwanese without the use of         Review of Systems   Constitutional: Positive for fatigue and unexpected weight change (10 " "pound weight gain in 2 months)  Negative for chills and fever  Respiratory: Negative for cough and shortness of breath  Cardiovascular: Negative for chest pain, palpitations and leg swelling  Genitourinary: Positive for difficulty urinating, dysuria and frequency  Negative for flank pain, hematuria, menstrual problem, pelvic pain and urgency  Musculoskeletal: Positive for arthralgias, back pain, gait problem, joint swelling and myalgias  Psychiatric/Behavioral: Positive for confusion, decreased concentration and sleep disturbance  Negative for self-injury and suicidal ideas  The patient is not nervous/anxious  Current Outpatient Medications on File Prior to Visit   Medication Sig   • atorvastatin (LIPITOR) 40 mg tablet Take 1 tablet (40 mg total) by mouth daily at bedtime   • Calcium Carbonate (Calcium 600) 1500 (600 Ca) MG TABS Take 1 tablet (600 mg Total) by mouth 2(two) times a day with a meal   • ergocalciferol (VITAMIN D2) 50,000 units Take 1 capsule (50,000 Units total) by mouth once a week   • exemestane (AROMASIN) 25 MG tablet Take 1 tablet (25 mg total) by mouth daily   • ezetimibe (ZETIA) 10 mg tablet Take 1 tablet (10 mg total) by mouth daily   • gabapentin (Neurontin) 300 mg capsule Take 1 capsule (300 mg total) by mouth 2 (two) times a day   • mirtazapine (REMERON) 7 5 MG tablet Take 1 tablet (7 5 mg total) by mouth daily at bedtime Joyce 1 tableta en la noche para dormir   • omeprazole (PriLOSEC) 20 mg delayed release capsule TAKE 1 CAPSULE (20 MG TOTAL) BY MOUTH DAILY BEFORE BREAKFAST   • Vascepa 1 g CAPS TAKE 2 CAPSULES (2 G TOTAL) BY MOUTH 2 (TWO) TIMES A DAY       Objective     /70 (BP Location: Left arm, Patient Position: Sitting, Cuff Size: Standard)   Pulse 60   Temp 99 5 °F (37 5 °C) (Tympanic)   Resp 17   Ht 4' 9\" (1 448 m)   Wt 84 7 kg (186 lb 12 8 oz)   SpO2 96%   BMI 40 42 kg/m²     Physical Exam  Vitals and nursing note reviewed     Constitutional:       " General: She is not in acute distress  Appearance: She is obese  She is not ill-appearing or diaphoretic  Comments: Burping excessively throughout visit, moaning, ambulating normally with cane   Eyes:      General: No scleral icterus  Cardiovascular:      Rate and Rhythm: Normal rate and regular rhythm  Pulses: Normal pulses  Pulmonary:      Effort: Pulmonary effort is normal       Breath sounds: Normal breath sounds  Abdominal:      Palpations: Abdomen is soft  Tenderness: There is no abdominal tenderness  There is no right CVA tenderness, left CVA tenderness or guarding  Musculoskeletal:      Lumbar back: Tenderness present  Negative right straight leg raise test and negative left straight leg raise test         Back:       Comments: 4/5 strength in lower extremities b/l with poor effort    Skin:     General: Skin is warm and dry  Neurological:      Mental Status: She is alert and oriented to person, place, and time         Wally SportsPETER meléndez

## 2023-06-30 NOTE — ASSESSMENT & PLAN NOTE
Lab Results   Component Value Date    CHOLESTEROL 185 06/26/2023    CHOLESTEROL 305 (H) 05/26/2022    CHOLESTEROL 176 11/08/2021     Lab Results   Component Value Date    HDL 39 (L) 06/26/2023    HDL 44 (L) 05/26/2022    HDL 42 11/08/2021     Lab Results   Component Value Date    TRIG 265 (H) 06/26/2023    TRIG 308 (H) 05/26/2022    TRIG 163 (H) 11/08/2021     Lab Results   Component Value Date    NONHDLC 146 06/26/2023    [place] 261 05/26/2022    [place] 134 11/08/2021     Lab Results   Component Value Date    LDLCALC 93 06/26/2023     Improved LDL since labs in 5/2022  Still with high triglycerides  Suspect Vascepa causing significant belching  Continue atorvastatin and ezetimibe 10 mg  Discontinue fenofibrate last visit  Reviewed 19 9% ASCVD risk or

## 2023-07-01 DIAGNOSIS — F33.1 MODERATE EPISODE OF RECURRENT MAJOR DEPRESSIVE DISORDER (HCC): ICD-10-CM

## 2023-07-02 LAB — BACTERIA UR CULT: ABNORMAL

## 2023-07-02 RX ORDER — MIRTAZAPINE 7.5 MG/1
7.5 TABLET, FILM COATED ORAL
Qty: 30 TABLET | Refills: 0 | Status: SHIPPED | OUTPATIENT
Start: 2023-07-02

## 2023-07-03 ENCOUNTER — TELEPHONE (OUTPATIENT)
Dept: OBGYN CLINIC | Facility: HOSPITAL | Age: 78
End: 2023-07-03

## 2023-07-03 NOTE — TELEPHONE ENCOUNTER
Patient is being referred to a orthopedics. Please schedule accordingly.     [facility]   [phone]

## 2023-07-21 DIAGNOSIS — E78.2 MIXED HYPERLIPIDEMIA: ICD-10-CM

## 2023-07-21 RX ORDER — EZETIMIBE 10 MG/1
10 TABLET ORAL DAILY
Qty: 30 TABLET | Refills: 5 | Status: SHIPPED | OUTPATIENT
Start: 2023-07-21

## 2023-08-04 DIAGNOSIS — F33.1 MODERATE EPISODE OF RECURRENT MAJOR DEPRESSIVE DISORDER (HCC): ICD-10-CM

## 2023-08-06 RX ORDER — MIRTAZAPINE 7.5 MG/1
7.5 TABLET, FILM COATED ORAL
Qty: 30 TABLET | Refills: 0 | Status: SHIPPED | OUTPATIENT
Start: 2023-08-06

## 2023-08-13 DIAGNOSIS — K21.9 GASTROESOPHAGEAL REFLUX DISEASE WITHOUT ESOPHAGITIS: ICD-10-CM

## 2023-08-14 RX ORDER — OMEPRAZOLE 20 MG/1
CAPSULE, DELAYED RELEASE ORAL
Qty: 30 CAPSULE | Refills: 5 | Status: SHIPPED | OUTPATIENT
Start: 2023-08-14

## 2023-09-01 DIAGNOSIS — F33.1 MODERATE EPISODE OF RECURRENT MAJOR DEPRESSIVE DISORDER (HCC): ICD-10-CM

## 2023-09-04 RX ORDER — MIRTAZAPINE 7.5 MG/1
7.5 TABLET, FILM COATED ORAL
Qty: 30 TABLET | Refills: 0 | Status: SHIPPED | OUTPATIENT
Start: 2023-09-04

## 2023-09-18 ENCOUNTER — OFFICE VISIT (OUTPATIENT)
Dept: FAMILY MEDICINE CLINIC | Facility: CLINIC | Age: 78
End: 2023-09-18
Payer: MEDICARE

## 2023-09-18 VITALS
HEIGHT: 57 IN | WEIGHT: 183 LBS | OXYGEN SATURATION: 98 % | SYSTOLIC BLOOD PRESSURE: 110 MMHG | RESPIRATION RATE: 16 BRPM | TEMPERATURE: 97.9 F | DIASTOLIC BLOOD PRESSURE: 70 MMHG | HEART RATE: 80 BPM | BODY MASS INDEX: 39.48 KG/M2

## 2023-09-18 DIAGNOSIS — M25.561 ARTHRALGIA OF BOTH KNEES: ICD-10-CM

## 2023-09-18 DIAGNOSIS — M25.562 ARTHRALGIA OF BOTH KNEES: ICD-10-CM

## 2023-09-18 DIAGNOSIS — Z23 NEED FOR SHINGLES VACCINE: ICD-10-CM

## 2023-09-18 DIAGNOSIS — M72.2 PLANTAR FASCIITIS: Primary | ICD-10-CM

## 2023-09-18 DIAGNOSIS — K59.1 FUNCTIONAL DIARRHEA: ICD-10-CM

## 2023-09-18 DIAGNOSIS — F51.01 PRIMARY INSOMNIA: ICD-10-CM

## 2023-09-18 PROCEDURE — 90471 IMMUNIZATION ADMIN: CPT

## 2023-09-18 PROCEDURE — 90750 HZV VACC RECOMBINANT IM: CPT

## 2023-09-18 PROCEDURE — 99214 OFFICE O/P EST MOD 30 MIN: CPT | Performed by: FAMILY MEDICINE

## 2023-09-18 PROCEDURE — 20550 NJX 1 TENDON SHEATH/LIGAMENT: CPT | Performed by: FAMILY MEDICINE

## 2023-09-18 RX ORDER — LOPERAMIDE HYDROCHLORIDE 2 MG/1
2 CAPSULE ORAL 2 TIMES DAILY
Qty: 14 CAPSULE | Refills: 0 | Status: SHIPPED | OUTPATIENT
Start: 2023-09-18 | End: 2023-09-25

## 2023-09-18 RX ORDER — CELECOXIB 100 MG/1
100 CAPSULE ORAL 2 TIMES DAILY
Qty: 60 CAPSULE | Refills: 5 | Status: SHIPPED | OUTPATIENT
Start: 2023-09-18

## 2023-09-18 NOTE — PROGRESS NOTES
Name: Cynthia Scanlon      : 1945      MRN: [id number]  Encounter Provider: William Flores MD  Encounter Date: 2023   Encounter department: [id number] [facility]    Assessment & Plan   Assessment/plan:   The patient is suffering from chronic pain, likely due to plantar fasciitis and arthritis. The patient also has gastrointestinal issues, including diarrhea and loss of appetite. The patient's difficulty sleeping may be due to the chronic pain or other underlying issues. The patient should continue taking omeprazol for gastritis. For the diarrhea, to take 2mg of loperamide twice a day for a week. For the arthritis pain, the patient will get celebrex BID. The patient should return for a follow-up visit in three months. Patient has a primary chronic insomnia. Avoidance of caffeine sources is strongly encouraged. Sleep hygiene issues are reviewed and discussed in order to improve quality of sleep. She tried in the past Ambien, The use of sedative hypnotics for temporary relief is appropriate but long term is not indicated; we discussed the physical and psychological depencency that can be created with this type of these drugs. Trial of trazodone will cause possible less dependency and side effects. at   Her left heel was injected with 40 mg of triamcinolone. Patient tolerated the procedure very well. No complications. Recommended to apply ice  Home. I will follow up the patient in the needed basis. 1. Plantar fasciitis    2. Functional diarrhea  -     loperamide (IMODIUM) 2 mg capsule; Take 1 capsule (2 mg total) by mouth 2 (two) times a day for 7 days    3. Need for shingles vaccine  -     Zoster Vaccine Recombinant IM    4. Arthralgia of both knees  -     celecoxib (CeleBREX) 100 mg capsule; Take 1 capsule (100 mg total) by mouth 2 (two) times a day    5. Primary insomnia    Procedures  Injected left heel. Subjective      Subjective:    The patient reports chronic pain in the left heel for several months. The pain is sharp ("de punta"). The patient also reports suffering from plantar fasciitis. The patient has been self-administering "injections for the pain" (vitamin from [place]). The patient also reports diarrhea and stomach issues, as well as loss of appetite. The patient has been taking omeprazol and reports difficulty sleeping. The patient also reports arthritis pain. The patient's daughter has been caring for her. Objective: The patient appears to be in discomfort due to chronic pain.    Physical Exam  Vitals and nursing note reviewed. Exam conducted with a chaperone present. Constitutional:       General: She is not in acute distress.     Appearance: She is obese, chronic ill looking   Cardiovascular:      Rate and Rhythm: Normal rate and regular rhythm.      Heart sounds: Normal heart sounds. No murmur heard. Pulmonary:      Effort: Pulmonary effort is normal. No respiratory distress.      Breath sounds: Normal breath sounds. No wheezing or rales. Abdominal:      General: Bowel sounds are normal. There is no distension.      Palpations: Abdomen is soft. There is no mass.      Tenderness: There is no abdominal tenderness. There is no guarding or rebound. Skin:     General: Skin is warm and dry. Neurological:      Mental Status: She is alert and oriented to person, place, and time. No focal deficits. Psychiatric:         BehaviorWillo Reuben     Thought Content:  Thought content normal.         Judgment: Judgment normal.             Review of Systems    Current Outpatient Medications on File Prior to Visit   Medication Sig   • atorvastatin (LIPITOR) 40 mg tablet Take 1 tablet (40 mg total) by mouth daily at bedtime   • Calcium Carbonate (Calcium 600) 1500 (600 Ca) MG TABS Take 1 tablet (600 mg Total) by mouth 2(two) times a day with a meal   • ergocalciferol (VITAMIN D2) 50,000 units Take 1 capsule (50,000 Units total) by mouth once a week   • exemestane (AROMASIN) 25 MG tablet Take 1 tablet (25 mg total) by mouth daily   • ezetimibe (ZETIA) 10 mg tablet TAKE 1 TABLET (10 MG TOTAL) BY MOUTH DAILY   • gabapentin (Neurontin) 300 mg capsule Take 1 capsule (300 mg total) by mouth 2 (two) times a day   • mirtazapine (REMERON) 7.5 MG tablet TAKE 1 TABLET (7.5 MG TOTAL) BY MOUTH DAILY AT BEDTIME ELVIS 1 TABLETA EN LA NOCHE PARA DORMIR   • omeprazole (PriLOSEC) 20 mg delayed release capsule TAKE 1 CAPSULE (20 MG TOTAL) BY MOUTH DAILY BEFORE BREAKFAST   • Vascepa 1 g CAPS TAKE 2 CAPSULES (2 G TOTAL) BY MOUTH 2 (TWO) TIMES A DAY       Objective     /70 (BP Location: Left arm, Patient Position: Sitting, Cuff Size: Standard)   Pulse 80   Temp 97.9 °F (36.6 °C) (Tympanic)   Resp 16   Ht 4' 9" (1.448 m)   Wt 83 kg (183 lb)   SpO2 98%   BMI 39.60 kg/m²     Physical Exam  Hernán Velez MD

## 2023-10-01 DIAGNOSIS — F33.1 MODERATE EPISODE OF RECURRENT MAJOR DEPRESSIVE DISORDER (HCC): ICD-10-CM

## 2023-10-01 RX ORDER — MIRTAZAPINE 7.5 MG/1
7.5 TABLET, FILM COATED ORAL
Qty: 30 TABLET | Refills: 0 | Status: SHIPPED | OUTPATIENT
Start: 2023-10-01

## 2023-10-26 DIAGNOSIS — F33.1 MODERATE EPISODE OF RECURRENT MAJOR DEPRESSIVE DISORDER (HCC): ICD-10-CM

## 2023-10-27 RX ORDER — MIRTAZAPINE 7.5 MG/1
7.5 TABLET, FILM COATED ORAL
Qty: 30 TABLET | Refills: 0 | Status: SHIPPED | OUTPATIENT
Start: 2023-10-27

## 2023-11-23 DIAGNOSIS — F33.1 MODERATE EPISODE OF RECURRENT MAJOR DEPRESSIVE DISORDER (HCC): ICD-10-CM

## 2023-11-24 RX ORDER — MIRTAZAPINE 7.5 MG/1
7.5 TABLET, FILM COATED ORAL
Qty: 30 TABLET | Refills: 0 | Status: SHIPPED | OUTPATIENT
Start: 2023-11-24

## 2023-12-19 DIAGNOSIS — F33.1 MODERATE EPISODE OF RECURRENT MAJOR DEPRESSIVE DISORDER (HCC): ICD-10-CM

## 2023-12-19 DIAGNOSIS — E78.2 MIXED HYPERLIPIDEMIA: ICD-10-CM

## 2023-12-19 RX ORDER — MIRTAZAPINE 7.5 MG/1
7.5 TABLET, FILM COATED ORAL
Qty: 30 TABLET | Refills: 3 | Status: SHIPPED | OUTPATIENT
Start: 2023-12-19

## 2023-12-19 RX ORDER — EZETIMIBE 10 MG/1
10 TABLET ORAL DAILY
Qty: 30 TABLET | Refills: 5 | Status: SHIPPED | OUTPATIENT
Start: 2023-12-19

## 2023-12-21 ENCOUNTER — OFFICE VISIT (OUTPATIENT)
Dept: FAMILY MEDICINE CLINIC | Facility: CLINIC | Age: 78
End: 2023-12-21
Payer: MEDICARE

## 2023-12-21 VITALS
DIASTOLIC BLOOD PRESSURE: 70 MMHG | HEIGHT: 57 IN | HEART RATE: 96 BPM | OXYGEN SATURATION: 97 % | SYSTOLIC BLOOD PRESSURE: 110 MMHG | RESPIRATION RATE: 16 BRPM | WEIGHT: 184 LBS | TEMPERATURE: 99.1 F | BODY MASS INDEX: 39.7 KG/M2

## 2023-12-21 DIAGNOSIS — G89.29 CHRONIC BILATERAL LOW BACK PAIN WITHOUT SCIATICA: ICD-10-CM

## 2023-12-21 DIAGNOSIS — G56.03 BILATERAL CARPAL TUNNEL SYNDROME: ICD-10-CM

## 2023-12-21 DIAGNOSIS — R73.03 PRE-DIABETES: ICD-10-CM

## 2023-12-21 DIAGNOSIS — M54.50 CHRONIC BILATERAL LOW BACK PAIN WITHOUT SCIATICA: ICD-10-CM

## 2023-12-21 DIAGNOSIS — E78.1 HIGH TRIGLYCERIDES: ICD-10-CM

## 2023-12-21 DIAGNOSIS — N39.0 URINARY TRACT INFECTION WITHOUT HEMATURIA, SITE UNSPECIFIED: Primary | ICD-10-CM

## 2023-12-21 DIAGNOSIS — R51.9 INCREASED SEVERITY OF HEADACHES: ICD-10-CM

## 2023-12-21 PROCEDURE — 99215 OFFICE O/P EST HI 40 MIN: CPT | Performed by: FAMILY MEDICINE

## 2023-12-21 RX ORDER — DEXAMETHASONE 4 MG/1
4 TABLET ORAL 2 TIMES DAILY WITH MEALS
Qty: 10 TABLET | Refills: 0 | Status: SHIPPED | OUTPATIENT
Start: 2023-12-21 | End: 2023-12-26

## 2023-12-21 RX ORDER — AMOXICILLIN 500 MG/1
500 CAPSULE ORAL EVERY 12 HOURS SCHEDULED
Qty: 14 CAPSULE | Refills: 0 | Status: SHIPPED | OUTPATIENT
Start: 2023-12-21 | End: 2023-12-28

## 2023-12-21 NOTE — PROGRESS NOTES
Name: Enriqueta Garrett      : 1945      MRN: [id number]  Encounter Provider: Brody Taylor MD  Encounter Date: 2023   Encounter department: [facility]    Assessment & Plan     1. Urinary tract infection without hematuria, site unspecified  -     amoxicillin (AMOXIL) 500 mg capsule; Take 1 capsule (500 mg total) by mouth every 12 (twelve) hours for 7 days    2. Increased severity of headaches  -     dexamethasone (DECADRON) 4 mg tablet; Take 1 tablet (4 mg total) by mouth 2 (two) times a day with meals for 5 days    3. High triglycerides  -     Comprehensive metabolic panel; Future  -     Lipid Panel with Direct LDL reflex; Future    4. Pre-diabetes  -     Hemoglobin A1C; Future    5. BMI 39.0-39.9,adult    6. Bilateral carpal tunnel syndrome    7. Chronic bilateral low back pain without sciatica    Chief Complaint  77-year-old female presenting for follow-up of bilateral hand pain, back pain, and depression.    History of Present Illness  Patient reports ongoing pain in both hands, particularly on the right side, which has not improved since last visit. She also describes a persistent low back pain that is intense and affects her daily activities. Additionally, she mentions symptoms of depression, which seem to be exacerbated by family stress and feelings of isolation.    Medications    Current Outpatient Medications:     amoxicillin (AMOXIL) 500 mg capsule, Take 1 capsule (500 mg total) by mouth every 12 (twelve) hours for 7 days, Disp: 14 capsule, Rfl: 0    dexamethasone (DECADRON) 4 mg tablet, Take 1 tablet (4 mg total) by mouth 2 (two) times a day with meals for 5 days, Disp: 10 tablet, Rfl: 0    atorvastatin (LIPITOR) 40 mg tablet, Take 1 tablet (40 mg total) by mouth daily at bedtime, Disp: 90 tablet, Rfl: 3    Calcium Carbonate (Calcium 600) 1500 (600 Ca) MG TABS, Take 1 tablet (600 mg Total) by mouth 2(two) times a day with a meal, Disp: 60 tablet,  "Rfl: 11    celecoxib (CeleBREX) 100 mg capsule, Take 1 capsule (100 mg total) by mouth 2 (two) times a day, Disp: 60 capsule, Rfl: 5    ergocalciferol (VITAMIN D2) 50,000 units, Take 1 capsule (50,000 Units total) by mouth once a week, Disp: 4 capsule, Rfl: 5    exemestane (AROMASIN) 25 MG tablet, Take 1 tablet (25 mg total) by mouth daily, Disp: 90 tablet, Rfl: 3    ezetimibe (ZETIA) 10 mg tablet, TAKE 1 TABLET (10 MG TOTAL) BY MOUTH DAILY, Disp: 30 tablet, Rfl: 5    gabapentin (Neurontin) 300 mg capsule, Take 1 capsule (300 mg total) by mouth 2 (two) times a day, Disp: 60 capsule, Rfl: 5    mirtazapine (REMERON) 7.5 MG tablet, TAKE 1 TABLET (7.5 MG TOTAL) BY MOUTH DAILY AT BEDTIME ELVIS 1 TABLETA EN LA NOCHE PARA DORMIR, Disp: 30 tablet, Rfl: 3    omeprazole (PriLOSEC) 20 mg delayed release capsule, TAKE 1 CAPSULE (20 MG TOTAL) BY MOUTH DAILY BEFORE BREAKFAST, Disp: 30 capsule, Rfl: 5    Vascepa 1 g CAPS, TAKE 2 CAPSULES (2 G TOTAL) BY MOUTH 2 (TWO) TIMES A DAY, Disp: , Rfl:       Allergies   Allergen Reactions    Chocolate - Food Allergy     Chocolate Flavor - Food Allergy     Ibuprofen Swelling    Pork-Derived Products - Food Allergy          Past Medical History  History of urinary tract infections, headaches, and depression.    Past Surgical History  No surgical history reported.    Social History  Patient lives alone and is retired. She has a history of working in agriculture and has family stressors contributing to her depression.    Family History  No significant family medical history provided.    Review of Systems  Positive for joint pain and depressive symptoms. Denies dizziness, chest pain, or recent changes in weight.    Vital Signs  /70 (BP Location: Left arm, Patient Position: Sitting, Cuff Size: Standard)   Pulse 96   Temp 99.1 °F (37.3 °C) (Tympanic)   Resp 16   Ht 4' 9\" (1.448 m)   Wt 83.5 kg (184 lb)   SpO2 97%   BMI 39.82 kg/m²       Physical Exam  Physical examination of the hands " revealed tenderness in the joints bilaterally. Back examination showed paraspinal muscle tenderness without radiculopathy.      Assessment and Plan  Possible UTI: not able to provide sample (dropped it in toilet), holidays ae coming, long weekend to provided benefit more than risk awaiting for sample a I will start her on antibiotics.  Headaches: trail of dexamethasone.   Bilateral hand pain: Continue conservative management with NSAIDs and consider referral to rheumatology for further evaluation if no improvement.  Low back pain: Recommend physical therapy and consider imaging if symptoms persist or worsen.  Depression: Continue current antidepressant medication and refer to mental health services for counseling. Encourage patient to engage in social activities and consider a follow-up appointment with a psychiatrist for medication management.      BMI Counseling: Body mass index is 39.82 kg/m². The BMI is above normal. Nutrition recommendations include reducing portion sizes.

## 2023-12-22 ENCOUNTER — APPOINTMENT (OUTPATIENT)
Dept: LAB | Facility: HOSPITAL | Age: 78
End: 2023-12-22
Payer: MEDICARE

## 2023-12-22 DIAGNOSIS — R73.03 PRE-DIABETES: ICD-10-CM

## 2023-12-22 DIAGNOSIS — E78.1 HIGH TRIGLYCERIDES: ICD-10-CM

## 2023-12-22 LAB
ALBUMIN SERPL BCP-MCNC: 3.9 G/DL (ref 3.5–5)
ALP SERPL-CCNC: 83 U/L (ref 34–104)
ALT SERPL W P-5'-P-CCNC: 18 U/L (ref 7–52)
ANION GAP SERPL CALCULATED.3IONS-SCNC: 7 MMOL/L
AST SERPL W P-5'-P-CCNC: 22 U/L (ref 13–39)
BILIRUB SERPL-MCNC: 0.39 MG/DL (ref 0.2–1)
BUN SERPL-MCNC: 15 MG/DL (ref 5–25)
CALCIUM SERPL-MCNC: 9.1 MG/DL (ref 8.4–10.2)
CHLORIDE SERPL-SCNC: 106 MMOL/L (ref 96–108)
CHOLEST SERPL-MCNC: 162 MG/DL
CO2 SERPL-SCNC: 29 MMOL/L (ref 21–32)
CREAT SERPL-MCNC: 0.69 MG/DL (ref 0.6–1.3)
GFR SERPL CREATININE-BSD FRML MDRD: 83 ML/MIN/1.73SQ M
GLUCOSE P FAST SERPL-MCNC: 96 MG/DL (ref 65–99)
HDLC SERPL-MCNC: 51 MG/DL
LDLC SERPL CALC-MCNC: 82 MG/DL (ref 0–100)
POTASSIUM SERPL-SCNC: 4.2 MMOL/L (ref 3.5–5.3)
PROT SERPL-MCNC: 6.5 G/DL (ref 6.4–8.4)
SODIUM SERPL-SCNC: 142 MMOL/L (ref 135–147)
TRIGL SERPL-MCNC: 145 MG/DL

## 2023-12-22 PROCEDURE — 80061 LIPID PANEL: CPT

## 2023-12-22 PROCEDURE — 36415 COLL VENOUS BLD VENIPUNCTURE: CPT

## 2023-12-22 PROCEDURE — 83036 HEMOGLOBIN GLYCOSYLATED A1C: CPT

## 2023-12-22 PROCEDURE — 80053 COMPREHEN METABOLIC PANEL: CPT

## 2023-12-23 LAB
EST. AVERAGE GLUCOSE BLD GHB EST-MCNC: 126 MG/DL
HBA1C MFR BLD: 6 %

## 2023-12-27 DIAGNOSIS — K21.9 GASTROESOPHAGEAL REFLUX DISEASE WITHOUT ESOPHAGITIS: ICD-10-CM

## 2023-12-27 RX ORDER — OMEPRAZOLE 20 MG/1
CAPSULE, DELAYED RELEASE ORAL
Qty: 30 CAPSULE | Refills: 5 | Status: SHIPPED | OUTPATIENT
Start: 2023-12-27

## 2023-12-28 DIAGNOSIS — R73.03 PRE-DIABETES: Primary | ICD-10-CM

## 2023-12-28 RX ORDER — METFORMIN HYDROCHLORIDE 500 MG/1
500 TABLET, EXTENDED RELEASE ORAL
Qty: 90 TABLET | Refills: 3 | Status: SHIPPED | OUTPATIENT
Start: 2023-12-28

## 2023-12-28 NOTE — RESULT ENCOUNTER NOTE
Please call patient she has prediabetes and needs to take medication to prevent diabetes.  The medication is Metformin once a day. Rest of labs are normal.

## 2024-01-05 DIAGNOSIS — C50.312 MALIGNANT NEOPLASM OF LOWER-INNER QUADRANT OF LEFT BREAST IN FEMALE, ESTROGEN RECEPTOR POSITIVE: ICD-10-CM

## 2024-01-05 DIAGNOSIS — Z17.0 MALIGNANT NEOPLASM OF LOWER-INNER QUADRANT OF LEFT BREAST IN FEMALE, ESTROGEN RECEPTOR POSITIVE: ICD-10-CM

## 2024-01-05 RX ORDER — EXEMESTANE 25 MG/1
25 TABLET ORAL DAILY
Qty: 90 TABLET | Refills: 3 | Status: SHIPPED | OUTPATIENT
Start: 2024-01-05 | End: 2024-12-30

## 2024-01-19 DIAGNOSIS — G56.03 BILATERAL CARPAL TUNNEL SYNDROME: ICD-10-CM

## 2024-01-19 DIAGNOSIS — M85.89 OSTEOPENIA OF MULTIPLE SITES: ICD-10-CM

## 2024-01-19 RX ORDER — ERGOCALCIFEROL 1.25 MG/1
50000 CAPSULE ORAL WEEKLY
Qty: 4 CAPSULE | Refills: 0 | Status: SHIPPED | OUTPATIENT
Start: 2024-01-19

## 2024-01-19 RX ORDER — GABAPENTIN 300 MG/1
300 CAPSULE ORAL 2 TIMES DAILY
Qty: 90 CAPSULE | Refills: 1 | Status: SHIPPED | OUTPATIENT
Start: 2024-01-19

## 2024-01-31 DIAGNOSIS — F33.1 MODERATE EPISODE OF RECURRENT MAJOR DEPRESSIVE DISORDER (HCC): ICD-10-CM

## 2024-01-31 RX ORDER — MIRTAZAPINE 7.5 MG/1
7.5 TABLET, FILM COATED ORAL
Qty: 30 TABLET | Refills: 5 | Status: SHIPPED | OUTPATIENT
Start: 2024-01-31

## 2024-02-15 DIAGNOSIS — E78.2 MIXED HYPERLIPIDEMIA: ICD-10-CM

## 2024-02-15 DIAGNOSIS — Z79.811 USE OF AROMATASE INHIBITORS: ICD-10-CM

## 2024-02-15 DIAGNOSIS — M25.562 ARTHRALGIA OF BOTH KNEES: ICD-10-CM

## 2024-02-15 DIAGNOSIS — M25.561 ARTHRALGIA OF BOTH KNEES: ICD-10-CM

## 2024-02-15 RX ORDER — ATORVASTATIN CALCIUM 40 MG/1
40 TABLET, FILM COATED ORAL
Qty: 90 TABLET | Refills: 1 | Status: SHIPPED | OUTPATIENT
Start: 2024-02-15

## 2024-02-15 RX ORDER — CELECOXIB 100 MG/1
100 CAPSULE ORAL 2 TIMES DAILY
Qty: 180 CAPSULE | Refills: 1 | Status: SHIPPED | OUTPATIENT
Start: 2024-02-15

## 2024-03-13 DIAGNOSIS — E78.2 MIXED HYPERLIPIDEMIA: ICD-10-CM

## 2024-03-13 RX ORDER — EZETIMIBE 10 MG/1
10 TABLET ORAL DAILY
Qty: 30 TABLET | Refills: 5 | Status: SHIPPED | OUTPATIENT
Start: 2024-03-13

## 2024-03-20 ENCOUNTER — RA CDI HCC (OUTPATIENT)
Dept: OTHER | Facility: HOSPITAL | Age: 79
End: 2024-03-20

## 2024-03-20 NOTE — PROGRESS NOTES
HCC coding opportunities          Chart Reviewed number of suggestions sent to Provider: 1     Patients Insurance     Medicare Insurance: [employer]          E66.01

## 2024-03-22 DIAGNOSIS — M85.89 OSTEOPENIA OF MULTIPLE SITES: ICD-10-CM

## 2024-04-16 RX ORDER — ERGOCALCIFEROL 1.25 MG/1
50000 CAPSULE ORAL WEEKLY
Qty: 4 CAPSULE | Refills: 0 | Status: SHIPPED | OUTPATIENT
Start: 2024-04-16 | End: 2024-04-20

## 2024-04-19 DIAGNOSIS — M85.89 OSTEOPENIA OF MULTIPLE SITES: ICD-10-CM

## 2024-04-20 RX ORDER — ERGOCALCIFEROL 1.25 MG/1
50000 CAPSULE ORAL WEEKLY
Qty: 4 CAPSULE | Refills: 0 | Status: SHIPPED | OUTPATIENT
Start: 2024-04-20

## 2024-04-23 DIAGNOSIS — K21.9 GASTROESOPHAGEAL REFLUX DISEASE WITHOUT ESOPHAGITIS: ICD-10-CM

## 2024-04-23 RX ORDER — OMEPRAZOLE 20 MG/1
CAPSULE, DELAYED RELEASE ORAL
Qty: 30 CAPSULE | Refills: 5 | Status: SHIPPED | OUTPATIENT
Start: 2024-04-23

## 2024-05-17 DIAGNOSIS — G56.03 BILATERAL CARPAL TUNNEL SYNDROME: ICD-10-CM

## 2024-05-17 RX ORDER — GABAPENTIN 300 MG/1
300 CAPSULE ORAL 2 TIMES DAILY
Qty: 90 CAPSULE | Refills: 1 | Status: SHIPPED | OUTPATIENT
Start: 2024-05-17

## 2024-06-05 DIAGNOSIS — F33.1 MODERATE EPISODE OF RECURRENT MAJOR DEPRESSIVE DISORDER (HCC): ICD-10-CM

## 2024-06-05 RX ORDER — MIRTAZAPINE 7.5 MG/1
7.5 TABLET, FILM COATED ORAL
Qty: 30 TABLET | Refills: 5 | Status: SHIPPED | OUTPATIENT
Start: 2024-06-05

## 2024-06-07 DIAGNOSIS — G56.03 BILATERAL CARPAL TUNNEL SYNDROME: ICD-10-CM

## 2024-06-07 RX ORDER — GABAPENTIN 300 MG/1
300 CAPSULE ORAL 2 TIMES DAILY
Qty: 90 CAPSULE | Refills: 1 | Status: SHIPPED | OUTPATIENT
Start: 2024-06-07

## 2024-06-11 ENCOUNTER — OFFICE VISIT (OUTPATIENT)
Dept: FAMILY MEDICINE CLINIC | Facility: CLINIC | Age: 79
End: 2024-06-11
Payer: COMMERCIAL

## 2024-06-11 VITALS
RESPIRATION RATE: 16 BRPM | DIASTOLIC BLOOD PRESSURE: 70 MMHG | SYSTOLIC BLOOD PRESSURE: 120 MMHG | HEART RATE: 86 BPM | WEIGHT: 178 LBS | HEIGHT: 57 IN | BODY MASS INDEX: 38.4 KG/M2 | OXYGEN SATURATION: 98 % | TEMPERATURE: 97.8 F

## 2024-06-11 DIAGNOSIS — Z29.11 NEED FOR RSV IMMUNIZATION: ICD-10-CM

## 2024-06-11 DIAGNOSIS — G30.1 LATE ONSET ALZHEIMER'S DEMENTIA WITHOUT BEHAVIORAL DISTURBANCE (HCC): ICD-10-CM

## 2024-06-11 DIAGNOSIS — M85.89 OSTEOPENIA OF MULTIPLE SITES: ICD-10-CM

## 2024-06-11 DIAGNOSIS — R53.81 PHYSICAL DECONDITIONING: ICD-10-CM

## 2024-06-11 DIAGNOSIS — Z12.31 ENCOUNTER FOR SCREENING MAMMOGRAM FOR MALIGNANT NEOPLASM OF BREAST: ICD-10-CM

## 2024-06-11 DIAGNOSIS — R73.03 PREDIABETES: ICD-10-CM

## 2024-06-11 DIAGNOSIS — M54.50 CHRONIC BILATERAL LOW BACK PAIN WITHOUT SCIATICA: ICD-10-CM

## 2024-06-11 DIAGNOSIS — F02.80 LATE ONSET ALZHEIMER'S DEMENTIA WITHOUT BEHAVIORAL DISTURBANCE (HCC): ICD-10-CM

## 2024-06-11 DIAGNOSIS — G56.03 BILATERAL CARPAL TUNNEL SYNDROME: ICD-10-CM

## 2024-06-11 DIAGNOSIS — G89.29 CHRONIC BILATERAL LOW BACK PAIN WITHOUT SCIATICA: ICD-10-CM

## 2024-06-11 DIAGNOSIS — Z00.00 MEDICARE ANNUAL WELLNESS VISIT, SUBSEQUENT: Primary | ICD-10-CM

## 2024-06-11 PROBLEM — I73.9 PERIPHERAL VASCULAR DISEASE (HCC): Status: RESOLVED | Noted: 2019-03-22 | Resolved: 2024-06-11

## 2024-06-11 PROBLEM — F33.1 MODERATE EPISODE OF RECURRENT MAJOR DEPRESSIVE DISORDER (HCC): Status: RESOLVED | Noted: 2023-01-24 | Resolved: 2024-06-11

## 2024-06-11 PROCEDURE — 99214 OFFICE O/P EST MOD 30 MIN: CPT | Performed by: FAMILY MEDICINE

## 2024-06-11 PROCEDURE — G0439 PPPS, SUBSEQ VISIT: HCPCS | Performed by: FAMILY MEDICINE

## 2024-06-11 NOTE — LETTER
June 11, 2024     Patient: Enriqueta Garrett  YOB: 1945  Date of Visit: 6/11/2024    Social Security Administration in [place].  Re: Disability Claim for Enriqueta Marshall    Dear /,    I am writing to provide medical documentation in support of Enriqueta Marshall’s application for Supplemental Security Income (SSI). As her treating physician, I can attest to her significant health challenges and the impact they have on her daily life.  Ms. Marshall, born on December 24, 1945, suffers from a multitude of comorbidities that severely limit her functional capacity. These conditions include:      Carpal Tunnel Syndrome: She has chronic pain and weakness in both hands due to carpal tunnel syndrome.  Chronic Bilateral Low Back Pain: Ms. Marshall suffers from persistent low back pain without sciatica, which significantly impairs her ability to perform daily activities.  Chronic Pain Disorder: Her overall pain level is substantial, impacting her quality of life.  Physical Deconditioning: Due to her health issues, Ms. Marshall has become physically deconditioned, further limiting her mobility.    Spinal Stenosis with Radiculopathy: Ms. Marshall experiences pain, numbness, and weakness in her lower limbs due to spinal stenosis.    Complex Regional Pain Syndrome Type 1 (CRPS): This condition affects both hands, causing severe pain and functional limitations.  Use of Aromatase Inhibitors: Ms. Marshall’s history of breast cancer necessitates ongoing medication.                                Osteopenia: She has reduced bone density at multiple sites.  Impaired Mobility and Activities of Daily Living (ADLs): Ms. Marshall struggles with mobility, self-care, and household tasks.    Chronic Midline Thoracic Back Pain: Persistent pain in her mid-back area.    Late-Onset Alzheimer’s Dementia: Although without behavioral disturbances, her cognitive decline affects her daily functioning.    Given the cumulative  impact of these conditions, Ms. Marshall is unable to work or maintain gainful employment. Her physical limitations, pain, and cognitive decline significantly hinder her ability to perform basic tasks.    I recommend that Ms. Marshall be considered for SSI benefits to alleviate the financial strain caused by her disabilities. Enclosed, please find her medical records, test results, and treatment history.    If you require any additional information or clarification, please do not hesitate to contact me. I am committed to supporting Ms. Marshall throughout this process.    Thank you for your attention to this matter.    Sincerely,    Dr. Brody Taylor MD.

## 2024-06-11 NOTE — PATIENT INSTRUCTIONS

## 2024-06-11 NOTE — ASSESSMENT & PLAN NOTE
I explained to the patient about osteopenia, and that Lifestyle measures that should be adopted universally to reduce bone loss in postmenopausal women. Lifestyle measures include adequate calcium and vitamin D, exercise, smoking cessation if apply, even second hand smoke, counseling on fall prevention, and avoidance of heavy alcohol use. Oyster Shell Calcium  500 mg of oyster shell calcium and 400 IU of vitamin D3 per tablet recommended.

## 2024-06-11 NOTE — PROGRESS NOTES
Name: Enriqueta Garrett      : 1945      MRN: [id number]  Encounter Provider: Brody Taylor MD  Encounter Date: 2024   Encounter department: [facility] CARE [facility]    Assessment & Plan   1. Medicare annual wellness visit, subsequent  Depression Screening Follow-up Plan: Patient's depression screening was positive with a PHQ-2 score of . Their PHQ-9 score was . Continue regular follow-up with their psychologist/therapist/psychiatrist who is managing their mental health condition(s).Ambulatory Visit       Preventive health issues were discussed with patient, and age appropriate screening tests were ordered as noted in patient's After Visit Summary. Personalized health advice and appropriate referrals for health education or preventive services given if needed, as noted in patient's After Visit Summary.    History of Present Illness     HPI   Patient Care Team:  Brody Taylor MD as PCP - General (Family Medicine)  Cristin Blackwood MD (Radiation Oncology)  Jacquie Spivey MD (Hematology and Oncology)    Review of Systems  Medical History Reviewed by provider this encounter:       Annual Wellness Visit Questionnaire   Enriqueta is here for her Subsequent Wellness visit. Last Medicare Wellness visit information reviewed, patient interviewed and updates made to the record today.      Health Risk Assessment:   Patient rates overall health as fair. Patient feels that their physical health rating is slightly worse. Patient is satisfied with their life. Eyesight was rated as slightly worse. Hearing was rated as same. Patient feels that their emotional and mental health rating is same. Patients states they are never, rarely angry. Patient states they are sometimes unusually tired/fatigued. Pain experienced in the last 7 days has been some. Patient's pain rating has been 4/10. Patient states that she has experienced no weight loss or gain in last 6 months.     Fall Risk Screening:   In the  past year, patient has experienced: history of falling in past year    Number of falls: 1  Injured during fall?: Yes    Feels unsteady when standing or walking?: Yes    Worried about falling?: Yes      Urinary Incontinence Screening:   Patient has leaked urine accidently in the last six months.     Home Safety:  Patient has trouble with stairs inside or outside of their home. Patient has working smoke alarms and has working carbon monoxide detector. Home safety hazards include: none.     Nutrition:   Current diet is Regular.     Medications:   Patient is currently taking over-the-counter supplements. OTC medications include: see medication list. Patient is able to manage medications.     Activities of Daily Living (ADLs)/Instrumental Activities of Daily Living (IADLs):   Walk and transfer into and out of bed and chair?: Yes  Dress and groom yourself?: Yes    Bathe or shower yourself?: Yes    Feed yourself? Yes  Do your laundry/housekeeping?: Yes  Manage your money, pay your bills and track your expenses?: No  Make your own meals?: Yes    Do your own shopping?: No    Previous Hospitalizations:   Any hospitalizations or ED visits within the last 12 months?: Yes    How many hospitalizations have you had in the last year?: 1-2    Advance Care Planning:   Living will: No    Durable POA for healthcare: No    Advanced directive: No    Advanced directive counseling given: Yes    Five wishes given: No    Patient declined ACP directive: No    End of Life Decisions reviewed with patient: Yes    Provider agrees with end of life decisions: Yes      Cognitive Screening:   Provider or family/friend/caregiver concerned regarding cognition?: No    PREVENTIVE SCREENINGS      Cardiovascular Screening:    General: Screening Not Indicated and History Lipid Disorder    Due for: Lipid Panel      Diabetes Screening:     General: Screening Current    Due for: Blood Glucose      Colorectal Cancer Screening:       Due for: FOBT/FIT       Prostate Cancer Screening:    General: Screening Current      Breast Cancer Screening:     General: History Breast Cancer      Cervical Cancer Screening:    General: Screening Not Indicated      Osteoporosis Screening:    General: Screening Current    Due for: DXA Axial      Abdominal Aortic Aneurysm (AAA) Screening:        General: Screening Not Indicated      Lung Cancer Screening:     General: Screening Not Indicated      Hepatitis C Screening:    General: Screening Current    Hep C Screening Accepted: Yes      Screening, Brief Intervention, and Referral to Treatment (SBIRT)    Screening  Typical number of drinks in a day: 0  Typical number of drinks in a week: 0  Interpretation: Low risk drinking behavior.    Single Item Drug Screening:  How often have you used an illegal drug (including marijuana) or a prescription medication for non-medical reasons in the past year? never    Single Item Drug Screen Score: 0  Interpretation: Negative screen for possible drug use disorder    Other Counseling Topics:   Alcohol use counseling, car/seat belt/driving safety, skin self-exam, sunscreen and calcium and vitamin D intake and regular weightbearing exercise.

## 2024-06-11 NOTE — ASSESSMENT & PLAN NOTE
With weakness of wrists as sequela. Patient is requesting disability insurance since she is unable to take care of herself.

## 2024-06-11 NOTE — ASSESSMENT & PLAN NOTE
Plan for rehabilitation: Therapeutic exercises, therapeutic activity, gait training, neuromuscular rehabilitation, monotherapy, splinting/stopping, patient occasion, self care.

## 2024-06-11 NOTE — PROGRESS NOTES
Name: Enriqueta Garrett      : 1945      MRN: [id number]  Encounter Provider: Brody Taylor MD  Encounter Date: 2024   Encounter department: [facility]   Follow-up for Disability and Multiple Chronic Conditions    Chief Complaint:   Follow-up for disability application and management of multiple chronic conditions.    History of Present Illness:   Enriqueta Garrett, an 78-year-old female, presents for a follow-up visit to discuss her disability application and ongoing management of multiple chronic conditions. The patient reports significant difficulty with neuropathy and arthritis affecting both legs, which has severely limited her ability to work. She has been advised to apply for disability benefits due to her inability to maintain employment. Additionally, she has a history of lumbar spinal stenosis, trigger finger, complex regional pain syndrome, and mild Alzheimer's dementia. The patient also requires vaccinations, including the shingles vaccine and the RSV vaccine, as well as routine screenings such as blood tests, mammogram, and bone density scan.      Current Outpatient Medications:     RSVPreF3 Vac Recomb Adjuvanted 120 MCG/0.5ML SUSR, Inject 0.5 mL into a muscle 1 (one) time for 1 dose, Disp: 1 each, Rfl: 0    atorvastatin (LIPITOR) 40 mg tablet, TAKE 1 TABLET (40 MG TOTAL) BY MOUTH DAILY AT BEDTIME, Disp: 90 tablet, Rfl: 1    Calcium Carbonate (Calcium 600) 1500 (600 Ca) MG TABS, TAKE 1 TABLET (600 MG TOTAL) BY MOUTH 2(TWO) TIMES A DAY WITH A MEAL, Disp: 180 tablet, Rfl: 1    celecoxib (CeleBREX) 100 mg capsule, TAKE 1 CAPSULE (100 MG TOTAL) BY MOUTH 2 (TWO) TIMES A DAY, Disp: 180 capsule, Rfl: 1    ergocalciferol (VITAMIN D2) 50,000 units, TAKE 1 CAPSULE (50,000 UNITS TOTAL) BY MOUTH ONCE A WEEK, Disp: 4 capsule, Rfl: 0    exemestane (AROMASIN) 25 MG tablet, Take 1 tablet (25 mg total) by mouth daily, Disp: 90 tablet, Rfl: 3     ezetimibe (ZETIA) 10 mg tablet, TAKE 1 TABLET (10 MG TOTAL) BY MOUTH DAILY, Disp: 30 tablet, Rfl: 5    gabapentin (NEURONTIN) 300 mg capsule, TAKE 1 CAPSULE (300 MG TOTAL) BY MOUTH 2 (TWO) TIMES A DAY, Disp: 90 capsule, Rfl: 1    metFORMIN (GLUCOPHAGE-XR) 500 mg 24 hr tablet, Take 1 tablet (500 mg total) by mouth daily with breakfast, Disp: 90 tablet, Rfl: 3    mirtazapine (REMERON) 7.5 MG tablet, TAKE 1 TABLET (7.5 MG TOTAL) BY MOUTH DAILY AT BEDTIME ELVIS 1 TABLETA EN LA NOCHE PARA DORMIR, Disp: 30 tablet, Rfl: 5    omeprazole (PriLOSEC) 20 mg delayed release capsule, TAKE 1 CAPSULE (20 MG TOTAL) BY MOUTH DAILY BEFORE BREAKFAST, Disp: 30 capsule, Rfl: 5    Vascepa 1 g CAPS, TAKE 2 CAPSULES (2 G TOTAL) BY MOUTH 2 (TWO) TIMES A DAY, Disp: , Rfl:       Allergies   Allergen Reactions    Chocolate - Food Allergy     Chocolate Flavor - Food Allergy     Ibuprofen Swelling    Pork-Derived Products - Food Allergy          Past Medical History:   Neuropathy  Arthritis  Lumbar spinal stenosis  Trigger finger  Complex regional pain syndrome  Mild Alzheimer's dementia    Past Surgical History:   Procedure Laterality Date    BREAST BIOPSY Left 2011    benign    BREAST BIOPSY Left     benign    BREAST LUMPECTOMY Left 2018    malignant    CARPAL TUNNEL RELEASE Right     right CTR    CARPAL TUNNEL RELEASE Left 2018     SECTION      CHOLECYSTECTOMY      TN NDSC WRST SURG W/RLS TRANSVRS CARPL LIGM Left 2018    Procedure: ENDOSCOPIOC RELEASE VOLAR CARPAL LIGAMENT;  Surgeon: Alfredo Miranda MD;  Location:  MAIN OR;  Service: Plastics    TN TENDON SHEATH INCISION Left 2018    Procedure: RELEASE 3RD TRIGGER FINGER  ;  Surgeon: Alfredo Miranda MD;  Location:  MAIN OR;  Service: Plastics    SENTINEL LYMPH NODE BIOPSY      TRIGGER FINGER RELEASE Left 2018         Social History:   The patient lives with her daughter and has no other children. She has a history of working for at least 15 years but is  "currently unable to work due to her medical conditions.    Family History:   Daughter will help, she just arrived from [place].    Review of Systems:   Not provided in the transcription.    /70 (BP Location: Left arm, Patient Position: Sitting, Cuff Size: Standard)   Pulse 86   Temp 97.8 °F (36.6 °C) (Tympanic)   Resp 16   Ht 4' 9\" (1.448 m)   Wt 80.7 kg (178 lb)   SpO2 98%   BMI 38.52 kg/m²       Physical Exam:   Non distress, normal respiratory effort. Pulse is regular. Heart without murmurs.       Lab Results:   Not provided in the transcription.    Imaging and Other Relevant Results:   Not provided in the transcription.    Assessment and Plan:   1. Neuropathy and Arthritis: The patient continues to experience significant pain and functional limitations. Recommend continuation of current pain management strategies and consider referral to a pain specialist if symptoms worsen.  2. Lumbar Spinal Stenosis: Continue current management. Consider physical therapy to improve mobility and reduce pain.  3. Trigger Finger: Monitor symptoms and consider referral to an orthopedic specialist if there is no improvement.  4. Complex Regional Pain Syndrome: Continue current pain management. Referral to a pain specialist may be necessary for further evaluation and treatment.  5. Mild Alzheimer's Dementia: Continue current management. Ensure regular follow-up with neurology.  6. Vaccinations: Administer shingles vaccine and RSV vaccine at the patient's pharmacy.  7. Routine Screenings: Order blood tests, mammogram, and bone density scan.  8. Disability Application: Provide a letter supporting the patient's disability application. Advise the patient to schedule an appointment with Social Security to submit the application.    Additional Notes:   Patient requires assistance with disability application and management of multiple chronic conditions.        Brody Taylor MD   [facility]" HEART

## 2024-06-11 NOTE — ASSESSMENT & PLAN NOTE
The choice of NSAID's must be use with caution in a higher risk patient. Acetaminophen can be used safetly in a appropriate dose only in a needed basis. The use ice and physical therapy is necessary in order to get the best results.   Always use ice and therapy to decrease or avoid the need for a Pharmacological agent.  She has deconditioning.

## 2024-06-11 NOTE — ASSESSMENT & PLAN NOTE
Patient has elevation of blood sugar without diagnosis of diabetes.  Risk explained, follow up in 3 months after life style modification that was explained to patient and committed to proceed withy..  Continue on metformin.

## 2024-06-12 ENCOUNTER — LAB (OUTPATIENT)
Dept: LAB | Facility: HOSPITAL | Age: 79
End: 2024-06-12
Payer: COMMERCIAL

## 2024-06-12 DIAGNOSIS — Z00.00 MEDICARE ANNUAL WELLNESS VISIT, SUBSEQUENT: ICD-10-CM

## 2024-06-12 LAB
ALBUMIN SERPL BCP-MCNC: 3.8 G/DL (ref 3.5–5)
ALP SERPL-CCNC: 83 U/L (ref 34–104)
ALT SERPL W P-5'-P-CCNC: 15 U/L (ref 7–52)
ANION GAP SERPL CALCULATED.3IONS-SCNC: 6 MMOL/L (ref 4–13)
AST SERPL W P-5'-P-CCNC: 21 U/L (ref 13–39)
BASOPHILS # BLD AUTO: 0.02 THOUSANDS/ÂΜL (ref 0–0.1)
BASOPHILS NFR BLD AUTO: 0 % (ref 0–1)
BILIRUB SERPL-MCNC: 0.36 MG/DL (ref 0.2–1)
BUN SERPL-MCNC: 8 MG/DL (ref 5–25)
CALCIUM SERPL-MCNC: 9.2 MG/DL (ref 8.4–10.2)
CHLORIDE SERPL-SCNC: 106 MMOL/L (ref 96–108)
CO2 SERPL-SCNC: 30 MMOL/L (ref 21–32)
CREAT SERPL-MCNC: 0.65 MG/DL (ref 0.6–1.3)
EOSINOPHIL # BLD AUTO: 0.19 THOUSAND/ÂΜL (ref 0–0.61)
EOSINOPHIL NFR BLD AUTO: 4 % (ref 0–6)
ERYTHROCYTE [DISTWIDTH] IN BLOOD BY AUTOMATED COUNT: 13.9 % (ref 11.6–15.1)
GFR SERPL CREATININE-BSD FRML MDRD: 84 ML/MIN/1.73SQ M
GLUCOSE P FAST SERPL-MCNC: 95 MG/DL (ref 65–99)
HCT VFR BLD AUTO: 38.2 % (ref 34.8–46.1)
HGB BLD-MCNC: 12.2 G/DL (ref 11.5–15.4)
IMM GRANULOCYTES # BLD AUTO: 0.01 THOUSAND/UL (ref 0–0.2)
IMM GRANULOCYTES NFR BLD AUTO: 0 % (ref 0–2)
LYMPHOCYTES # BLD AUTO: 2.4 THOUSANDS/ÂΜL (ref 0.6–4.47)
LYMPHOCYTES NFR BLD AUTO: 44 % (ref 14–44)
MCH RBC QN AUTO: 29.3 PG (ref 26.8–34.3)
MCHC RBC AUTO-ENTMCNC: 31.9 G/DL (ref 31.4–37.4)
MCV RBC AUTO: 92 FL (ref 82–98)
MONOCYTES # BLD AUTO: 0.7 THOUSAND/ÂΜL (ref 0.17–1.22)
MONOCYTES NFR BLD AUTO: 13 % (ref 4–12)
NEUTROPHILS # BLD AUTO: 2.16 THOUSANDS/ÂΜL (ref 1.85–7.62)
NEUTS SEG NFR BLD AUTO: 39 % (ref 43–75)
NRBC BLD AUTO-RTO: 0 /100 WBCS
PLATELET # BLD AUTO: 231 THOUSANDS/UL (ref 149–390)
PMV BLD AUTO: 10.1 FL (ref 8.9–12.7)
POTASSIUM SERPL-SCNC: 4.3 MMOL/L (ref 3.5–5.3)
PROT SERPL-MCNC: 6.9 G/DL (ref 6.4–8.4)
RBC # BLD AUTO: 4.16 MILLION/UL (ref 3.81–5.12)
SODIUM SERPL-SCNC: 142 MMOL/L (ref 135–147)
WBC # BLD AUTO: 5.48 THOUSAND/UL (ref 4.31–10.16)

## 2024-06-12 PROCEDURE — 36415 COLL VENOUS BLD VENIPUNCTURE: CPT

## 2024-06-12 PROCEDURE — 85025 COMPLETE CBC W/AUTO DIFF WBC: CPT

## 2024-06-12 PROCEDURE — 80053 COMPREHEN METABOLIC PANEL: CPT

## 2024-06-16 DIAGNOSIS — Z79.811 USE OF AROMATASE INHIBITORS: ICD-10-CM

## 2024-06-16 DIAGNOSIS — M85.89 OSTEOPENIA OF MULTIPLE SITES: ICD-10-CM

## 2024-06-16 DIAGNOSIS — R73.03 PRE-DIABETES: ICD-10-CM

## 2024-06-16 RX ORDER — METFORMIN HYDROCHLORIDE 500 MG/1
500 TABLET, EXTENDED RELEASE ORAL
Qty: 90 TABLET | Refills: 1 | Status: SHIPPED | OUTPATIENT
Start: 2024-06-16

## 2024-06-17 DIAGNOSIS — M85.89 OSTEOPENIA OF MULTIPLE SITES: ICD-10-CM

## 2024-06-17 RX ORDER — ERGOCALCIFEROL 1.25 MG/1
50000 CAPSULE ORAL WEEKLY
Qty: 4 CAPSULE | Refills: 0 | OUTPATIENT
Start: 2024-06-17

## 2024-06-17 RX ORDER — ERGOCALCIFEROL 1.25 MG/1
50000 CAPSULE ORAL WEEKLY
Qty: 12 CAPSULE | Refills: 3 | Status: SHIPPED | OUTPATIENT
Start: 2024-06-17

## 2024-07-13 DIAGNOSIS — E78.2 MIXED HYPERLIPIDEMIA: ICD-10-CM

## 2024-07-13 RX ORDER — ATORVASTATIN CALCIUM 40 MG/1
40 TABLET, FILM COATED ORAL
Qty: 100 TABLET | Refills: 1 | Status: SHIPPED | OUTPATIENT
Start: 2024-07-13

## 2024-07-24 DIAGNOSIS — F33.1 MODERATE EPISODE OF RECURRENT MAJOR DEPRESSIVE DISORDER (HCC): ICD-10-CM

## 2024-07-24 DIAGNOSIS — E78.2 MIXED HYPERLIPIDEMIA: ICD-10-CM

## 2024-07-24 RX ORDER — EZETIMIBE 10 MG/1
10 TABLET ORAL DAILY
Qty: 30 TABLET | Refills: 5 | Status: SHIPPED | OUTPATIENT
Start: 2024-07-24

## 2024-07-24 RX ORDER — MIRTAZAPINE 7.5 MG/1
7.5 TABLET, FILM COATED ORAL
Qty: 100 TABLET | Refills: 1 | Status: SHIPPED | OUTPATIENT
Start: 2024-07-24

## 2024-08-09 DIAGNOSIS — M25.562 ARTHRALGIA OF BOTH KNEES: ICD-10-CM

## 2024-08-09 DIAGNOSIS — M25.561 ARTHRALGIA OF BOTH KNEES: ICD-10-CM

## 2024-08-09 RX ORDER — CELECOXIB 100 MG/1
100 CAPSULE ORAL 2 TIMES DAILY
Qty: 180 CAPSULE | Refills: 1 | Status: SHIPPED | OUTPATIENT
Start: 2024-08-09

## 2024-08-20 ENCOUNTER — HOSPITAL ENCOUNTER (OUTPATIENT)
Dept: MAMMOGRAPHY | Facility: CLINIC | Age: 79
Discharge: HOME | End: 2024-08-20
Payer: COMMERCIAL

## 2024-08-20 VITALS — WEIGHT: 178 LBS | BODY MASS INDEX: 38.4 KG/M2 | HEIGHT: 57 IN

## 2024-08-20 DIAGNOSIS — Z09 FOLLOW-UP EXAM, 3-6 MONTHS SINCE PREVIOUS EXAM: ICD-10-CM

## 2024-08-20 PROCEDURE — G0279 TOMOSYNTHESIS, MAMMO: HCPCS

## 2024-08-20 PROCEDURE — 77066 DX MAMMO INCL CAD BI: CPT

## 2024-08-21 ENCOUNTER — OFFICE VISIT (OUTPATIENT)
Dept: FAMILY MEDICINE CLINIC | Facility: CLINIC | Age: 79
End: 2024-08-21
Payer: COMMERCIAL

## 2024-08-21 VITALS
BODY MASS INDEX: 39.7 KG/M2 | TEMPERATURE: 98 F | WEIGHT: 184 LBS | SYSTOLIC BLOOD PRESSURE: 120 MMHG | HEART RATE: 85 BPM | OXYGEN SATURATION: 98 % | DIASTOLIC BLOOD PRESSURE: 70 MMHG | HEIGHT: 57 IN | RESPIRATION RATE: 16 BRPM

## 2024-08-21 DIAGNOSIS — R31.9 URINARY TRACT INFECTION WITH HEMATURIA, SITE UNSPECIFIED: ICD-10-CM

## 2024-08-21 DIAGNOSIS — R30.0 DYSURIA: ICD-10-CM

## 2024-08-21 DIAGNOSIS — H81.10 BENIGN PAROXYSMAL POSITIONAL VERTIGO, UNSPECIFIED LATERALITY: Primary | ICD-10-CM

## 2024-08-21 DIAGNOSIS — B35.3 TINEA PEDIS OF BOTH FEET: ICD-10-CM

## 2024-08-21 DIAGNOSIS — N39.0 URINARY TRACT INFECTION WITH HEMATURIA, SITE UNSPECIFIED: ICD-10-CM

## 2024-08-21 DIAGNOSIS — M85.89 OSTEOPENIA OF MULTIPLE SITES: ICD-10-CM

## 2024-08-21 LAB
SL AMB  POCT GLUCOSE, UA: ABNORMAL
SL AMB LEUKOCYTE ESTERASE,UA: ABNORMAL
SL AMB POCT BILIRUBIN,UA: ABNORMAL
SL AMB POCT BLOOD,UA: ABNORMAL
SL AMB POCT CLARITY,UA: CLEAR
SL AMB POCT COLOR,UA: YELLOW
SL AMB POCT KETONES,UA: ABNORMAL
SL AMB POCT NITRITE,UA: ABNORMAL
SL AMB POCT PH,UA: 8
SL AMB POCT SPECIFIC GRAVITY,UA: 1
SL AMB POCT URINE PROTEIN: ABNORMAL
SL AMB POCT UROBILINOGEN: 0.2

## 2024-08-21 PROCEDURE — 99214 OFFICE O/P EST MOD 30 MIN: CPT | Performed by: FAMILY MEDICINE

## 2024-08-21 PROCEDURE — 87086 URINE CULTURE/COLONY COUNT: CPT | Performed by: FAMILY MEDICINE

## 2024-08-21 PROCEDURE — 81002 URINALYSIS NONAUTO W/O SCOPE: CPT | Performed by: FAMILY MEDICINE

## 2024-08-21 RX ORDER — LANCETS 33 GAUGE
EACH MISCELLANEOUS
COMMUNITY
Start: 2024-08-02

## 2024-08-21 RX ORDER — BLOOD SUGAR DIAGNOSTIC
STRIP MISCELLANEOUS
COMMUNITY
Start: 2024-08-02

## 2024-08-21 RX ORDER — ISOPROPYL ALCOHOL 0.75 G/1
SWAB TOPICAL
COMMUNITY
Start: 2024-08-02

## 2024-08-21 RX ORDER — CLOTRIMAZOLE 1 %
CREAM (GRAM) TOPICAL 2 TIMES DAILY
Qty: 40 G | Refills: 0 | Status: SHIPPED | OUTPATIENT
Start: 2024-08-21

## 2024-08-21 RX ORDER — FLUCONAZOLE 150 MG/1
150 TABLET ORAL ONCE
Qty: 1 TABLET | Refills: 0 | Status: SHIPPED | OUTPATIENT
Start: 2024-08-21 | End: 2024-08-21

## 2024-08-21 NOTE — PROGRESS NOTES
"Name: Enriqueta Garrett      : 1945      MRN: [id number]  Encounter Provider: Brody Taylor MD  Encounter Date: 2024   Encounter department: [facility] CARE [facility]    Back Pain, Wrist Pain, and Dizziness    Assessment and Plan:  1. Back Pain: The patient presents with back pain. Recommend physical therapy and NSAIDs for pain management. Consider imaging if symptoms persist.  2. Wrist Pain (Left side): The patient reports left wrist pain. Suggest rest, ice, compression, and elevation (RICE). Prescribe NSAIDs for pain relief. If no improvement, consider further evaluation with imaging.  3. Dizziness: The patient experiences dizziness. Advise hydration and monitor symptoms. If dizziness persists or worsens, consider further evaluation for potential underlying causes such as vestibular disorders or cardiovascular issues.    Subjective:  The patient presents with complaints of back pain, left wrist pain, and dizziness. The back pain has been persistent and is affecting daily activities. The left wrist pain is described as sharp and intermittent, exacerbated by movement. The dizziness is described as a sensation of spinning, occurring intermittently throughout the day.    Objective:  /70 (BP Location: Left arm, Patient Position: Sitting, Cuff Size: Standard)   Pulse 85   Temp 98 °F (36.7 °C) (Tympanic)   Resp 16   Ht 4' 9\" (1.448 m)   Wt 83.5 kg (184 lb)   SpO2 98%   BMI 39.82 kg/m²     Physical examination reveals tenderness in the lumbar region and limited range of motion in the left wrist. No obvious swelling or deformity noted in the wrist. Neurological examination is unremarkable. No signs of acute distress observed.        Depression Screening Follow-up Plan: Patient's depression screening was positive with a PHQ-2 score of . Their PHQ-9 score was . Continue regular follow-up with their psychologist/therapist/psychiatrist who is managing their mental health " condition(s).  Falls Plan of Care: Balance, strength, and gait training instructions were provided.

## 2024-08-21 NOTE — PATIENT INSTRUCTIONS
Patient Education     Preventing falls in adults   The Basics   Written by the doctors and editors at [facility]   Am I at risk of falling? -- Falls can happen to anyone, no matter how old they are. Several things can increase your risk of a fall, including:   Vision problems   Having certain chronic health conditions, being sick, having recently been in the hospital, or having had surgery   Changing the medicines you take   An unsafe or unfamiliar setting (for example, a room with rugs or furniture that might trip you, or an area you don't know well)  Your risk of falling increases as you grow older. That's because getting older can make it harder to walk steadily and keep your balance. Also, the effects of falls are more serious for older people.  Overall, 3 to 4 out of every 10 people over the age of 65 fall each year. Many people who fracture a hip never fully recover to how they were before the fracture. If you have fallen in the past, you are at higher risk of falling again.  How can my doctor help me avoid falling? -- Your doctor can talk to you about the following things:   Past falls - It is important to tell your doctor about any times that you have fallen or almost fallen. They can then suggest ways to prevent another fall.   Your health conditions - Some health problems can put you at risk of falling. These include conditions that affect eyesight, hearing, muscle strength, or balance.   What to do if you are in the hospital - Falls can happen in the hospital because you are in an unfamiliar place. You might feel unsteady or drowsy from medicines or anesthesia. Or you might be attached to catheters or IV tubing that you could trip over. You are also likely to be weaker than usual.   Your medicines - Certain medicines can increase the risk of falling. These include some medicines for sleeping problems, anxiety, high blood pressure, or depression. Adding new medicines, or changing doses of some medicines, can  also affect your risk of falling.  The more your doctor knows about your situation, the better they can help you. For example, if you fell because you have a condition that causes pain, your doctor might suggest treatments to help with the pain. Or if any of your medicines are making you dizzy and more likely to fall, your doctor might switch you to a different medicine.  Is there anything I can do on my own? -- Yes. To help keep from falling, you can:   Make your home safer - To avoid falling at home, get rid of things that might make you trip or slip. This can include furniture, electrical cords, clutter, and loose rugs (figure 1). Keep your home well lit so you can easily see where you are going. Avoid storing things in high places so you don't have to reach or climb.   Wear non-slip socks or sturdy shoes that fit well - Wearing shoes with high heels or slippery soles, or shoes that are too loose, can lead to falls. Walking around in bare feet, or only socks, can also increase your risk of falling.   Take vitamin D pills - Taking vitamin D might lower the risk of falls in older people. This is because vitamin D helps make bones and muscles stronger. Your doctor can talk to you about whether you should take extra vitamin D, and how much.   Stay active - Moving your body regularly can help lower your risk of falling. It might also help prevent you from getting hurt if you do fall. It is best to do a few different activities that help with both strength and balance. There are many kinds of exercise that can be safe for older people. These include walking, swimming, and isidro chi (a Chinese martial art involving slow, gentle movements).   Use a cane, walker, or other safety devices - If your doctor recommends that you use a cane or walker, make sure that it's the right size and you know how to use it. There are other devices that might help you avoid falling, too. These include grab bars or a sturdy seat for the  shower, non-slip bath mats, and hand rails or treads for the stairs (to prevent slipping).   Take extra care if you have had surgery or are in the hospital - Ask for help with getting out of bed, getting up from a chair, or going to the bathroom. Your body needs time to heal, and it's normal to need help with these things while you recover. It can also help to keep your belongings within reach, and avoid walking around in the dark. If you need help, use the call button instead of trying to get up.  If you worry that you could fall, you can get an emergency alert system. This is usually an alarm button that lets you call for help if you fall and can't get up. If you live alone and you do not have an emergency alert system, always carry a cell phone or portable phone with you when moving around the house.  How do I get up from a fall? -- If you do fall, try not to be afraid. Stay calm, and take slow, deep breaths. If someone is near you, call for help right away. If you have an emergency alert system, use it.  Try to find out if you are hurt. If you are hurt badly, trying to get up can make things worse. If you do not think that you are hurt badly, come up with a plan to get up off of the floor.  Some tips to get up after a fall if you are alone:   Look around you for a piece of sturdy furniture such as a couch or chair. Try to move your body to the furniture. You might need to scoot, crawl, or roll to get close. Do these movements very slowly. If you feel any sharp pains, you might need to stop.   Once you are close to the furniture, roll onto your side. Pull your knees up toward your chest, and try to get on your hands and knees.   Put your hands on the seat of the couch or chair.   Bring 1 leg forward so the foot is flat on the floor. If you have a stronger leg, move this leg first.   Now, try to stand up. Once both feet are on the ground, slowly turn and lower yourself to sit down on the seat.  What should I do  after a fall? -- If you had a fall, see your doctor right away, even if you aren't hurt. Your doctor can try to figure out what caused you to fall, and how likely you are to fall again. They will do an exam and talk to you about your health problems, medicines, and activities. They can also check how well you walk, move, and balance. Then, they can suggest things you can do to lower your risk of falling again.  Many older people have a hard time recovering after a fall. Doing things to prevent falling can help you protect your health and independence.  All topics are updated as new evidence becomes available and our peer review process is complete.  This topic retrieved from [employer] on: Apr 04, 2024.  Topic 60795 Version 25.0  Release: 32.2.4 - C32.93  © 2024 UpToDate, Inc. and/or its affiliates. All rights reserved.  figure 1: How to avoid falling at home     This picture shows some of the things that can cause a fall in your home. Look around and remove any loose rugs, electrical cords, clutter, or furniture that could trip you.  Graphic 76983 Version 1.0  Consumer Information Use and Disclaimer   Disclaimer: This generalized information is a limited summary of diagnosis, treatment, and/or medication information. It is not meant to be comprehensive and should be used as a tool to help the user understand and/or assess potential diagnostic and treatment options. It does NOT include all information about conditions, treatments, medications, side effects, or risks that may apply to a specific patient. It is not intended to be medical advice or a substitute for the medical advice, diagnosis, or treatment of a health care provider based on the health care provider's examination and assessment of a patient's specific and unique circumstances. Patients must speak with a health care provider for complete information about their health, medical questions, and treatment options, including any risks or benefits regarding  use of medications. This information does not endorse any treatments or medications as safe, effective, or approved for treating a specific patient. UpToDate, Inc. and its affiliates disclaim any warranty or liability relating to this information or the use thereof.The use of this information is governed by the Terms of Use, available at https://www.[employer].com/en/know/clinical-effectiveness-terms. 2024© UpToDate, Inc. and its affiliates and/or licensors. All rights reserved.  Copyright   © 2024 UpToDate, Inc. and/or its affiliates. All rights reserved.

## 2024-08-22 LAB — BACTERIA UR CULT: NORMAL

## 2024-08-25 DIAGNOSIS — K21.9 GASTROESOPHAGEAL REFLUX DISEASE WITHOUT ESOPHAGITIS: ICD-10-CM

## 2024-08-25 RX ORDER — BLOOD SUGAR DIAGNOSTIC
STRIP MISCELLANEOUS
Qty: 100 EACH | OUTPATIENT
Start: 2024-08-25

## 2024-08-25 RX ORDER — ISOPROPYL ALCOHOL 0.75 G/1
SWAB TOPICAL
Qty: 100 EACH | OUTPATIENT
Start: 2024-08-25

## 2024-08-25 RX ORDER — LANCETS 33 GAUGE
EACH MISCELLANEOUS
Qty: 100 EACH | OUTPATIENT
Start: 2024-08-25

## 2024-08-25 NOTE — RESULT ENCOUNTER NOTE
Ms. colon is contamination.  Patient will finish antibiotic to give the benefit of the doubt.  No side effects reported.

## 2024-09-17 DIAGNOSIS — G56.03 BILATERAL CARPAL TUNNEL SYNDROME: ICD-10-CM

## 2024-09-18 RX ORDER — GABAPENTIN 300 MG/1
CAPSULE ORAL
Qty: 180 CAPSULE | Refills: 1 | Status: SHIPPED | OUTPATIENT
Start: 2024-09-18

## 2024-09-30 ENCOUNTER — OFFICE VISIT (OUTPATIENT)
Dept: FAMILY MEDICINE CLINIC | Facility: CLINIC | Age: 79
End: 2024-09-30
Payer: COMMERCIAL

## 2024-09-30 VITALS
WEIGHT: 184 LBS | HEIGHT: 57 IN | SYSTOLIC BLOOD PRESSURE: 120 MMHG | OXYGEN SATURATION: 98 % | BODY MASS INDEX: 39.7 KG/M2 | RESPIRATION RATE: 16 BRPM | DIASTOLIC BLOOD PRESSURE: 80 MMHG | HEART RATE: 92 BPM | TEMPERATURE: 98 F

## 2024-09-30 DIAGNOSIS — Z23 ENCOUNTER FOR IMMUNIZATION: Primary | ICD-10-CM

## 2024-09-30 DIAGNOSIS — M72.2 PLANTAR FASCIITIS OF LEFT FOOT: ICD-10-CM

## 2024-09-30 PROCEDURE — G0008 ADMIN INFLUENZA VIRUS VAC: HCPCS

## 2024-09-30 PROCEDURE — 20550 NJX 1 TENDON SHEATH/LIGAMENT: CPT | Performed by: FAMILY MEDICINE

## 2024-09-30 PROCEDURE — 90662 IIV NO PRSV INCREASED AG IM: CPT

## 2024-09-30 PROCEDURE — 99214 OFFICE O/P EST MOD 30 MIN: CPT | Performed by: FAMILY MEDICINE

## 2024-09-30 NOTE — PROGRESS NOTES
Ambulatory Visit  Name: Enriqueta Garrett      : 1945      MRN: [id number]  Encounter Provider: Brody Taylor MD  Encounter Date: 2024   Encounter department: [facility] CARE [facility]    Assessment & Plan  Plantar fasciitis of left foot  1. Left Foot Pain: Likely plantar fasciitis.  2. Administered local anesthetic injection with 1% lidocaine without epinephrine and 40 mg triamcinolone.  3. Advised patient to perform regular foot massages with oil to alleviate pain.  4. Follow-up in one week to assess response to treatment and adjust management as necessary.       Encounter for immunization    Orders:    influenza vaccine, high-dose, PF 0.5 mL (Fluzone High Dose)       History of Present Illness     HPI  History obtained from patient.  The patient reports experiencing acute pain in the left foot, specifically in the plantar region. The pain began suddenly on Friday and has progressively worsened, becoming more intense and spreading to the sides of the foot. The patient mentioned that a previous injection administered two years ago provided relief. Currently, the pain is severe enough to impede walking, and the patient relies on assistance for mobility. The patient also noted that the foot feels hot to the touch.  Review of Systems  Past Medical History:   Diagnosis Date    Anxiety     Arthritis     Breast cancer (HCC) 2018    left    Cancer (HCC)     breast    Chronic pain disorder     Contusion of rib on left side 2022    Depression     Depression     Diabetes mellitus (HCC)     Hepatic steatosis     History of chemotherapy     Hypercholesterolemia     Hypertension     Irritable bowel syndrome     Major depressive disorder, single episode 2013    Last Assessment & Plan:  Formatting of this note might be different from the original. - Pt on Trazodone - Will add Mirtazapine as low dose regimen. Not likely to risk serotonin syndrome. Discussed with pt and  "family over the phone. - No thoughts hurting self/others    Migraine     Mucopurulent chronic bronchitis (HCC) 2023    Obesity     Type 2 diabetes mellitus with hyperglycemia, without long-term current use of insulin (HCC) 3/22/2019     Past Surgical History:   Procedure Laterality Date    BREAST BIOPSY Left 2011    benign    BREAST BIOPSY Left 2011    benign    BREAST LUMPECTOMY Left 2018    malignant    CARPAL TUNNEL RELEASE Right     right CTR    CARPAL TUNNEL RELEASE Left 2018     SECTION      CHOLECYSTECTOMY      MI NDSC WRST SURG W/RLS TRANSVRS CARPL LIGM Left 2018    Procedure: ENDOSCOPIOC RELEASE VOLAR CARPAL LIGAMENT;  Surgeon: Alfredo Miranda MD;  Location:  MAIN OR;  Service: Plastics    MI TENDON SHEATH INCISION Left 2018    Procedure: RELEASE 3RD TRIGGER FINGER  ;  Surgeon: Alfredo Miranda MD;  Location:  MAIN OR;  Service: Plastics    SENTINEL LYMPH NODE BIOPSY      TRIGGER FINGER RELEASE Left 2018           Objective     /80 (BP Location: Left arm, Patient Position: Sitting, Cuff Size: Standard)   Pulse 92   Temp 98 °F (36.7 °C) (Tympanic)   Resp 16   Ht 4' 9\" (1.448 m)   Wt 83.5 kg (184 lb)   SpO2 98%   BMI 39.82 kg/m²     Physical Exam  Non distress, normal respiratory effort. Pulse is regular. Heart without murmurs.     I have spent 35 minutes with Enriqueta today in which greater than 50% of this time was spent in counseling/coordination of care regarding Diagnostic results, Prognosis, Risks and benefits of tx options, Counseling / Coordination of care, Reviewing / ordering tests, medicine, procedures.  Please note this time includes cumulative time on the day of encounter, including reviewing medical records and/or coordinating care among the patient's other specialists.  Depression Screening Follow-up Plan: Patient's depression screening was positive with a PHQ-2 score of . Their PHQ-9 score was . Continue regular follow-up with their " psychologist/therapist/psychiatrist who is managing their mental health condition(s).

## 2024-10-01 LAB
DME PARACHUTE DELIVERY DATE ACTUAL: NORMAL
DME PARACHUTE DELIVERY DATE REQUESTED: NORMAL
DME PARACHUTE ITEM DESCRIPTION: NORMAL
DME PARACHUTE ITEM DESCRIPTION: NORMAL
DME PARACHUTE ORDER STATUS: NORMAL
DME PARACHUTE SUPPLIER NAME: NORMAL
DME PARACHUTE SUPPLIER PHONE: NORMAL

## 2024-10-28 DIAGNOSIS — B35.3 TINEA PEDIS OF BOTH FEET: ICD-10-CM

## 2024-10-28 DIAGNOSIS — R73.03 PRE-DIABETES: ICD-10-CM

## 2024-10-29 RX ORDER — CLOTRIMAZOLE 1 %
CREAM (GRAM) TOPICAL 2 TIMES DAILY
Qty: 30 G | Refills: 0 | Status: SHIPPED | OUTPATIENT
Start: 2024-10-29

## 2024-10-29 RX ORDER — METFORMIN HYDROCHLORIDE 500 MG/1
500 TABLET, EXTENDED RELEASE ORAL
Qty: 90 TABLET | Refills: 1 | Status: SHIPPED | OUTPATIENT
Start: 2024-10-29

## 2024-11-01 DIAGNOSIS — Z17.0 MALIGNANT NEOPLASM OF LOWER-INNER QUADRANT OF LEFT BREAST IN FEMALE, ESTROGEN RECEPTOR POSITIVE (HCC): ICD-10-CM

## 2024-11-01 DIAGNOSIS — C50.312 MALIGNANT NEOPLASM OF LOWER-INNER QUADRANT OF LEFT BREAST IN FEMALE, ESTROGEN RECEPTOR POSITIVE (HCC): ICD-10-CM

## 2024-11-01 DIAGNOSIS — F33.1 MODERATE EPISODE OF RECURRENT MAJOR DEPRESSIVE DISORDER (HCC): ICD-10-CM

## 2024-11-01 RX ORDER — MIRTAZAPINE 7.5 MG/1
TABLET, FILM COATED ORAL
Qty: 30 TABLET | Refills: 5 | Status: SHIPPED | OUTPATIENT
Start: 2024-11-01

## 2024-11-01 RX ORDER — EXEMESTANE 25 MG/1
25 TABLET ORAL DAILY
Qty: 90 TABLET | Refills: 3 | Status: SHIPPED | OUTPATIENT
Start: 2024-11-01 | End: 2025-10-27

## 2024-11-17 DIAGNOSIS — E78.2 MIXED HYPERLIPIDEMIA: ICD-10-CM

## 2024-11-18 RX ORDER — ATORVASTATIN CALCIUM 40 MG/1
TABLET, FILM COATED ORAL
Qty: 100 TABLET | Refills: 1 | Status: SHIPPED | OUTPATIENT
Start: 2024-11-18

## 2024-12-10 ENCOUNTER — RA CDI HCC (OUTPATIENT)
Dept: OTHER | Facility: HOSPITAL | Age: 79
End: 2024-12-10

## 2024-12-17 ENCOUNTER — TELEPHONE (OUTPATIENT)
Age: 79
End: 2024-12-17

## 2024-12-17 ENCOUNTER — OFFICE VISIT (OUTPATIENT)
Dept: FAMILY MEDICINE CLINIC | Facility: CLINIC | Age: 79
End: 2024-12-17
Payer: COMMERCIAL

## 2024-12-17 ENCOUNTER — PATIENT OUTREACH (OUTPATIENT)
Dept: CASE MANAGEMENT | Facility: OTHER | Age: 79
End: 2024-12-17

## 2024-12-17 VITALS
HEIGHT: 57 IN | TEMPERATURE: 97.8 F | BODY MASS INDEX: 40.56 KG/M2 | SYSTOLIC BLOOD PRESSURE: 120 MMHG | DIASTOLIC BLOOD PRESSURE: 70 MMHG | HEART RATE: 92 BPM | OXYGEN SATURATION: 97 % | WEIGHT: 188 LBS | RESPIRATION RATE: 16 BRPM

## 2024-12-17 DIAGNOSIS — R73.03 PRE-DIABETES: ICD-10-CM

## 2024-12-17 DIAGNOSIS — E78.2 MIXED HYPERLIPIDEMIA: ICD-10-CM

## 2024-12-17 DIAGNOSIS — Z59.41 FOOD INSECURITY: Primary | ICD-10-CM

## 2024-12-17 DIAGNOSIS — F32.2 MODERATELY SEVERE MAJOR DEPRESSION (HCC): ICD-10-CM

## 2024-12-17 DIAGNOSIS — E66.01 OBESITY, MORBID (HCC): ICD-10-CM

## 2024-12-17 DIAGNOSIS — R51.9 INCREASED SEVERITY OF HEADACHES: ICD-10-CM

## 2024-12-17 DIAGNOSIS — F33.1 MODERATE EPISODE OF RECURRENT MAJOR DEPRESSIVE DISORDER (HCC): Primary | ICD-10-CM

## 2024-12-17 DIAGNOSIS — Z59.48 LACK OF FOOD: ICD-10-CM

## 2024-12-17 PROCEDURE — G2211 COMPLEX E/M VISIT ADD ON: HCPCS | Performed by: FAMILY MEDICINE

## 2024-12-17 PROCEDURE — 99215 OFFICE O/P EST HI 40 MIN: CPT | Performed by: FAMILY MEDICINE

## 2024-12-17 RX ORDER — CITALOPRAM HYDROBROMIDE 10 MG/1
10 TABLET ORAL DAILY
Qty: 30 TABLET | Refills: 5 | Status: SHIPPED | OUTPATIENT
Start: 2024-12-17

## 2024-12-17 RX ORDER — TOPIRAMATE 25 MG/1
25 TABLET, FILM COATED ORAL 2 TIMES DAILY
Qty: 60 TABLET | Refills: 5 | Status: SHIPPED | OUTPATIENT
Start: 2024-12-17

## 2024-12-17 SDOH — ECONOMIC STABILITY - FOOD INSECURITY: OTHER SPECIFIED LACK OF ADEQUATE FOOD: Z59.48

## 2024-12-17 SDOH — ECONOMIC STABILITY - FOOD INSECURITY: FOOD INSECURITY: Z59.41

## 2024-12-17 NOTE — ASSESSMENT & PLAN NOTE
We discussed the initial approach to the obese patient who desires to lose weight. Patients should diet and exercise, as the NIH Expert Panel recommended in 2017. Combining a reduced-calorie diet and exercise is more productive than either alone. Additional weight loss may be possible with some medication regimens. The initial goal of weight loss therapy (diet and exercise) is a 10% reduction in body weight over six months. After the initial 6-month period, we will reassess to determine the efficacy of the therapy; whether the patient needs to lose more weight, the patient should establish a weight maintenance program. We will reassess at the next appointment GLP1 analog choice.

## 2024-12-17 NOTE — PROGRESS NOTES
Received direct PCP referral for food insecurity.  Referral placed for social work care management.

## 2024-12-17 NOTE — TELEPHONE ENCOUNTER
Contacted patient in regards to Routine Referral in attempts to verify patient's needs of services and add patient to proper wait list. LVM for patient to contact intake dept  in regards to routine referral at [phone]. 1st attempt.

## 2024-12-17 NOTE — PROGRESS NOTES
Name: Enriqueta Garrett      : 1945      MRN: [id number]  Encounter Provider: Brody Taylor MD  Encounter Date: 2024   Encounter department: [facility] PRIMARY CARE [facility]    Assessment & Plan  Moderate episode of recurrent major depressive disorder (HCC)  Depression Screening Follow-up Plan: Patient's depression screening was positive with a PHQ-2 score of 4. Their PHQ-9 score was 12. Patient assessed for underlying major depression. They have no active suicidal ideations. Brief counseling provided and recommend additional follow-up/re-evaluation next office visit.    The patient's depression screening results were positive, with a PHQ-9 score of 12. The patient was assessed for underlying Moderate major depression with  no active suicidal ideations. Brief counseling was provided, and I recommend additional follow-up for re-evaluation during the next office visit. Mental health referral placed. The patient expressed frustration about not having enough income to pay her bills. This situation seems to indicate more of a reactive depression or adjustment disorder. Unfortunately, both she and her  lack the physical drive and strength to work, leading them to depend on their children for support. I suggested that she consider moving in with her daughter to alleviate some of the financial stress. In January She is facing eviction from her apartment, which they can no longer afford. She is planning to apply for housing assistance. I explained that I see other options as very challenging to pursue, especially given the lack of correction planning. I encouraged her to schedule an appointment for mental health counseling. Additionally, I am requesting complex care management to explore any viable options that could help mitigate cold and hunger during the  season.   Orders:    citalopram (CeleXA) 10 mg tablet; Take 1 tablet (10 mg total) by mouth daily    Ambulatory  referral to Psych Services; Future    Increased severity of headaches  Multifactorial, failed to help with tylenol. Will start Topamax and reassess.  Orders:    topiramate (Topamax) 25 mg tablet; Take 1 tablet (25 mg total) by mouth 2 (two) times a day    Pre-diabetes  Patient has elevation of blood sugar without diagnosis of diabetes.  Risk explained, follow up in 3 months after life style modification that was explained to patient and committed to proceed withy..    Orders:    Hemoglobin A1C; Future    Mixed hyperlipidemia    Orders:    Comprehensive metabolic panel; Future    Lipid Panel with Direct LDL reflex; Future    Obesity, morbid (HCC)  We discussed the initial approach to the obese patient who desires to lose weight. Patients should diet and exercise, as the NIH Expert Panel recommended in 2017. Combining a reduced-calorie diet and exercise is more productive than either alone. Additional weight loss may be possible with some medication regimens. The initial goal of weight loss therapy (diet and exercise) is a 10% reduction in body weight over six months. After the initial 6-month period, we will reassess to determine the efficacy of the therapy; whether the patient needs to lose more weight, the patient should establish a weight maintenance program. We will reassess at the next appointment GLP1 analog choice.         Lack of food  Their economical situation is serious since she migrated to the [facility] in older age. They have no saving or any nursing home funds. Social security income is   Orders:    Ambulatory Referral to Complex Care Management Program; Future           Subjective:  78-year-old French-speaking female presents with worsening depression and multiple psychosocial stressors. Patient reports significant depressive symptoms including crying spells, hopelessness, and passive suicidal ideation  with desire to disappear. She describes persistent insomnia, headaches, and decreased appetite. Patient  "is experiencing severe financial hardship and housing insecurity, with pending eviction in January. Currently living with her  who has limited income from [employer]($900/month), and she gets  $450. Unable to pay for utilities and basic necessities. Reports strong family support from daughters but limited housing options due to their circumstances and not enough room. If she move with them only available room will be the living room. Patient attends Jewish regularly and identifies as deeply Gnosticism. Currently taking unspecified antidepressant medication and reports occasional use of antihypertensive medication for headaches. Denies current substance use.    Objective:  /70 (BP Location: Left arm, Patient Position: Sitting, Cuff Size: Standard)   Pulse 92   Temp 97.8 °F (36.6 °C) (Tympanic)   Resp 16   Ht 4' 9\" (1.448 m)   Wt 85.3 kg (188 lb)   SpO2 97%   BMI 40.68 kg/m²     General: Elderly female in visible emotional distress  Psychiatric:  - Mood appears depressed with congruent affect  - Active suicidal ideation present  - No apparent psychosis  - Oriented x3  - Speech clear and coherent    No other physical exam elements noted in transcription    Additional Notes:  Patient requires urgent psychiatric evaluation and  intervention. Consider emergency services if suicidal risk increases.  used during visit.    During this visit, I spent 60 minutes with the patient; more than 50% of the time I counseled the patient regarding care all his multiple chronic conditions.     "

## 2024-12-18 ENCOUNTER — PATIENT OUTREACH (OUTPATIENT)
Dept: CASE MANAGEMENT | Facility: OTHER | Age: 79
End: 2024-12-18

## 2024-12-18 DIAGNOSIS — Z78.9 NEEDS ASSISTANCE WITH COMMUNITY RESOURCES: Primary | ICD-10-CM

## 2024-12-18 NOTE — PROGRESS NOTES
"Chart review indicates that an order was placed to follow up with patient regarding food insecurity. Pt at OV with MDD. Pt with worsening depression and multiple psychosocial stressors. Oriented x3. Pt reports significant depressive symptoms including crying spells, hopelessness and active suicidal ideation with thoughts of \"taking something\" or \"going somewhere to end it.\" Pt prescribed Celexa by provider. Order placed for [facility], they did attempt to reach pt yesterday, VM left. Pt experiencing severe financial hardship and housing insecurity with pending eviction in January. Currently living with son who has limited income. Unable to pay utilities and basic necessities. Pt has strong family support from daughter but limited housing options due to their circumstances.     [facility] outreached pt via [employer]  #[id number]. Pt spouse answered and stated that pt is not home at this time. Introduced self and reason for call. Message left with spouse for pt to return call to [facility]. [facility] to follow.     Later entry    IB from ADILENE Man that pt had returned call. [facility] outreached pt again via [employer]  and spoke to patient. On phone for extended amount of time with patient due to phone issues and language barrier. Pt confirms an increase in depressive symptoms which she relates to financial struggles. She has not received OP MH in 5 years. She is unable to articulate effective coping mechanisms and does not note anyone she can confide in. She denies any recent thoughts of wanting to harm herself or others to this [facility]. [facility] did inform her to go to ER or call crisis should symptoms increase. Spouse was also informed of this. She is interested in OP MH and would like this  assistance to be connected to services. Pt denied desire for a higher level of care related to her depression at this time.    Pt and spouse are renting an apartment. They are 3 months behind in rent. [facility] has informed them verbally that " "may be evicted in January, they have not received a written notice yet. SOLIS provided her with resource of [facility] for advice. They are actively looking for a new apt. No family to stay with in January should they need to leave the apt. Pt and spouse aware need to call 211 if fearful they will not have place to stay and may need shelter. They both receive SS and spouse had been able to work prior to supplement rent, he can no longer work. They are not open to a \"room\" at this time, must be an apt. SOLISCM will try [facility] for availability. They do not receive any housing assistance. They would like help to apply for Low Rent Senior Housing, but are aware that this is not a short term fix, only long term. Appears they are on multiple sites looking for apartments and appears daughter is helping to look.     They receive SNAP and deny food insecurity at this time. Pt deferred to her spouse regarding utilities. They are behind on water and PPL bill (no shut off). Does not appear connected to [facility] or [facility], would like [facility] assistance. Referral placed.     Spouse has a vehicle. They had to go at this time as spouse had medical appointment.    Potential referral to [facility] for added support.     SOLIS did call Tree at [facility] for availability. VM left. JOEY to follow.     Case made complex to follow for connection to services.  "

## 2024-12-19 ENCOUNTER — PATIENT OUTREACH (OUTPATIENT)
Dept: CASE MANAGEMENT | Facility: OTHER | Age: 79
End: 2024-12-19

## 2024-12-19 NOTE — PROGRESS NOTES
CMOC attempted outreach to pt. CMOC left pt a message introducing self and ask for a return call regarding getting help with housing, [employer] and [employer].    Will F/U by 12/23 and routed note to care team.

## 2024-12-20 ENCOUNTER — PATIENT OUTREACH (OUTPATIENT)
Dept: CASE MANAGEMENT | Facility: OTHER | Age: 79
End: 2024-12-20

## 2024-12-20 ENCOUNTER — TELEPHONE (OUTPATIENT)
Age: 79
End: 2024-12-20

## 2024-12-20 NOTE — TELEPHONE ENCOUNTER
Contacted patient in regards to Routine Referral in attempts to verify patient's needs of services and add patient to proper wait list. Unable to LVM VM states the person you have reached is not accepting calls at this time. 2nd attempt.

## 2024-12-23 ENCOUNTER — PATIENT OUTREACH (OUTPATIENT)
Dept: CASE MANAGEMENT | Facility: OTHER | Age: 79
End: 2024-12-23

## 2024-12-23 NOTE — PROGRESS NOTES
CMOC outreached to the Language Line for a  [place] #[id number] . CMOC asked  to ask pt to complete an assessment and this was regarding pt needing utility assistance and housing. Pt agreed and while trying to complete pt CMOC Screen Tool, the  Soy communicated that he was having difficulty understanding pt. Patient was put in as a log on [employer] to [place] Benefits Assistance for goods CMOC asked when would pt and family need to leave or move from their home, pt communicated by mid January . CMOC informed Soy, the  that CMOC was doing an application for housing at the [facility] in [place], [place].,. Pt communicated that their electric and gas bill is very high and she could use help from CMOC.   As CMOC proceeded to have  Soy translate and ask pt for demographic information and pt had a lot of difficulty understanding Soy questions that CMOC would have him interpret. CMOC asked if pt had  someone else in the home that we could speak with? Pt responded with putting CMOC on hold and saying she was cooking and CMOC &  spoke with pt daughter. Pt communicated to  Soy that her daughter is 48 and have no children of her own who live with pt and her spouse. Pt stated that her daughter would need to be added to the housing application. CMOC spoke with pt daughter through the  Soy and she was able to provide pt ss number, D.O.B, and pt state id number.When CMOC asked pt daughter for her ss number id state id number, she communicated to Soy that she did not have one yet for both. Pt communicated that her daughter just came to [place] both that long ago and is waiting on her ss number and state identification to arrive.  When CMOC begin to ask pt and daughter for pt spouse information, pt stated she don't know and CMOC will have to talk with him, because she does not have all of their financial information too.Pt  communicated that her spouse would know everything and would not be home until 4:30 or 5pm. CMOC asked pt if her  work and get ss? Pt stated no, and that pt spouse have not worked in over a year ago and they both receive their income from [employer]. Pt communicated she believe she receive $500 and her spouse get about $900 per month and the daughter have no income. CMOC responded okay and could CMOC call tomorrow morning to speak with pt spouse and pt responded with a yes and that 10 am is fine for CMOC to call on 12/24.    Will route note to care team and F/U by 12/24.

## 2024-12-24 ENCOUNTER — PATIENT OUTREACH (OUTPATIENT)
Dept: CASE MANAGEMENT | Facility: OTHER | Age: 79
End: 2024-12-24

## 2024-12-24 NOTE — PROGRESS NOTES
CMOC outreached the Language line to get an  for [place]. CMOC spoke with  Noa #[id number] who was able to do a conference call to pt spouse at the home number. Noa communicated that there was no answer and a voicemail and did OC want to leave a message for pt? CMOC communicated to the  that OC is from [facility] , and was returning our scheduled call this morning to continue the housing applications with pt spouse and to please feel free to return CMOC call at [phone]. [facility] thanked the  Noa for her assistance and wished her a happy holiday!    Will F/U by 12/27

## 2024-12-26 ENCOUNTER — APPOINTMENT (OUTPATIENT)
Dept: LAB | Facility: HOSPITAL | Age: 79
End: 2024-12-26
Payer: COMMERCIAL

## 2024-12-26 ENCOUNTER — OFFICE VISIT (OUTPATIENT)
Dept: FAMILY MEDICINE CLINIC | Facility: CLINIC | Age: 79
End: 2024-12-26
Payer: COMMERCIAL

## 2024-12-26 VITALS
SYSTOLIC BLOOD PRESSURE: 110 MMHG | RESPIRATION RATE: 16 BRPM | HEART RATE: 92 BPM | DIASTOLIC BLOOD PRESSURE: 80 MMHG | WEIGHT: 178 LBS | BODY MASS INDEX: 38.52 KG/M2 | TEMPERATURE: 98.3 F | OXYGEN SATURATION: 97 %

## 2024-12-26 DIAGNOSIS — E78.2 MIXED HYPERLIPIDEMIA: ICD-10-CM

## 2024-12-26 DIAGNOSIS — E86.0 DEHYDRATION, MODERATE: ICD-10-CM

## 2024-12-26 DIAGNOSIS — R30.0 DYSURIA: ICD-10-CM

## 2024-12-26 DIAGNOSIS — R73.03 PRE-DIABETES: ICD-10-CM

## 2024-12-26 DIAGNOSIS — K52.9 GASTROENTERITIS: Primary | ICD-10-CM

## 2024-12-26 LAB
ALBUMIN SERPL BCG-MCNC: 3.8 G/DL (ref 3.5–5)
ALP SERPL-CCNC: 79 U/L (ref 34–104)
ALT SERPL W P-5'-P-CCNC: 25 U/L (ref 7–52)
ANION GAP SERPL CALCULATED.3IONS-SCNC: 6 MMOL/L (ref 4–13)
AST SERPL W P-5'-P-CCNC: 34 U/L (ref 13–39)
BILIRUB SERPL-MCNC: 0.5 MG/DL (ref 0.2–1)
BUN SERPL-MCNC: 13 MG/DL (ref 5–25)
CALCIUM SERPL-MCNC: 8.9 MG/DL (ref 8.4–10.2)
CHLORIDE SERPL-SCNC: 106 MMOL/L (ref 96–108)
CHOLEST SERPL-MCNC: 106 MG/DL (ref ?–200)
CO2 SERPL-SCNC: 30 MMOL/L (ref 21–32)
CREAT SERPL-MCNC: 0.64 MG/DL (ref 0.6–1.3)
EST. AVERAGE GLUCOSE BLD GHB EST-MCNC: 123 MG/DL
GFR SERPL CREATININE-BSD FRML MDRD: 83 ML/MIN/1.73SQ M
GLUCOSE P FAST SERPL-MCNC: 91 MG/DL (ref 65–99)
HBA1C MFR BLD: 5.9 %
HDLC SERPL-MCNC: 41 MG/DL
LDLC SERPL CALC-MCNC: 46 MG/DL (ref 0–100)
POTASSIUM SERPL-SCNC: 3.9 MMOL/L (ref 3.5–5.3)
PROT SERPL-MCNC: 6.8 G/DL (ref 6.4–8.4)
SL AMB  POCT GLUCOSE, UA: ABNORMAL
SL AMB LEUKOCYTE ESTERASE,UA: ABNORMAL
SL AMB POCT BILIRUBIN,UA: ABNORMAL
SL AMB POCT BLOOD,UA: ABNORMAL
SL AMB POCT CLARITY,UA: ABNORMAL
SL AMB POCT COLOR,UA: ABNORMAL
SL AMB POCT KETONES,UA: ABNORMAL
SL AMB POCT NITRITE,UA: ABNORMAL
SL AMB POCT PH,UA: 5
SL AMB POCT SPECIFIC GRAVITY,UA: 1.03
SL AMB POCT URINE PROTEIN: ABNORMAL
SL AMB POCT UROBILINOGEN: 0.2
SODIUM SERPL-SCNC: 142 MMOL/L (ref 135–147)
TRIGL SERPL-MCNC: 93 MG/DL (ref ?–150)

## 2024-12-26 PROCEDURE — 99214 OFFICE O/P EST MOD 30 MIN: CPT

## 2024-12-26 PROCEDURE — 81002 URINALYSIS NONAUTO W/O SCOPE: CPT

## 2024-12-26 PROCEDURE — 80061 LIPID PANEL: CPT

## 2024-12-26 PROCEDURE — 36415 COLL VENOUS BLD VENIPUNCTURE: CPT

## 2024-12-26 PROCEDURE — 83036 HEMOGLOBIN GLYCOSYLATED A1C: CPT

## 2024-12-26 PROCEDURE — 80053 COMPREHEN METABOLIC PANEL: CPT

## 2024-12-26 RX ORDER — ONDANSETRON 4 MG/1
4 TABLET, FILM COATED ORAL EVERY 8 HOURS PRN
Qty: 24 TABLET | Refills: 0 | Status: SHIPPED | OUTPATIENT
Start: 2024-12-26 | End: 2025-01-03

## 2024-12-26 RX ORDER — LOPERAMIDE HYDROCHLORIDE 2 MG/1
2 CAPSULE ORAL 4 TIMES DAILY PRN
Qty: 30 CAPSULE | Refills: 0 | Status: SHIPPED | OUTPATIENT
Start: 2024-12-26 | End: 2025-01-03

## 2024-12-26 RX ORDER — PHENAZOPYRIDINE HYDROCHLORIDE 200 MG/1
200 TABLET, FILM COATED ORAL
Qty: 6 TABLET | Refills: 0 | Status: SHIPPED | OUTPATIENT
Start: 2024-12-26 | End: 2024-12-28

## 2024-12-26 NOTE — ASSESSMENT & PLAN NOTE
Do not believe dysuria is second to infection but rather possible vulvar irritation. Urine dip negative for infection but there are signs of dehydration such as elevated ketones and elevated concentration. As  exam is deferred will trial pyridium 200mg TID for two days. Should symptoms not improve recommend re-evaluation and  exam in 1-2 weeks.   Orders:    POCT urine dip    phenazopyridine (PYRIDIUM) 200 mg tablet; Take 1 tablet (200 mg total) by mouth 3 (three) times a day with meals for 2 days

## 2024-12-26 NOTE — ASSESSMENT & PLAN NOTE
Likely viral gastroenteritis. No alarming symptoms at this time but pt is moderately dehydrated. Will treat conservatively and recommend the following: maintaining hydration 64oz of water a day (can use Pedialyte for hydration), recommend advancing diet as tolerated, frequent hand washing, imodium 2mg QID PRN, and zofran 4mg q8 PRN. ED precautions given but not limited to: new onset fever/chills, hematemesis, hematochezia,worsening abdominal pain  Orders:    loperamide (IMODIUM) 2 mg capsule; Take 1 capsule (2 mg total) by mouth 4 (four) times a day as needed for diarrhea for up to 8 days    ondansetron (ZOFRAN) 4 mg tablet; Take 1 tablet (4 mg total) by mouth every 8 (eight) hours as needed for nausea or vomiting for up to 8 days

## 2024-12-26 NOTE — PROGRESS NOTES
Name: Enriqueta Garrett      : 1945      MRN: [id number]  Encounter Provider: Enrique Sullivan MD  Encounter Date: 2024   Encounter department: [facility] PRIMARY CARE [facility]  :  Assessment & Plan  Gastroenteritis  Likely viral gastroenteritis. No alarming symptoms at this time but pt is moderately dehydrated. Will treat conservatively and recommend the following: maintaining hydration 64oz of water a day (can use Pedialyte for hydration), recommend advancing diet as tolerated, frequent hand washing, imodium 2mg QID PRN, and zofran 4mg q8 PRN. ED precautions given but not limited to: new onset fever/chills, hematemesis, hematochezia,worsening abdominal pain  Orders:    loperamide (IMODIUM) 2 mg capsule; Take 1 capsule (2 mg total) by mouth 4 (four) times a day as needed for diarrhea for up to 8 days    ondansetron (ZOFRAN) 4 mg tablet; Take 1 tablet (4 mg total) by mouth every 8 (eight) hours as needed for nausea or vomiting for up to 8 days    Dehydration, moderate  Management.  Gastroenteritis.       Dysuria  Do not believe dysuria is second to infection but rather possible vulvar irritation. Urine dip negative for infection but there are signs of dehydration such as elevated ketones and elevated concentration. As  exam is deferred will trial pyridium 200mg TID for two days. Should symptoms not improve recommend re-evaluation and  exam in 1-2 weeks.   Orders:    POCT urine dip    phenazopyridine (PYRIDIUM) 200 mg tablet; Take 1 tablet (200 mg total) by mouth 3 (three) times a day with meals for 2 days           History of Present Illness     80yo female presenting to clinic for evaluation of emesis and diarrhea. Also having dysuria.    Vomiting/diarrhea: started 3 days ago. Has not had any change in diet, no new medications, no recent travel, no etoh use. Her daughter also having similar symptoms, last seen about 3 days ago. Was having about 4 episodes of emesis a  day and 5+ episodes of diarrhea a day (regular bowel habit once every other day).  No vomiting or diarrhea today.  Giving hematemesis, hematuria, hematochezia.  Is having some fatigue as well as dizziness and weakness she has been unable to to keep much down up until this morning.    Dysuria: going on for about 2 weeks. Pain worse at the end of urination.  States that discomfort mostly feels internal.  Denying any vaginal bleeding, dryness, or discharge.  She is denying any frequency, still urinating at baseline approximately 2 times a day.  Patient uses depends for urge incontinence going on for a while.  Denying any skin abrasion or rash near her vaginal area.  Remaining ROS is notable      Review of Systems   Constitutional:  Positive for appetite change, chills and fatigue. Negative for fever.   Gastrointestinal:  Positive for abdominal pain, diarrhea, nausea and vomiting. Negative for blood in stool and constipation.   Genitourinary:  Positive for dysuria. Negative for difficulty urinating, frequency, hematuria, urgency, vaginal bleeding, vaginal discharge and vaginal pain.   Neurological:  Positive for dizziness and weakness.       Objective   /80 (BP Location: Right arm, Patient Position: Sitting, Cuff Size: Standard)   Pulse 92   Temp 98.3 °F (36.8 °C) (Tympanic)   Resp 16   Wt 80.7 kg (178 lb)   SpO2 97%   BMI 38.52 kg/m²      Physical Exam  Vitals and nursing note reviewed.   Constitutional:       General: She is not in acute distress.     Appearance: Normal appearance. She is not ill-appearing, toxic-appearing or diaphoretic.   HENT:      Head: Normocephalic.      Nose: Nose normal.      Mouth/Throat:      Lips: Pink.      Mouth: Mucous membranes are dry. No injury or oral lesions.      Tongue: No lesions. Tongue does not deviate from midline.      Palate: No mass and lesions.      Pharynx: Oropharynx is clear. Uvula midline.      Comments: Mild cheilitis  Eyes:      Conjunctiva/sclera:  Conjunctivae normal.   Cardiovascular:      Rate and Rhythm: Normal rate and regular rhythm.      Heart sounds: Normal heart sounds.   Pulmonary:      Effort: Pulmonary effort is normal.      Breath sounds: Normal breath sounds.   Abdominal:      General: Abdomen is flat. There is no distension.      Palpations: Abdomen is soft. There is no shifting dullness.      Tenderness: There is abdominal tenderness in the epigastric area. There is no right CVA tenderness, left CVA tenderness, guarding or rebound.   Genitourinary:     Comments: Deferred  Musculoskeletal:         General: Normal range of motion.      Cervical back: Neck supple.   Skin:     General: Skin is warm and dry.   Neurological:      General: No focal deficit present.      Mental Status: She is alert.   Psychiatric:         Mood and Affect: Mood normal.         Behavior: Behavior normal.

## 2024-12-26 NOTE — PATIENT INSTRUCTIONS
"Patient Education     Gastroenteritis viral en adultos   Conceptos Básicos   Redactado por los médicos y editores de [employer]   ¿Qué es la gastroenteritis viral? -- La gastroenteritis viral es isiah infección que puede causar diarrea y vómitos. Se produce cuando el estómago y los intestinos se infectan con un virus (figura 1). Isiah de las causas más frecuentes de gastroenteritis es el norovirus. Sin embargo, otros virus también pueden causarla.  Isiah persona puede contraer gastroenteritis viral si:   Toca a isiah persona o isiah superficie infectada con el virus y luego no se lava las tolu.   Come alimentos o mark líquidos que contienen el virus. Si las personas que tienen el virus no se lavan las tolu, pueden esparcirlo a los alimentos o líquidos que toquen.  ¿Cuáles son los síntomas de la gastroenteritis viral? -- La infección causa diarrea y vómitos. Puede kory vómitos o diarrea, o ambas cosas a la vez. Estos síntomas por lo general empiezan en forma repentina y pueden ser graves.  La gastroenteritis viral también puede causar:   Fiebre   Dolor de boogie o de los músculos   Dolor o calambres en el área del estómago   Pérdida del apetito  Si tiene mucha diarrea y vómitos, trujillo cuerpo puede perder demasiada agua. [place] se llama \"deshidratación\". La deshidratación puede causar sed, cansancio, mareo o hasta confusión. También puede hacer que la orina sea de color amarillo oscuro.  La deshidratación grave puede poner en riesgo la camille. Las personas mayores tienen más posibilidades de tener deshidratación grave.  ¿Es necesario que me realice pruebas? -- En general, no. Trujillo médico o enfermero debería poder determinar si tiene gastroenteritis viral al observar ita síntomas y hacerle un examen. Sin embargo, es posible que realice pruebas para detectar la deshidratación y determinar qué virus es la causa de la infección. Algunas de esas pruebas pueden ser:   Pruebas de tara   Pruebas de orina   Pruebas de isiah muestra de " "evacuaciones  ¿Hay algo que pueda hacer por mi cuenta para sentirme mejor? -- Sí. Debe reponer los líquidos corporales que perdió a causa de los vómitos y la diarrea:   Candace líquidos cuando pueda. Beber sorbos pequeños cada 15 a 30 minutos podría ser de ayuda. A medida que se sienta mejor, trate de beber más.   Cuando tiene muchos vómitos o diarrea, trujillo cuerpo pierde agua y sales. Beber líquidos que contienen algo de sales puede ayudar a reponer lo que el cuerpo ha perdido. Algunos ejemplos son las \"soluciones de rehidratación oral\", las bebidas deportivas y los caldos. Si mark mucha agua hai, también asegúrese de comer algo para ayudar a que el organismo mantenga el equilibrio adecuado de sales y agua.   Evite los líquidos con mucha azúcar, noe jugos o gaseosas. También evite el alcohol.   Coma cuando pueda. Si puede retener alimentos, es mejor comer sunitha magras, frutas, verduras, y pan y cereales integrales. Evite comer alimentos con mucha grasa o azúcar porque pueden empeorar los síntomas.  Si es adulto y wyatt de 65 años, tiene un nuevo ataque de diarrea y no tiene fiebre ni hay tara en ita evacuaciones, puede jacoby isiah medicina para detener la diarrea, noe la loperamida (robbie comercial: Imodium), kiko nuvia a dos días. Sima si es mayor de 65 años, tiene fiebre o hay tara en ita evacuaciones, no tome esas medicinas sin antes hablar con el médico.  Si tiene diabetes, rock vez deba revisarse el azúcar en tara más seguido hasta que se sienta mejor. Consulte a trujillo médico o enfermero al respecto.  ¿Sapna llamar al médico o enfermero? -- Llame al médico o enfermero si:   Tiene cualquier síntoma de deshidratación, noe mucho cansancio, sed, mareo o confusión   Tiene diarrea o vómitos kiko más de un par de laila   Vomita tara, tiene diarrea con tara o dolor intenso en el área del estómago   No almaraz podido beber nada en muchas horas   No almaraz tenido necesidad de orinar en las últimas 6 a 8 horas (kiko el " "día)  ¿Cómo se trata la gastroenteritis viral? -- La mayoría de las personas no necesita ningún tratamiento, porque ita síntomas mejoran por sí solos, clarissa las personas con deshidratación grave podrían necesitar tratamiento en el hospital. [place] parte del tratamiento, se reciben líquidos mediante un tubo seaman que se coloca en isiah vena, llamado \"vía intravenosa\".  Los médicos no tratan la gastroenteritis viral con antibióticos porque los antibióticos solo sirven para tratar infecciones causadas por bacterias, no virus.  ¿Se puede prevenir la gastroenteritis viral? -- A veces sí. Para disminuir las posibilidades de tener o esparcir la infección, lávese victorino las tolu con agua y jabón:   Después de ir al baño   Antes de comer   Antes de preparar la comida  Además, si cuida de un andrea que usa pañales, lávese victorino las tolu después de cambiarle los pañales. No le cambie los pañales cerca de donde cocina o come.  Todos los artículos se actualizan a medida que se descubre nueva evidencia y culmina nuestro proceso de evaluación por homólogos   Irene artículo se recuperó de UpToDate el: Mar 06, 2024.  Artículo 68714 Versión 15.0.es-419.1  Release: 32.2.4 - C32.64  © 2024 [employer], Inc. Todos los derechos reservados.  figura 1: El sistema digestivo     En irene dibujo se muestran los órganos del cuerpo que procesan los alimentos. El conjunto de estos órganos se llama \"sistema digestivo\" o \"tracto digestivo\". A medida que los alimentos se desplazan por irene sistema, el cuerpo absorbe nutrientes y agua.  Gráfico 74987 Versión 5.0  Exención de responsabilidad y uso de la información del consumidor   Descargo de responsabilidad: esta información generalizada es un resumen limitado de información sobre el diagnóstico, el tratamiento y/o los medicamentos. No pretende ser exhaustiva y se debe utilizar noe herramienta para ayudar al usuario a comprender y/o evaluar las posibles opciones de diagnóstico y tratamiento. No incluye toda " la información sobre afecciones, tratamientos, medicamentos, efectos secundarios o riesgos puedan ser aplicables a un paciente específico. No tiene el propósito de servir noe recomendación médica ni de sustituir la recomendación médica, el diagnóstico o el tratamiento de un profesional de atención médica que se base en el examen y la evaluación de irene profesional de la dimple respecto a las circunstancias específicas y únicas del paciente. Los pacientes deben hablar con un profesional de atención médica para obtener información completa sobre trujillo dimple, cuestiones médicas y opciones de tratamiento, incluidos los riesgos o los beneficios relacionados con el uso de medicamentos. Esta información no certifica que los tratamientos o medicamentos reno seguros, eficaces o estén aprobados para tratar a un paciente específico. [employer] y ita afiliados renuncian a cualquier garantía o responsabilidad relacionada con esta información o el uso de la misma.El uso de esta información está sujeto a las Condiciones de uso, disponibles en https://www.WordRakeuwer.com/en/know/clinical-effectiveness-terms. 2024© [employer], Inc. y ita afiliados y/o licenciantes. Todos los derechos reservados.  Copyright   © 2024 [employer], Inc. Todos los derechos reservados.

## 2024-12-27 ENCOUNTER — RESULTS FOLLOW-UP (OUTPATIENT)
Dept: FAMILY MEDICINE CLINIC | Facility: CLINIC | Age: 79
End: 2024-12-27

## 2024-12-27 ENCOUNTER — PATIENT OUTREACH (OUTPATIENT)
Dept: CASE MANAGEMENT | Facility: OTHER | Age: 79
End: 2024-12-27

## 2024-12-27 ENCOUNTER — TELEPHONE (OUTPATIENT)
Age: 79
End: 2024-12-27

## 2024-12-27 NOTE — PROGRESS NOTES
CMOC attempted 2nd outreach and received no answer and left pt & spouse a message to return call.    Will F/U within a week and route note to [facility].

## 2024-12-27 NOTE — TELEPHONE ENCOUNTER
Contacted patient in regards to Routine Referral in attempts to verify patient's needs of services and add patient to proper wait list. spoke with patient whom stated she is interested in med management. Pt is Greenlandic speaking only. Pt has been added to the proper wait list for med management. Closing referral.

## 2024-12-30 DIAGNOSIS — R73.03 PRE-DIABETES: ICD-10-CM

## 2024-12-30 DIAGNOSIS — E78.2 MIXED HYPERLIPIDEMIA: ICD-10-CM

## 2024-12-30 DIAGNOSIS — R51.9 INCREASED SEVERITY OF HEADACHES: ICD-10-CM

## 2024-12-30 DIAGNOSIS — F33.1 MODERATE EPISODE OF RECURRENT MAJOR DEPRESSIVE DISORDER (HCC): ICD-10-CM

## 2024-12-30 RX ORDER — EZETIMIBE 10 MG/1
TABLET ORAL
Qty: 30 TABLET | Refills: 5 | Status: SHIPPED | OUTPATIENT
Start: 2024-12-30

## 2024-12-30 NOTE — PROGRESS NOTES
Current Outpatient Medications:     Alcohol Swabs (B-D SINGLE USE SWABS REGULAR) PADS, USE AS DIRECTED USE RAPHAEL DELGADO, Disp: , Rfl:     atorvastatin (LIPITOR) 40 mg tablet, TAKE 1 TABLET (40 MG TOTAL) BY MOUTH DAILY AT BEDTIMETOME 1 TABLET (40 MG TOTAL) BY MOUTH DAILY AT BEDTIME, Disp: 100 tablet, Rfl: 1    Calcium Carbonate (Calcium 600) 1500 (600 Ca) MG TABS, TAKE 1 TABLET (600 MG TOTAL) BY MOUTH 2(TWO) TIMES A DAY WITH A MEAL, Disp: 180 tablet, Rfl: 1    citalopram (CeleXA) 10 mg tablet, Take 1 tablet (10 mg total) by mouth daily, Disp: 30 tablet, Rfl: 5    clotrimazole (LOTRIMIN) 1 % cream, APPLY TOPICALLY 2 (TWO) TIMES A DAYAPPLY TOPICALLY 2 (TWO) VECES A DAY, Disp: 30 g, Rfl: 0    ergocalciferol (VITAMIN D2) 50,000 units, Take 1 capsule (50,000 Units total) by mouth once a week, Disp: 12 capsule, Rfl: 3    exemestane (AROMASIN) 25 MG tablet, TAKE 1 TABLET (25 MG TOTAL) BY MOUTH DAILY, Disp: 90 tablet, Rfl: 3    ezetimibe (ZETIA) 10 mg tablet, TAKE 1 TABLET (10 MG TOTAL) BY MOUTH DAILY, Disp: 30 tablet, Rfl: 5    gabapentin (NEURONTIN) 300 mg capsule, TAKE 1 CAPSULE (300 MG TOTAL) BY MOUTH 2 (TWO) TIMES A DAYTOME 1 CAPSULE (300 MG TOTAL) BY MOUTH 2 (TWO) VECES A DAY, Disp: 180 capsule, Rfl: 1    Lancets (OneTouch Delica Plus Niuvvv72E) MISC, TEST 2-3 TIMES A DAY AS DIRECTED TEST 2-3 TIMES A DAY AS DIRECTED, Disp: , Rfl:     loperamide (IMODIUM) 2 mg capsule, Take 1 capsule (2 mg total) by mouth 4 (four) times a day as needed for diarrhea for up to 8 days, Disp: 30 capsule, Rfl: 0    metFORMIN (GLUCOPHAGE-XR) 500 mg 24 hr tablet, TAKE 1 TABLET (500 MG TOTAL) BY MOUTH DAILY WITH BREAKFAST, Disp: 90 tablet, Rfl: 1    mirtazapine (REMERON) 7.5 MG tablet, TAKE 1 TABLET (7.5 MG TOTAL) BY MOUTH DAILY AT BEDTIME ELVIS 1 TABLETA EACH NOSTRIL LA NOCHE PARA DORMIRTOME 1 TABLET (7.5 MG TOTAL) BY MOUTH DAILY AT BEDTIME ELVIS 1 TABLETA CADA FOSA NASAL LA NOCHE PARA DORMIR, Disp: 30 tablet, Rfl: 5    omeprazole  (PriLOSEC) 20 mg delayed release capsule, TAKE 1 CAPSULE (20 MG TOTAL) BY MOUTH DAILY BEFORE BREAKFAST, Disp: 30 capsule, Rfl: 5    ondansetron (ZOFRAN) 4 mg tablet, Take 1 tablet (4 mg total) by mouth every 8 (eight) hours as needed for nausea or vomiting for up to 8 days, Disp: 24 tablet, Rfl: 0    OneTouch Ultra test strip, TEST 2-3 TIMES A DAY AS DIRECTED TEST 2-3 TIMES A DAY AS DIRECTED, Disp: , Rfl:     topiramate (Topamax) 25 mg tablet, Take 1 tablet (25 mg total) by mouth 2 (two) times a day, Disp: 60 tablet, Rfl: 5    Vascepa 1 g CAPS, TAKE 2 CAPSULES (2 G TOTAL) BY MOUTH 2 (TWO) TIMES A DAY, Disp: , Rfl:

## 2024-12-31 ENCOUNTER — PATIENT OUTREACH (OUTPATIENT)
Dept: CASE MANAGEMENT | Facility: OTHER | Age: 79
End: 2024-12-31

## 2024-12-31 RX ORDER — METFORMIN HYDROCHLORIDE 500 MG/1
TABLET, EXTENDED RELEASE ORAL
Qty: 90 TABLET | Refills: 1 | Status: SHIPPED | OUTPATIENT
Start: 2024-12-31

## 2024-12-31 NOTE — PROGRESS NOTES
SOLIS MORAN reviewed patient's chart. CMOC outreached patient and spouse to assist with utility assistance and housing insecurity. CMOC attempted to outreach patient on 12/24 and 12/27 and voicemail's were left.    SOLIS MORAN placed outreach call to patient using [employer] Interpretor Burt #[id number]. Interpretor was unable to leave a voicemail as the call continued to ring. SOLIS MORAN to attempt another outreach call in one week if return call is not received prior.

## 2025-01-03 ENCOUNTER — PATIENT OUTREACH (OUTPATIENT)
Dept: CASE MANAGEMENT | Facility: OTHER | Age: 80
End: 2025-01-03

## 2025-01-03 NOTE — PROGRESS NOTES
CMOC outreach [employer] and spoke with  Brad ID#[id number] whom [facility] asked to do a conference call with pt spuse to complete pt housig applications.  Renee communicated to [facility] that pt voicemail came on and the  left a message for pt spouse to return OC call regarding housing application.      [facility] will send unable to reach letter and route note to [facility]  Will F/U by 1/17

## 2025-01-03 NOTE — LETTER
01/03/25    Estimado/a Enriqueta Garrett,    Mi Llamo Jd Brewster. Soy trabajador comunitario de la dimple de CARE MANAGEMENT [place] [id number] [facility] [id number] [phone]. Intenté comunicarme con usted por teléfono varias veces. Es importante que me llame all [phone], para que pueda ofrecerle ayuda con ita necesidades de atención médica.     Atentamente.         RACHEL Jolley.Ed    
no

## 2025-01-07 ENCOUNTER — PATIENT OUTREACH (OUTPATIENT)
Dept: CASE MANAGEMENT | Facility: OTHER | Age: 80
End: 2025-01-07

## 2025-01-07 NOTE — LETTER
01/07/25    Estimado/a Enriqueta Garrett,    Soy un coordinador de la atención médica en [facility]. Intentamos comunicarnos con usted por teléfono varias veces. Es importante que se comunique con nosotros al [phone] para que podamos ofrecerle ayuda con ita necesidades de atención médica.     Atentamente.         Shilpa BELL  Outpatient Social Work Care Manager

## 2025-01-07 NOTE — PROGRESS NOTES
SOLIS MORAN reviewed patient's chart. CMOC attempted to outreach patient on 1/3 and left a second voicemail. Unable to Reach letter was also sent.    SOLIS MORAN placed another outreach call to patient using [employer] Interpretor Justin #[id number]. Interpretor attempted to outreach patient's number twice, but was unable to leave a voicemail. SOLIS CM to mail out Unable to Reach letter to patient's address listed on chart. SOLIS MORAN close in two weeks if response is not received prior. IB reminder sent.

## 2025-01-17 ENCOUNTER — PATIENT OUTREACH (OUTPATIENT)
Dept: CASE MANAGEMENT | Facility: OTHER | Age: 80
End: 2025-01-17

## 2025-01-17 NOTE — PROGRESS NOTES
SOLIS CM mailed out Unable to Reach letter. In-basket reminder sent to close patient in two weeks if contact is not received prior.

## 2025-01-21 ENCOUNTER — PATIENT OUTREACH (OUTPATIENT)
Dept: CASE MANAGEMENT | Facility: OTHER | Age: 80
End: 2025-01-21

## 2025-01-21 NOTE — PROGRESS NOTES
CMOC received inbasket reminder that pt was sent unable to reach letter two weeks ago and SW was unable to reach pt .   CMOC will close pt and route note to [facility]..

## 2025-01-25 PROBLEM — K52.9 GASTROENTERITIS: Status: RESOLVED | Noted: 2024-12-26 | Resolved: 2025-01-25

## 2025-01-31 ENCOUNTER — PATIENT OUTREACH (OUTPATIENT)
Dept: CASE MANAGEMENT | Facility: OTHER | Age: 80
End: 2025-01-31

## 2025-01-31 NOTE — PROGRESS NOTES
SOLIS MORAN received in-basket patient reminder to close patient. Response has not been received since Unable to Reach letter was mailed out. SOLIS MORAN resolved episode and removed self from care team.

## 2025-02-07 ENCOUNTER — RA CDI HCC (OUTPATIENT)
Dept: OTHER | Facility: HOSPITAL | Age: 80
End: 2025-02-07

## 2025-02-17 ENCOUNTER — OFFICE VISIT (OUTPATIENT)
Dept: FAMILY MEDICINE CLINIC | Facility: CLINIC | Age: 80
End: 2025-02-17
Payer: COMMERCIAL

## 2025-02-17 VITALS
OXYGEN SATURATION: 98 % | WEIGHT: 181 LBS | SYSTOLIC BLOOD PRESSURE: 120 MMHG | DIASTOLIC BLOOD PRESSURE: 76 MMHG | RESPIRATION RATE: 16 BRPM | BODY MASS INDEX: 39.05 KG/M2 | HEIGHT: 57 IN | HEART RATE: 92 BPM | TEMPERATURE: 99.2 F

## 2025-02-17 DIAGNOSIS — E66.01 OBESITY, MORBID (HCC): ICD-10-CM

## 2025-02-17 DIAGNOSIS — J11.1 INFLUENZA: Primary | ICD-10-CM

## 2025-02-17 DIAGNOSIS — F02.80 LATE ONSET ALZHEIMER'S DEMENTIA WITHOUT BEHAVIORAL DISTURBANCE (HCC): ICD-10-CM

## 2025-02-17 DIAGNOSIS — G30.1 LATE ONSET ALZHEIMER'S DEMENTIA WITHOUT BEHAVIORAL DISTURBANCE (HCC): ICD-10-CM

## 2025-02-17 DIAGNOSIS — F33.1 MODERATE EPISODE OF RECURRENT MAJOR DEPRESSIVE DISORDER (HCC): ICD-10-CM

## 2025-02-17 PROCEDURE — G2211 COMPLEX E/M VISIT ADD ON: HCPCS | Performed by: FAMILY MEDICINE

## 2025-02-17 PROCEDURE — 99214 OFFICE O/P EST MOD 30 MIN: CPT | Performed by: FAMILY MEDICINE

## 2025-02-17 RX ORDER — OSELTAMIVIR PHOSPHATE 75 MG/1
75 CAPSULE ORAL EVERY 12 HOURS SCHEDULED
Qty: 10 CAPSULE | Refills: 0 | Status: SHIPPED | OUTPATIENT
Start: 2025-02-17 | End: 2025-02-22

## 2025-02-17 RX ORDER — BENZONATATE 100 MG/1
100 CAPSULE ORAL 3 TIMES DAILY PRN
Qty: 20 CAPSULE | Refills: 0 | Status: SHIPPED | OUTPATIENT
Start: 2025-02-17

## 2025-02-17 NOTE — ASSESSMENT & PLAN NOTE
She is a stable, functional, need to repeat dementia test since this can be confusing with depression responses and performance.  Follow-up in 3 months and  repeat mental exam.

## 2025-02-17 NOTE — ASSESSMENT & PLAN NOTE
- Patient reports significant worsening of depression symptoms  - Expressing urgent need to see psychiatrist  - Previous psychiatric referral was provided but not acted upon  - New psychiatric referral to be processed    Orders:    Ambulatory referral to Psych Services; Future

## 2025-02-17 NOTE — PROGRESS NOTES
Name: Enriqueta Garrett      : 1945      MRN: [id number]  Encounter Provider: Brody Taylor MD  Encounter Date: 2025   Encounter department: [facility] C[facility]    Assessment & Plan  Influenza    - Presenting with fatigue and cough  - No fever reported  - Consider viral etiology  - Discussed supportive care measuresMost likely. Traitement given Empirically.  Benefits overweight the risk of the treatment.  Orders:    oseltamivir (TAMIFLU) 75 mg capsule; Take 1 capsule (75 mg total) by mouth every 12 (twelve) hours for 5 days    benzonatate (TESSALON PERLES) 100 mg capsule; Take 1 capsule (100 mg total) by mouth 3 (three) times a day as needed for cough    Late onset Alzheimer's dementia without behavioral disturbance (HCC)  She is a stable, functional, need to repeat dementia test since this can be confusing with depression responses and performance.  Follow-up in 3 months and  repeat mental exam.       Obesity, morbid (HCC)  We discussed the initial approach to the obese patient who desires to lose weight. Patients should diet and exercise, as the NIH Expert Panel recommended in 2017. Combining a reduced-calorie diet and exercise is more productive than either alone. Additional weight loss may be possible with some medication regimens. The initial goal of weight loss therapy (diet and exercise) is a 10% reduction in body weight over six months.         Moderate episode of recurrent major depressive disorder (HCC)    - Patient reports significant worsening of depression symptoms  - Expressing urgent need to see psychiatrist  - Previous psychiatric referral was provided but not acted upon  - New psychiatric referral to be processed    Orders:    Ambulatory referral to Psych Services; Future         Follow-up Visit for Depression and Cough            Subjective:  79-year-old Kiswahili-speaking female presents with complaints of significant fatigue and cough. Patient  reports spending most time in bed and lacking motivation to get up. Denies fever. Reports significant worsening of depression symptoms with feelings of desperation and desire to cry. Patient expresses urgent need to see a psychiatrist. When questioned about previous referral, it was confirmed that patient did not follow through with making the appointment.  Allergies   Allergen Reactions    Chocolate - Food Allergy     Chocolate Flavor - Food Allergy     Ibuprofen Swelling    Pork-Derived Products - Food Allergy        Patient Active Problem List   Diagnosis    Benign paroxysmal positional vertigo    Carpal tunnel syndrome    Chronic bilateral low back pain without sciatica    Chronic pain disorder    Physical deconditioning    Prediabetes    Spinal stenosis of lumbar region with radiculopathy    Age-related incipient cataract of both eyes    Trigger middle finger of left hand    Complex regional pain syndrome type 1 affecting both hands    Use of aromatase inhibitors    Osteopenia of multiple sites    History of left breast cancer    Impaired mobility and ADLs    Mixed hyperlipidemia    Chronic midline thoracic back pain    Gastroesophageal reflux disease without esophagitis    Late onset Alzheimer's dementia without behavioral disturbance (HCC)    Trigger middle finger of right hand    Moderate episode of recurrent major depressive disorder (HCC)    Poor appetite    Obesity, morbid (HCC)    Dysuria         Current Outpatient Medications:     benzonatate (TESSALON PERLES) 100 mg capsule, Take 1 capsule (100 mg total) by mouth 3 (three) times a day as needed for cough, Disp: 20 capsule, Rfl: 0    oseltamivir (TAMIFLU) 75 mg capsule, Take 1 capsule (75 mg total) by mouth every 12 (twelve) hours for 5 days, Disp: 10 capsule, Rfl: 0    Alcohol Swabs (B-D SINGLE USE SWABS REGULAR) PADS, USE AS DIRECTED USE RAPHAEL DELGADO, Disp: , Rfl:     atorvastatin (LIPITOR) 40 mg tablet, TAKE 1 TABLET (40 MG TOTAL) BY MOUTH DAILY AT  BEDTIMETOME 1 TABLET (40 MG TOTAL) BY MOUTH DAILY AT BEDTIME, Disp: 100 tablet, Rfl: 1    Calcium Carbonate (Calcium 600) 1500 (600 Ca) MG TABS, TAKE 1 TABLET (600 MG TOTAL) BY MOUTH 2(TWO) TIMES A DAY WITH A MEAL, Disp: 180 tablet, Rfl: 1    citalopram (CeleXA) 10 mg tablet, Take 1 tablet (10 mg total) by mouth daily, Disp: 30 tablet, Rfl: 5    clotrimazole (LOTRIMIN) 1 % cream, APPLY TOPICALLY 2 (TWO) TIMES A DAYAPPLY TOPICALLY 2 (TWO) VECES A DAY, Disp: 30 g, Rfl: 0    ergocalciferol (VITAMIN D2) 50,000 units, Take 1 capsule (50,000 Units total) by mouth once a week, Disp: 12 capsule, Rfl: 3    exemestane (AROMASIN) 25 MG tablet, TAKE 1 TABLET (25 MG TOTAL) BY MOUTH DAILY, Disp: 90 tablet, Rfl: 3    ezetimibe (ZETIA) 10 mg tablet, TAKE 1 TABLET (10 MG TOTAL) BY MOUTH DAILY, Disp: 30 tablet, Rfl: 5    gabapentin (NEURONTIN) 300 mg capsule, TAKE 1 CAPSULE (300 MG TOTAL) BY MOUTH 2 (TWO) TIMES A DAYTOME 1 CAPSULE (300 MG TOTAL) BY MOUTH 2 (TWO) VECES A DAY, Disp: 180 capsule, Rfl: 1    Lancets (OneTouch Delica Plus [id number]) MISC, TEST 2-3 TIMES A DAY AS DIRECTED TEST 2-3 TIMES A DAY AS DIRECTED, Disp: , Rfl:     metFORMIN (GLUCOPHAGE-XR) 500 mg 24 hr tablet, TAKE 1 TABLET (500 MG TOTAL) BY MOUTH DAILY WITH BREAKFASTTOME 1 TABLETA (500 MG TOTAL) VIA ORAL CON EL DESAYUNO, Disp: 90 tablet, Rfl: 1    mirtazapine (REMERON) 7.5 MG tablet, TAKE 1 TABLET (7.5 MG TOTAL) BY MOUTH DAILY AT BEDTIME ELVIS 1 TABLETA EACH NOSTRIL LA NOCHE PARA DORMIRTOME 1 TABLET (7.5 MG TOTAL) BY MOUTH DAILY AT BEDTIME ELVIS 1 TABLETA CADA FOSA NASAL LA NOCHE PARA DORMIR, Disp: 30 tablet, Rfl: 5    omeprazole (PriLOSEC) 20 mg delayed release capsule, TAKE 1 CAPSULE (20 MG TOTAL) BY MOUTH DAILY BEFORE BREAKFAST, Disp: 30 capsule, Rfl: 5    ondansetron (ZOFRAN) 4 mg tablet, Take 1 tablet (4 mg total) by mouth every 8 (eight) hours as needed for nausea or vomiting for up to 8 days, Disp: 24 tablet, Rfl: 0    OneTouch Ultra test strip, TEST 2-3  "TIMES A DAY AS DIRECTED TEST 2-3 TIMES A DAY AS DIRECTED, Disp: , Rfl:     topiramate (Topamax) 25 mg tablet, Take 1 tablet (25 mg total) by mouth 2 (two) times a day, Disp: 60 tablet, Rfl: 5    ROS  - Affect: Depressed  - Mood: Significantly depressed with expressed feelings of hopelessness  - No apparent acute distress  Respiratory:  - Cough present  - No fever reported  Objective:  /76 (BP Location: Left arm, Patient Position: Sitting, Cuff Size: Standard)   Pulse 92   Temp 99.2 °F (37.3 °C) (Tympanic)   Resp 16   Ht 4' 9\" (1.448 m)   Wt 82.1 kg (181 lb)   SpO2 98%   BMI 39.17 kg/m²     The patient appears to without distress; HEENT is unremarkable, the neck has no masses or enlarged lymph nodes. Respiratory effort is normal. Lung fields are clear; the heart has a normal rhythm without murmurs. Abdomen soft without abnormalities;  Neurological with no focal deficits.        Brody Taylor MD   [facility] CARE [facility]"

## 2025-02-17 NOTE — ASSESSMENT & PLAN NOTE
We discussed the initial approach to the obese patient who desires to lose weight. Patients should diet and exercise, as the NIH Expert Panel recommended in 2017. Combining a reduced-calorie diet and exercise is more productive than either alone. Additional weight loss may be possible with some medication regimens. The initial goal of weight loss therapy (diet and exercise) is a 10% reduction in body weight over six months.

## 2025-02-20 DIAGNOSIS — K21.9 GASTROESOPHAGEAL REFLUX DISEASE WITHOUT ESOPHAGITIS: ICD-10-CM

## 2025-03-12 ENCOUNTER — HOSPITAL ENCOUNTER (OUTPATIENT)
Dept: BONE DENSITY | Facility: CLINIC | Age: 80
Discharge: HOME | End: 2025-03-12
Payer: COMMERCIAL

## 2025-03-12 DIAGNOSIS — M85.89 OSTEOPENIA OF MULTIPLE SITES: ICD-10-CM

## 2025-03-12 PROCEDURE — 77080 DXA BONE DENSITY AXIAL: CPT

## 2025-03-14 ENCOUNTER — RESULTS FOLLOW-UP (OUTPATIENT)
Dept: FAMILY MEDICINE CLINIC | Facility: CLINIC | Age: 80
End: 2025-03-14

## 2025-03-14 NOTE — RESULT ENCOUNTER NOTE
Please call the patient, the DEXA scan showed that she has osteopenia.  The main recommendation is to take calcium with vitamin D that she can purchase over-the-counter, work at least half hour every day.

## 2025-03-17 DIAGNOSIS — K52.9 GASTROENTERITIS: ICD-10-CM

## 2025-03-19 RX ORDER — ONDANSETRON 4 MG/1
TABLET, FILM COATED ORAL
Qty: 24 TABLET | Refills: 0 | Status: SHIPPED | OUTPATIENT
Start: 2025-03-19

## 2025-03-20 DIAGNOSIS — M85.89 OSTEOPENIA OF MULTIPLE SITES: ICD-10-CM

## 2025-03-20 DIAGNOSIS — G56.03 BILATERAL CARPAL TUNNEL SYNDROME: ICD-10-CM

## 2025-03-20 RX ORDER — GABAPENTIN 300 MG/1
CAPSULE ORAL
Qty: 180 CAPSULE | Refills: 1 | Status: SHIPPED | OUTPATIENT
Start: 2025-03-20

## 2025-03-21 RX ORDER — ERGOCALCIFEROL 1.25 MG/1
CAPSULE, LIQUID FILLED ORAL
Qty: 12 CAPSULE | Refills: 3 | Status: SHIPPED | OUTPATIENT
Start: 2025-03-21

## 2025-03-22 DIAGNOSIS — R51.9 INCREASED SEVERITY OF HEADACHES: ICD-10-CM

## 2025-03-22 DIAGNOSIS — F33.1 MODERATE EPISODE OF RECURRENT MAJOR DEPRESSIVE DISORDER (HCC): ICD-10-CM

## 2025-03-24 RX ORDER — TOPIRAMATE 25 MG/1
TABLET, FILM COATED ORAL
Qty: 60 TABLET | Refills: 5 | OUTPATIENT
Start: 2025-03-24

## 2025-03-24 RX ORDER — CITALOPRAM HYDROBROMIDE 10 MG/1
10 TABLET ORAL DAILY
Qty: 30 TABLET | Refills: 5 | Status: SHIPPED | OUTPATIENT
Start: 2025-03-24

## 2025-03-24 RX ORDER — TOPIRAMATE 25 MG/1
TABLET, FILM COATED ORAL
Qty: 60 TABLET | Refills: 5 | Status: SHIPPED | OUTPATIENT
Start: 2025-03-24

## 2025-04-14 DIAGNOSIS — K52.9 GASTROENTERITIS: ICD-10-CM

## 2025-04-16 RX ORDER — ONDANSETRON 4 MG/1
TABLET, FILM COATED ORAL
Qty: 24 TABLET | Refills: 0 | Status: SHIPPED | OUTPATIENT
Start: 2025-04-16

## 2025-04-21 RX ORDER — BLOOD SUGAR DIAGNOSTIC
STRIP MISCELLANEOUS
Qty: 100 EACH | OUTPATIENT
Start: 2025-04-21

## 2025-04-21 RX ORDER — LANCETS 33 GAUGE
EACH MISCELLANEOUS
Qty: 100 EACH | OUTPATIENT
Start: 2025-04-21

## 2025-04-21 RX ORDER — ISOPROPYL ALCOHOL 0.75 G/1
SWAB TOPICAL
Qty: 100 EACH | OUTPATIENT
Start: 2025-04-21

## 2025-05-11 DIAGNOSIS — R73.03 PRE-DIABETES: ICD-10-CM

## 2025-05-11 RX ORDER — METFORMIN HYDROCHLORIDE 500 MG/1
TABLET, EXTENDED RELEASE ORAL
Qty: 90 TABLET | Refills: 1 | Status: SHIPPED | OUTPATIENT
Start: 2025-05-11

## 2025-05-15 ENCOUNTER — TELEPHONE (OUTPATIENT)
Age: 80
End: 2025-05-15

## 2025-05-15 DIAGNOSIS — E78.2 MIXED HYPERLIPIDEMIA: ICD-10-CM

## 2025-05-15 RX ORDER — ATORVASTATIN CALCIUM 40 MG/1
40 TABLET, FILM COATED ORAL
Qty: 100 TABLET | Refills: 1 | Status: SHIPPED | OUTPATIENT
Start: 2025-05-15

## 2025-05-15 NOTE — TELEPHONE ENCOUNTER
Contacted patient off of Medication Management  wait list to verify needs of services in attempts to update list with patient preferences.  LVM for patient to contact intake dept  in regards to wait list maintenance.     Interpretation line: Leanne [id number]    1st attempt  Upon call back, verify needs of services, information, and preferences

## 2025-06-05 DIAGNOSIS — Z17.0 MALIGNANT NEOPLASM OF LOWER-INNER QUADRANT OF LEFT BREAST IN FEMALE, ESTROGEN RECEPTOR POSITIVE (HCC): ICD-10-CM

## 2025-06-05 DIAGNOSIS — C50.312 MALIGNANT NEOPLASM OF LOWER-INNER QUADRANT OF LEFT BREAST IN FEMALE, ESTROGEN RECEPTOR POSITIVE (HCC): ICD-10-CM

## 2025-06-05 RX ORDER — EXEMESTANE 25 MG/1
TABLET ORAL
Qty: 90 TABLET | Refills: 3 | Status: SHIPPED | OUTPATIENT
Start: 2025-06-05

## 2025-06-10 DIAGNOSIS — F33.1 MODERATE EPISODE OF RECURRENT MAJOR DEPRESSIVE DISORDER (HCC): ICD-10-CM

## 2025-06-10 RX ORDER — MIRTAZAPINE 7.5 MG/1
TABLET, FILM COATED ORAL
Qty: 30 TABLET | Refills: 5 | Status: SHIPPED | OUTPATIENT
Start: 2025-06-10

## 2025-06-13 DIAGNOSIS — E78.2 MIXED HYPERLIPIDEMIA: ICD-10-CM

## 2025-06-13 RX ORDER — EZETIMIBE 10 MG/1
10 TABLET ORAL DAILY
Qty: 30 TABLET | Refills: 5 | Status: SHIPPED | OUTPATIENT
Start: 2025-06-13

## 2025-07-19 DIAGNOSIS — F33.1 MODERATE EPISODE OF RECURRENT MAJOR DEPRESSIVE DISORDER (HCC): ICD-10-CM

## 2025-07-21 RX ORDER — CITALOPRAM HYDROBROMIDE 10 MG/1
10 TABLET ORAL DAILY
Qty: 30 TABLET | Refills: 5 | Status: SHIPPED | OUTPATIENT
Start: 2025-07-21

## 2025-07-31 ENCOUNTER — TELEPHONE (OUTPATIENT)
Dept: FAMILY MEDICINE CLINIC | Facility: CLINIC | Age: 80
End: 2025-07-31

## 2025-08-07 DIAGNOSIS — K21.9 GASTROESOPHAGEAL REFLUX DISEASE WITHOUT ESOPHAGITIS: ICD-10-CM

## 2025-08-08 RX ORDER — OMEPRAZOLE 20 MG/1
CAPSULE, DELAYED RELEASE ORAL
Qty: 30 CAPSULE | Refills: 5 | Status: SHIPPED | OUTPATIENT
Start: 2025-08-08

## (undated) DEVICE — ASSEMBLY DISPOSABLE BLADE CARPAL TUNNEL RELEASE SYNDROME

## (undated) DEVICE — NEEDLE BLUNT 18 G X 1 1/2IN

## (undated) DEVICE — SYRINGE 30ML LL

## (undated) DEVICE — GARMENT,MEDLINE,DVT,INT,CALF,FOAM,MED: Brand: MEDLINE

## (undated) DEVICE — NEEDLE 30 G X 1/2

## (undated) DEVICE — ACE WRAP 4 IN STERILE

## (undated) DEVICE — GAUZE SPONGES,16 PLY: Brand: CURITY

## (undated) DEVICE — STOCKINETTE 2P PREROLLD 6X60

## (undated) DEVICE — STERILE POLYISOPRENE POWDER-FREE SURGICAL GLOVES: Brand: PROTEXIS

## (undated) DEVICE — STERILE POLYISOPRENE POWDER-FREE SURGICAL GLOVES WITH EMOLLIENT COATING: Brand: PROTEXIS

## (undated) DEVICE — OCCLUSIVE GAUZE STRIP,3% BISMUTH TRIBROMOPHENATE IN PETROLATUM BLEND: Brand: XEROFORM

## (undated) DEVICE — STOCKINETTE: Brand: DEROYAL

## (undated) DEVICE — TUBING SUCTION 5MM X 12 FT

## (undated) DEVICE — CUFF TOURNIQUET DISP SZ18

## (undated) DEVICE — SUPER SPONGES,MEDIUM: Brand: DERMACEA

## (undated) DEVICE — NEEDLE 25G X 1 1/2

## (undated) DEVICE — BETHLEHEM UNIVERSAL  MIONR EXT: Brand: CARDINAL HEALTH

## (undated) DEVICE — INTENDED FOR TISSUE SEPARATION, AND OTHER PROCEDURES THAT REQUIRE A SHARP SURGICAL BLADE TO PUNCTURE OR CUT.: Brand: BARD-PARKER SAFETY BLADES SIZE 15, STERILE

## (undated) DEVICE — KERLIX BANDAGE ROLL: Brand: KERLIX

## (undated) DEVICE — SUT ETHILON 5-0 PS-2 18 IN 1666H

## (undated) DEVICE — READY WET SKIN SCRUB TRAY-LF: Brand: MEDLINE INDUSTRIES, INC.

## (undated) DEVICE — SUT ETHILON 5-0 PS-2 18 IN 1666G